# Patient Record
Sex: MALE | Race: WHITE | NOT HISPANIC OR LATINO | Employment: OTHER | URBAN - METROPOLITAN AREA
[De-identification: names, ages, dates, MRNs, and addresses within clinical notes are randomized per-mention and may not be internally consistent; named-entity substitution may affect disease eponyms.]

---

## 2017-01-06 DIAGNOSIS — J44.9 CHRONIC OBSTRUCTIVE PULMONARY DISEASE, UNSPECIFIED COPD TYPE (HCC): ICD-10-CM

## 2017-01-06 NOTE — TELEPHONE ENCOUNTER
Have we ever prescribed this med? No.  If yes, what date? To replace Anoro Ellipta, not covered    Last OV: 11/17/2016    Next OV: no pending    DX: COPD    Medications: Stiolto

## 2017-02-22 ENCOUNTER — HOSPITAL ENCOUNTER (OUTPATIENT)
Dept: LAB | Facility: MEDICAL CENTER | Age: 72
End: 2017-02-22
Attending: PHYSICIAN ASSISTANT
Payer: MEDICARE

## 2017-02-22 LAB
ALBUMIN SERPL BCP-MCNC: 4.1 G/DL (ref 3.2–4.9)
ALBUMIN/GLOB SERPL: 1.4 G/DL
ALP SERPL-CCNC: 63 U/L (ref 30–99)
ALT SERPL-CCNC: 14 U/L (ref 2–50)
ANION GAP SERPL CALC-SCNC: 5 MMOL/L (ref 0–11.9)
AST SERPL-CCNC: 16 U/L (ref 12–45)
BASOPHILS # BLD AUTO: 0.03 K/UL (ref 0–0.12)
BASOPHILS NFR BLD AUTO: 0.4 % (ref 0–1.8)
BILIRUB SERPL-MCNC: 0.6 MG/DL (ref 0.1–1.5)
BUN SERPL-MCNC: 21 MG/DL (ref 8–22)
CALCIUM SERPL-MCNC: 9.3 MG/DL (ref 8.5–10.5)
CHLORIDE SERPL-SCNC: 108 MMOL/L (ref 96–112)
CO2 SERPL-SCNC: 27 MMOL/L (ref 20–33)
CREAT SERPL-MCNC: 1 MG/DL (ref 0.5–1.4)
EOSINOPHIL # BLD: 0.06 K/UL (ref 0–0.51)
EOSINOPHIL NFR BLD AUTO: 0.8 % (ref 0–6.9)
ERYTHROCYTE [DISTWIDTH] IN BLOOD BY AUTOMATED COUNT: 47.2 FL (ref 35.9–50)
EST. AVERAGE GLUCOSE BLD GHB EST-MCNC: 128 MG/DL
GLOBULIN SER CALC-MCNC: 3 G/DL (ref 1.9–3.5)
GLUCOSE SERPL-MCNC: 102 MG/DL (ref 65–99)
HBA1C MFR BLD: 6.1 % (ref 0–5.6)
HCT VFR BLD AUTO: 52.6 % (ref 42–52)
HCV AB S/CO SERPL IA: NEGATIVE
HGB BLD-MCNC: 17.9 G/DL (ref 14–18)
IMM GRANULOCYTES # BLD AUTO: 0.02 K/UL (ref 0–0.11)
IMM GRANULOCYTES NFR BLD AUTO: 0.3 % (ref 0–0.9)
LYMPHOCYTES # BLD: 1.49 K/UL (ref 1–4.8)
LYMPHOCYTES NFR BLD AUTO: 20.2 % (ref 22–41)
MCH RBC QN AUTO: 32.8 PG (ref 27–33)
MCHC RBC AUTO-ENTMCNC: 34 G/DL (ref 33.7–35.3)
MCV RBC AUTO: 96.5 FL (ref 81.4–97.8)
MONOCYTES # BLD: 0.56 K/UL (ref 0–0.85)
MONOCYTES NFR BLD AUTO: 7.6 % (ref 0–13.4)
NEUTROPHILS # BLD: 5.21 K/UL (ref 1.82–7.42)
NEUTROPHILS NFR BLD AUTO: 70.7 % (ref 44–72)
NRBC # BLD AUTO: 0 K/UL
NRBC BLD-RTO: 0 /100 WBC
PLATELET # BLD AUTO: 139 K/UL (ref 164–446)
PMV BLD AUTO: 12.1 FL (ref 9–12.9)
POTASSIUM SERPL-SCNC: 4.7 MMOL/L (ref 3.6–5.5)
PROT SERPL-MCNC: 7.1 G/DL (ref 6–8.2)
RBC # BLD AUTO: 5.45 M/UL (ref 4.7–6.1)
SODIUM SERPL-SCNC: 140 MMOL/L (ref 135–145)
WBC # BLD AUTO: 7.4 K/UL (ref 4.8–10.8)

## 2017-02-22 PROCEDURE — 85025 COMPLETE CBC W/AUTO DIFF WBC: CPT

## 2017-02-22 PROCEDURE — 83036 HEMOGLOBIN GLYCOSYLATED A1C: CPT

## 2017-02-22 PROCEDURE — 80053 COMPREHEN METABOLIC PANEL: CPT

## 2017-02-22 PROCEDURE — 86803 HEPATITIS C AB TEST: CPT

## 2017-02-22 PROCEDURE — 36415 COLL VENOUS BLD VENIPUNCTURE: CPT

## 2017-02-23 ENCOUNTER — HOSPITAL ENCOUNTER (OUTPATIENT)
Facility: MEDICAL CENTER | Age: 72
End: 2017-02-23
Attending: FAMILY MEDICINE
Payer: MEDICARE

## 2017-02-23 ENCOUNTER — HOSPITAL ENCOUNTER (OUTPATIENT)
Facility: MEDICAL CENTER | Age: 72
End: 2017-02-23
Attending: PHYSICIAN ASSISTANT
Payer: MEDICARE

## 2017-02-23 PROCEDURE — 82274 ASSAY TEST FOR BLOOD FECAL: CPT

## 2017-02-24 LAB — HEMOCCULT STL QL IA: NEGATIVE

## 2017-03-13 ENCOUNTER — HOSPITAL ENCOUNTER (OUTPATIENT)
Dept: RADIOLOGY | Facility: MEDICAL CENTER | Age: 72
End: 2017-03-13
Attending: PHYSICIAN ASSISTANT
Payer: MEDICARE

## 2017-03-13 DIAGNOSIS — Z87.891 PERSONAL HISTORY OF TOBACCO USE, PRESENTING HAZARDS TO HEALTH: ICD-10-CM

## 2017-03-13 DIAGNOSIS — I71.40 ABDOMINAL AORTIC ANEURYSM (AAA) WITHOUT RUPTURE (HCC): ICD-10-CM

## 2017-03-13 PROCEDURE — 76775 US EXAM ABDO BACK WALL LIM: CPT

## 2017-04-27 ENCOUNTER — OFFICE VISIT (OUTPATIENT)
Dept: PULMONOLOGY | Facility: HOSPICE | Age: 72
End: 2017-04-27
Payer: MEDICARE

## 2017-04-27 VITALS
TEMPERATURE: 97.3 F | WEIGHT: 164 LBS | HEART RATE: 82 BPM | DIASTOLIC BLOOD PRESSURE: 70 MMHG | BODY MASS INDEX: 24.86 KG/M2 | RESPIRATION RATE: 16 BRPM | OXYGEN SATURATION: 91 % | SYSTOLIC BLOOD PRESSURE: 110 MMHG | HEIGHT: 68 IN

## 2017-04-27 DIAGNOSIS — J44.9 CHRONIC OBSTRUCTIVE PULMONARY DISEASE, UNSPECIFIED COPD TYPE (HCC): ICD-10-CM

## 2017-04-27 PROCEDURE — 4040F PNEUMOC VAC/ADMIN/RCVD: CPT | Performed by: NURSE PRACTITIONER

## 2017-04-27 PROCEDURE — 1101F PT FALLS ASSESS-DOCD LE1/YR: CPT | Mod: 8P | Performed by: NURSE PRACTITIONER

## 2017-04-27 PROCEDURE — 3017F COLORECTAL CA SCREEN DOC REV: CPT | Mod: 8P | Performed by: NURSE PRACTITIONER

## 2017-04-27 PROCEDURE — 99213 OFFICE O/P EST LOW 20 MIN: CPT | Performed by: NURSE PRACTITIONER

## 2017-04-27 PROCEDURE — G8432 DEP SCR NOT DOC, RNG: HCPCS | Performed by: NURSE PRACTITIONER

## 2017-04-27 PROCEDURE — 4004F PT TOBACCO SCREEN RCVD TLK: CPT | Mod: 8P | Performed by: NURSE PRACTITIONER

## 2017-04-27 PROCEDURE — G8420 CALC BMI NORM PARAMETERS: HCPCS | Performed by: NURSE PRACTITIONER

## 2017-04-27 NOTE — PATIENT INSTRUCTIONS
1. Continue Stiolto 2 inhalations daily and Ventolin 1-2 puffs every 4 hours as needed for shortness of breath.  2. Cessation encouraged.  3. Pulmonary function tests at next visit in 3 months.

## 2017-04-27 NOTE — MR AVS SNAPSHOT
"        Corona Bean   2017 11:20 AM   Office Visit   MRN: [id number]    Department:  [facility]   Dept Phone:  [phone]    Description:  Male : 1945   Provider:  BRISA Cabrales           Reason for Visit     Follow-Up 6 Mth Follow Up/ Pft not scheduled      Allergies as of 2017     No Known Allergies      You were diagnosed with     Chronic obstructive pulmonary disease, unspecified COPD type (CMS-HCC)   [[id number]]         Vital Signs     Blood Pressure Pulse Temperature Respirations Height Weight    110/70 mmHg 82 36.3 °C (97.3 °F) 16 1.727 m (5' 7.99\") 74.39 kg (164 lb)    Body Mass Index Oxygen Saturation Smoking Status             24.94 kg/m2 91% Current Every Day Smoker         Basic Information     Date Of Birth Sex Race Ethnicity Preferred Language    1945 Male White Non- English      Your appointments     Aug 01, 2017  1:00 PM   Pulmonary Function Test with PFT-RM2   [facility] (--)    [address] [place] [address] [address]  [place] [id number] [phone]            Aug 01, 2017  2:00 PM   Established Patient Pul with BRISA Cabrales   [facility] (--)    [address]  [facility] [address]  [place] [id number] [phone]              Problem List              ICD-10-CM Priority Class Noted - Resolved    COPD (chronic obstructive pulmonary disease) (CMS-HCC) J44.9   2017 - Present      Health Maintenance        Date Due Completion Dates    IMM DTaP/Tdap/Td Vaccine (1 - Tdap) 1965 ---    COLONOSCOPY 1996 ---    IMM ZOSTER VACCINE 2006 ---    IMM PNEUMOCOCCAL 65+ (ADULT) LOW/MEDIUM RISK SERIES (1 of 2 - PCV13) 2011 ---            Current Immunizations     13-VALENT PCV PREVNAR 2/10/2017    Influenza TIV (IM) 2016    Pneumococcal polysaccharide vaccine (PPSV-23) 2012      Below and/or attached are the medications your provider expects you to take. Review all of your home medications and newly " ordered medications with your provider and/or pharmacist. Follow medication instructions as directed by your provider and/or pharmacist. Please keep your medication list with you and share with your provider. Update the information when medications are discontinued, doses are changed, or new medications (including over-the-counter products) are added; and carry medication information at all times in the event of emergency situations     Allergies:  No Known Allergies          Medications  Valid as of: April 27, 2017 - 11:33 AM    Generic Name Brand Name Tablet Size Instructions for use    Albuterol Sulfate (Aero Soln) albuterol 108 (90 BASE) MCG/ACT Inhale 2 Puffs by mouth every 6 hours as needed for Shortness of Breath.        B Complex Vitamins   Take  by mouth.        Calcium Carbonate-Vit D-Min   Take  by mouth.        Doxycycline Hyclate (Cap) Doxycycline Hyclate 20 MG Take  by mouth.        MetFORMIN HCl (Tab) GLUCOPHAGE 500 MG Take 500 mg by mouth 2 times a day, with meals.        Pediatric Multivitamins-Fl   Take  by mouth.        Timolol Hemihydrate (Solution) BETIMOL 0.25 % Place 1 Drop in both eyes 2 times a day.        Tiotropium Bromide-Olodaterol (Aero Soln) Tiotropium Bromide-Olodaterol 2.5-2.5 MCG/ACT Inhale 2 Puffs by mouth every day. Indications: Rinse mouth after each use        Umeclidinium-Vilanterol (AEROSOL POWDER, BREATH ACTIVATED) Umeclidinium-Vilanterol 62.5-25 MCG/INH Take 1 Inhalation by mouth every day.        Vitamins A & D   Take  by mouth.        .                 Medicines prescribed today were sent to:     [facility]/PHARMACY #[id number] - [place], [place] - [address] [place] [id number]    Phone: [phone] Fax: [phone]    Open 24 Hours?: No      Medication refill instructions:       If your prescription bottle indicates you have medication refills left, it is not necessary to call your provider’s office. Please contact your pharmacy and  they will refill your medication.    If your prescription bottle indicates you do not have any refills left, you may request refills at any time through one of the following ways: The online [employer] system (except Urgent Care), by calling your provider’s office, or by asking your pharmacy to contact your provider’s office with a refill request. Medication refills are processed only during regular business hours and may not be available until the next business day. Your provider may request additional information or to have a follow-up visit with you prior to refilling your medication.   *Please Note: Medication refills are assigned a new Rx number when refilled electronically. Your pharmacy may indicate that no refills were authorized even though a new prescription for the same medication is available at the pharmacy. Please request the medicine by name with the pharmacy before contacting your provider for a refill.        Your To Do List     Future Labs/Procedures Complete By Expires    AMB PULMONARY FUNCTION TEST/LAB  7/27/2017 (Approximate) 4/27/2018      Instructions    1. Continue Stiolto 2 inhalations daily and Ventolin 1-2 puffs every 4 hours as needed for shortness of breath.  2. Cessation encouraged.  3. Pulmonary function tests at next visit in 3 months.               MyChart Status: Patient Declined        Quit Tobacco Information     Do you want to quit using tobacco?    Quitting tobacco decreases risks of cancer, heart and lung disease, increases life expectancy, improves sense of taste and smell, and increases spending money, among other benefits.    If you are thinking about quitting, we can help.  • [facility] Quit Tobacco Program: 164-814-4312  o Program occurs weekly for four weeks and includes pharmacist consultation on products to support quitting smoking or chewing tobacco. A provider referral is needed for pharmacist consultation.  • Tobacco Users Help Hotline: 0-443-QUIT-NOW (298-0935) or  https://[place].quitlogix.org/  o Free, confidential telephone and online coaching for [place] residents. Sessions are designed on a schedule that is convenient for you. Eligible clients receive free nicotine replacement therapy.  • Nationally: www.smokefree.gov  o Information and professional assistance to support both immediate and long-term needs as you become, and remain, a non-smoker. Smokefree.gov allows you to choose the help that best fits your needs.

## 2017-04-27 NOTE — PROGRESS NOTES
"Chief Complaint   Patient presents with   • Follow-Up     6 Mth Follow Up/ Pft not scheduled         HPI:  This is a 71 y.o. male with a history of chronic obstructive pulmonary disease. The patient is compliant with Stiolto 2 inhalations daily and Ventolin as needed. The patient currently smokes 3 packs of cigarettes per day. He recently decided he was going to quit smoking and went to days without smoking. He reports \"it wasn't as bad as I thought it would be.\" He has shortness of breath with exertion. He denies fevers, chills, sweats, and hemoptysis. He has a cough in the morning with minimal sputum production. He has occasional wheezing.    Past Medical History   Diagnosis Date   • Bronchitis    • COPD (chronic obstructive pulmonary disease) (CMS-[facility])    • Diabetes (CMS-HCC)    • Pneumonia    • Chickenpox    • Wolof measles    • Mumps    • Influenza    • Scarlet fever    • Bladder cancer (CMS-HCC)        Past Surgical History   Procedure Laterality Date   • Appendectomy     • Pr remv 2nd cataract,corn-scler sectn     • Prostatectomy robotic         Social History   Substance Use Topics   • Smoking status: Current Every Day Smoker -- 1.00 packs/day     Types: Cigarettes   • Smokeless tobacco: None   • Alcohol Use: No       ROS:   Constitutional: Denies fevers, chills, sweats, fatigue, and weight loss.  Eyes: Denies glasses.  Ears/nose/mouth/throat: Denies injury.  Cardiovascular: Denies chest pain, tightness.  Respiratory: See history of present illness.  GI: Denies heartburn, difficulty swallowing, nausea, and vomiting.  Neurological: Denies frequent headaches, dizziness, weakness.    Vitals:  Filed Vitals:    04/27/17 1103   Height: 1.727 m (5' 7.99\")   Weight: 74.39 kg (164 lb)   Weight % change since last entry.: 0 %   BP: 110/70   Pulse: 82   BMI (Calculated): 24.94   Resp: 16   Temp: 36.3 °C (97.3 °F)   O2 sat % room air: 91 %       Allergies:  Review of patient's allergies indicates no known " allergies.    Medications:  Current Outpatient Prescriptions   Medication Sig Dispense Refill   • Tiotropium Bromide-Olodaterol (STIOLTO RESPIMAT) 2.5-2.5 MCG/ACT Aero Soln Inhale 2 Puffs by mouth every day. Indications: Rinse mouth after each use 1 Inhaler 5   • albuterol (VENTOLIN HFA) 108 (90 BASE) MCG/ACT Aero Soln inhalation aerosol Inhale 2 Puffs by mouth every 6 hours as needed for Shortness of Breath.     • Umeclidinium-Vilanterol (ANORO ELLIPTA) 62.5-25 MCG/INH AEROSOL POWDER, BREATH ACTIVATED Take 1 Inhalation by mouth every day. (Patient not taking: Reported on 4/27/2017) 1 Each 5   • timolol (BETIMOL) 0.25 % ophthalmic solution Place 1 Drop in both eyes 2 times a day.     • Doxycycline Hyclate 20 MG Cap Take  by mouth.     • metformin (GLUCOPHAGE) 500 MG Tab Take 500 mg by mouth 2 times a day, with meals.     • Vitamins A & D (VITAMIN A & D PO) Take  by mouth.     • Calcium Carbonate-Vit D-Min (CALCIUM 1200 PO) Take  by mouth.     • B Complex Vitamins (B COMPLEX 50 PO) Take  by mouth.     • Pediatric Multivitamins-Fl (MULTI VIT/FL PO) Take  by mouth.       No current facility-administered medications for this visit.       PHYSICAL EXAM:  Appearance: Well-developed, well-nourished, no acute distress.  Eyes. PERRL.  Hearing: Grossly intact.  Oropharynx: Tongue normal, posterior pharynx without erythema or exudate.  Respiratory effort: No intercostal retractions or use of accessory muscles.  Lung auscultation: No crackles, wheezing.  Heart auscultation: No murmur, gallop, or rub. Regular rate and rhythm.  Extremities: No cyanosis or edema.  Gait and Station: Normal  Orientation: Oriented to time, place, and person.    Assessment:  1. Chronic obstructive pulmonary disease, unspecified COPD type (CMS-HCC)  AMB PULMONARY FUNCTION TEST/LAB         Plan:  1. Continue Stiolto 2 inhalations daily and Ventolin 1-2 puffs every 4 hours as needed for shortness of breath.  2. Cessation encouraged.  3. Pulmonary  function tests at next visit in 3 months.     Return in about 3 months (around 7/27/2017) for With results, With DEBRA Coon.

## 2017-06-15 ENCOUNTER — HOSPITAL ENCOUNTER (OUTPATIENT)
Dept: LAB | Facility: MEDICAL CENTER | Age: 72
End: 2017-06-15
Attending: PHYSICIAN ASSISTANT
Payer: MEDICARE

## 2017-06-15 ENCOUNTER — HOSPITAL ENCOUNTER (OUTPATIENT)
Dept: RADIOLOGY | Facility: MEDICAL CENTER | Age: 72
End: 2017-06-15
Attending: PHYSICIAN ASSISTANT
Payer: MEDICARE

## 2017-06-15 DIAGNOSIS — J98.4 OTHER PULMONARY INSUFFICIENCY, NOT ELSEWHERE CLASSIFIED: ICD-10-CM

## 2017-06-15 LAB
ALBUMIN SERPL BCP-MCNC: 3.8 G/DL (ref 3.2–4.9)
ALBUMIN/GLOB SERPL: 1.3 G/DL
ALP SERPL-CCNC: 61 U/L (ref 30–99)
ALT SERPL-CCNC: 13 U/L (ref 2–50)
ANION GAP SERPL CALC-SCNC: 5 MMOL/L (ref 0–11.9)
AST SERPL-CCNC: 13 U/L (ref 12–45)
BASOPHILS # BLD AUTO: 0.4 % (ref 0–1.8)
BASOPHILS # BLD: 0.03 K/UL (ref 0–0.12)
BILIRUB SERPL-MCNC: 0.5 MG/DL (ref 0.1–1.5)
BUN SERPL-MCNC: 22 MG/DL (ref 8–22)
CALCIUM SERPL-MCNC: 8.9 MG/DL (ref 8.5–10.5)
CHLORIDE SERPL-SCNC: 111 MMOL/L (ref 96–112)
CO2 SERPL-SCNC: 23 MMOL/L (ref 20–33)
CREAT SERPL-MCNC: 1.04 MG/DL (ref 0.5–1.4)
EOSINOPHIL # BLD AUTO: 0.07 K/UL (ref 0–0.51)
EOSINOPHIL NFR BLD: 0.9 % (ref 0–6.9)
ERYTHROCYTE [DISTWIDTH] IN BLOOD BY AUTOMATED COUNT: 46.8 FL (ref 35.9–50)
GFR SERPL CREATININE-BSD FRML MDRD: >60 ML/MIN/1.73 M 2
GLOBULIN SER CALC-MCNC: 2.9 G/DL (ref 1.9–3.5)
GLUCOSE SERPL-MCNC: 134 MG/DL (ref 65–99)
HCT VFR BLD AUTO: 51.1 % (ref 42–52)
HGB BLD-MCNC: 17.9 G/DL (ref 14–18)
IMM GRANULOCYTES # BLD AUTO: 0.03 K/UL (ref 0–0.11)
IMM GRANULOCYTES NFR BLD AUTO: 0.4 % (ref 0–0.9)
LYMPHOCYTES # BLD AUTO: 1.48 K/UL (ref 1–4.8)
LYMPHOCYTES NFR BLD: 19 % (ref 22–41)
MCH RBC QN AUTO: 33.1 PG (ref 27–33)
MCHC RBC AUTO-ENTMCNC: 35 G/DL (ref 33.7–35.3)
MCV RBC AUTO: 94.5 FL (ref 81.4–97.8)
MONOCYTES # BLD AUTO: 0.67 K/UL (ref 0–0.85)
MONOCYTES NFR BLD AUTO: 8.6 % (ref 0–13.4)
NEUTROPHILS # BLD AUTO: 5.51 K/UL (ref 1.82–7.42)
NEUTROPHILS NFR BLD: 70.7 % (ref 44–72)
NRBC # BLD AUTO: 0 K/UL
NRBC BLD AUTO-RTO: 0 /100 WBC
PLATELET # BLD AUTO: 132 K/UL (ref 164–446)
PMV BLD AUTO: 12.4 FL (ref 9–12.9)
POTASSIUM SERPL-SCNC: 4.3 MMOL/L (ref 3.6–5.5)
PROT SERPL-MCNC: 6.7 G/DL (ref 6–8.2)
RBC # BLD AUTO: 5.41 M/UL (ref 4.7–6.1)
SODIUM SERPL-SCNC: 139 MMOL/L (ref 135–145)
WBC # BLD AUTO: 7.8 K/UL (ref 4.8–10.8)

## 2017-06-15 PROCEDURE — 71020 DX-CHEST-2 VIEWS: CPT

## 2017-07-07 DIAGNOSIS — J44.9 CHRONIC OBSTRUCTIVE PULMONARY DISEASE, UNSPECIFIED COPD TYPE (HCC): ICD-10-CM

## 2017-07-07 RX ORDER — TIOTROPIUM BROMIDE AND OLODATEROL 3.124; 2.736 UG/1; UG/1
SPRAY, METERED RESPIRATORY (INHALATION)
Qty: 1 INHALER | Refills: 5 | Status: SHIPPED | OUTPATIENT
Start: 2017-07-07 | End: 2018-02-08 | Stop reason: SDUPTHER

## 2017-07-07 NOTE — TELEPHONE ENCOUNTER
Have we ever prescribed this med? yes.  If yes, what date? 1/6/17    Last OV: 4/27/17    Next OV: 8/1/17    DX: copd    Medications: Tiotropium Bromide-Olodaterol (STIOLTO RESPIMAT) 2.5-2.5 MCG/ACT Aero Sol

## 2017-08-01 ENCOUNTER — NON-PROVIDER VISIT (OUTPATIENT)
Dept: PULMONOLOGY | Facility: HOSPICE | Age: 72
End: 2017-08-01
Payer: MEDICARE

## 2017-08-01 ENCOUNTER — OFFICE VISIT (OUTPATIENT)
Dept: PULMONOLOGY | Facility: HOSPICE | Age: 72
End: 2017-08-01
Payer: MEDICARE

## 2017-08-01 VITALS
TEMPERATURE: 98.6 F | HEART RATE: 81 BPM | RESPIRATION RATE: 16 BRPM | OXYGEN SATURATION: 93 % | HEIGHT: 68 IN | WEIGHT: 164 LBS | DIASTOLIC BLOOD PRESSURE: 80 MMHG | BODY MASS INDEX: 24.86 KG/M2 | SYSTOLIC BLOOD PRESSURE: 110 MMHG

## 2017-08-01 DIAGNOSIS — J43.9 PULMONARY EMPHYSEMA, UNSPECIFIED EMPHYSEMA TYPE (HCC): ICD-10-CM

## 2017-08-01 DIAGNOSIS — J44.9 CHRONIC OBSTRUCTIVE PULMONARY DISEASE, UNSPECIFIED COPD TYPE (HCC): ICD-10-CM

## 2017-08-01 PROCEDURE — 94726 PLETHYSMOGRAPHY LUNG VOLUMES: CPT | Performed by: INTERNAL MEDICINE

## 2017-08-01 PROCEDURE — 94729 DIFFUSING CAPACITY: CPT | Performed by: INTERNAL MEDICINE

## 2017-08-01 PROCEDURE — 99213 OFFICE O/P EST LOW 20 MIN: CPT | Performed by: NURSE PRACTITIONER

## 2017-08-01 PROCEDURE — 94060 EVALUATION OF WHEEZING: CPT | Performed by: INTERNAL MEDICINE

## 2017-08-01 ASSESSMENT — PULMONARY FUNCTION TESTS
FEV1_PERCENT_PREDICTED: 39
FVC_PERCENT_PREDICTED: 57
FVC_PERCENT_PREDICTED: 68
FEV1: 1.11
FEV1/FVC_PERCENT_PREDICTED: 73
FEV1/FVC_PERCENT_CHANGE: 32
FEV1_PERCENT_PREDICTED: 37
FVC: 2.33
FVC_PREDICTED: 4.06
FEV1/FVC_PERCENT_PREDICTED: 65
FEV1: 1.19
FEV1_PERCENT_CHANGE: 6
FEV1/FVC: 42.81
FEV1_PERCENT_CHANGE: 19
FEV1_PREDICTED: 2.97
FEV1/FVC_PERCENT_PREDICTED: 57
FEV1/FVC: 48
FVC: 2.78

## 2017-08-01 NOTE — PROGRESS NOTES
"Chief Complaint   Patient presents with   • COPD     PFT         HPI:  This is a 71 y.o. male with a history of chronic obstructive pulmonary disease. Pulmonary function tests from 8/1/17 indicate FEV1 0.11 L, 37% predicted, FEV1 4/FVC 48%, and DLCO 84% predicted. The patient is compliant with Stiolto 2 inhalations daily. He has shortness of breath with exertion. He experiences a productive cough in the morning. He is smoking 3 packs per day. He is not really interested in quitting. He denies fevers, chills, sweats, and hemoptysis.    Past Medical History   Diagnosis Date   • Bronchitis    • COPD (chronic obstructive pulmonary disease) (CMS-HCC)    • Diabetes (CMS-HCC)    • Pneumonia    • Chickenpox    • Malay measles    • Mumps    • Influenza    • Scarlet fever    • Bladder cancer (CMS-HCC)        Past Surgical History   Procedure Laterality Date   • Appendectomy     • Pr remv 2nd cataract,corn-scler sectn     • Prostatectomy robotic         Social History   Substance Use Topics   • Smoking status: Current Every Day Smoker -- 1.00 packs/day     Types: Cigarettes   • Smokeless tobacco: None   • Alcohol Use: No       ROS:   Constitutional: Denies fevers, chills, sweats, fatigue, and weight loss.  Eyes: Denies glasses.  Ears/nose/mouth/throat: Denies injury.  Cardiovascular: Denies chest pain, tightness.  Respiratory: See history of present illness.  GI: Denies heartburn, difficulty swallowing, nausea, and vomiting.  Neurological: Denies frequent headaches, dizziness, weakness.    Vitals:  Filed Vitals:    08/01/17 1332   Height: 1.727 m (5' 7.99\")   Weight: 74.39 kg (164 lb)   Weight % change since last entry.: 0 %   BP: 110/80   Pulse: 81   BMI (Calculated): 24.94   Resp: 16   Temp: 37 °C (98.6 °F)   O2 sat % room air: 93 %       Allergies:  Review of patient's allergies indicates no known allergies.    Medications:  Current Outpatient Prescriptions   Medication Sig Dispense Refill   • STIOLTO RESPIMAT 2.5-2.5 " MCG/ACT Aero Soln INHALE 2 PUFFS BY MOUTH EVERY DAY. INDICATIONS: RINSE MOUTH AFTER EACH USE 1 Inhaler 5   • Umeclidinium-Vilanterol (ANORO ELLIPTA) 62.5-25 MCG/INH AEROSOL POWDER, BREATH ACTIVATED Take 1 Inhalation by mouth every day. (Patient not taking: Reported on 4/27/2017) 1 Each 5   • albuterol (VENTOLIN HFA) 108 (90 BASE) MCG/ACT Aero Soln inhalation aerosol Inhale 2 Puffs by mouth every 6 hours as needed for Shortness of Breath.     • timolol (BETIMOL) 0.25 % ophthalmic solution Place 1 Drop in both eyes 2 times a day.     • Doxycycline Hyclate 20 MG Cap Take  by mouth.     • metformin (GLUCOPHAGE) 500 MG Tab Take 500 mg by mouth 2 times a day, with meals.     • Vitamins A & D (VITAMIN A & D PO) Take  by mouth.     • Calcium Carbonate-Vit D-Min (CALCIUM 1200 PO) Take  by mouth.     • B Complex Vitamins (B COMPLEX 50 PO) Take  by mouth.     • Pediatric Multivitamins-Fl (MULTI VIT/FL PO) Take  by mouth.       No current facility-administered medications for this visit.       PHYSICAL EXAM:  Appearance: Well-developed, well-nourished, no acute distress.  Eyes. PERRL.  Hearing: Grossly intact.  Oropharynx: Tongue normal, posterior pharynx without erythema or exudate.  Respiratory effort: No intercostal retractions or use of accessory muscles.  Lung auscultation: No crackles, wheezing.  Heart auscultation: No murmur, gallop, or rub. Regular rate and rhythm.  Extremities: No cyanosis or edema.  Gait and Station: Normal  Orientation: Oriented to time, place, and person.    Assessment:  1. Chronic obstructive pulmonary disease, unspecified COPD type (CMS-HCC)           Plan:  1. Continue Stiolto 2 inhalations daily and Ventolin 2 inhalations every 4 hours as needed for shortness of breath or difficulty breathing.  2. Patient is encouraged to quit smoking or at least trying cut back.     Return in about 6 months (around 2/1/2018) for With DEBRA Coon.

## 2017-08-01 NOTE — MR AVS SNAPSHOT
"        Corona Bean   2017 2:00 PM   Office Visit   MRN: [id number]    Department:  [facility]   Dept Phone:  [phone]    Description:  Male : 1945   Provider:  BRISA Cabrales           Reason for Visit     COPD PFT      Allergies as of 2017     No Known Allergies      You were diagnosed with     Chronic obstructive pulmonary disease, unspecified COPD type (CMS-HCC)   [[id number]]         Vital Signs     Blood Pressure Pulse Temperature Respirations Height Weight    110/80 mmHg 81 37 °C (98.6 °F) 16 1.727 m (5' 7.99\") 74.39 kg (164 lb)    Body Mass Index Oxygen Saturation Smoking Status             24.94 kg/m2 93% Current Every Day Smoker         Basic Information     Date Of Birth Sex Race Ethnicity Preferred Language    1945 Male White Non- English      Your appointments     2018 11:40 AM   Established Patient Pul with BRISA Cabrales   [facility] (--)    [address] [place] [address] [address]  [facility] [address]   [phone]              Problem List              ICD-10-CM Priority Class Noted - Resolved    COPD (chronic obstructive pulmonary disease) (CMS-HCC) J44.9   2017 - Present      Health Maintenance        Date Due Completion Dates    IMM DTaP/Tdap/Td Vaccine (1 - Tdap) 1965 ---    COLONOSCOPY 1996 ---    IMM ZOSTER VACCINE 2006 ---    IMM INFLUENZA (1) 2017            Current Immunizations     13-VALENT PCV PREVNAR 2/10/2017    Influenza TIV (IM) 2016    Pneumococcal polysaccharide vaccine (PPSV-23) 2012      Below and/or attached are the medications your provider expects you to take. Review all of your home medications and newly ordered medications with your provider and/or pharmacist. Follow medication instructions as directed by your provider and/or pharmacist. Please keep your medication list with you and share with your provider. Update the information when medications are discontinued, " doses are changed, or new medications (including over-the-counter products) are added; and carry medication information at all times in the event of emergency situations     Allergies:  No Known Allergies          Medications  Valid as of: August 01, 2017 -  2:08 PM    Generic Name Brand Name Tablet Size Instructions for use    Albuterol Sulfate (Aero Soln) albuterol 108 (90 BASE) MCG/ACT Inhale 2 Puffs by mouth every 6 hours as needed for Shortness of Breath.        B Complex Vitamins   Take  by mouth.        Calcium Carbonate-Vit D-Min   Take  by mouth.        Doxycycline Hyclate (Cap) Doxycycline Hyclate 20 MG Take  by mouth.        MetFORMIN HCl (Tab) GLUCOPHAGE 500 MG Take 500 mg by mouth 2 times a day, with meals.        Pediatric Multivitamins-Fl   Take  by mouth.        Timolol Hemihydrate (Solution) BETIMOL 0.25 % Place 1 Drop in both eyes 2 times a day.        Tiotropium Bromide-Olodaterol (Aero Soln) STIOLTO RESPIMAT 2.5-2.5 MCG/ACT INHALE 2 PUFFS BY MOUTH EVERY DAY. INDICATIONS: RINSE MOUTH AFTER EACH USE        Umeclidinium-Vilanterol (AEROSOL POWDER, BREATH ACTIVATED) Umeclidinium-Vilanterol 62.5-25 MCG/INH Take 1 Inhalation by mouth every day.        Vitamins A & D   Take  by mouth.        .                 Medicines prescribed today were sent to:     [facility]/PHARMACY #[id number] - [place], [place] - [address] [place] [address] AT [address] [address]    Phone: [phone] Fax: [phone]    Open 24 Hours?: No      Medication refill instructions:       If your prescription bottle indicates you have medication refills left, it is not necessary to call your provider’s office. Please contact your pharmacy and they will refill your medication.    If your prescription bottle indicates you do not have any refills left, you may request refills at any time through one of the following ways: The online [employer] system (except Urgent Care), by calling your provider’s office, or by asking  your pharmacy to contact your provider’s office with a refill request. Medication refills are processed only during regular business hours and may not be available until the next business day. Your provider may request additional information or to have a follow-up visit with you prior to refilling your medication.   *Please Note: Medication refills are assigned a new Rx number when refilled electronically. Your pharmacy may indicate that no refills were authorized even though a new prescription for the same medication is available at the pharmacy. Please request the medicine by name with the pharmacy before contacting your provider for a refill.        Instructions    1. Continue Stiolto 2 inhalations daily and Ventolin 2 inhalations every 4 hours as needed for shortness of breath or difficulty breathing.  2. Patient is encouraged to quit smoking or at least trying cut back.               MyChart Status: Patient Declined        Quit Tobacco Information     Do you want to quit using tobacco?    Quitting tobacco decreases risks of cancer, heart and lung disease, increases life expectancy, improves sense of taste and smell, and increases spending money, among other benefits.    If you are thinking about quitting, we can help.  • [facility] Quit Tobacco Program: 484.312.7533  o Program occurs weekly for four weeks and includes pharmacist consultation on products to support quitting smoking or chewing tobacco. A provider referral is needed for pharmacist consultation.  • Tobacco Users Help Hotline: 8-084-QUITNOW (936-7331) or https://[place].quitlogix.org/  o Free, confidential telephone and online coaching for [place] residents. Sessions are designed on a schedule that is convenient for you. Eligible clients receive free nicotine replacement therapy.  • Nationally: www.smokefree.gov  o Information and professional assistance to support both immediate and long-term needs as you become, and remain, a non-smoker. Smokefree.gov  allows you to choose the help that best fits your needs.

## 2017-08-01 NOTE — PROCEDURES
Good patient effort & cooperation.  The results of this test meet the ATS/ERS standards for acceptability and repeatability.  A bronchodilator of Ventolin HFA- 2puffs via spacer were administered.  The DLCO was uncorrected for Hgb.    FEV1 is 1.11 L which is severely reduced at 37% of predicted. FEV1 FVC ratio is reduced at 48% of predicted. MVV is 37% of predicted.  After bronchodilator there is a 19% improvement in FVC and a 6% improvement in FEV1.  Lung volumes demonstrate hyperinflation.  Diffusing capacity is 84% predicted.  Airways resistance is mildly increased.    Severe obstructive lung disease. Partial reversibility is noted on the study.

## 2017-08-01 NOTE — PATIENT INSTRUCTIONS
1. Continue Stiolto 2 inhalations daily and Ventolin 2 inhalations every 4 hours as needed for shortness of breath or difficulty breathing.  2. Patient is encouraged to quit smoking or at least trying cut back.

## 2017-08-01 NOTE — MR AVS SNAPSHOT
Corona MARLENE Bean   2017 1:00 PM   Non-Provider Visit   MRN: [id number]    Department:  [facility]   Dept Phone:  [phone]    Description:  Male : 1945   Provider:  Dante Sosa M.D.           Reason for Visit     COPD     Shortness of Breath           Allergies as of 2017     No Known Allergies      You were diagnosed with     Pulmonary emphysema, unspecified emphysema type (CMS-HCC)   [6149581]         Vital Signs     Smoking Status                   Current Every Day Smoker           Basic Information     Date Of Birth Sex Race Ethnicity Preferred Language    1945 Male White Non- English      Your appointments     Aug 01, 2017  2:00 PM   Established Patient Pul with BRISA Cabrales   [facility] (--)    [address]  [address]  [facility] [id number] [phone]              Problem List              ICD-10-CM Priority Class Noted - Resolved    COPD (chronic obstructive pulmonary disease) (CMS-HCC) J44.9   2017 - Present      Health Maintenance        Date Due Completion Dates    IMM DTaP/Tdap/Td Vaccine (1 - Tdap) 1965 ---    COLONOSCOPY 1996 ---    IMM ZOSTER VACCINE 2006 ---    IMM INFLUENZA (1) 2017            Current Immunizations     13-VALENT PCV PREVNAR 2/10/2017    Influenza TIV (IM) 2016    Pneumococcal polysaccharide vaccine (PPSV-23) 2012      Below and/or attached are the medications your provider expects you to take. Review all of your home medications and newly ordered medications with your provider and/or pharmacist. Follow medication instructions as directed by your provider and/or pharmacist. Please keep your medication list with you and share with your provider. Update the information when medications are discontinued, doses are changed, or new medications (including over-the-counter products) are added; and carry medication information at all times in the event of emergency  situations     Allergies:  No Known Allergies          Medications  Valid as of: August 01, 2017 -  1:55 PM    Generic Name Brand Name Tablet Size Instructions for use    Albuterol Sulfate (Aero Soln) albuterol 108 (90 BASE) MCG/ACT Inhale 2 Puffs by mouth every 6 hours as needed for Shortness of Breath.        B Complex Vitamins   Take  by mouth.        Calcium Carbonate-Vit D-Min   Take  by mouth.        Doxycycline Hyclate (Cap) Doxycycline Hyclate 20 MG Take  by mouth.        MetFORMIN HCl (Tab) GLUCOPHAGE 500 MG Take 500 mg by mouth 2 times a day, with meals.        Pediatric Multivitamins-Fl   Take  by mouth.        Timolol Hemihydrate (Solution) BETIMOL 0.25 % Place 1 Drop in both eyes 2 times a day.        Tiotropium Bromide-Olodaterol (Aero Soln) STIOLTO RESPIMAT 2.5-2.5 MCG/ACT INHALE 2 PUFFS BY MOUTH EVERY DAY. INDICATIONS: RINSE MOUTH AFTER EACH USE        Umeclidinium-Vilanterol (AEROSOL POWDER, BREATH ACTIVATED) Umeclidinium-Vilanterol 62.5-25 MCG/INH Take 1 Inhalation by mouth every day.        Vitamins A & D   Take  by mouth.        .                 Medicines prescribed today were sent to:     [id number] [place], [place] - [address] [place] [id number]    Phone: [phone] Fax: [phone]    Open 24 Hours?: No      Medication refill instructions:       If your prescription bottle indicates you have medication refills left, it is not necessary to call your provider’s office. Please contact your pharmacy and they will refill your medication.    If your prescription bottle indicates you do not have any refills left, you may request refills at any time through one of the following ways: The online [employer] system (except Urgent Care), by calling your provider’s office, or by asking your pharmacy to contact your provider’s office with a refill request. Medication refills are processed only during regular business hours and may not be  available until the next business day. Your provider may request additional information or to have a follow-up visit with you prior to refilling your medication.   *Please Note: Medication refills are assigned a new Rx number when refilled electronically. Your pharmacy may indicate that no refills were authorized even though a new prescription for the same medication is available at the pharmacy. Please request the medicine by name with the pharmacy before contacting your provider for a refill.           MyChart Status: Patient Declined        Quit Tobacco Information     Do you want to quit using tobacco?    Quitting tobacco decreases risks of cancer, heart and lung disease, increases life expectancy, improves sense of taste and smell, and increases spending money, among other benefits.    If you are thinking about quitting, we can help.  • [employer] Quit Tobacco Program: [phone]  o Program occurs weekly for four weeks and includes pharmacist consultation on products to support quitting smoking or chewing tobacco. A provider referral is needed for pharmacist consultation.  • Tobacco Users Help Hotline: 2-274-QUIT-NOW (002-1500) or https://[place].quitlogix.org/  o Free, confidential telephone and online coaching for [place] residents. Sessions are designed on a schedule that is convenient for you. Eligible clients receive free nicotine replacement therapy.  • Nationally: www.smokefree.gov  o Information and professional assistance to support both immediate and long-term needs as you become, and remain, a non-smoker. Smokefree.gov allows you to choose the help that best fits your needs.

## 2018-02-05 ENCOUNTER — APPOINTMENT (OUTPATIENT)
Dept: PULMONOLOGY | Facility: HOSPICE | Age: 73
End: 2018-02-05
Payer: MEDICARE

## 2018-02-15 ENCOUNTER — HOSPITAL ENCOUNTER (OUTPATIENT)
Dept: LAB | Facility: MEDICAL CENTER | Age: 73
End: 2018-02-15
Attending: UROLOGY
Payer: MEDICARE

## 2018-02-15 LAB
ALBUMIN SERPL BCP-MCNC: 4.2 G/DL (ref 3.2–4.9)
ALBUMIN/GLOB SERPL: 1.6 G/DL
ALP SERPL-CCNC: 55 U/L (ref 30–99)
ALT SERPL-CCNC: 11 U/L (ref 2–50)
ANION GAP SERPL CALC-SCNC: 6 MMOL/L (ref 0–11.9)
AST SERPL-CCNC: 15 U/L (ref 12–45)
BILIRUB SERPL-MCNC: 0.5 MG/DL (ref 0.1–1.5)
BUN SERPL-MCNC: 21 MG/DL (ref 8–22)
CALCIUM SERPL-MCNC: 9.4 MG/DL (ref 8.5–10.5)
CHLORIDE SERPL-SCNC: 107 MMOL/L (ref 96–112)
CO2 SERPL-SCNC: 24 MMOL/L (ref 20–33)
CREAT SERPL-MCNC: 1.19 MG/DL (ref 0.5–1.4)
ERYTHROCYTE [DISTWIDTH] IN BLOOD BY AUTOMATED COUNT: 47.8 FL (ref 35.9–50)
GLOBULIN SER CALC-MCNC: 2.7 G/DL (ref 1.9–3.5)
GLUCOSE SERPL-MCNC: 131 MG/DL (ref 65–99)
HCT VFR BLD AUTO: 54 % (ref 42–52)
HGB BLD-MCNC: 18.4 G/DL (ref 14–18)
MCH RBC QN AUTO: 33 PG (ref 27–33)
MCHC RBC AUTO-ENTMCNC: 34.1 G/DL (ref 33.7–35.3)
MCV RBC AUTO: 96.9 FL (ref 81.4–97.8)
PLATELET # BLD AUTO: 150 K/UL (ref 164–446)
PMV BLD AUTO: 12 FL (ref 9–12.9)
POTASSIUM SERPL-SCNC: 4.5 MMOL/L (ref 3.6–5.5)
PROT SERPL-MCNC: 6.9 G/DL (ref 6–8.2)
RBC # BLD AUTO: 5.57 M/UL (ref 4.7–6.1)
SODIUM SERPL-SCNC: 137 MMOL/L (ref 135–145)
WBC # BLD AUTO: 8.7 K/UL (ref 4.8–10.8)

## 2018-02-15 PROCEDURE — 85027 COMPLETE CBC AUTOMATED: CPT

## 2018-02-15 PROCEDURE — 80053 COMPREHEN METABOLIC PANEL: CPT

## 2018-02-15 PROCEDURE — 36415 COLL VENOUS BLD VENIPUNCTURE: CPT

## 2018-04-06 ENCOUNTER — APPOINTMENT (OUTPATIENT)
Dept: PULMONOLOGY | Facility: HOSPICE | Age: 73
End: 2018-04-06
Payer: MEDICARE

## 2018-05-18 ENCOUNTER — OFFICE VISIT (OUTPATIENT)
Dept: PULMONOLOGY | Facility: HOSPICE | Age: 73
End: 2018-05-18
Payer: MEDICARE

## 2018-05-18 VITALS
DIASTOLIC BLOOD PRESSURE: 62 MMHG | BODY MASS INDEX: 23.04 KG/M2 | TEMPERATURE: 98.2 F | SYSTOLIC BLOOD PRESSURE: 118 MMHG | WEIGHT: 152 LBS | OXYGEN SATURATION: 93 % | RESPIRATION RATE: 16 BRPM | HEART RATE: 83 BPM | HEIGHT: 68 IN

## 2018-05-18 DIAGNOSIS — F17.200 CURRENT SMOKER: ICD-10-CM

## 2018-05-18 DIAGNOSIS — Z85.51 HISTORY OF BLADDER CANCER: ICD-10-CM

## 2018-05-18 DIAGNOSIS — J44.9 CHRONIC OBSTRUCTIVE PULMONARY DISEASE, UNSPECIFIED COPD TYPE (HCC): ICD-10-CM

## 2018-05-18 DIAGNOSIS — H40.9 GLAUCOMA OF RIGHT EYE, UNSPECIFIED GLAUCOMA TYPE: ICD-10-CM

## 2018-05-18 PROCEDURE — 99406 BEHAV CHNG SMOKING 3-10 MIN: CPT | Performed by: NURSE PRACTITIONER

## 2018-05-18 PROCEDURE — 99214 OFFICE O/P EST MOD 30 MIN: CPT | Mod: 25 | Performed by: NURSE PRACTITIONER

## 2018-05-18 RX ORDER — GLIMEPIRIDE 2 MG/1
1 TABLET ORAL 2 TIMES DAILY
Refills: 3 | COMMUNITY

## 2018-05-18 NOTE — PROGRESS NOTES
Chief Complaint   Patient presents with   • Follow-Up     6 months        HPI:  Corona Bean is a 72 y.o. year old male here today for follow-up on Stage 3 COPD. PFT 8/1/2017 with FEV1 1.11 L or 37% predicted, FEV1 CHF C ratio 48, % predicted, % predicted, and a DLCO of 84% predicted. Patient did have a positive bronchodilator response. Patient is compliant with Stiolto 2 puffs once daily and albuterol HFA inhaler up to 2 times daily. He continues to smoke 3 packs of cigarettes daily. He said he smoked when he is 15 years old and quit for 11 years at one point but resumed smoking. He would like to quit smoking but is not anxious at this time. Most recent chest x-ray indicates no acute process with hyperinflation noted. He notes shortness of breath on exertion with some days better worse. Patient has occasional wheezing mainly when he lays down. He denies chest pain or tightness. He denies pedal edema.    He also has a history of bladder cancer with bladder removed in 2014 by Dr. Armenta. 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     ROS: As per HPI and otherwise negative if not stated.    Past Medical History:   Diagnosis Date   • Bladder cancer (HCC)    • Bronchitis    • Chickenpox    • COPD (chronic obstructive pulmonary disease) (HCC)    • Diabetes (HCC)    • Pashto measles    • Influenza    • Mumps    • Pneumonia    • Scarlet fever        Past Surgical History:   Procedure Laterality Date   • APPENDECTOMY     • PB REMV 2ND CATARACT,CORN-SCLER SECTN     • PROSTATECTOMY ROBOTIC         Family History   Problem Relation Age of Onset   • Cancer Mother        Social History     Social History   • Marital status: Single     Spouse name: N/A   • Number of children: N/A   • Years of education: N/A     Occupational History   • Not on file.     Social History Main Topics   • Smoking status: Current Every Day Smoker     Packs/day: 3.00     Years: 46.00     Types: Cigarettes   • Smokeless tobacco: Never Used   • Alcohol use No  "  • Drug use: No   • Sexual activity: Not on file     Other Topics Concern   • Not on file     Social History Narrative   • No narrative on file       Allergies as of 05/18/2018   • (No Known Allergies)        @Vital signs for this encounter:  Vitals:    05/18/18 1512   Height: 1.727 m (5' 7.99\")   Weight: 68.9 kg (152 lb)   Weight % change since last entry.: 0 %   BP: 118/62   Pulse: 83   BMI (Calculated): 23.12   Resp: 16   Temp: 36.8 °C (98.2 °F)   O2 sat % room air: 93 %       Current medications as of today   Current Outpatient Prescriptions   Medication Sig Dispense Refill   • timolol (TIMOPTIC) 0.25 % Solution PLACE 1 DROP IN THE RIGHT EYE TWICE A DAY AS DIRECTED  3   • Fluticasone-Umeclidin-Vilant (TRELEGY ELLIPTA) 100-62.5-25 MCG/INH AEROSOL POWDER, BREATH ACTIVATED Inhale 1 Puff by mouth every day. 2 Each 0   • Tiotropium Bromide-Olodaterol (STIOLTO RESPIMAT) 2.5-2.5 MCG/ACT Aero Soln Inhale 2 Puffs by mouth every day. 1 Inhaler 11   • albuterol (VENTOLIN HFA) 108 (90 BASE) MCG/ACT Aero Soln inhalation aerosol Inhale 2 Puffs by mouth every 6 hours as needed for Shortness of Breath.     • timolol (BETIMOL) 0.25 % ophthalmic solution Place 1 Drop in both eyes 2 times a day.     • metformin (GLUCOPHAGE) 500 MG Tab Take 500 mg by mouth 2 times a day, with meals.     • Vitamins A & D (VITAMIN A & D PO) Take  by mouth.     • Calcium Carbonate-Vit D-Min (CALCIUM 1200 PO) Take  by mouth.     • B Complex Vitamins (B COMPLEX 50 PO) Take  by mouth.     • Pediatric Multivitamins-Fl (MULTI VIT/FL PO) Take  by mouth.     • Doxycycline Hyclate 20 MG Cap Take  by mouth.       No current facility-administered medications for this visit.          Physical Exam:   Gen:           Alert and oriented, No apparent distress. Mood and affect appropriate, normal interaction with examiner.  Eyes:          PERRL, EOM intact, sclere white, conjunctive moist.  Ears:          Not examined.   Hearing:     Grossly intact.  Nose:        "   Normal, no lesions or deformities.  Dentition:    Good dentition.  Oropharynx:   Tongue normal, posterior pharynx without erythema or exudate.  Mallampati Classification: 2/3  Neck:        Supple, trachea midline, no masses.  Respiratory Effort: No intercostal retractions or use of accessory muscles.   Lung Auscultation:      Significantly diminished t/o; no rales, rhonchi or wheezing.  CV:            Regular rate and rhythm. No murmurs, rubs or gallops.  Abd:           Not examined.   Lymphadenopathy: Not examined.  Gait and Station: Normal.  Digits and Nails: No clubbing, cyanosis, petechiae, or nodes.   Cranial Nerves: II-XII grossly intact.  Skin:        No rashes, lesions or ulcers noted.               Ext:           No cyanosis or edema.      Assessment:  1. Chronic obstructive pulmonary disease, unspecified COPD type (HCC)  REFERRAL TO LUNG CANCER SCREENING PROGRAM   2. Current smoker  PB TOBACCO CESSATION COUNSELING 3-10 MIN    REFERRAL TO LUNG CANCER SCREENING PROGRAM   3. History of bladder cancer  REFERRAL TO LUNG CANCER SCREENING PROGRAM   4. Glaucoma of right eye, unspecified glaucoma type         Immunizations:    Flu:10/2017  Pneumovax 23:2012  Prevnar 13:2017    Plan:  1. Continue Stiolto 2 puffs QD and HEENA prn. Patient would like to try Trelegy 1 puff QD. RX for samples sent. Patient will report back if more beneficial than Stiolto to then check cost.  2. Strongly encouraged smoking cessation. Declined smoking cessation progrm.  3. Referral to LDCT.  4. Discussed respiratory hygiene.  5. Follow up in 6 months with CT, sooner if needed.

## 2018-06-05 ENCOUNTER — TELEPHONE (OUTPATIENT)
Dept: HEMATOLOGY ONCOLOGY | Facility: MEDICAL CENTER | Age: 73
End: 2018-06-05

## 2018-06-05 NOTE — TELEPHONE ENCOUNTER
Received referral to lung cancer screening program.  Chart review to assess for lung cancer screening program eligibility.   1. Age 55-77 yrs of age? Yes 72 y.o.  2. 30 pack year hx of smoking, or greater? Yes 3 ppdx 46 yrs= 138 pkyr hx  3. Current smoker or if quit, has pt quit within last 15 yrs?Yes  Current Smoker  4. Any signs or symptoms of lung cancer? None noted COPD  5. Previous history of lung cancer? None noted- Hx of Bladder cancer  6. Chest CT within past 12 mos.? None noted  Patient does meet eligibility criteria. [facility] scheduling notified to schedule the shared decision making visit.

## 2018-06-13 ENCOUNTER — HOSPITAL ENCOUNTER (OUTPATIENT)
Dept: LAB | Facility: MEDICAL CENTER | Age: 73
End: 2018-06-13
Attending: FAMILY MEDICINE
Payer: MEDICARE

## 2018-06-13 LAB
ALBUMIN SERPL BCP-MCNC: 4.3 G/DL (ref 3.2–4.9)
ALBUMIN/GLOB SERPL: 1.4 G/DL
ALP SERPL-CCNC: 61 U/L (ref 30–99)
ALT SERPL-CCNC: 25 U/L (ref 2–50)
ANION GAP SERPL CALC-SCNC: 4 MMOL/L (ref 0–11.9)
AST SERPL-CCNC: 22 U/L (ref 12–45)
BASOPHILS # BLD AUTO: 0.3 % (ref 0–1.8)
BASOPHILS # BLD: 0.03 K/UL (ref 0–0.12)
BILIRUB SERPL-MCNC: 0.6 MG/DL (ref 0.1–1.5)
BUN SERPL-MCNC: 24 MG/DL (ref 8–22)
CALCIUM SERPL-MCNC: 9.3 MG/DL (ref 8.5–10.5)
CHLORIDE SERPL-SCNC: 113 MMOL/L (ref 96–112)
CHOLEST SERPL-MCNC: 186 MG/DL (ref 100–199)
CO2 SERPL-SCNC: 20 MMOL/L (ref 20–33)
CREAT SERPL-MCNC: 1.04 MG/DL (ref 0.5–1.4)
EOSINOPHIL # BLD AUTO: 0.04 K/UL (ref 0–0.51)
EOSINOPHIL NFR BLD: 0.5 % (ref 0–6.9)
ERYTHROCYTE [DISTWIDTH] IN BLOOD BY AUTOMATED COUNT: 47.7 FL (ref 35.9–50)
EST. AVERAGE GLUCOSE BLD GHB EST-MCNC: 131 MG/DL
GLOBULIN SER CALC-MCNC: 3 G/DL (ref 1.9–3.5)
GLUCOSE SERPL-MCNC: 122 MG/DL (ref 65–99)
HBA1C MFR BLD: 6.2 % (ref 0–5.6)
HCT VFR BLD AUTO: 53 % (ref 42–52)
HDLC SERPL-MCNC: 33 MG/DL
HGB BLD-MCNC: 18.1 G/DL (ref 14–18)
IMM GRANULOCYTES # BLD AUTO: 0.03 K/UL (ref 0–0.11)
IMM GRANULOCYTES NFR BLD AUTO: 0.3 % (ref 0–0.9)
LDLC SERPL CALC-MCNC: 131 MG/DL
LYMPHOCYTES # BLD AUTO: 1.67 K/UL (ref 1–4.8)
LYMPHOCYTES NFR BLD: 19.3 % (ref 22–41)
MCH RBC QN AUTO: 32.8 PG (ref 27–33)
MCHC RBC AUTO-ENTMCNC: 34.2 G/DL (ref 33.7–35.3)
MCV RBC AUTO: 96 FL (ref 81.4–97.8)
MONOCYTES # BLD AUTO: 0.77 K/UL (ref 0–0.85)
MONOCYTES NFR BLD AUTO: 8.9 % (ref 0–13.4)
NEUTROPHILS # BLD AUTO: 6.12 K/UL (ref 1.82–7.42)
NEUTROPHILS NFR BLD: 70.7 % (ref 44–72)
NRBC # BLD AUTO: 0 K/UL
NRBC BLD-RTO: 0 /100 WBC
PLATELET # BLD AUTO: 162 K/UL (ref 164–446)
PMV BLD AUTO: 12.2 FL (ref 9–12.9)
POTASSIUM SERPL-SCNC: 4.7 MMOL/L (ref 3.6–5.5)
PROT SERPL-MCNC: 7.3 G/DL (ref 6–8.2)
RBC # BLD AUTO: 5.52 M/UL (ref 4.7–6.1)
SODIUM SERPL-SCNC: 137 MMOL/L (ref 135–145)
TRIGL SERPL-MCNC: 108 MG/DL (ref 0–149)
TSH SERPL DL<=0.005 MIU/L-ACNC: 2.66 UIU/ML (ref 0.38–5.33)
WBC # BLD AUTO: 8.7 K/UL (ref 4.8–10.8)

## 2018-06-13 PROCEDURE — 36415 COLL VENOUS BLD VENIPUNCTURE: CPT

## 2018-06-13 PROCEDURE — 84443 ASSAY THYROID STIM HORMONE: CPT

## 2018-06-13 PROCEDURE — 80061 LIPID PANEL: CPT

## 2018-06-13 PROCEDURE — 85025 COMPLETE CBC W/AUTO DIFF WBC: CPT

## 2018-06-13 PROCEDURE — 80053 COMPREHEN METABOLIC PANEL: CPT

## 2018-06-13 PROCEDURE — 84681 ASSAY OF C-PEPTIDE: CPT

## 2018-06-13 PROCEDURE — 83036 HEMOGLOBIN GLYCOSYLATED A1C: CPT

## 2018-06-15 LAB — C PEPTIDE SERPL-MCNC: 2.6 NG/ML (ref 0.8–3.5)

## 2018-06-28 ENCOUNTER — HOSPITAL ENCOUNTER (OUTPATIENT)
Facility: MEDICAL CENTER | Age: 73
End: 2018-06-28
Attending: FAMILY MEDICINE
Payer: MEDICARE

## 2018-06-28 LAB
CREAT UR-MCNC: 103.7 MG/DL
MICROALBUMIN UR-MCNC: 2 MG/DL
MICROALBUMIN/CREAT UR: 19 MG/G (ref 0–30)

## 2018-06-28 PROCEDURE — 82043 UR ALBUMIN QUANTITATIVE: CPT

## 2018-06-28 PROCEDURE — 82570 ASSAY OF URINE CREATININE: CPT

## 2018-07-16 ENCOUNTER — TELEPHONE (OUTPATIENT)
Dept: HEMATOLOGY ONCOLOGY | Facility: MEDICAL CENTER | Age: 73
End: 2018-07-16

## 2018-07-17 NOTE — TELEPHONE ENCOUNTER
Spoke to pt and he's not sure if he wants to proceed with the Lung Cancer Screening- offered to call the patient back in a couple of weeks and he stated he has our contact info from the letter that was sent.  He will call back if he wants to do the appt

## 2018-08-07 ENCOUNTER — OFFICE VISIT (OUTPATIENT)
Dept: HEMATOLOGY ONCOLOGY | Facility: MEDICAL CENTER | Age: 73
End: 2018-08-07
Payer: MEDICARE

## 2018-08-07 VITALS
BODY MASS INDEX: 23.6 KG/M2 | WEIGHT: 150.35 LBS | SYSTOLIC BLOOD PRESSURE: 110 MMHG | TEMPERATURE: 99 F | OXYGEN SATURATION: 93 % | DIASTOLIC BLOOD PRESSURE: 72 MMHG | RESPIRATION RATE: 16 BRPM | HEART RATE: 87 BPM | HEIGHT: 67 IN

## 2018-08-07 DIAGNOSIS — F17.210 CIGARETTE SMOKER: ICD-10-CM

## 2018-08-07 PROCEDURE — G0296 VISIT TO DETERM LDCT ELIG: HCPCS | Performed by: NURSE PRACTITIONER

## 2018-08-07 ASSESSMENT — PAIN SCALES - GENERAL: PAINLEVEL: NO PAIN

## 2018-08-07 ASSESSMENT — ENCOUNTER SYMPTOMS
SPUTUM PRODUCTION: 1
WHEEZING: 1
SHORTNESS OF BREATH: 1
COUGH: 1
WEIGHT LOSS: 0
HEMOPTYSIS: 0

## 2018-08-07 NOTE — PROGRESS NOTES
Subjective:      Corona Bean is a 73 y.o. male who presents for Lung Cancer Screening Program Prescreen for lung cancer screening shared decision making visit.        HPI   Patient seen today for initial lung cancer screening visit. Patient referred by Pulmonology, DEBRA Troy.     The patient meets eligibility criteria including age, smoking history (30+ pack years), if former smoker, quit in the last 15 years, and absence of signs or symptoms of lung cancer.    - Age - 73  - Smoking history - Patient has smoked for 46 years at an average of 3 ppd = 138 pack year smoking history.  - Current smoking status - current smoker  - No symptoms of lung cancer and no previous history of lung cancer       No Known Allergies    Current Outpatient Prescriptions on File Prior to Visit   Medication Sig Dispense Refill   • timolol (TIMOPTIC) 0.25 % Solution PLACE 1 DROP IN THE RIGHT EYE TWICE A DAY AS DIRECTED  3   • Fluticasone-Umeclidin-Vilant (TRELEGY ELLIPTA) 100-62.5-25 MCG/INH AEROSOL POWDER, BREATH ACTIVATED Inhale 1 Puff by mouth every day. 2 Each 0   • Tiotropium Bromide-Olodaterol (STIOLTO RESPIMAT) 2.5-2.5 MCG/ACT Aero Soln Inhale 2 Puffs by mouth every day. 1 Inhaler 11   • albuterol (VENTOLIN HFA) 108 (90 BASE) MCG/ACT Aero Soln inhalation aerosol Inhale 2 Puffs by mouth every 6 hours as needed for Shortness of Breath.     • timolol (BETIMOL) 0.25 % ophthalmic solution Place 1 Drop in both eyes 2 times a day.     • Doxycycline Hyclate 20 MG Cap Take  by mouth.     • metformin (GLUCOPHAGE) 500 MG Tab Take 500 mg by mouth 2 times a day, with meals.     • Vitamins A & D (VITAMIN A & D PO) Take  by mouth.     • Calcium Carbonate-Vit D-Min (CALCIUM 1200 PO) Take  by mouth.     • B Complex Vitamins (B COMPLEX 50 PO) Take  by mouth.     • Pediatric Multivitamins-Fl (MULTI VIT/FL PO) Take  by mouth.       No current facility-administered medications on file prior to visit.        Review of Systems  "  Constitutional: Negative for malaise/fatigue (poor actiivty tolerance) and weight loss.   Respiratory: Positive for cough (morning), sputum production (white/clear), shortness of breath (with exertion, sometimes at rest especially with recent smoke) and wheezing (resolved with inhalers; albuterol BID consistently). Negative for hemoptysis.         Mid 80s with activity, rebounds to >90 fairly quick          Objective:     /72   Pulse 87   Temp 37.2 °C (99 °F)   Resp 16   Ht 1.702 m (5' 7\")   Wt 68.2 kg (150 lb 5.7 oz)   SpO2 93%   BMI 23.55 kg/m²      Physical Exam   Constitutional: He is oriented to person, place, and time. He appears well-developed and well-nourished. No distress.   Cardiovascular: Normal rate, regular rhythm and normal heart sounds.  Exam reveals no gallop and no friction rub.    No murmur heard.  Pulmonary/Chest: Effort normal and breath sounds normal. No respiratory distress. He has no wheezes.   Musculoskeletal: Normal range of motion.   Neurological: He is alert and oriented to person, place, and time.   Skin: Skin is warm and dry. He is not diaphoretic.   Vitals reviewed.       Assessment/Plan:     1. Cigarette smoker  CT-LUNG CANCER-SCREENING         We conducted a shared decision-making process using a decision aid. We reviewed benefits and harms of screening, including false positives and potential need for additional diagnostic testing, the possibility of over diagnosis, and total radiation exposure.    We discussed the importance of adhering to annual LDCT screening. We also discussed the impact of comorbities on the patient's the ability or willingness to undergo diagnostic procedure(s) and treatment.    Counseling on the importance of maintaining cigarette smoking abstinence if former smoker; or the importance of smoking cessation if current smoker and, if appropriate, furnishing of information about tobacco cessation interventions. I provided patient with smoking " cessation materials and resources within [facility] and the community. Patient appreciative of the resources.     Based on our discussion, we have decided to begin annual lung cancer screening starting now.

## 2018-08-15 ENCOUNTER — HOSPITAL ENCOUNTER (OUTPATIENT)
Dept: LAB | Facility: MEDICAL CENTER | Age: 73
End: 2018-08-15
Attending: UROLOGY
Payer: MEDICARE

## 2018-08-15 LAB
ALBUMIN SERPL BCP-MCNC: 4.2 G/DL (ref 3.2–4.9)
ALBUMIN/GLOB SERPL: 1.6 G/DL
ALP SERPL-CCNC: 54 U/L (ref 30–99)
ALT SERPL-CCNC: 22 U/L (ref 2–50)
ANION GAP SERPL CALC-SCNC: 8 MMOL/L (ref 0–11.9)
AST SERPL-CCNC: 20 U/L (ref 12–45)
BASOPHILS # BLD AUTO: 0.2 % (ref 0–1.8)
BASOPHILS # BLD: 0.02 K/UL (ref 0–0.12)
BILIRUB SERPL-MCNC: 0.5 MG/DL (ref 0.1–1.5)
BUN SERPL-MCNC: 24 MG/DL (ref 8–22)
CALCIUM SERPL-MCNC: 9.2 MG/DL (ref 8.5–10.5)
CHLORIDE SERPL-SCNC: 111 MMOL/L (ref 96–112)
CO2 SERPL-SCNC: 21 MMOL/L (ref 20–33)
CREAT SERPL-MCNC: 0.78 MG/DL (ref 0.5–1.4)
EOSINOPHIL # BLD AUTO: 0.06 K/UL (ref 0–0.51)
EOSINOPHIL NFR BLD: 0.7 % (ref 0–6.9)
ERYTHROCYTE [DISTWIDTH] IN BLOOD BY AUTOMATED COUNT: 48.9 FL (ref 35.9–50)
GLOBULIN SER CALC-MCNC: 2.6 G/DL (ref 1.9–3.5)
GLUCOSE SERPL-MCNC: 122 MG/DL (ref 65–99)
HCT VFR BLD AUTO: 50.3 % (ref 42–52)
HGB BLD-MCNC: 17.6 G/DL (ref 14–18)
IMM GRANULOCYTES # BLD AUTO: 0.04 K/UL (ref 0–0.11)
IMM GRANULOCYTES NFR BLD AUTO: 0.5 % (ref 0–0.9)
LYMPHOCYTES # BLD AUTO: 1.46 K/UL (ref 1–4.8)
LYMPHOCYTES NFR BLD: 18 % (ref 22–41)
MCH RBC QN AUTO: 33.9 PG (ref 27–33)
MCHC RBC AUTO-ENTMCNC: 35 G/DL (ref 33.7–35.3)
MCV RBC AUTO: 96.9 FL (ref 81.4–97.8)
MONOCYTES # BLD AUTO: 0.77 K/UL (ref 0–0.85)
MONOCYTES NFR BLD AUTO: 9.5 % (ref 0–13.4)
NEUTROPHILS # BLD AUTO: 5.74 K/UL (ref 1.82–7.42)
NEUTROPHILS NFR BLD: 71.1 % (ref 44–72)
NRBC # BLD AUTO: 0 K/UL
NRBC BLD-RTO: 0 /100 WBC
PLATELET # BLD AUTO: 151 K/UL (ref 164–446)
PMV BLD AUTO: 11.9 FL (ref 9–12.9)
POTASSIUM SERPL-SCNC: 4 MMOL/L (ref 3.6–5.5)
PROT SERPL-MCNC: 6.8 G/DL (ref 6–8.2)
RBC # BLD AUTO: 5.19 M/UL (ref 4.7–6.1)
SODIUM SERPL-SCNC: 140 MMOL/L (ref 135–145)
WBC # BLD AUTO: 8.1 K/UL (ref 4.8–10.8)

## 2018-08-15 PROCEDURE — 80053 COMPREHEN METABOLIC PANEL: CPT

## 2018-08-15 PROCEDURE — 36415 COLL VENOUS BLD VENIPUNCTURE: CPT

## 2018-08-15 PROCEDURE — 85025 COMPLETE CBC W/AUTO DIFF WBC: CPT

## 2018-08-29 ENCOUNTER — HOSPITAL ENCOUNTER (OUTPATIENT)
Dept: RADIOLOGY | Facility: MEDICAL CENTER | Age: 73
End: 2018-08-29
Attending: NURSE PRACTITIONER
Payer: MEDICARE

## 2018-08-29 DIAGNOSIS — F17.210 CIGARETTE SMOKER: ICD-10-CM

## 2018-08-29 PROCEDURE — G0297 LDCT FOR LUNG CA SCREEN: HCPCS

## 2018-08-30 ENCOUNTER — TELEPHONE (OUTPATIENT)
Dept: HEMATOLOGY ONCOLOGY | Facility: MEDICAL CENTER | Age: 73
End: 2018-08-30

## 2018-08-30 NOTE — TELEPHONE ENCOUNTER
Phoned patient with results of LDCT exam performed 8/29/2018.  Notified him that the results showed no pulmonary nodules and no concern for lung cancer.  Recommend follow up CT in 12 months. Informed patient of incidental findings of atherosclerosis with coronary artery calcification as well as changes of emphysema and COPD with recommendation to follow up with care providers. Patient agrees to all recommendations. Referring provider DEBRA Troy, notified of results and incidental findings.  Health maintenance updated and patient sent lung cancer screening result letter.

## 2018-08-30 NOTE — LETTER
" [place] [address]  [place] [address]  P [phone]  F [phone]         Date: August 30, 2018    Corona Bean  [address]  [place] [address]    Re:  Low-dose chest CT performed on 8/29/2018    Medical Record Number: [id number]    Dear Corona,    We are pleased to let you know that the results of your recent low-dose chest CT (LDCT) examination were negative, or showed no evidence of lung nodule or mass.  The radiologist recommends to continue annual screening with LDCT in 12 months.  In the event that any additional \"incidental\" findings were identified from this exam, we have communicated back to your primary care provider for follow-up.    Here are some other important points you should know:  · Your low-dose Chest CT report has been sent to your referring or primary health care provider and is available to participants in [facility].  As a part of our Lung Cancer Screening program we will remind you and your referring health care provider when your next LDCT screening is due.  · Although low-dose chest CT is very effective at finding lung cancer early, it cannot find all lung cancers. If you develop any new symptoms such as shortness of breath, chest pain, or coughing up blood, please call your doctor.  · Please keep in mind that good health involves quitting smoking (for help, call [facility] Quit Tobacco program at [phone]), an annual physical exam, and continued screening with low-dose chest CT.    Thank you for participating in the Lung Cancer Screening program.  If you have any questions about this letter or our program, please call our Nurse Navigator at [phone].    Sincerely,  Alexsandra Pritchett MD, [facility]  Medical Director  [facility] Lung Cancer Screening Program    "

## 2018-11-15 ENCOUNTER — APPOINTMENT (OUTPATIENT)
Dept: LAB | Facility: MEDICAL CENTER | Age: 73
End: 2018-11-15
Payer: MEDICARE

## 2018-11-20 ENCOUNTER — APPOINTMENT (OUTPATIENT)
Dept: PULMONOLOGY | Facility: HOSPICE | Age: 73
End: 2018-11-20
Payer: MEDICARE

## 2019-01-01 ENCOUNTER — HOSPITAL ENCOUNTER (OUTPATIENT)
Dept: RADIATION ONCOLOGY | Facility: MEDICAL CENTER | Age: 74
End: 2019-12-02

## 2019-01-01 ENCOUNTER — APPOINTMENT (OUTPATIENT)
Dept: RADIOLOGY | Facility: MEDICAL CENTER | Age: 74
DRG: 374 | End: 2019-01-01
Attending: HOSPITALIST
Payer: MEDICARE

## 2019-01-01 ENCOUNTER — HOSPITAL ENCOUNTER (OUTPATIENT)
Dept: RADIATION ONCOLOGY | Facility: MEDICAL CENTER | Age: 74
End: 2019-12-04
Attending: INTERNAL MEDICINE

## 2019-01-01 ENCOUNTER — HOSPITAL ENCOUNTER (OUTPATIENT)
Dept: RADIATION ONCOLOGY | Facility: MEDICAL CENTER | Age: 74
End: 2019-12-09

## 2019-01-01 ENCOUNTER — PATIENT OUTREACH (OUTPATIENT)
Dept: OTHER | Facility: MEDICAL CENTER | Age: 74
End: 2019-01-01

## 2019-01-01 ENCOUNTER — APPOINTMENT (OUTPATIENT)
Dept: CARDIOLOGY | Facility: MEDICAL CENTER | Age: 74
DRG: 374 | End: 2019-01-01
Attending: INTERNAL MEDICINE
Payer: MEDICARE

## 2019-01-01 ENCOUNTER — APPOINTMENT (OUTPATIENT)
Dept: ONCOLOGY | Facility: MEDICAL CENTER | Age: 74
DRG: 374 | End: 2019-01-01
Attending: INTERNAL MEDICINE
Payer: MEDICARE

## 2019-01-01 ENCOUNTER — HOSPITAL ENCOUNTER (OUTPATIENT)
Dept: CARDIOLOGY | Facility: MEDICAL CENTER | Age: 74
End: 2019-11-14
Attending: INTERNAL MEDICINE
Payer: MEDICARE

## 2019-01-01 ENCOUNTER — HOSPITAL ENCOUNTER (OUTPATIENT)
Dept: RADIATION ONCOLOGY | Facility: MEDICAL CENTER | Age: 74
End: 2019-11-26

## 2019-01-01 ENCOUNTER — ANESTHESIA EVENT (OUTPATIENT)
Dept: SURGERY | Facility: MEDICAL CENTER | Age: 74
End: 2019-01-01
Payer: MEDICARE

## 2019-01-01 ENCOUNTER — HOSPITAL ENCOUNTER (OUTPATIENT)
Dept: RADIATION ONCOLOGY | Facility: MEDICAL CENTER | Age: 74
End: 2019-12-31
Attending: RADIOLOGY
Payer: MEDICARE

## 2019-01-01 ENCOUNTER — HOSPITAL ENCOUNTER (OUTPATIENT)
Dept: RADIATION ONCOLOGY | Facility: MEDICAL CENTER | Age: 74
End: 2019-12-16

## 2019-01-01 ENCOUNTER — HOSPITAL ENCOUNTER (OUTPATIENT)
Dept: RADIATION ONCOLOGY | Facility: MEDICAL CENTER | Age: 74
End: 2019-12-17

## 2019-01-01 ENCOUNTER — DOCUMENTATION (OUTPATIENT)
Dept: NUTRITION | Facility: MEDICAL CENTER | Age: 74
End: 2019-01-01

## 2019-01-01 ENCOUNTER — APPOINTMENT (OUTPATIENT)
Dept: RADIOLOGY | Facility: MEDICAL CENTER | Age: 74
DRG: 374 | End: 2019-01-01
Attending: EMERGENCY MEDICINE
Payer: MEDICARE

## 2019-01-01 ENCOUNTER — HOSPITAL ENCOUNTER (OUTPATIENT)
Dept: RADIATION ONCOLOGY | Facility: MEDICAL CENTER | Age: 74
End: 2019-11-20

## 2019-01-01 ENCOUNTER — HOSPITAL ENCOUNTER (OUTPATIENT)
Dept: RADIATION ONCOLOGY | Facility: MEDICAL CENTER | Age: 74
End: 2019-11-30
Attending: SURGERY
Payer: MEDICARE

## 2019-01-01 ENCOUNTER — APPOINTMENT (OUTPATIENT)
Dept: RADIOLOGY | Facility: MEDICAL CENTER | Age: 74
DRG: 374 | End: 2019-01-01
Attending: INTERNAL MEDICINE
Payer: MEDICARE

## 2019-01-01 ENCOUNTER — HOSPITAL ENCOUNTER (OUTPATIENT)
Dept: RADIOLOGY | Facility: MEDICAL CENTER | Age: 74
End: 2019-01-01
Attending: SURGERY
Payer: MEDICARE

## 2019-01-01 ENCOUNTER — ANESTHESIA (OUTPATIENT)
Dept: SURGERY | Facility: MEDICAL CENTER | Age: 74
End: 2019-01-01
Payer: MEDICARE

## 2019-01-01 ENCOUNTER — HOSPITAL ENCOUNTER (OUTPATIENT)
Dept: RADIOLOGY | Facility: MEDICAL CENTER | Age: 74
End: 2019-10-18
Attending: SURGERY
Payer: MEDICARE

## 2019-01-01 ENCOUNTER — HOSPITAL ENCOUNTER (OUTPATIENT)
Dept: RADIATION ONCOLOGY | Facility: MEDICAL CENTER | Age: 74
End: 2019-12-06

## 2019-01-01 ENCOUNTER — HOSPITAL ENCOUNTER (OUTPATIENT)
Dept: RADIATION ONCOLOGY | Facility: MEDICAL CENTER | Age: 74
End: 2019-12-31

## 2019-01-01 ENCOUNTER — APPOINTMENT (OUTPATIENT)
Dept: ADMISSIONS | Facility: MEDICAL CENTER | Age: 74
End: 2019-01-01
Payer: MEDICARE

## 2019-01-01 ENCOUNTER — HOSPITAL ENCOUNTER (OUTPATIENT)
Dept: RADIATION ONCOLOGY | Facility: MEDICAL CENTER | Age: 74
End: 2019-12-24

## 2019-01-01 ENCOUNTER — HOSPITAL ENCOUNTER (OUTPATIENT)
Dept: RADIATION ONCOLOGY | Facility: MEDICAL CENTER | Age: 74
End: 2019-11-24

## 2019-01-01 ENCOUNTER — OUTPATIENT INFUSION SERVICES (OUTPATIENT)
Dept: ONCOLOGY | Facility: MEDICAL CENTER | Age: 74
End: 2019-01-01
Attending: INTERNAL MEDICINE
Payer: MEDICARE

## 2019-01-01 ENCOUNTER — HOSPITAL ENCOUNTER (OUTPATIENT)
Facility: MEDICAL CENTER | Age: 74
End: 2019-01-01
Attending: SURGERY | Admitting: SURGERY
Payer: MEDICARE

## 2019-01-01 ENCOUNTER — HOSPITAL ENCOUNTER (OUTPATIENT)
Dept: LAB | Facility: MEDICAL CENTER | Age: 74
End: 2019-10-07
Attending: SURGERY
Payer: MEDICARE

## 2019-01-01 ENCOUNTER — HOSPITAL ENCOUNTER (OUTPATIENT)
Dept: RADIATION ONCOLOGY | Facility: MEDICAL CENTER | Age: 74
End: 2019-12-23

## 2019-01-01 ENCOUNTER — HOSPITAL ENCOUNTER (OUTPATIENT)
Dept: RADIATION ONCOLOGY | Facility: MEDICAL CENTER | Age: 74
End: 2019-12-19

## 2019-01-01 ENCOUNTER — APPOINTMENT (OUTPATIENT)
Dept: RADIATION ONCOLOGY | Facility: MEDICAL CENTER | Age: 74
End: 2019-01-01
Attending: SURGERY
Payer: MEDICARE

## 2019-01-01 ENCOUNTER — HOSPITAL ENCOUNTER (OUTPATIENT)
Facility: MEDICAL CENTER | Age: 74
End: 2019-10-15
Attending: SURGERY | Admitting: SURGERY
Payer: MEDICARE

## 2019-01-01 ENCOUNTER — APPOINTMENT (OUTPATIENT)
Dept: RADIATION ONCOLOGY | Facility: MEDICAL CENTER | Age: 74
End: 2019-01-01
Attending: RADIOLOGY
Payer: MEDICARE

## 2019-01-01 ENCOUNTER — HOSPITAL ENCOUNTER (OUTPATIENT)
Dept: RADIATION ONCOLOGY | Facility: MEDICAL CENTER | Age: 74
End: 2019-11-21

## 2019-01-01 ENCOUNTER — DOCUMENTATION (OUTPATIENT)
Dept: OTHER | Facility: MEDICAL CENTER | Age: 74
End: 2019-01-01

## 2019-01-01 ENCOUNTER — HOSPITAL ENCOUNTER (OUTPATIENT)
Dept: RADIATION ONCOLOGY | Facility: MEDICAL CENTER | Age: 74
End: 2019-12-13

## 2019-01-01 ENCOUNTER — HOSPITAL ENCOUNTER (OUTPATIENT)
Dept: RADIOLOGY | Facility: MEDICAL CENTER | Age: 74
End: 2019-11-14
Attending: INTERNAL MEDICINE
Payer: MEDICARE

## 2019-01-01 ENCOUNTER — HOSPITAL ENCOUNTER (OUTPATIENT)
Dept: RADIATION ONCOLOGY | Facility: MEDICAL CENTER | Age: 74
End: 2019-12-10

## 2019-01-01 ENCOUNTER — APPOINTMENT (OUTPATIENT)
Dept: RADIOLOGY | Facility: MEDICAL CENTER | Age: 74
DRG: 374 | End: 2019-01-01
Attending: FAMILY MEDICINE
Payer: MEDICARE

## 2019-01-01 ENCOUNTER — TELEPHONE (OUTPATIENT)
Dept: HEALTH INFORMATION MANAGEMENT | Facility: OTHER | Age: 74
End: 2019-01-01

## 2019-01-01 ENCOUNTER — HOSPITAL ENCOUNTER (OUTPATIENT)
Dept: RADIATION ONCOLOGY | Facility: MEDICAL CENTER | Age: 74
End: 2019-12-30

## 2019-01-01 ENCOUNTER — HOSPITAL ENCOUNTER (OUTPATIENT)
Dept: RADIATION ONCOLOGY | Facility: MEDICAL CENTER | Age: 74
End: 2019-11-22

## 2019-01-01 ENCOUNTER — HOSPITAL ENCOUNTER (INPATIENT)
Facility: MEDICAL CENTER | Age: 74
LOS: 35 days | DRG: 374 | End: 2020-01-11
Attending: EMERGENCY MEDICINE | Admitting: HOSPITALIST
Payer: MEDICARE

## 2019-01-01 ENCOUNTER — HOSPITAL ENCOUNTER (OUTPATIENT)
Dept: RADIATION ONCOLOGY | Facility: MEDICAL CENTER | Age: 74
End: 2019-12-11

## 2019-01-01 ENCOUNTER — HOSPITAL ENCOUNTER (OUTPATIENT)
Dept: RADIATION ONCOLOGY | Facility: MEDICAL CENTER | Age: 74
End: 2019-12-27

## 2019-01-01 ENCOUNTER — HOSPITAL ENCOUNTER (OUTPATIENT)
Dept: LAB | Facility: MEDICAL CENTER | Age: 74
End: 2019-10-03
Attending: FAMILY MEDICINE
Payer: MEDICARE

## 2019-01-01 ENCOUNTER — HOSPITAL ENCOUNTER (OUTPATIENT)
Dept: RADIATION ONCOLOGY | Facility: MEDICAL CENTER | Age: 74
End: 2019-12-12

## 2019-01-01 ENCOUNTER — HOSPITAL ENCOUNTER (OUTPATIENT)
Dept: RADIATION ONCOLOGY | Facility: MEDICAL CENTER | Age: 74
End: 2019-11-25

## 2019-01-01 ENCOUNTER — HOSPITAL ENCOUNTER (OUTPATIENT)
Dept: RADIOLOGY | Facility: MEDICAL CENTER | Age: 74
End: 2019-11-08
Attending: RADIOLOGY
Payer: MEDICARE

## 2019-01-01 ENCOUNTER — HOSPITAL ENCOUNTER (OUTPATIENT)
Dept: RADIATION ONCOLOGY | Facility: MEDICAL CENTER | Age: 74
End: 2019-12-20

## 2019-01-01 ENCOUNTER — HOSPITAL ENCOUNTER (OUTPATIENT)
Dept: RADIATION ONCOLOGY | Facility: MEDICAL CENTER | Age: 74
End: 2019-11-27

## 2019-01-01 ENCOUNTER — HOSPITAL ENCOUNTER (OUTPATIENT)
Dept: RADIATION ONCOLOGY | Facility: MEDICAL CENTER | Age: 74
End: 2019-12-26

## 2019-01-01 ENCOUNTER — HOSPITAL ENCOUNTER (OUTPATIENT)
Dept: RADIATION ONCOLOGY | Facility: MEDICAL CENTER | Age: 74
End: 2019-12-05

## 2019-01-01 ENCOUNTER — HOSPITAL ENCOUNTER (OUTPATIENT)
Dept: RADIATION ONCOLOGY | Facility: MEDICAL CENTER | Age: 74
End: 2019-11-13

## 2019-01-01 VITALS
HEART RATE: 60 BPM | TEMPERATURE: 98.5 F | SYSTOLIC BLOOD PRESSURE: 96 MMHG | OXYGEN SATURATION: 94 % | DIASTOLIC BLOOD PRESSURE: 47 MMHG

## 2019-01-01 VITALS
HEART RATE: 90 BPM | RESPIRATION RATE: 18 BRPM | TEMPERATURE: 98 F | DIASTOLIC BLOOD PRESSURE: 95 MMHG | SYSTOLIC BLOOD PRESSURE: 147 MMHG | BODY MASS INDEX: 20.2 KG/M2 | OXYGEN SATURATION: 94 % | HEIGHT: 66 IN | WEIGHT: 125.66 LBS

## 2019-01-01 VITALS
HEART RATE: 85 BPM | OXYGEN SATURATION: 92 % | TEMPERATURE: 98.4 F | DIASTOLIC BLOOD PRESSURE: 79 MMHG | SYSTOLIC BLOOD PRESSURE: 116 MMHG

## 2019-01-01 VITALS
TEMPERATURE: 97.8 F | SYSTOLIC BLOOD PRESSURE: 90 MMHG | HEIGHT: 68 IN | WEIGHT: 130.95 LBS | HEART RATE: 74 BPM | DIASTOLIC BLOOD PRESSURE: 54 MMHG | BODY MASS INDEX: 19.85 KG/M2 | RESPIRATION RATE: 18 BRPM | OXYGEN SATURATION: 92 %

## 2019-01-01 VITALS
BODY MASS INDEX: 20.16 KG/M2 | SYSTOLIC BLOOD PRESSURE: 121 MMHG | TEMPERATURE: 97.7 F | OXYGEN SATURATION: 95 % | WEIGHT: 125.44 LBS | HEIGHT: 66 IN | DIASTOLIC BLOOD PRESSURE: 85 MMHG | HEART RATE: 81 BPM | RESPIRATION RATE: 18 BRPM

## 2019-01-01 VITALS
TEMPERATURE: 98.2 F | SYSTOLIC BLOOD PRESSURE: 102 MMHG | DIASTOLIC BLOOD PRESSURE: 63 MMHG | WEIGHT: 125 LBS | HEART RATE: 96 BPM | BODY MASS INDEX: 20.09 KG/M2 | OXYGEN SATURATION: 96 %

## 2019-01-01 VITALS
RESPIRATION RATE: 18 BRPM | HEIGHT: 66 IN | WEIGHT: 134.92 LBS | HEART RATE: 90 BPM | BODY MASS INDEX: 21.68 KG/M2 | DIASTOLIC BLOOD PRESSURE: 56 MMHG | SYSTOLIC BLOOD PRESSURE: 92 MMHG | TEMPERATURE: 98.2 F | OXYGEN SATURATION: 92 %

## 2019-01-01 VITALS
DIASTOLIC BLOOD PRESSURE: 57 MMHG | OXYGEN SATURATION: 89 % | WEIGHT: 137.3 LBS | SYSTOLIC BLOOD PRESSURE: 97 MMHG | BODY MASS INDEX: 21.68 KG/M2 | HEART RATE: 90 BPM

## 2019-01-01 VITALS
DIASTOLIC BLOOD PRESSURE: 89 MMHG | HEIGHT: 68 IN | WEIGHT: 134.64 LBS | SYSTOLIC BLOOD PRESSURE: 118 MMHG | TEMPERATURE: 98.9 F | BODY MASS INDEX: 20.4 KG/M2 | OXYGEN SATURATION: 90 % | HEART RATE: 79 BPM

## 2019-01-01 VITALS
BODY MASS INDEX: 20.2 KG/M2 | HEART RATE: 92 BPM | WEIGHT: 125.66 LBS | RESPIRATION RATE: 18 BRPM | SYSTOLIC BLOOD PRESSURE: 87 MMHG | DIASTOLIC BLOOD PRESSURE: 34 MMHG | TEMPERATURE: 98.2 F | HEIGHT: 66 IN | OXYGEN SATURATION: 95 %

## 2019-01-01 VITALS
TEMPERATURE: 98 F | HEIGHT: 67 IN | DIASTOLIC BLOOD PRESSURE: 61 MMHG | OXYGEN SATURATION: 96 % | HEART RATE: 73 BPM | SYSTOLIC BLOOD PRESSURE: 107 MMHG | RESPIRATION RATE: 18 BRPM | BODY MASS INDEX: 21.21 KG/M2 | WEIGHT: 135.14 LBS

## 2019-01-01 DIAGNOSIS — C21.1 MALIGNANT NEOPLASM OF ANAL CANAL (HCC): ICD-10-CM

## 2019-01-01 DIAGNOSIS — C20 MALIGNANT NEOPLASM OF RECTUM (HCC): ICD-10-CM

## 2019-01-01 DIAGNOSIS — C21.0 SQUAMOUS CELL CANCER, ANUS (HCC): ICD-10-CM

## 2019-01-01 DIAGNOSIS — C21.0 ANAL CARCINOMA (HCC): ICD-10-CM

## 2019-01-01 DIAGNOSIS — I95.9 HYPOTENSION, UNSPECIFIED HYPOTENSION TYPE: ICD-10-CM

## 2019-01-01 DIAGNOSIS — N28.9 RENAL INSUFFICIENCY: ICD-10-CM

## 2019-01-01 DIAGNOSIS — C21.1 CANCER OF ANAL CANAL (HCC): ICD-10-CM

## 2019-01-01 DIAGNOSIS — J44.9 CHRONIC OBSTRUCTIVE PULMONARY DISEASE, UNSPECIFIED COPD TYPE (HCC): ICD-10-CM

## 2019-01-01 DIAGNOSIS — D70.1 CHEMOTHERAPY-INDUCED NEUTROPENIA (HCC): ICD-10-CM

## 2019-01-01 DIAGNOSIS — D69.6 THROMBOCYTOPENIA (HCC): ICD-10-CM

## 2019-01-01 DIAGNOSIS — K62.89 RECTAL MASS: ICD-10-CM

## 2019-01-01 DIAGNOSIS — D61.818 PANCYTOPENIA (HCC): Chronic | ICD-10-CM

## 2019-01-01 DIAGNOSIS — C21.0 ANAL CANCER (HCC): ICD-10-CM

## 2019-01-01 DIAGNOSIS — T45.1X5A CHEMOTHERAPY-INDUCED NEUTROPENIA (HCC): ICD-10-CM

## 2019-01-01 LAB
ABO + RH BLD: NORMAL
ABO GROUP BLD: NORMAL
ALBUMIN SERPL BCP-MCNC: 1.9 G/DL (ref 3.2–4.9)
ALBUMIN SERPL BCP-MCNC: 1.9 G/DL (ref 3.2–4.9)
ALBUMIN SERPL BCP-MCNC: 2 G/DL (ref 3.2–4.9)
ALBUMIN SERPL BCP-MCNC: 2.1 G/DL (ref 3.2–4.9)
ALBUMIN SERPL BCP-MCNC: 2.1 G/DL (ref 3.2–4.9)
ALBUMIN SERPL BCP-MCNC: 2.2 G/DL (ref 3.2–4.9)
ALBUMIN SERPL BCP-MCNC: 2.2 G/DL (ref 3.2–4.9)
ALBUMIN SERPL BCP-MCNC: 2.5 G/DL (ref 3.2–4.9)
ALBUMIN SERPL BCP-MCNC: 2.6 G/DL (ref 3.2–4.9)
ALBUMIN SERPL BCP-MCNC: 3.3 G/DL (ref 3.2–4.9)
ALBUMIN SERPL BCP-MCNC: 3.5 G/DL (ref 3.2–4.9)
ALBUMIN SERPL BCP-MCNC: 4.2 G/DL (ref 3.2–4.9)
ALBUMIN SERPL BCP-MCNC: 4.4 G/DL (ref 3.2–4.9)
ALBUMIN/GLOB SERPL: 1 G/DL
ALBUMIN/GLOB SERPL: 1 G/DL
ALBUMIN/GLOB SERPL: 1.1 G/DL
ALBUMIN/GLOB SERPL: 1.2 G/DL
ALBUMIN/GLOB SERPL: 1.3 G/DL
ALBUMIN/GLOB SERPL: 1.4 G/DL
ALBUMIN/GLOB SERPL: 1.8 G/DL
ALBUMIN/GLOB SERPL: 1.8 G/DL
ALP SERPL-CCNC: 28 U/L (ref 30–99)
ALP SERPL-CCNC: 30 U/L (ref 30–99)
ALP SERPL-CCNC: 30 U/L (ref 30–99)
ALP SERPL-CCNC: 32 U/L (ref 30–99)
ALP SERPL-CCNC: 33 U/L (ref 30–99)
ALP SERPL-CCNC: 33 U/L (ref 30–99)
ALP SERPL-CCNC: 34 U/L (ref 30–99)
ALP SERPL-CCNC: 37 U/L (ref 30–99)
ALP SERPL-CCNC: 41 U/L (ref 30–99)
ALP SERPL-CCNC: 48 U/L (ref 30–99)
ALP SERPL-CCNC: 49 U/L (ref 30–99)
ALP SERPL-CCNC: 68 U/L (ref 30–99)
ALP SERPL-CCNC: 69 U/L (ref 30–99)
ALT SERPL-CCNC: 12 U/L (ref 2–50)
ALT SERPL-CCNC: 12 U/L (ref 2–50)
ALT SERPL-CCNC: 13 U/L (ref 2–50)
ALT SERPL-CCNC: 14 U/L (ref 2–50)
ALT SERPL-CCNC: 14 U/L (ref 2–50)
ALT SERPL-CCNC: 15 U/L (ref 2–50)
ALT SERPL-CCNC: 16 U/L (ref 2–50)
ALT SERPL-CCNC: 19 U/L (ref 2–50)
ALT SERPL-CCNC: 23 U/L (ref 2–50)
ANION GAP SERPL CALC-SCNC: 3 MMOL/L (ref 0–11.9)
ANION GAP SERPL CALC-SCNC: 4 MMOL/L (ref 0–11.9)
ANION GAP SERPL CALC-SCNC: 5 MMOL/L (ref 0–11.9)
ANION GAP SERPL CALC-SCNC: 6 MMOL/L (ref 0–11.9)
ANION GAP SERPL CALC-SCNC: 7 MMOL/L (ref 0–11.9)
ANION GAP SERPL CALC-SCNC: 7 MMOL/L (ref 0–11.9)
ANION GAP SERPL CALC-SCNC: 8 MMOL/L (ref 0–11.9)
ANION GAP SERPL CALC-SCNC: 8 MMOL/L (ref 0–11.9)
ANISOCYTOSIS BLD QL SMEAR: ABNORMAL
AST SERPL-CCNC: 10 U/L (ref 12–45)
AST SERPL-CCNC: 11 U/L (ref 12–45)
AST SERPL-CCNC: 12 U/L (ref 12–45)
AST SERPL-CCNC: 7 U/L (ref 12–45)
AST SERPL-CCNC: 7 U/L (ref 12–45)
AST SERPL-CCNC: 8 U/L (ref 12–45)
BACTERIA BLD CULT: NORMAL
BARCODED ABORH UBTYP: 5100
BARCODED PRD CODE UBPRD: NORMAL
BARCODED UNIT NUM UBUNT: NORMAL
BASOPHILS # BLD AUTO: 0 % (ref 0–1.8)
BASOPHILS # BLD AUTO: 0.1 % (ref 0–1.8)
BASOPHILS # BLD AUTO: 0.4 % (ref 0–1.8)
BASOPHILS # BLD AUTO: 0.5 % (ref 0–1.8)
BASOPHILS # BLD AUTO: 0.5 % (ref 0–1.8)
BASOPHILS # BLD AUTO: 0.9 % (ref 0–1.8)
BASOPHILS # BLD: 0 K/UL (ref 0–0.12)
BASOPHILS # BLD: 0.01 K/UL (ref 0–0.12)
BASOPHILS # BLD: 0.02 K/UL (ref 0–0.12)
BASOPHILS # BLD: 0.03 K/UL (ref 0–0.12)
BILIRUB SERPL-MCNC: 0.2 MG/DL (ref 0.1–1.5)
BILIRUB SERPL-MCNC: 0.3 MG/DL (ref 0.1–1.5)
BILIRUB SERPL-MCNC: 0.4 MG/DL (ref 0.1–1.5)
BILIRUB SERPL-MCNC: 0.6 MG/DL (ref 0.1–1.5)
BILIRUB SERPL-MCNC: 0.9 MG/DL (ref 0.1–1.5)
BILIRUB SERPL-MCNC: 1.1 MG/DL (ref 0.1–1.5)
BLD GP AB SCN SERPL QL: NORMAL
BUN SERPL-MCNC: 19 MG/DL (ref 8–22)
BUN SERPL-MCNC: 20 MG/DL (ref 8–22)
BUN SERPL-MCNC: 21 MG/DL (ref 8–22)
BUN SERPL-MCNC: 24 MG/DL (ref 8–22)
BUN SERPL-MCNC: 25 MG/DL (ref 8–22)
BUN SERPL-MCNC: 25 MG/DL (ref 8–22)
BUN SERPL-MCNC: 27 MG/DL (ref 8–22)
BUN SERPL-MCNC: 28 MG/DL (ref 8–22)
BUN SERPL-MCNC: 29 MG/DL (ref 8–22)
BUN SERPL-MCNC: 30 MG/DL (ref 8–22)
BUN SERPL-MCNC: 30 MG/DL (ref 8–22)
BUN SERPL-MCNC: 32 MG/DL (ref 8–22)
BUN SERPL-MCNC: 32 MG/DL (ref 8–22)
BUN SERPL-MCNC: 33 MG/DL (ref 8–22)
BUN SERPL-MCNC: 34 MG/DL (ref 8–22)
BUN SERPL-MCNC: 34 MG/DL (ref 8–22)
BUN SERPL-MCNC: 35 MG/DL (ref 8–22)
BUN SERPL-MCNC: 35 MG/DL (ref 8–22)
BUN SERPL-MCNC: 37 MG/DL (ref 8–22)
BUN SERPL-MCNC: 46 MG/DL (ref 8–22)
BUN SERPL-MCNC: 52 MG/DL (ref 8–22)
C PEPTIDE SERPL-MCNC: 2.8 NG/ML (ref 0.8–3.5)
CA-I SERPL-SCNC: 1.1 MMOL/L (ref 1.1–1.3)
CALCIUM SERPL-MCNC: 6.6 MG/DL (ref 8.5–10.5)
CALCIUM SERPL-MCNC: 6.8 MG/DL (ref 8.5–10.5)
CALCIUM SERPL-MCNC: 7 MG/DL (ref 8.5–10.5)
CALCIUM SERPL-MCNC: 7.1 MG/DL (ref 8.5–10.5)
CALCIUM SERPL-MCNC: 7.2 MG/DL (ref 8.5–10.5)
CALCIUM SERPL-MCNC: 7.3 MG/DL (ref 8.5–10.5)
CALCIUM SERPL-MCNC: 7.4 MG/DL (ref 8.5–10.5)
CALCIUM SERPL-MCNC: 7.4 MG/DL (ref 8.5–10.5)
CALCIUM SERPL-MCNC: 7.5 MG/DL (ref 8.5–10.5)
CALCIUM SERPL-MCNC: 7.5 MG/DL (ref 8.5–10.5)
CALCIUM SERPL-MCNC: 8.2 MG/DL (ref 8.5–10.5)
CALCIUM SERPL-MCNC: 8.8 MG/DL (ref 8.5–10.5)
CALCIUM SERPL-MCNC: 8.8 MG/DL (ref 8.5–10.5)
CALCIUM SERPL-MCNC: 8.9 MG/DL (ref 8.5–10.5)
CHEMOTHERAPY INFUSION START DATE: NORMAL
CHEMOTHERAPY RECORDS: 1.8
CHEMOTHERAPY RECORDS: 5400
CHEMOTHERAPY RECORDS: NORMAL
CHEMOTHERAPY RX CANCER: NORMAL
CHLORIDE SERPL-SCNC: 100 MMOL/L (ref 96–112)
CHLORIDE SERPL-SCNC: 101 MMOL/L (ref 96–112)
CHLORIDE SERPL-SCNC: 104 MMOL/L (ref 96–112)
CHLORIDE SERPL-SCNC: 107 MMOL/L (ref 96–112)
CHLORIDE SERPL-SCNC: 108 MMOL/L (ref 96–112)
CHLORIDE SERPL-SCNC: 109 MMOL/L (ref 96–112)
CHLORIDE SERPL-SCNC: 110 MMOL/L (ref 96–112)
CHLORIDE SERPL-SCNC: 110 MMOL/L (ref 96–112)
CHLORIDE SERPL-SCNC: 111 MMOL/L (ref 96–112)
CHLORIDE SERPL-SCNC: 112 MMOL/L (ref 96–112)
CHLORIDE UR-SCNC: 101 MMOL/L
CHOLEST SERPL-MCNC: 155 MG/DL (ref 100–199)
CHOLEST SERPL-MCNC: 91 MG/DL (ref 100–199)
CO2 SERPL-SCNC: 20 MMOL/L (ref 20–33)
CO2 SERPL-SCNC: 21 MMOL/L (ref 20–33)
CO2 SERPL-SCNC: 22 MMOL/L (ref 20–33)
CO2 SERPL-SCNC: 23 MMOL/L (ref 20–33)
CO2 SERPL-SCNC: 24 MMOL/L (ref 20–33)
CO2 SERPL-SCNC: 24 MMOL/L (ref 20–33)
CO2 SERPL-SCNC: 25 MMOL/L (ref 20–33)
CO2 SERPL-SCNC: 26 MMOL/L (ref 20–33)
CO2 SERPL-SCNC: 26 MMOL/L (ref 20–33)
CO2 SERPL-SCNC: 27 MMOL/L (ref 20–33)
CO2 SERPL-SCNC: 28 MMOL/L (ref 20–33)
CO2 SERPL-SCNC: 29 MMOL/L (ref 20–33)
CO2 SERPL-SCNC: 29 MMOL/L (ref 20–33)
COMPONENT P 8504P: NORMAL
CORTIS SERPL-MCNC: 11 UG/DL (ref 0–23)
CORTIS SERPL-MCNC: 22.6 UG/DL (ref 0–23)
CREAT SERPL-MCNC: 0.87 MG/DL (ref 0.5–1.4)
CREAT SERPL-MCNC: 1 MG/DL (ref 0.5–1.4)
CREAT SERPL-MCNC: 1.01 MG/DL (ref 0.5–1.4)
CREAT SERPL-MCNC: 1.03 MG/DL (ref 0.5–1.4)
CREAT SERPL-MCNC: 1.12 MG/DL (ref 0.5–1.4)
CREAT SERPL-MCNC: 1.12 MG/DL (ref 0.5–1.4)
CREAT SERPL-MCNC: 1.13 MG/DL (ref 0.5–1.4)
CREAT SERPL-MCNC: 1.14 MG/DL (ref 0.5–1.4)
CREAT SERPL-MCNC: 1.14 MG/DL (ref 0.5–1.4)
CREAT SERPL-MCNC: 1.2 MG/DL (ref 0.5–1.4)
CREAT SERPL-MCNC: 1.21 MG/DL (ref 0.5–1.4)
CREAT SERPL-MCNC: 1.25 MG/DL (ref 0.5–1.4)
CREAT SERPL-MCNC: 1.26 MG/DL (ref 0.5–1.4)
CREAT SERPL-MCNC: 1.27 MG/DL (ref 0.5–1.4)
CREAT SERPL-MCNC: 1.28 MG/DL (ref 0.5–1.4)
CREAT SERPL-MCNC: 1.31 MG/DL (ref 0.5–1.4)
CREAT SERPL-MCNC: 1.31 MG/DL (ref 0.5–1.4)
CREAT SERPL-MCNC: 1.34 MG/DL (ref 0.5–1.4)
CREAT SERPL-MCNC: 1.4 MG/DL (ref 0.5–1.4)
CREAT SERPL-MCNC: 1.41 MG/DL (ref 0.5–1.4)
CREAT SERPL-MCNC: 1.46 MG/DL (ref 0.5–1.4)
CREAT SERPL-MCNC: 1.52 MG/DL (ref 0.5–1.4)
CREAT SERPL-MCNC: 1.61 MG/DL (ref 0.5–1.4)
CREAT SERPL-MCNC: 1.65 MG/DL (ref 0.5–1.4)
CREAT SERPL-MCNC: 1.82 MG/DL (ref 0.5–1.4)
CREAT UR-MCNC: 51.1 MG/DL
CREAT UR-MCNC: 51.8 MG/DL
CRP SERPL HS-MCNC: 1.39 MG/DL (ref 0–0.75)
CRP SERPL HS-MCNC: 1.54 MG/DL (ref 0–0.75)
CRP SERPL HS-MCNC: 14.19 MG/DL (ref 0–0.75)
DACRYOCYTES BLD QL SMEAR: NORMAL
DATE 1ST CHEMO CANCER: NORMAL
DOHLE BOD BLD QL SMEAR: NORMAL
DOHLE BOD BLD QL SMEAR: NORMAL
EKG IMPRESSION: NORMAL
EOSINOPHIL # BLD AUTO: 0 K/UL (ref 0–0.51)
EOSINOPHIL # BLD AUTO: 0.01 K/UL (ref 0–0.51)
EOSINOPHIL # BLD AUTO: 0.02 K/UL (ref 0–0.51)
EOSINOPHIL # BLD AUTO: 0.04 K/UL (ref 0–0.51)
EOSINOPHIL # BLD AUTO: 0.05 K/UL (ref 0–0.51)
EOSINOPHIL NFR BLD: 0 % (ref 0–6.9)
EOSINOPHIL NFR BLD: 0.5 % (ref 0–6.9)
EOSINOPHIL NFR BLD: 0.5 % (ref 0–6.9)
EOSINOPHIL NFR BLD: 0.6 % (ref 0–6.9)
EOSINOPHIL NFR BLD: 0.6 % (ref 0–6.9)
EOSINOPHIL NFR BLD: 0.9 % (ref 0–6.9)
EOSINOPHIL NFR BLD: 1.3 % (ref 0–6.9)
EOSINOPHIL NFR BLD: 1.6 % (ref 0–6.9)
EOSINOPHIL NFR BLD: 2.6 % (ref 0–6.9)
EOSINOPHIL NFR BLD: 4.9 % (ref 0–6.9)
ERYTHROCYTE [DISTWIDTH] IN BLOOD BY AUTOMATED COUNT: 40.7 FL (ref 35.9–50)
ERYTHROCYTE [DISTWIDTH] IN BLOOD BY AUTOMATED COUNT: 41.1 FL (ref 35.9–50)
ERYTHROCYTE [DISTWIDTH] IN BLOOD BY AUTOMATED COUNT: 42 FL (ref 35.9–50)
ERYTHROCYTE [DISTWIDTH] IN BLOOD BY AUTOMATED COUNT: 42 FL (ref 35.9–50)
ERYTHROCYTE [DISTWIDTH] IN BLOOD BY AUTOMATED COUNT: 42.5 FL (ref 35.9–50)
ERYTHROCYTE [DISTWIDTH] IN BLOOD BY AUTOMATED COUNT: 42.7 FL (ref 35.9–50)
ERYTHROCYTE [DISTWIDTH] IN BLOOD BY AUTOMATED COUNT: 42.9 FL (ref 35.9–50)
ERYTHROCYTE [DISTWIDTH] IN BLOOD BY AUTOMATED COUNT: 43 FL (ref 35.9–50)
ERYTHROCYTE [DISTWIDTH] IN BLOOD BY AUTOMATED COUNT: 43.1 FL (ref 35.9–50)
ERYTHROCYTE [DISTWIDTH] IN BLOOD BY AUTOMATED COUNT: 43.1 FL (ref 35.9–50)
ERYTHROCYTE [DISTWIDTH] IN BLOOD BY AUTOMATED COUNT: 43.4 FL (ref 35.9–50)
ERYTHROCYTE [DISTWIDTH] IN BLOOD BY AUTOMATED COUNT: 43.4 FL (ref 35.9–50)
ERYTHROCYTE [DISTWIDTH] IN BLOOD BY AUTOMATED COUNT: 44.1 FL (ref 35.9–50)
ERYTHROCYTE [DISTWIDTH] IN BLOOD BY AUTOMATED COUNT: 44.6 FL (ref 35.9–50)
ERYTHROCYTE [DISTWIDTH] IN BLOOD BY AUTOMATED COUNT: 44.7 FL (ref 35.9–50)
ERYTHROCYTE [DISTWIDTH] IN BLOOD BY AUTOMATED COUNT: 44.9 FL (ref 35.9–50)
ERYTHROCYTE [DISTWIDTH] IN BLOOD BY AUTOMATED COUNT: 45 FL (ref 35.9–50)
ERYTHROCYTE [DISTWIDTH] IN BLOOD BY AUTOMATED COUNT: 47 FL (ref 35.9–50)
ERYTHROCYTE [DISTWIDTH] IN BLOOD BY AUTOMATED COUNT: 47.8 FL (ref 35.9–50)
ERYTHROCYTE [DISTWIDTH] IN BLOOD BY AUTOMATED COUNT: 48.3 FL (ref 35.9–50)
ERYTHROCYTE [DISTWIDTH] IN BLOOD BY AUTOMATED COUNT: 48.5 FL (ref 35.9–50)
ERYTHROCYTE [DISTWIDTH] IN BLOOD BY AUTOMATED COUNT: 48.6 FL (ref 35.9–50)
ERYTHROCYTE [DISTWIDTH] IN BLOOD BY AUTOMATED COUNT: 49.5 FL (ref 35.9–50)
ERYTHROCYTE [DISTWIDTH] IN BLOOD BY AUTOMATED COUNT: 49.6 FL (ref 35.9–50)
ERYTHROCYTE [DISTWIDTH] IN BLOOD BY AUTOMATED COUNT: 49.8 FL (ref 35.9–50)
ERYTHROCYTE [DISTWIDTH] IN BLOOD BY AUTOMATED COUNT: 50 FL (ref 35.9–50)
ERYTHROCYTE [DISTWIDTH] IN BLOOD BY AUTOMATED COUNT: 50.2 FL (ref 35.9–50)
ERYTHROCYTE [DISTWIDTH] IN BLOOD BY AUTOMATED COUNT: 50.3 FL (ref 35.9–50)
ERYTHROCYTE [DISTWIDTH] IN BLOOD BY AUTOMATED COUNT: 51.9 FL (ref 35.9–50)
ERYTHROCYTE [SEDIMENTATION RATE] IN BLOOD BY WESTERGREN METHOD: 31 MM/HOUR (ref 0–20)
EST. AVERAGE GLUCOSE BLD GHB EST-MCNC: 137 MG/DL
FASTING STATUS PATIENT QL REPORTED: NORMAL
GIANT PLATELETS BLD QL SMEAR: NORMAL
GLOBULIN SER CALC-MCNC: 1.6 G/DL (ref 1.9–3.5)
GLOBULIN SER CALC-MCNC: 1.7 G/DL (ref 1.9–3.5)
GLOBULIN SER CALC-MCNC: 1.8 G/DL (ref 1.9–3.5)
GLOBULIN SER CALC-MCNC: 1.9 G/DL (ref 1.9–3.5)
GLOBULIN SER CALC-MCNC: 2 G/DL (ref 1.9–3.5)
GLOBULIN SER CALC-MCNC: 2.4 G/DL (ref 1.9–3.5)
GLOBULIN SER CALC-MCNC: 2.4 G/DL (ref 1.9–3.5)
GLOBULIN SER CALC-MCNC: 2.5 G/DL (ref 1.9–3.5)
GLOBULIN SER CALC-MCNC: 2.8 G/DL (ref 1.9–3.5)
GLUCOSE BLD-MCNC: 101 MG/DL (ref 65–99)
GLUCOSE BLD-MCNC: 103 MG/DL (ref 65–99)
GLUCOSE BLD-MCNC: 111 MG/DL (ref 65–99)
GLUCOSE BLD-MCNC: 112 MG/DL (ref 65–99)
GLUCOSE BLD-MCNC: 112 MG/DL (ref 65–99)
GLUCOSE BLD-MCNC: 114 MG/DL (ref 65–99)
GLUCOSE BLD-MCNC: 117 MG/DL (ref 65–99)
GLUCOSE BLD-MCNC: 117 MG/DL (ref 65–99)
GLUCOSE BLD-MCNC: 123 MG/DL (ref 65–99)
GLUCOSE BLD-MCNC: 124 MG/DL (ref 65–99)
GLUCOSE BLD-MCNC: 127 MG/DL (ref 65–99)
GLUCOSE BLD-MCNC: 128 MG/DL (ref 65–99)
GLUCOSE BLD-MCNC: 130 MG/DL (ref 65–99)
GLUCOSE BLD-MCNC: 136 MG/DL (ref 65–99)
GLUCOSE BLD-MCNC: 139 MG/DL (ref 65–99)
GLUCOSE BLD-MCNC: 143 MG/DL (ref 65–99)
GLUCOSE BLD-MCNC: 145 MG/DL (ref 65–99)
GLUCOSE BLD-MCNC: 146 MG/DL (ref 65–99)
GLUCOSE BLD-MCNC: 146 MG/DL (ref 65–99)
GLUCOSE BLD-MCNC: 153 MG/DL (ref 65–99)
GLUCOSE BLD-MCNC: 158 MG/DL (ref 65–99)
GLUCOSE BLD-MCNC: 159 MG/DL (ref 65–99)
GLUCOSE BLD-MCNC: 166 MG/DL (ref 65–99)
GLUCOSE BLD-MCNC: 172 MG/DL (ref 65–99)
GLUCOSE BLD-MCNC: 176 MG/DL (ref 65–99)
GLUCOSE BLD-MCNC: 178 MG/DL (ref 65–99)
GLUCOSE BLD-MCNC: 191 MG/DL (ref 65–99)
GLUCOSE BLD-MCNC: 192 MG/DL (ref 65–99)
GLUCOSE BLD-MCNC: 193 MG/DL (ref 65–99)
GLUCOSE BLD-MCNC: 208 MG/DL (ref 65–99)
GLUCOSE BLD-MCNC: 232 MG/DL (ref 65–99)
GLUCOSE BLD-MCNC: 252 MG/DL (ref 65–99)
GLUCOSE BLD-MCNC: 265 MG/DL (ref 65–99)
GLUCOSE BLD-MCNC: 88 MG/DL (ref 65–99)
GLUCOSE BLD-MCNC: 89 MG/DL (ref 65–99)
GLUCOSE BLD-MCNC: 91 MG/DL (ref 65–99)
GLUCOSE BLD-MCNC: 91 MG/DL (ref 65–99)
GLUCOSE BLD-MCNC: 95 MG/DL (ref 65–99)
GLUCOSE SERPL-MCNC: 110 MG/DL (ref 65–99)
GLUCOSE SERPL-MCNC: 117 MG/DL (ref 65–99)
GLUCOSE SERPL-MCNC: 120 MG/DL (ref 65–99)
GLUCOSE SERPL-MCNC: 122 MG/DL (ref 65–99)
GLUCOSE SERPL-MCNC: 125 MG/DL (ref 65–99)
GLUCOSE SERPL-MCNC: 128 MG/DL (ref 65–99)
GLUCOSE SERPL-MCNC: 129 MG/DL (ref 65–99)
GLUCOSE SERPL-MCNC: 131 MG/DL (ref 65–99)
GLUCOSE SERPL-MCNC: 132 MG/DL (ref 65–99)
GLUCOSE SERPL-MCNC: 136 MG/DL (ref 65–99)
GLUCOSE SERPL-MCNC: 139 MG/DL (ref 65–99)
GLUCOSE SERPL-MCNC: 139 MG/DL (ref 65–99)
GLUCOSE SERPL-MCNC: 143 MG/DL (ref 65–99)
GLUCOSE SERPL-MCNC: 143 MG/DL (ref 65–99)
GLUCOSE SERPL-MCNC: 148 MG/DL (ref 65–99)
GLUCOSE SERPL-MCNC: 151 MG/DL (ref 65–99)
GLUCOSE SERPL-MCNC: 153 MG/DL (ref 65–99)
GLUCOSE SERPL-MCNC: 156 MG/DL (ref 65–99)
GLUCOSE SERPL-MCNC: 162 MG/DL (ref 65–99)
GLUCOSE SERPL-MCNC: 163 MG/DL (ref 65–99)
GLUCOSE SERPL-MCNC: 166 MG/DL (ref 65–99)
GLUCOSE SERPL-MCNC: 166 MG/DL (ref 65–99)
GLUCOSE SERPL-MCNC: 187 MG/DL (ref 65–99)
HBA1C MFR BLD: 6.4 % (ref 0–5.6)
HCT VFR BLD AUTO: 24.2 % (ref 42–52)
HCT VFR BLD AUTO: 25.4 % (ref 42–52)
HCT VFR BLD AUTO: 25.6 % (ref 42–52)
HCT VFR BLD AUTO: 26.7 % (ref 42–52)
HCT VFR BLD AUTO: 26.7 % (ref 42–52)
HCT VFR BLD AUTO: 27.3 % (ref 42–52)
HCT VFR BLD AUTO: 27.4 % (ref 42–52)
HCT VFR BLD AUTO: 27.6 % (ref 42–52)
HCT VFR BLD AUTO: 27.9 % (ref 42–52)
HCT VFR BLD AUTO: 28.5 % (ref 42–52)
HCT VFR BLD AUTO: 28.5 % (ref 42–52)
HCT VFR BLD AUTO: 29 % (ref 42–52)
HCT VFR BLD AUTO: 29.2 % (ref 42–52)
HCT VFR BLD AUTO: 29.4 % (ref 42–52)
HCT VFR BLD AUTO: 29.6 % (ref 42–52)
HCT VFR BLD AUTO: 29.6 % (ref 42–52)
HCT VFR BLD AUTO: 29.8 % (ref 42–52)
HCT VFR BLD AUTO: 29.9 % (ref 42–52)
HCT VFR BLD AUTO: 30 % (ref 42–52)
HCT VFR BLD AUTO: 30.6 % (ref 42–52)
HCT VFR BLD AUTO: 31.9 % (ref 42–52)
HCT VFR BLD AUTO: 33.2 % (ref 42–52)
HCT VFR BLD AUTO: 34 % (ref 42–52)
HCT VFR BLD AUTO: 34 % (ref 42–52)
HCT VFR BLD AUTO: 37.3 % (ref 42–52)
HCT VFR BLD AUTO: 41.8 % (ref 42–52)
HCT VFR BLD AUTO: 43.9 % (ref 42–52)
HCT VFR BLD AUTO: 46.3 % (ref 42–52)
HCT VFR BLD AUTO: 49.9 % (ref 42–52)
HDLC SERPL-MCNC: 15 MG/DL
HDLC SERPL-MCNC: 37 MG/DL
HGB BLD-MCNC: 10 G/DL (ref 14–18)
HGB BLD-MCNC: 10.1 G/DL (ref 14–18)
HGB BLD-MCNC: 10.2 G/DL (ref 14–18)
HGB BLD-MCNC: 10.3 G/DL (ref 14–18)
HGB BLD-MCNC: 10.3 G/DL (ref 14–18)
HGB BLD-MCNC: 10.7 G/DL (ref 14–18)
HGB BLD-MCNC: 10.9 G/DL (ref 14–18)
HGB BLD-MCNC: 11.3 G/DL (ref 14–18)
HGB BLD-MCNC: 11.4 G/DL (ref 14–18)
HGB BLD-MCNC: 11.5 G/DL (ref 14–18)
HGB BLD-MCNC: 13.3 G/DL (ref 14–18)
HGB BLD-MCNC: 14.9 G/DL (ref 14–18)
HGB BLD-MCNC: 15.5 G/DL (ref 14–18)
HGB BLD-MCNC: 15.8 G/DL (ref 14–18)
HGB BLD-MCNC: 16.2 G/DL (ref 14–18)
HGB BLD-MCNC: 8.1 G/DL (ref 14–18)
HGB BLD-MCNC: 8.5 G/DL (ref 14–18)
HGB BLD-MCNC: 8.6 G/DL (ref 14–18)
HGB BLD-MCNC: 9 G/DL (ref 14–18)
HGB BLD-MCNC: 9.2 G/DL (ref 14–18)
HGB BLD-MCNC: 9.3 G/DL (ref 14–18)
HGB BLD-MCNC: 9.5 G/DL (ref 14–18)
HGB BLD-MCNC: 9.5 G/DL (ref 14–18)
HGB BLD-MCNC: 9.6 G/DL (ref 14–18)
HGB BLD-MCNC: 9.6 G/DL (ref 14–18)
HGB BLD-MCNC: 9.7 G/DL (ref 14–18)
HGB BLD-MCNC: 9.8 G/DL (ref 14–18)
HGB BLD-MCNC: 9.8 G/DL (ref 14–18)
HGB BLD-MCNC: 9.9 G/DL (ref 14–18)
IMM GRANULOCYTES # BLD AUTO: 0.02 K/UL (ref 0–0.11)
IMM GRANULOCYTES # BLD AUTO: 0.03 K/UL (ref 0–0.11)
IMM GRANULOCYTES NFR BLD AUTO: 0.2 % (ref 0–0.9)
IMM GRANULOCYTES NFR BLD AUTO: 0.2 % (ref 0–0.9)
IMM GRANULOCYTES NFR BLD AUTO: 0.9 % (ref 0–0.9)
IMM GRANULOCYTES NFR BLD AUTO: 1.4 % (ref 0–0.9)
LACTATE BLD-SCNC: 1.1 MMOL/L (ref 0.5–2)
LACTATE BLD-SCNC: 1.7 MMOL/L (ref 0.5–2)
LDLC SERPL CALC-MCNC: 104 MG/DL
LDLC SERPL CALC-MCNC: 53 MG/DL
LG PLATELETS BLD QL SMEAR: NORMAL
LV EJECT FRACT  99904: 70
LV EJECT FRACT  99904: 75
LV EJECT FRACT MOD 2C 99903: 75.14
LV EJECT FRACT MOD 2C 99903: 76.9
LV EJECT FRACT MOD 4C 99902: 61.37
LV EJECT FRACT MOD 4C 99902: 71.08
LV EJECT FRACT MOD BP 99901: 62.92
LV EJECT FRACT MOD BP 99901: 74.92
LYMPHOCYTES # BLD AUTO: 0.05 K/UL (ref 1–4.8)
LYMPHOCYTES # BLD AUTO: 0.06 K/UL (ref 1–4.8)
LYMPHOCYTES # BLD AUTO: 0.06 K/UL (ref 1–4.8)
LYMPHOCYTES # BLD AUTO: 0.08 K/UL (ref 1–4.8)
LYMPHOCYTES # BLD AUTO: 0.09 K/UL (ref 1–4.8)
LYMPHOCYTES # BLD AUTO: 0.09 K/UL (ref 1–4.8)
LYMPHOCYTES # BLD AUTO: 0.1 K/UL (ref 1–4.8)
LYMPHOCYTES # BLD AUTO: 0.11 K/UL (ref 1–4.8)
LYMPHOCYTES # BLD AUTO: 0.12 K/UL (ref 1–4.8)
LYMPHOCYTES # BLD AUTO: 0.13 K/UL (ref 1–4.8)
LYMPHOCYTES # BLD AUTO: 0.14 K/UL (ref 1–4.8)
LYMPHOCYTES # BLD AUTO: 0.14 K/UL (ref 1–4.8)
LYMPHOCYTES # BLD AUTO: 0.15 K/UL (ref 1–4.8)
LYMPHOCYTES # BLD AUTO: 0.16 K/UL (ref 1–4.8)
LYMPHOCYTES # BLD AUTO: 0.16 K/UL (ref 1–4.8)
LYMPHOCYTES # BLD AUTO: 0.17 K/UL (ref 1–4.8)
LYMPHOCYTES # BLD AUTO: 0.18 K/UL (ref 1–4.8)
LYMPHOCYTES # BLD AUTO: 0.19 K/UL (ref 1–4.8)
LYMPHOCYTES # BLD AUTO: 0.27 K/UL (ref 1–4.8)
LYMPHOCYTES # BLD AUTO: 0.28 K/UL (ref 1–4.8)
LYMPHOCYTES # BLD AUTO: 1.2 K/UL (ref 1–4.8)
LYMPHOCYTES # BLD AUTO: 1.27 K/UL (ref 1–4.8)
LYMPHOCYTES NFR BLD: 10 % (ref 22–41)
LYMPHOCYTES NFR BLD: 10 % (ref 22–41)
LYMPHOCYTES NFR BLD: 11.1 % (ref 22–41)
LYMPHOCYTES NFR BLD: 12.2 % (ref 22–41)
LYMPHOCYTES NFR BLD: 13.2 % (ref 22–41)
LYMPHOCYTES NFR BLD: 13.9 % (ref 22–41)
LYMPHOCYTES NFR BLD: 14.3 % (ref 22–41)
LYMPHOCYTES NFR BLD: 15.9 % (ref 22–41)
LYMPHOCYTES NFR BLD: 17.9 % (ref 22–41)
LYMPHOCYTES NFR BLD: 2.6 % (ref 22–41)
LYMPHOCYTES NFR BLD: 2.7 % (ref 22–41)
LYMPHOCYTES NFR BLD: 33.3 % (ref 22–41)
LYMPHOCYTES NFR BLD: 36.7 % (ref 22–41)
LYMPHOCYTES NFR BLD: 4.3 % (ref 22–41)
LYMPHOCYTES NFR BLD: 4.4 % (ref 22–41)
LYMPHOCYTES NFR BLD: 49.2 % (ref 22–41)
LYMPHOCYTES NFR BLD: 5 % (ref 22–41)
LYMPHOCYTES NFR BLD: 56 % (ref 22–41)
LYMPHOCYTES NFR BLD: 6.1 % (ref 22–41)
LYMPHOCYTES NFR BLD: 6.2 % (ref 22–41)
LYMPHOCYTES NFR BLD: 7 % (ref 22–41)
LYMPHOCYTES NFR BLD: 7.5 % (ref 22–41)
LYMPHOCYTES NFR BLD: 8.9 % (ref 22–41)
LYMPHOCYTES NFR BLD: 9.6 % (ref 22–41)
LYMPHOCYTES NFR BLD: 9.7 % (ref 22–41)
MAGNESIUM SERPL-MCNC: 1.3 MG/DL (ref 1.5–2.5)
MAGNESIUM SERPL-MCNC: 1.6 MG/DL (ref 1.5–2.5)
MAGNESIUM SERPL-MCNC: 1.7 MG/DL (ref 1.5–2.5)
MAGNESIUM SERPL-MCNC: 1.8 MG/DL (ref 1.5–2.5)
MAGNESIUM SERPL-MCNC: 1.8 MG/DL (ref 1.5–2.5)
MAGNESIUM SERPL-MCNC: 2 MG/DL (ref 1.5–2.5)
MAGNESIUM SERPL-MCNC: 2.1 MG/DL (ref 1.5–2.5)
MAGNESIUM SERPL-MCNC: 2.1 MG/DL (ref 1.5–2.5)
MANUAL DIFF BLD: ABNORMAL
MANUAL DIFF BLD: NORMAL
MCH RBC QN AUTO: 32 PG (ref 27–33)
MCH RBC QN AUTO: 32.1 PG (ref 27–33)
MCH RBC QN AUTO: 32.2 PG (ref 27–33)
MCH RBC QN AUTO: 32.3 PG (ref 27–33)
MCH RBC QN AUTO: 32.4 PG (ref 27–33)
MCH RBC QN AUTO: 32.5 PG (ref 27–33)
MCH RBC QN AUTO: 32.6 PG (ref 27–33)
MCH RBC QN AUTO: 32.7 PG (ref 27–33)
MCH RBC QN AUTO: 32.8 PG (ref 27–33)
MCH RBC QN AUTO: 32.9 PG (ref 27–33)
MCH RBC QN AUTO: 33 PG (ref 27–33)
MCH RBC QN AUTO: 33.2 PG (ref 27–33)
MCH RBC QN AUTO: 33.3 PG (ref 27–33)
MCH RBC QN AUTO: 33.5 PG (ref 27–33)
MCHC RBC AUTO-ENTMCNC: 32.5 G/DL (ref 33.7–35.3)
MCHC RBC AUTO-ENTMCNC: 33 G/DL (ref 33.7–35.3)
MCHC RBC AUTO-ENTMCNC: 33.2 G/DL (ref 33.7–35.3)
MCHC RBC AUTO-ENTMCNC: 33.5 G/DL (ref 33.7–35.3)
MCHC RBC AUTO-ENTMCNC: 33.6 G/DL (ref 33.7–35.3)
MCHC RBC AUTO-ENTMCNC: 33.6 G/DL (ref 33.7–35.3)
MCHC RBC AUTO-ENTMCNC: 33.7 G/DL (ref 33.7–35.3)
MCHC RBC AUTO-ENTMCNC: 33.8 G/DL (ref 33.7–35.3)
MCHC RBC AUTO-ENTMCNC: 34 G/DL (ref 33.7–35.3)
MCHC RBC AUTO-ENTMCNC: 34.1 G/DL (ref 33.7–35.3)
MCHC RBC AUTO-ENTMCNC: 34.2 G/DL (ref 33.7–35.3)
MCHC RBC AUTO-ENTMCNC: 34.3 G/DL (ref 33.7–35.3)
MCHC RBC AUTO-ENTMCNC: 34.4 G/DL (ref 33.7–35.3)
MCHC RBC AUTO-ENTMCNC: 34.5 G/DL (ref 33.7–35.3)
MCHC RBC AUTO-ENTMCNC: 34.7 G/DL (ref 33.7–35.3)
MCHC RBC AUTO-ENTMCNC: 34.8 G/DL (ref 33.7–35.3)
MCHC RBC AUTO-ENTMCNC: 35.3 G/DL (ref 33.7–35.3)
MCHC RBC AUTO-ENTMCNC: 35.6 G/DL (ref 33.7–35.3)
MCHC RBC AUTO-ENTMCNC: 35.7 G/DL (ref 33.7–35.3)
MCHC RBC AUTO-ENTMCNC: 36.1 G/DL (ref 33.7–35.3)
MCV RBC AUTO: 101.2 FL (ref 81.4–97.8)
MCV RBC AUTO: 91.1 FL (ref 81.4–97.8)
MCV RBC AUTO: 91.9 FL (ref 81.4–97.8)
MCV RBC AUTO: 92.7 FL (ref 81.4–97.8)
MCV RBC AUTO: 93.7 FL (ref 81.4–97.8)
MCV RBC AUTO: 94 FL (ref 81.4–97.8)
MCV RBC AUTO: 94.2 FL (ref 81.4–97.8)
MCV RBC AUTO: 94.3 FL (ref 81.4–97.8)
MCV RBC AUTO: 94.7 FL (ref 81.4–97.8)
MCV RBC AUTO: 95 FL (ref 81.4–97.8)
MCV RBC AUTO: 95.1 FL (ref 81.4–97.8)
MCV RBC AUTO: 95.2 FL (ref 81.4–97.8)
MCV RBC AUTO: 95.4 FL (ref 81.4–97.8)
MCV RBC AUTO: 95.8 FL (ref 81.4–97.8)
MCV RBC AUTO: 96 FL (ref 81.4–97.8)
MCV RBC AUTO: 96.1 FL (ref 81.4–97.8)
MCV RBC AUTO: 96.2 FL (ref 81.4–97.8)
MCV RBC AUTO: 96.4 FL (ref 81.4–97.8)
MCV RBC AUTO: 96.5 FL (ref 81.4–97.8)
MCV RBC AUTO: 97.1 FL (ref 81.4–97.8)
MCV RBC AUTO: 97.3 FL (ref 81.4–97.8)
MCV RBC AUTO: 97.3 FL (ref 81.4–97.8)
MCV RBC AUTO: 97.4 FL (ref 81.4–97.8)
MCV RBC AUTO: 97.5 FL (ref 81.4–97.8)
MCV RBC AUTO: 97.8 FL (ref 81.4–97.8)
MCV RBC AUTO: 98 FL (ref 81.4–97.8)
MCV RBC AUTO: 98 FL (ref 81.4–97.8)
METAMYELOCYTES NFR BLD MANUAL: 0.8 %
METAMYELOCYTES NFR BLD MANUAL: 0.9 %
METAMYELOCYTES NFR BLD MANUAL: 1.6 %
METAMYELOCYTES NFR BLD MANUAL: 1.9 %
METAMYELOCYTES NFR BLD MANUAL: 2.6 %
METAMYELOCYTES NFR BLD MANUAL: 5.1 %
MICROALBUMIN UR-MCNC: 12.2 MG/DL
MICROALBUMIN/CREAT UR: 239 MG/G (ref 0–30)
MICROCYTES BLD QL SMEAR: ABNORMAL
MONOCYTES # BLD AUTO: 0 K/UL (ref 0–0.85)
MONOCYTES # BLD AUTO: 0.01 K/UL (ref 0–0.85)
MONOCYTES # BLD AUTO: 0.03 K/UL (ref 0–0.85)
MONOCYTES # BLD AUTO: 0.03 K/UL (ref 0–0.85)
MONOCYTES # BLD AUTO: 0.04 K/UL (ref 0–0.85)
MONOCYTES # BLD AUTO: 0.09 K/UL (ref 0–0.85)
MONOCYTES # BLD AUTO: 0.09 K/UL (ref 0–0.85)
MONOCYTES # BLD AUTO: 0.1 K/UL (ref 0–0.85)
MONOCYTES # BLD AUTO: 0.12 K/UL (ref 0–0.85)
MONOCYTES # BLD AUTO: 0.16 K/UL (ref 0–0.85)
MONOCYTES # BLD AUTO: 0.17 K/UL (ref 0–0.85)
MONOCYTES # BLD AUTO: 0.19 K/UL (ref 0–0.85)
MONOCYTES # BLD AUTO: 0.21 K/UL (ref 0–0.85)
MONOCYTES # BLD AUTO: 0.25 K/UL (ref 0–0.85)
MONOCYTES # BLD AUTO: 0.29 K/UL (ref 0–0.85)
MONOCYTES # BLD AUTO: 0.36 K/UL (ref 0–0.85)
MONOCYTES # BLD AUTO: 0.4 K/UL (ref 0–0.85)
MONOCYTES # BLD AUTO: 0.41 K/UL (ref 0–0.85)
MONOCYTES # BLD AUTO: 0.41 K/UL (ref 0–0.85)
MONOCYTES # BLD AUTO: 0.44 K/UL (ref 0–0.85)
MONOCYTES # BLD AUTO: 0.45 K/UL (ref 0–0.85)
MONOCYTES # BLD AUTO: 0.51 K/UL (ref 0–0.85)
MONOCYTES # BLD AUTO: 0.58 K/UL (ref 0–0.85)
MONOCYTES # BLD AUTO: 0.59 K/UL (ref 0–0.85)
MONOCYTES # BLD AUTO: 0.6 K/UL (ref 0–0.85)
MONOCYTES # BLD AUTO: 0.62 K/UL (ref 0–0.85)
MONOCYTES # BLD AUTO: 0.71 K/UL (ref 0–0.85)
MONOCYTES NFR BLD AUTO: 0 % (ref 0–13.4)
MONOCYTES NFR BLD AUTO: 0.9 % (ref 0–13.4)
MONOCYTES NFR BLD AUTO: 0.9 % (ref 0–13.4)
MONOCYTES NFR BLD AUTO: 1.8 % (ref 0–13.4)
MONOCYTES NFR BLD AUTO: 11.5 % (ref 0–13.4)
MONOCYTES NFR BLD AUTO: 12.3 % (ref 0–13.4)
MONOCYTES NFR BLD AUTO: 13.9 % (ref 0–13.4)
MONOCYTES NFR BLD AUTO: 14 % (ref 0–13.4)
MONOCYTES NFR BLD AUTO: 15.8 % (ref 0–13.4)
MONOCYTES NFR BLD AUTO: 16.5 % (ref 0–13.4)
MONOCYTES NFR BLD AUTO: 18.6 % (ref 0–13.4)
MONOCYTES NFR BLD AUTO: 18.7 % (ref 0–13.4)
MONOCYTES NFR BLD AUTO: 19.1 % (ref 0–13.4)
MONOCYTES NFR BLD AUTO: 20.1 % (ref 0–13.4)
MONOCYTES NFR BLD AUTO: 20.5 % (ref 0–13.4)
MONOCYTES NFR BLD AUTO: 24.1 % (ref 0–13.4)
MONOCYTES NFR BLD AUTO: 24.3 % (ref 0–13.4)
MONOCYTES NFR BLD AUTO: 26.9 % (ref 0–13.4)
MONOCYTES NFR BLD AUTO: 27.4 % (ref 0–13.4)
MONOCYTES NFR BLD AUTO: 28.6 % (ref 0–13.4)
MONOCYTES NFR BLD AUTO: 30 % (ref 0–13.4)
MONOCYTES NFR BLD AUTO: 32.9 % (ref 0–13.4)
MONOCYTES NFR BLD AUTO: 34.5 % (ref 0–13.4)
MONOCYTES NFR BLD AUTO: 5.4 % (ref 0–13.4)
MONOCYTES NFR BLD AUTO: 6 % (ref 0–13.4)
MONOCYTES NFR BLD AUTO: 6.9 % (ref 0–13.4)
MONOCYTES NFR BLD AUTO: 7.4 % (ref 0–13.4)
MORPHOLOGY BLD-IMP: NORMAL
MYELOCYTES NFR BLD MANUAL: 0.8 %
MYELOCYTES NFR BLD MANUAL: 0.9 %
MYELOCYTES NFR BLD MANUAL: 2.5 %
MYELOCYTES NFR BLD MANUAL: 2.6 %
NEUTROPHILS # BLD AUTO: 0.06 K/UL (ref 1.82–7.42)
NEUTROPHILS # BLD AUTO: 0.11 K/UL (ref 1.82–7.42)
NEUTROPHILS # BLD AUTO: 0.19 K/UL (ref 1.82–7.42)
NEUTROPHILS # BLD AUTO: 0.21 K/UL (ref 1.82–7.42)
NEUTROPHILS # BLD AUTO: 0.67 K/UL (ref 1.82–7.42)
NEUTROPHILS # BLD AUTO: 0.71 K/UL (ref 1.82–7.42)
NEUTROPHILS # BLD AUTO: 0.87 K/UL (ref 1.82–7.42)
NEUTROPHILS # BLD AUTO: 0.87 K/UL (ref 1.82–7.42)
NEUTROPHILS # BLD AUTO: 0.9 K/UL (ref 1.82–7.42)
NEUTROPHILS # BLD AUTO: 0.93 K/UL (ref 1.82–7.42)
NEUTROPHILS # BLD AUTO: 0.95 K/UL (ref 1.82–7.42)
NEUTROPHILS # BLD AUTO: 1 K/UL (ref 1.82–7.42)
NEUTROPHILS # BLD AUTO: 1.02 K/UL (ref 1.82–7.42)
NEUTROPHILS # BLD AUTO: 1.03 K/UL (ref 1.82–7.42)
NEUTROPHILS # BLD AUTO: 1.33 K/UL (ref 1.82–7.42)
NEUTROPHILS # BLD AUTO: 1.33 K/UL (ref 1.82–7.42)
NEUTROPHILS # BLD AUTO: 1.44 K/UL (ref 1.82–7.42)
NEUTROPHILS # BLD AUTO: 1.5 K/UL (ref 1.82–7.42)
NEUTROPHILS # BLD AUTO: 1.59 K/UL (ref 1.82–7.42)
NEUTROPHILS # BLD AUTO: 1.61 K/UL (ref 1.82–7.42)
NEUTROPHILS # BLD AUTO: 1.91 K/UL (ref 1.82–7.42)
NEUTROPHILS # BLD AUTO: 2 K/UL (ref 1.82–7.42)
NEUTROPHILS # BLD AUTO: 2.01 K/UL (ref 1.82–7.42)
NEUTROPHILS # BLD AUTO: 3.28 K/UL (ref 1.82–7.42)
NEUTROPHILS # BLD AUTO: 3.63 K/UL (ref 1.82–7.42)
NEUTROPHILS # BLD AUTO: 6.07 K/UL (ref 1.82–7.42)
NEUTROPHILS # BLD AUTO: 6.49 K/UL (ref 1.82–7.42)
NEUTROPHILS # BLD AUTO: ABNORMAL K/UL (ref 1.82–7.42)
NEUTROPHILS NFR BLD: 13.9 % (ref 44–72)
NEUTROPHILS NFR BLD: 14.3 % (ref 44–72)
NEUTROPHILS NFR BLD: 35 % (ref 44–72)
NEUTROPHILS NFR BLD: 38 % (ref 44–72)
NEUTROPHILS NFR BLD: 40.7 % (ref 44–72)
NEUTROPHILS NFR BLD: 55.5 % (ref 44–72)
NEUTROPHILS NFR BLD: 55.7 % (ref 44–72)
NEUTROPHILS NFR BLD: 58 % (ref 44–72)
NEUTROPHILS NFR BLD: 61.1 % (ref 44–72)
NEUTROPHILS NFR BLD: 62.6 % (ref 44–72)
NEUTROPHILS NFR BLD: 69.6 % (ref 44–72)
NEUTROPHILS NFR BLD: 69.7 % (ref 44–72)
NEUTROPHILS NFR BLD: 71.9 % (ref 44–72)
NEUTROPHILS NFR BLD: 72.2 % (ref 44–72)
NEUTROPHILS NFR BLD: 72.5 % (ref 44–72)
NEUTROPHILS NFR BLD: 72.5 % (ref 44–72)
NEUTROPHILS NFR BLD: 72.8 % (ref 44–72)
NEUTROPHILS NFR BLD: 74.6 % (ref 44–72)
NEUTROPHILS NFR BLD: 75.8 % (ref 44–72)
NEUTROPHILS NFR BLD: 77.6 % (ref 44–72)
NEUTROPHILS NFR BLD: 8.9 % (ref 44–72)
NEUTROPHILS NFR BLD: 80.7 % (ref 44–72)
NEUTROPHILS NFR BLD: 90 % (ref 44–72)
NEUTROPHILS NFR BLD: 92.2 % (ref 44–72)
NEUTROPHILS NFR BLD: 92.5 % (ref 44–72)
NEUTROPHILS NFR BLD: 93 % (ref 44–72)
NEUTROPHILS NFR BLD: 95.6 % (ref 44–72)
NEUTS BAND NFR BLD MANUAL: 0.8 % (ref 0–10)
NEUTS BAND NFR BLD MANUAL: 0.9 % (ref 0–10)
NEUTS BAND NFR BLD MANUAL: 12.6 % (ref 0–10)
NEUTS BAND NFR BLD MANUAL: 2.6 % (ref 0–10)
NEUTS BAND NFR BLD MANUAL: 22 % (ref 0–10)
NEUTS BAND NFR BLD MANUAL: 3 % (ref 0–10)
NEUTS BAND NFR BLD MANUAL: 3.5 % (ref 0–10)
NEUTS BAND NFR BLD MANUAL: 4.7 % (ref 0–10)
NEUTS BAND NFR BLD MANUAL: 49.6 % (ref 0–10)
NEUTS BAND NFR BLD MANUAL: 6.1 % (ref 0–10)
NEUTS BAND NFR BLD MANUAL: 6.9 % (ref 0–10)
NEUTS BAND NFR BLD MANUAL: 7.1 % (ref 0–10)
NEUTS BAND NFR BLD MANUAL: 7.8 % (ref 0–10)
NRBC # BLD AUTO: 0 K/UL
NRBC # BLD AUTO: 0.02 K/UL
NRBC # BLD AUTO: 0.03 K/UL
NRBC BLD-RTO: 0 /100 WBC
NRBC BLD-RTO: 0.4 /100 WBC
NRBC BLD-RTO: 1.4 /100 WBC
OSMOLALITY UR: 440 MOSM/KG H2O (ref 300–900)
PATHOLOGY CONSULT NOTE: NORMAL
PATHOLOGY CONSULT NOTE: NORMAL
PHOSPHATE SERPL-MCNC: 3.8 MG/DL (ref 2.5–4.5)
PHOSPHATE SERPL-MCNC: 5 MG/DL (ref 2.5–4.5)
PLATELET # BLD AUTO: 10 K/UL (ref 164–446)
PLATELET # BLD AUTO: 103 K/UL (ref 164–446)
PLATELET # BLD AUTO: 108 K/UL (ref 164–446)
PLATELET # BLD AUTO: 113 K/UL (ref 164–446)
PLATELET # BLD AUTO: 120 K/UL (ref 164–446)
PLATELET # BLD AUTO: 122 K/UL (ref 164–446)
PLATELET # BLD AUTO: 129 K/UL (ref 164–446)
PLATELET # BLD AUTO: 134 K/UL (ref 164–446)
PLATELET # BLD AUTO: 134 K/UL (ref 164–446)
PLATELET # BLD AUTO: 141 K/UL (ref 164–446)
PLATELET # BLD AUTO: 142 K/UL (ref 164–446)
PLATELET # BLD AUTO: 143 K/UL (ref 164–446)
PLATELET # BLD AUTO: 149 K/UL (ref 164–446)
PLATELET # BLD AUTO: 162 K/UL (ref 164–446)
PLATELET # BLD AUTO: 163 K/UL (ref 164–446)
PLATELET # BLD AUTO: 18 K/UL (ref 164–446)
PLATELET # BLD AUTO: 27 K/UL (ref 164–446)
PLATELET # BLD AUTO: 31 K/UL (ref 164–446)
PLATELET # BLD AUTO: 32 K/UL (ref 164–446)
PLATELET # BLD AUTO: 34 K/UL (ref 164–446)
PLATELET # BLD AUTO: 34 K/UL (ref 164–446)
PLATELET # BLD AUTO: 36 K/UL (ref 164–446)
PLATELET # BLD AUTO: 38 K/UL (ref 164–446)
PLATELET # BLD AUTO: 51 K/UL (ref 164–446)
PLATELET # BLD AUTO: 52 K/UL (ref 164–446)
PLATELET # BLD AUTO: 73 K/UL (ref 164–446)
PLATELET # BLD AUTO: 77 K/UL (ref 164–446)
PLATELET # BLD AUTO: 78 K/UL (ref 164–446)
PLATELET # BLD AUTO: 79 K/UL (ref 164–446)
PLATELET BLD QL SMEAR: NORMAL
PLATELETS.RETICULATED NFR BLD AUTO: 11.7 K/UL (ref 0.6–13.1)
PLATELETS.RETICULATED NFR BLD AUTO: 3.4 K/UL (ref 0.6–13.1)
PLATELETS.RETICULATED NFR BLD AUTO: 3.6 K/UL (ref 0.6–13.1)
PLATELETS.RETICULATED NFR BLD AUTO: 3.9 K/UL (ref 0.6–13.1)
PLATELETS.RETICULATED NFR BLD AUTO: 4.1 K/UL (ref 0.6–13.1)
PLATELETS.RETICULATED NFR BLD AUTO: 4.1 K/UL (ref 0.6–13.1)
PLATELETS.RETICULATED NFR BLD AUTO: 5.8 K/UL (ref 0.6–13.1)
PLATELETS.RETICULATED NFR BLD AUTO: 7.7 K/UL (ref 0.6–13.1)
PLATELETS.RETICULATED NFR BLD AUTO: 9.3 K/UL (ref 0.6–13.1)
PMV BLD AUTO: 10 FL (ref 9–12.9)
PMV BLD AUTO: 10.1 FL (ref 9–12.9)
PMV BLD AUTO: 10.2 FL (ref 9–12.9)
PMV BLD AUTO: 10.3 FL (ref 9–12.9)
PMV BLD AUTO: 10.4 FL (ref 9–12.9)
PMV BLD AUTO: 10.5 FL (ref 9–12.9)
PMV BLD AUTO: 10.5 FL (ref 9–12.9)
PMV BLD AUTO: 10.6 FL (ref 9–12.9)
PMV BLD AUTO: 10.7 FL (ref 9–12.9)
PMV BLD AUTO: 10.8 FL (ref 9–12.9)
PMV BLD AUTO: 11 FL (ref 9–12.9)
PMV BLD AUTO: 11.1 FL (ref 9–12.9)
PMV BLD AUTO: 12.3 FL (ref 9–12.9)
PMV BLD AUTO: 12.4 FL (ref 9–12.9)
PMV BLD AUTO: 12.5 FL (ref 9–12.9)
PMV BLD AUTO: 12.6 FL (ref 9–12.9)
PMV BLD AUTO: 13.5 FL (ref 9–12.9)
PMV BLD AUTO: 9.6 FL (ref 9–12.9)
PMV BLD AUTO: 9.7 FL (ref 9–12.9)
PMV BLD AUTO: 9.9 FL (ref 9–12.9)
PMV BLD AUTO: 9.9 FL (ref 9–12.9)
POIKILOCYTOSIS BLD QL SMEAR: NORMAL
POLYCHROMASIA BLD QL SMEAR: NORMAL
POTASSIUM SERPL-SCNC: 3.6 MMOL/L (ref 3.6–5.5)
POTASSIUM SERPL-SCNC: 3.7 MMOL/L (ref 3.6–5.5)
POTASSIUM SERPL-SCNC: 3.8 MMOL/L (ref 3.6–5.5)
POTASSIUM SERPL-SCNC: 3.9 MMOL/L (ref 3.6–5.5)
POTASSIUM SERPL-SCNC: 4 MMOL/L (ref 3.6–5.5)
POTASSIUM SERPL-SCNC: 4.1 MMOL/L (ref 3.6–5.5)
POTASSIUM SERPL-SCNC: 4.1 MMOL/L (ref 3.6–5.5)
POTASSIUM SERPL-SCNC: 4.2 MMOL/L (ref 3.6–5.5)
POTASSIUM SERPL-SCNC: 4.4 MMOL/L (ref 3.6–5.5)
POTASSIUM SERPL-SCNC: 4.4 MMOL/L (ref 3.6–5.5)
POTASSIUM SERPL-SCNC: 4.5 MMOL/L (ref 3.6–5.5)
POTASSIUM SERPL-SCNC: 4.8 MMOL/L (ref 3.6–5.5)
POTASSIUM SERPL-SCNC: 4.8 MMOL/L (ref 3.6–5.5)
POTASSIUM SERPL-SCNC: 5.2 MMOL/L (ref 3.6–5.5)
POTASSIUM UR-SCNC: 14.4 MMOL/L
PROCALCITONIN SERPL-MCNC: 0.05 NG/ML
PROCALCITONIN SERPL-MCNC: 0.26 NG/ML
PROCALCITONIN SERPL-MCNC: <0.05 NG/ML
PRODUCT TYPE UPROD: NORMAL
PROMYELOCYTES NFR BLD MANUAL: 3.5 %
PROT SERPL-MCNC: 3.6 G/DL (ref 6–8.2)
PROT SERPL-MCNC: 3.6 G/DL (ref 6–8.2)
PROT SERPL-MCNC: 3.8 G/DL (ref 6–8.2)
PROT SERPL-MCNC: 3.8 G/DL (ref 6–8.2)
PROT SERPL-MCNC: 3.9 G/DL (ref 6–8.2)
PROT SERPL-MCNC: 4 G/DL (ref 6–8.2)
PROT SERPL-MCNC: 4.1 G/DL (ref 6–8.2)
PROT SERPL-MCNC: 4.4 G/DL (ref 6–8.2)
PROT SERPL-MCNC: 4.5 G/DL (ref 6–8.2)
PROT SERPL-MCNC: 5.8 G/DL (ref 6–8.2)
PROT SERPL-MCNC: 6.3 G/DL (ref 6–8.2)
PROT SERPL-MCNC: 6.6 G/DL (ref 6–8.2)
PROT SERPL-MCNC: 6.8 G/DL (ref 6–8.2)
PROT UR-MCNC: 546.2 MG/DL (ref 0–15)
PTH-INTACT SERPL-MCNC: 121.6 PG/ML (ref 14–72)
RAD ONC ARIA COURSE LAST TREATMENT DATE: NORMAL
RAD ONC ARIA COURSE TREATMENT ELAPSED DAYS: NORMAL
RAD ONC ARIA REFERENCE POINT DOSAGE GIVEN TO DATE: 1.8
RAD ONC ARIA REFERENCE POINT DOSAGE GIVEN TO DATE: 1.86
RAD ONC ARIA REFERENCE POINT DOSAGE GIVEN TO DATE: 10.8
RAD ONC ARIA REFERENCE POINT DOSAGE GIVEN TO DATE: 11.19
RAD ONC ARIA REFERENCE POINT DOSAGE GIVEN TO DATE: 12.6
RAD ONC ARIA REFERENCE POINT DOSAGE GIVEN TO DATE: 13.05
RAD ONC ARIA REFERENCE POINT DOSAGE GIVEN TO DATE: 14.4
RAD ONC ARIA REFERENCE POINT DOSAGE GIVEN TO DATE: 14.92
RAD ONC ARIA REFERENCE POINT DOSAGE GIVEN TO DATE: 16.2
RAD ONC ARIA REFERENCE POINT DOSAGE GIVEN TO DATE: 16.78
RAD ONC ARIA REFERENCE POINT DOSAGE GIVEN TO DATE: 18
RAD ONC ARIA REFERENCE POINT DOSAGE GIVEN TO DATE: 18.64
RAD ONC ARIA REFERENCE POINT DOSAGE GIVEN TO DATE: 19.8
RAD ONC ARIA REFERENCE POINT DOSAGE GIVEN TO DATE: 20.51
RAD ONC ARIA REFERENCE POINT DOSAGE GIVEN TO DATE: 21.6
RAD ONC ARIA REFERENCE POINT DOSAGE GIVEN TO DATE: 22.37
RAD ONC ARIA REFERENCE POINT DOSAGE GIVEN TO DATE: 23.4
RAD ONC ARIA REFERENCE POINT DOSAGE GIVEN TO DATE: 24.24
RAD ONC ARIA REFERENCE POINT DOSAGE GIVEN TO DATE: 25.2
RAD ONC ARIA REFERENCE POINT DOSAGE GIVEN TO DATE: 26.1
RAD ONC ARIA REFERENCE POINT DOSAGE GIVEN TO DATE: 27
RAD ONC ARIA REFERENCE POINT DOSAGE GIVEN TO DATE: 27.97
RAD ONC ARIA REFERENCE POINT DOSAGE GIVEN TO DATE: 28.8
RAD ONC ARIA REFERENCE POINT DOSAGE GIVEN TO DATE: 29.83
RAD ONC ARIA REFERENCE POINT DOSAGE GIVEN TO DATE: 3.6
RAD ONC ARIA REFERENCE POINT DOSAGE GIVEN TO DATE: 3.73
RAD ONC ARIA REFERENCE POINT DOSAGE GIVEN TO DATE: 30.6
RAD ONC ARIA REFERENCE POINT DOSAGE GIVEN TO DATE: 31.7
RAD ONC ARIA REFERENCE POINT DOSAGE GIVEN TO DATE: 32.4
RAD ONC ARIA REFERENCE POINT DOSAGE GIVEN TO DATE: 33.56
RAD ONC ARIA REFERENCE POINT DOSAGE GIVEN TO DATE: 34.2
RAD ONC ARIA REFERENCE POINT DOSAGE GIVEN TO DATE: 35.42
RAD ONC ARIA REFERENCE POINT DOSAGE GIVEN TO DATE: 36
RAD ONC ARIA REFERENCE POINT DOSAGE GIVEN TO DATE: 37.29
RAD ONC ARIA REFERENCE POINT DOSAGE GIVEN TO DATE: 37.8
RAD ONC ARIA REFERENCE POINT DOSAGE GIVEN TO DATE: 39.15
RAD ONC ARIA REFERENCE POINT DOSAGE GIVEN TO DATE: 39.6
RAD ONC ARIA REFERENCE POINT DOSAGE GIVEN TO DATE: 41.02
RAD ONC ARIA REFERENCE POINT DOSAGE GIVEN TO DATE: 41.4
RAD ONC ARIA REFERENCE POINT DOSAGE GIVEN TO DATE: 42.88
RAD ONC ARIA REFERENCE POINT DOSAGE GIVEN TO DATE: 43.2
RAD ONC ARIA REFERENCE POINT DOSAGE GIVEN TO DATE: 44.75
RAD ONC ARIA REFERENCE POINT DOSAGE GIVEN TO DATE: 45
RAD ONC ARIA REFERENCE POINT DOSAGE GIVEN TO DATE: 46.61
RAD ONC ARIA REFERENCE POINT DOSAGE GIVEN TO DATE: 46.8
RAD ONC ARIA REFERENCE POINT DOSAGE GIVEN TO DATE: 48.47
RAD ONC ARIA REFERENCE POINT DOSAGE GIVEN TO DATE: 48.6
RAD ONC ARIA REFERENCE POINT DOSAGE GIVEN TO DATE: 5.4
RAD ONC ARIA REFERENCE POINT DOSAGE GIVEN TO DATE: 5.59
RAD ONC ARIA REFERENCE POINT DOSAGE GIVEN TO DATE: 50.34
RAD ONC ARIA REFERENCE POINT DOSAGE GIVEN TO DATE: 7.2
RAD ONC ARIA REFERENCE POINT DOSAGE GIVEN TO DATE: 7.46
RAD ONC ARIA REFERENCE POINT DOSAGE GIVEN TO DATE: 9
RAD ONC ARIA REFERENCE POINT DOSAGE GIVEN TO DATE: 9.32
RAD ONC ARIA REFERENCE POINT ID: NORMAL
RAD ONC ARIA REFERENCE POINT SESSION DOSAGE GIVEN: 1.8
RAD ONC ARIA REFERENCE POINT SESSION DOSAGE GIVEN: 1.86
RBC # BLD AUTO: 2.47 M/UL (ref 4.7–6.1)
RBC # BLD AUTO: 2.61 M/UL (ref 4.7–6.1)
RBC # BLD AUTO: 2.66 M/UL (ref 4.7–6.1)
RBC # BLD AUTO: 2.78 M/UL (ref 4.7–6.1)
RBC # BLD AUTO: 2.79 M/UL (ref 4.7–6.1)
RBC # BLD AUTO: 2.85 M/UL (ref 4.7–6.1)
RBC # BLD AUTO: 2.91 M/UL (ref 4.7–6.1)
RBC # BLD AUTO: 2.93 M/UL (ref 4.7–6.1)
RBC # BLD AUTO: 2.93 M/UL (ref 4.7–6.1)
RBC # BLD AUTO: 2.96 M/UL (ref 4.7–6.1)
RBC # BLD AUTO: 2.98 M/UL (ref 4.7–6.1)
RBC # BLD AUTO: 3 M/UL (ref 4.7–6.1)
RBC # BLD AUTO: 3.02 M/UL (ref 4.7–6.1)
RBC # BLD AUTO: 3.05 M/UL (ref 4.7–6.1)
RBC # BLD AUTO: 3.09 M/UL (ref 4.7–6.1)
RBC # BLD AUTO: 3.1 M/UL (ref 4.7–6.1)
RBC # BLD AUTO: 3.11 M/UL (ref 4.7–6.1)
RBC # BLD AUTO: 3.14 M/UL (ref 4.7–6.1)
RBC # BLD AUTO: 3.19 M/UL (ref 4.7–6.1)
RBC # BLD AUTO: 3.25 M/UL (ref 4.7–6.1)
RBC # BLD AUTO: 3.37 M/UL (ref 4.7–6.1)
RBC # BLD AUTO: 3.48 M/UL (ref 4.7–6.1)
RBC # BLD AUTO: 3.5 M/UL (ref 4.7–6.1)
RBC # BLD AUTO: 3.55 M/UL (ref 4.7–6.1)
RBC # BLD AUTO: 4.06 M/UL (ref 4.7–6.1)
RBC # BLD AUTO: 4.51 M/UL (ref 4.7–6.1)
RBC # BLD AUTO: 4.66 M/UL (ref 4.7–6.1)
RBC # BLD AUTO: 4.82 M/UL (ref 4.7–6.1)
RBC # BLD AUTO: 4.93 M/UL (ref 4.7–6.1)
RBC BLD AUTO: NORMAL
RBC BLD AUTO: PRESENT
RH BLD: NORMAL
SIGNIFICANT IND 70042: NORMAL
SITE SITE: NORMAL
SODIUM SERPL-SCNC: 129 MMOL/L (ref 135–145)
SODIUM SERPL-SCNC: 130 MMOL/L (ref 135–145)
SODIUM SERPL-SCNC: 133 MMOL/L (ref 135–145)
SODIUM SERPL-SCNC: 134 MMOL/L (ref 135–145)
SODIUM SERPL-SCNC: 135 MMOL/L (ref 135–145)
SODIUM SERPL-SCNC: 135 MMOL/L (ref 135–145)
SODIUM SERPL-SCNC: 136 MMOL/L (ref 135–145)
SODIUM SERPL-SCNC: 137 MMOL/L (ref 135–145)
SODIUM SERPL-SCNC: 138 MMOL/L (ref 135–145)
SODIUM SERPL-SCNC: 139 MMOL/L (ref 135–145)
SODIUM SERPL-SCNC: 140 MMOL/L (ref 135–145)
SODIUM SERPL-SCNC: 142 MMOL/L (ref 135–145)
SODIUM UR-SCNC: 114 MMOL/L
SOURCE SOURCE: NORMAL
T3FREE SERPL-MCNC: 3.11 PG/ML (ref 2.4–4.2)
T4 FREE SERPL-MCNC: 0.95 NG/DL (ref 0.53–1.43)
TOXIC GRANULES BLD QL SMEAR: NORMAL
TOXIC GRANULES BLD QL SMEAR: SLIGHT
TRIGL SERPL-MCNC: 115 MG/DL (ref 0–149)
TRIGL SERPL-MCNC: 70 MG/DL (ref 0–149)
TSH SERPL DL<=0.005 MIU/L-ACNC: 2.47 UIU/ML (ref 0.38–5.33)
TSH SERPL DL<=0.005 MIU/L-ACNC: 3.67 UIU/ML (ref 0.38–5.33)
UNIT STATUS USTAT: NORMAL
URATE SERPL-MCNC: 5.9 MG/DL (ref 2.5–8.3)
VARIANT LYMPHS BLD QL SMEAR: NORMAL
WBC # BLD AUTO: 0.3 K/UL (ref 4.8–10.8)
WBC # BLD AUTO: 0.5 K/UL (ref 4.8–10.8)
WBC # BLD AUTO: 0.9 K/UL (ref 4.8–10.8)
WBC # BLD AUTO: 1 K/UL (ref 4.8–10.8)
WBC # BLD AUTO: 1 K/UL (ref 4.8–10.8)
WBC # BLD AUTO: 1.2 K/UL (ref 4.8–10.8)
WBC # BLD AUTO: 1.4 K/UL (ref 4.8–10.8)
WBC # BLD AUTO: 1.4 K/UL (ref 4.8–10.8)
WBC # BLD AUTO: 1.5 K/UL (ref 4.8–10.8)
WBC # BLD AUTO: 1.8 K/UL (ref 4.8–10.8)
WBC # BLD AUTO: 2 K/UL (ref 4.8–10.8)
WBC # BLD AUTO: 2.1 K/UL (ref 4.8–10.8)
WBC # BLD AUTO: 2.1 K/UL (ref 4.8–10.8)
WBC # BLD AUTO: 2.2 K/UL (ref 4.8–10.8)
WBC # BLD AUTO: 2.5 K/UL (ref 4.8–10.8)
WBC # BLD AUTO: 3.4 K/UL (ref 4.8–10.8)
WBC # BLD AUTO: 4.5 K/UL (ref 4.8–10.8)
WBC # BLD AUTO: 8 K/UL (ref 4.8–10.8)
WBC # BLD AUTO: 8.4 K/UL (ref 4.8–10.8)

## 2019-01-01 PROCEDURE — 77386 HCHG IMRT DELIVERY COMPLEX: CPT | Performed by: RADIOLOGY

## 2019-01-01 PROCEDURE — 82962 GLUCOSE BLOOD TEST: CPT | Mod: 91

## 2019-01-01 PROCEDURE — 700111 HCHG RX REV CODE 636 W/ 250 OVERRIDE (IP): Performed by: INTERNAL MEDICINE

## 2019-01-01 PROCEDURE — 700102 HCHG RX REV CODE 250 W/ 637 OVERRIDE(OP): Performed by: FAMILY MEDICINE

## 2019-01-01 PROCEDURE — 700105 HCHG RX REV CODE 258: Performed by: HOSPITALIST

## 2019-01-01 PROCEDURE — 88305 TISSUE EXAM BY PATHOLOGIST: CPT

## 2019-01-01 PROCEDURE — 700102 HCHG RX REV CODE 250 W/ 637 OVERRIDE(OP): Performed by: INTERNAL MEDICINE

## 2019-01-01 PROCEDURE — A9270 NON-COVERED ITEM OR SERVICE: HCPCS | Performed by: INTERNAL MEDICINE

## 2019-01-01 PROCEDURE — 99232 SBSQ HOSP IP/OBS MODERATE 35: CPT | Performed by: INTERNAL MEDICINE

## 2019-01-01 PROCEDURE — 700105 HCHG RX REV CODE 258: Performed by: INTERNAL MEDICINE

## 2019-01-01 PROCEDURE — 85025 COMPLETE CBC W/AUTO DIFF WBC: CPT

## 2019-01-01 PROCEDURE — 83735 ASSAY OF MAGNESIUM: CPT

## 2019-01-01 PROCEDURE — A9270 NON-COVERED ITEM OR SERVICE: HCPCS | Performed by: HOSPITALIST

## 2019-01-01 PROCEDURE — 84156 ASSAY OF PROTEIN URINE: CPT

## 2019-01-01 PROCEDURE — 700111 HCHG RX REV CODE 636 W/ 250 OVERRIDE (IP): Mod: JG | Performed by: INTERNAL MEDICINE

## 2019-01-01 PROCEDURE — 700102 HCHG RX REV CODE 250 W/ 637 OVERRIDE(OP): Performed by: HOSPITALIST

## 2019-01-01 PROCEDURE — 84439 ASSAY OF FREE THYROXINE: CPT

## 2019-01-01 PROCEDURE — A9270 NON-COVERED ITEM OR SERVICE: HCPCS | Performed by: FAMILY MEDICINE

## 2019-01-01 PROCEDURE — 77300 RADIATION THERAPY DOSE PLAN: CPT | Mod: 26 | Performed by: RADIOLOGY

## 2019-01-01 PROCEDURE — 77014 PR CT GUIDANCE PLACEMENT RAD THERAPY FIELDS: CPT | Mod: 26 | Performed by: RADIOLOGY

## 2019-01-01 PROCEDURE — 770004 HCHG ROOM/CARE - ONCOLOGY PRIVATE *

## 2019-01-01 PROCEDURE — 80053 COMPREHEN METABOLIC PANEL: CPT

## 2019-01-01 PROCEDURE — 77427 RADIATION TX MANAGEMENT X5: CPT | Performed by: RADIOLOGY

## 2019-01-01 PROCEDURE — 85007 BL SMEAR W/DIFF WBC COUNT: CPT

## 2019-01-01 PROCEDURE — 86901 BLOOD TYPING SEROLOGIC RH(D): CPT

## 2019-01-01 PROCEDURE — 77336 RADIATION PHYSICS CONSULT: CPT | Performed by: RADIOLOGY

## 2019-01-01 PROCEDURE — 160204 HCHG ENDO MINUTES - 1ST 30 MINS LEVEL 5: Performed by: SURGERY

## 2019-01-01 PROCEDURE — 99214 OFFICE O/P EST MOD 30 MIN: CPT | Performed by: RADIOLOGY

## 2019-01-01 PROCEDURE — 700105 HCHG RX REV CODE 258: Performed by: SURGERY

## 2019-01-01 PROCEDURE — 84133 ASSAY OF URINE POTASSIUM: CPT

## 2019-01-01 PROCEDURE — 700105 HCHG RX REV CODE 258: Performed by: FAMILY MEDICINE

## 2019-01-01 PROCEDURE — 96375 TX/PRO/DX INJ NEW DRUG ADDON: CPT

## 2019-01-01 PROCEDURE — 84145 PROCALCITONIN (PCT): CPT

## 2019-01-01 PROCEDURE — 85055 RETICULATED PLATELET ASSAY: CPT

## 2019-01-01 PROCEDURE — P9034 PLATELETS, PHERESIS: HCPCS

## 2019-01-01 PROCEDURE — 80048 BASIC METABOLIC PNL TOTAL CA: CPT

## 2019-01-01 PROCEDURE — 77331 SPECIAL RADIATION DOSIMETRY: CPT | Mod: 26 | Performed by: RADIOLOGY

## 2019-01-01 PROCEDURE — 36415 COLL VENOUS BLD VENIPUNCTURE: CPT

## 2019-01-01 PROCEDURE — 77338 DESIGN MLC DEVICE FOR IMRT: CPT | Mod: 26 | Performed by: RADIOLOGY

## 2019-01-01 PROCEDURE — 700111 HCHG RX REV CODE 636 W/ 250 OVERRIDE (IP): Performed by: HOSPITALIST

## 2019-01-01 PROCEDURE — 96360 HYDRATION IV INFUSION INIT: CPT

## 2019-01-01 PROCEDURE — 86945 BLOOD PRODUCT/IRRADIATION: CPT

## 2019-01-01 PROCEDURE — 85027 COMPLETE CBC AUTOMATED: CPT

## 2019-01-01 PROCEDURE — 99233 SBSQ HOSP IP/OBS HIGH 50: CPT | Performed by: FAMILY MEDICINE

## 2019-01-01 PROCEDURE — 84443 ASSAY THYROID STIM HORMONE: CPT

## 2019-01-01 PROCEDURE — 71045 X-RAY EXAM CHEST 1 VIEW: CPT

## 2019-01-01 PROCEDURE — 93971 EXTREMITY STUDY: CPT | Mod: RT

## 2019-01-01 PROCEDURE — B548ZZA ULTRASONOGRAPHY OF SUPERIOR VENA CAVA, GUIDANCE: ICD-10-PCS | Performed by: INTERNAL MEDICINE

## 2019-01-01 PROCEDURE — C1751 CATH, INF, PER/CENT/MIDLINE: HCPCS

## 2019-01-01 PROCEDURE — 36592 COLLECT BLOOD FROM PICC: CPT

## 2019-01-01 PROCEDURE — 160046 HCHG PACU - 1ST 60 MINS PHASE II: Performed by: SURGERY

## 2019-01-01 PROCEDURE — 96361 HYDRATE IV INFUSION ADD-ON: CPT

## 2019-01-01 PROCEDURE — 77331 SPECIAL RADIATION DOSIMETRY: CPT | Performed by: RADIOLOGY

## 2019-01-01 PROCEDURE — A9552 F18 FDG: HCPCS

## 2019-01-01 PROCEDURE — 93306 TTE W/DOPPLER COMPLETE: CPT

## 2019-01-01 PROCEDURE — 84100 ASSAY OF PHOSPHORUS: CPT

## 2019-01-01 PROCEDURE — 77338 DESIGN MLC DEVICE FOR IMRT: CPT | Performed by: RADIOLOGY

## 2019-01-01 PROCEDURE — 87040 BLOOD CULTURE FOR BACTERIA: CPT | Mod: 91

## 2019-01-01 PROCEDURE — 80061 LIPID PANEL: CPT

## 2019-01-01 PROCEDURE — 99233 SBSQ HOSP IP/OBS HIGH 50: CPT | Performed by: INTERNAL MEDICINE

## 2019-01-01 PROCEDURE — 76775 US EXAM ABDO BACK WALL LIM: CPT

## 2019-01-01 PROCEDURE — 36556 INSERT NON-TUNNEL CV CATH: CPT | Mod: RT | Performed by: INTERNAL MEDICINE

## 2019-01-01 PROCEDURE — 77470 SPECIAL RADIATION TREATMENT: CPT | Performed by: RADIOLOGY

## 2019-01-01 PROCEDURE — 160036 HCHG PACU - EA ADDL 30 MINS PHASE I: Performed by: SURGERY

## 2019-01-01 PROCEDURE — 36556 INSERT NON-TUNNEL CV CATH: CPT

## 2019-01-01 PROCEDURE — 82962 GLUCOSE BLOOD TEST: CPT

## 2019-01-01 PROCEDURE — 99232 SBSQ HOSP IP/OBS MODERATE 35: CPT | Performed by: FAMILY MEDICINE

## 2019-01-01 PROCEDURE — 77301 RADIOTHERAPY DOSE PLAN IMRT: CPT | Mod: 26 | Performed by: RADIOLOGY

## 2019-01-01 PROCEDURE — 82533 TOTAL CORTISOL: CPT

## 2019-01-01 PROCEDURE — 306396 CUSHION, WAFFLE ADULT 17X17: Performed by: INTERNAL MEDICINE

## 2019-01-01 PROCEDURE — 700111 HCHG RX REV CODE 636 W/ 250 OVERRIDE (IP)

## 2019-01-01 PROCEDURE — 700111 HCHG RX REV CODE 636 W/ 250 OVERRIDE (IP): Performed by: ANESTHESIOLOGY

## 2019-01-01 PROCEDURE — 96372 THER/PROPH/DIAG INJ SC/IM: CPT

## 2019-01-01 PROCEDURE — 700101 HCHG RX REV CODE 250: Performed by: INTERNAL MEDICINE

## 2019-01-01 PROCEDURE — 83935 ASSAY OF URINE OSMOLALITY: CPT

## 2019-01-01 PROCEDURE — 87040 BLOOD CULTURE FOR BACTERIA: CPT

## 2019-01-01 PROCEDURE — 83605 ASSAY OF LACTIC ACID: CPT

## 2019-01-01 PROCEDURE — 160035 HCHG PACU - 1ST 60 MINS PHASE I: Performed by: SURGERY

## 2019-01-01 PROCEDURE — 84550 ASSAY OF BLOOD/URIC ACID: CPT

## 2019-01-01 PROCEDURE — 77263 THER RADIOLOGY TX PLNG CPLX: CPT | Performed by: RADIOLOGY

## 2019-01-01 PROCEDURE — 86900 BLOOD TYPING SEROLOGIC ABO: CPT

## 2019-01-01 PROCEDURE — 86140 C-REACTIVE PROTEIN: CPT

## 2019-01-01 PROCEDURE — 77290 THER RAD SIMULAJ FIELD CPLX: CPT | Performed by: RADIOLOGY

## 2019-01-01 PROCEDURE — 700105 HCHG RX REV CODE 258: Performed by: EMERGENCY MEDICINE

## 2019-01-01 PROCEDURE — 94760 N-INVAS EAR/PLS OXIMETRY 1: CPT

## 2019-01-01 PROCEDURE — 77280 THER RAD SIMULAJ FIELD SMPL: CPT | Performed by: RADIOLOGY

## 2019-01-01 PROCEDURE — 99223 1ST HOSP IP/OBS HIGH 75: CPT | Mod: AI | Performed by: HOSPITALIST

## 2019-01-01 PROCEDURE — 71260 CT THORAX DX C+: CPT

## 2019-01-01 PROCEDURE — 82570 ASSAY OF URINE CREATININE: CPT

## 2019-01-01 PROCEDURE — 86644 CMV ANTIBODY: CPT

## 2019-01-01 PROCEDURE — C1751 CATH, INF, PER/CENT/MIDLINE: HCPCS | Performed by: FAMILY MEDICINE

## 2019-01-01 PROCEDURE — 83970 ASSAY OF PARATHORMONE: CPT

## 2019-01-01 PROCEDURE — 82436 ASSAY OF URINE CHLORIDE: CPT

## 2019-01-01 PROCEDURE — 82043 UR ALBUMIN QUANTITATIVE: CPT

## 2019-01-01 PROCEDURE — 86850 RBC ANTIBODY SCREEN: CPT

## 2019-01-01 PROCEDURE — 306780 HCHG STAT FOR TRANSFUSION PER CASE

## 2019-01-01 PROCEDURE — 77334 RADIATION TREATMENT AID(S): CPT | Mod: 26 | Performed by: RADIOLOGY

## 2019-01-01 PROCEDURE — 84300 ASSAY OF URINE SODIUM: CPT

## 2019-01-01 PROCEDURE — 85652 RBC SED RATE AUTOMATED: CPT

## 2019-01-01 PROCEDURE — 93306 TTE W/DOPPLER COMPLETE: CPT | Mod: 26 | Performed by: INTERNAL MEDICINE

## 2019-01-01 PROCEDURE — 84481 FREE ASSAY (FT-3): CPT

## 2019-01-01 PROCEDURE — 93010 ELECTROCARDIOGRAM REPORT: CPT | Performed by: INTERNAL MEDICINE

## 2019-01-01 PROCEDURE — 96413 CHEMO IV INFUSION 1 HR: CPT

## 2019-01-01 PROCEDURE — 700101 HCHG RX REV CODE 250: Performed by: HOSPITALIST

## 2019-01-01 PROCEDURE — 77300 RADIATION THERAPY DOSE PLAN: CPT | Performed by: RADIOLOGY

## 2019-01-01 PROCEDURE — 84681 ASSAY OF C-PEPTIDE: CPT

## 2019-01-01 PROCEDURE — 160025 RECOVERY II MINUTES (STATS): Performed by: SURGERY

## 2019-01-01 PROCEDURE — 99285 EMERGENCY DEPT VISIT HI MDM: CPT

## 2019-01-01 PROCEDURE — 02HV33Z INSERTION OF INFUSION DEVICE INTO SUPERIOR VENA CAVA, PERCUTANEOUS APPROACH: ICD-10-PCS | Performed by: INTERNAL MEDICINE

## 2019-01-01 PROCEDURE — 160048 HCHG OR STATISTICAL LEVEL 1-5: Performed by: SURGERY

## 2019-01-01 PROCEDURE — 307059 PAD,EAR PROTECTOR: Performed by: FAMILY MEDICINE

## 2019-01-01 PROCEDURE — 99211 OFF/OP EST MAY X REQ PHY/QHP: CPT

## 2019-01-01 PROCEDURE — 82330 ASSAY OF CALCIUM: CPT

## 2019-01-01 PROCEDURE — 77334 RADIATION TREATMENT AID(S): CPT | Performed by: RADIOLOGY

## 2019-01-01 PROCEDURE — 160002 HCHG RECOVERY MINUTES (STAT): Performed by: SURGERY

## 2019-01-01 PROCEDURE — 97161 PT EVAL LOW COMPLEX 20 MIN: CPT

## 2019-01-01 PROCEDURE — 99205 OFFICE O/P NEW HI 60 MIN: CPT | Performed by: RADIOLOGY

## 2019-01-01 PROCEDURE — 77301 RADIOTHERAPY DOSE PLAN IMRT: CPT | Performed by: RADIOLOGY

## 2019-01-01 PROCEDURE — 503072 HCHG SNARE POLYPECTOMY: Performed by: SURGERY

## 2019-01-01 PROCEDURE — 97165 OT EVAL LOW COMPLEX 30 MIN: CPT

## 2019-01-01 PROCEDURE — 36430 TRANSFUSION BLD/BLD COMPNT: CPT

## 2019-01-01 PROCEDURE — 77470 SPECIAL RADIATION TREATMENT: CPT | Mod: 26 | Performed by: RADIOLOGY

## 2019-01-01 PROCEDURE — 160009 HCHG ANES TIME/MIN: Performed by: SURGERY

## 2019-01-01 PROCEDURE — 700102 HCHG RX REV CODE 250 W/ 637 OVERRIDE(OP): Performed by: EMERGENCY MEDICINE

## 2019-01-01 PROCEDURE — 83036 HEMOGLOBIN GLYCOSYLATED A1C: CPT

## 2019-01-01 PROCEDURE — G0498 CHEMO EXTEND IV INFUS W/PUMP: HCPCS

## 2019-01-01 PROCEDURE — 700117 HCHG RX CONTRAST REV CODE 255: Performed by: SURGERY

## 2019-01-01 PROCEDURE — 93005 ELECTROCARDIOGRAM TRACING: CPT | Performed by: SURGERY

## 2019-01-01 RX ORDER — 0.9 % SODIUM CHLORIDE 0.9 %
VIAL (ML) INJECTION PRN
Status: CANCELLED | OUTPATIENT
Start: 2019-01-01

## 2019-01-01 RX ORDER — POLYETHYLENE GLYCOL 3350 17 G/17G
1 POWDER, FOR SOLUTION ORAL 3 TIMES DAILY PRN
Status: DISCONTINUED | OUTPATIENT
Start: 2019-01-01 | End: 2020-01-01 | Stop reason: HOSPADM

## 2019-01-01 RX ORDER — CALCIUM CARBONATE 500 MG/1
500 TABLET, CHEWABLE ORAL 2 TIMES DAILY
Status: DISCONTINUED | OUTPATIENT
Start: 2019-01-01 | End: 2019-01-01

## 2019-01-01 RX ORDER — ONDANSETRON 2 MG/ML
4 INJECTION INTRAMUSCULAR; INTRAVENOUS PRN
Status: CANCELLED | OUTPATIENT
Start: 2019-01-01

## 2019-01-01 RX ORDER — TIOTROPIUM BROMIDE AND OLODATEROL 3.124; 2.736 UG/1; UG/1
SPRAY, METERED RESPIRATORY (INHALATION)
Qty: 1 INHALER | Refills: 1 | Status: SHIPPED | OUTPATIENT
Start: 2019-01-01

## 2019-01-01 RX ORDER — ONDANSETRON 2 MG/ML
8 INJECTION INTRAMUSCULAR; INTRAVENOUS ONCE
Status: CANCELLED | OUTPATIENT
Start: 2019-01-01

## 2019-01-01 RX ORDER — SODIUM CHLORIDE 9 MG/ML
INJECTION, SOLUTION INTRAVENOUS CONTINUOUS
Status: CANCELLED | OUTPATIENT
Start: 2019-01-01

## 2019-01-01 RX ORDER — SODIUM CHLORIDE 9 MG/ML
1000 INJECTION, SOLUTION INTRAVENOUS ONCE
Status: COMPLETED | OUTPATIENT
Start: 2019-01-01 | End: 2019-01-01

## 2019-01-01 RX ORDER — ACETAMINOPHEN 325 MG/1
650 TABLET ORAL ONCE
Status: COMPLETED | OUTPATIENT
Start: 2019-01-01 | End: 2019-01-01

## 2019-01-01 RX ORDER — AMOXICILLIN 250 MG
2 CAPSULE ORAL 2 TIMES DAILY
Status: DISCONTINUED | OUTPATIENT
Start: 2019-01-01 | End: 2019-01-01

## 2019-01-01 RX ORDER — 0.9 % SODIUM CHLORIDE 0.9 %
VIAL (ML) INJECTION PRN
Status: CANCELLED | OUTPATIENT
Start: 2020-01-01

## 2019-01-01 RX ORDER — BISACODYL 10 MG
10 SUPPOSITORY, RECTAL RECTAL
Status: DISCONTINUED | OUTPATIENT
Start: 2019-01-01 | End: 2019-01-01

## 2019-01-01 RX ORDER — TIOTROPIUM BROMIDE AND OLODATEROL 3.124; 2.736 UG/1; UG/1
2 SPRAY, METERED RESPIRATORY (INHALATION) DAILY
Qty: 1 INHALER | Refills: 1 | Status: SHIPPED | OUTPATIENT
Start: 2019-01-01 | End: 2019-01-01 | Stop reason: SDUPTHER

## 2019-01-01 RX ORDER — CALCIUM CARBONATE 500 MG/1
1000 TABLET, CHEWABLE ORAL 2 TIMES DAILY
Status: DISCONTINUED | OUTPATIENT
Start: 2019-01-01 | End: 2020-01-01

## 2019-01-01 RX ORDER — HALOPERIDOL 5 MG/ML
1 INJECTION INTRAMUSCULAR
Status: DISCONTINUED | OUTPATIENT
Start: 2019-01-01 | End: 2019-01-01 | Stop reason: HOSPADM

## 2019-01-01 RX ORDER — HYDROMORPHONE HYDROCHLORIDE 1 MG/ML
.5-1 INJECTION, SOLUTION INTRAMUSCULAR; INTRAVENOUS; SUBCUTANEOUS
Status: DISCONTINUED | OUTPATIENT
Start: 2019-01-01 | End: 2020-01-01

## 2019-01-01 RX ORDER — CALCIUM CARBONATE 500 MG/1
1000 TABLET, CHEWABLE ORAL DAILY
Status: DISCONTINUED | OUTPATIENT
Start: 2019-01-01 | End: 2019-01-01

## 2019-01-01 RX ORDER — MAGNESIUM SULFATE HEPTAHYDRATE 40 MG/ML
2 INJECTION, SOLUTION INTRAVENOUS ONCE
Status: COMPLETED | OUTPATIENT
Start: 2019-01-01 | End: 2019-01-01

## 2019-01-01 RX ORDER — LIDOCAINE HYDROCHLORIDE 10 MG/ML
INJECTION, SOLUTION EPIDURAL; INFILTRATION; INTRACAUDAL; PERINEURAL
Status: COMPLETED
Start: 2019-01-01 | End: 2019-01-01

## 2019-01-01 RX ORDER — ONDANSETRON 8 MG/1
8 TABLET, ORALLY DISINTEGRATING ORAL PRN
Status: CANCELLED | OUTPATIENT
Start: 2019-01-01

## 2019-01-01 RX ORDER — BISACODYL 10 MG
10 SUPPOSITORY, RECTAL RECTAL
Status: DISCONTINUED | OUTPATIENT
Start: 2019-01-01 | End: 2020-01-01 | Stop reason: HOSPADM

## 2019-01-01 RX ORDER — POLYETHYLENE GLYCOL 3350 17 G/17G
1 POWDER, FOR SOLUTION ORAL 3 TIMES DAILY
Status: DISCONTINUED | OUTPATIENT
Start: 2019-01-01 | End: 2019-01-01

## 2019-01-01 RX ORDER — PROCHLORPERAZINE MALEATE 10 MG
10 TABLET ORAL EVERY 6 HOURS PRN
Status: CANCELLED | OUTPATIENT
Start: 2019-01-01

## 2019-01-01 RX ORDER — CALCIUM CARBONATE 500 MG/1
500 TABLET, CHEWABLE ORAL 4 TIMES DAILY PRN
Status: DISCONTINUED | OUTPATIENT
Start: 2019-01-01 | End: 2019-01-01

## 2019-01-01 RX ORDER — MIDAZOLAM HYDROCHLORIDE 1 MG/ML
INJECTION INTRAMUSCULAR; INTRAVENOUS PRN
Status: DISCONTINUED | OUTPATIENT
Start: 2019-01-01 | End: 2019-01-01 | Stop reason: SURG

## 2019-01-01 RX ORDER — ONDANSETRON 4 MG/1
4 TABLET, ORALLY DISINTEGRATING ORAL EVERY 4 HOURS PRN
Status: DISCONTINUED | OUTPATIENT
Start: 2019-01-01 | End: 2020-01-01 | Stop reason: HOSPADM

## 2019-01-01 RX ORDER — ALBUTEROL SULFATE 90 UG/1
2 AEROSOL, METERED RESPIRATORY (INHALATION) EVERY 6 HOURS PRN
Status: DISCONTINUED | OUTPATIENT
Start: 2019-01-01 | End: 2020-01-01 | Stop reason: HOSPADM

## 2019-01-01 RX ORDER — POLYETHYLENE GLYCOL 3350 17 G/17G
1 POWDER, FOR SOLUTION ORAL
Status: DISCONTINUED | OUTPATIENT
Start: 2019-01-01 | End: 2019-01-01

## 2019-01-01 RX ORDER — CHLORAL HYDRATE 500 MG
1000 CAPSULE ORAL
COMMUNITY

## 2019-01-01 RX ORDER — PROCHLORPERAZINE EDISYLATE 5 MG/ML
10 INJECTION INTRAMUSCULAR; INTRAVENOUS EVERY 6 HOURS PRN
Status: DISCONTINUED | OUTPATIENT
Start: 2019-01-01 | End: 2020-01-01 | Stop reason: HOSPADM

## 2019-01-01 RX ORDER — SODIUM PHOSPHATE,MONO-DIBASIC 19G-7G/118
500 ENEMA (ML) RECTAL
COMMUNITY

## 2019-01-01 RX ORDER — 0.9 % SODIUM CHLORIDE 0.9 %
20 VIAL (ML) INJECTION PRN
Status: CANCELLED | OUTPATIENT
Start: 2019-01-01

## 2019-01-01 RX ORDER — SODIUM CHLORIDE 9 MG/ML
1000 INJECTION, SOLUTION INTRAVENOUS CONTINUOUS
Status: ACTIVE | OUTPATIENT
Start: 2019-01-01 | End: 2019-01-01

## 2019-01-01 RX ORDER — LOPERAMIDE HYDROCHLORIDE 2 MG/1
2-4 CAPSULE ORAL 4 TIMES DAILY PRN
Status: DISCONTINUED | OUTPATIENT
Start: 2019-01-01 | End: 2020-01-01 | Stop reason: HOSPADM

## 2019-01-01 RX ORDER — SODIUM CHLORIDE 9 MG/ML
1000 INJECTION, SOLUTION INTRAVENOUS ONCE
Status: CANCELLED | OUTPATIENT
Start: 2019-01-01

## 2019-01-01 RX ORDER — GLIMEPIRIDE 2 MG/1
1 TABLET ORAL 2 TIMES DAILY
Status: DISCONTINUED | OUTPATIENT
Start: 2019-01-01 | End: 2020-01-01 | Stop reason: HOSPADM

## 2019-01-01 RX ORDER — 0.9 % SODIUM CHLORIDE 0.9 %
5 VIAL (ML) INJECTION PRN
Status: CANCELLED | OUTPATIENT
Start: 2019-01-01

## 2019-01-01 RX ORDER — PHENYLEPHRINE HCL IN 0.9% NACL 0.5 MG/5ML
SYRINGE (ML) INTRAVENOUS PRN
Status: DISCONTINUED | OUTPATIENT
Start: 2019-01-01 | End: 2019-01-01 | Stop reason: SURG

## 2019-01-01 RX ORDER — TIOTROPIUM BROMIDE 18 UG/1
1 CAPSULE ORAL; RESPIRATORY (INHALATION)
Status: DISCONTINUED | OUTPATIENT
Start: 2019-01-01 | End: 2019-01-01

## 2019-01-01 RX ORDER — SODIUM CHLORIDE 9 MG/ML
1000 INJECTION, SOLUTION INTRAVENOUS ONCE
Status: CANCELLED
Start: 2019-01-01

## 2019-01-01 RX ORDER — SODIUM CHLORIDE, SODIUM LACTATE, POTASSIUM CHLORIDE, CALCIUM CHLORIDE 600; 310; 30; 20 MG/100ML; MG/100ML; MG/100ML; MG/100ML
INJECTION, SOLUTION INTRAVENOUS CONTINUOUS
Status: DISCONTINUED | OUTPATIENT
Start: 2019-01-01 | End: 2019-01-01 | Stop reason: HOSPADM

## 2019-01-01 RX ORDER — LABETALOL HYDROCHLORIDE 5 MG/ML
10 INJECTION, SOLUTION INTRAVENOUS EVERY 4 HOURS PRN
Status: DISCONTINUED | OUTPATIENT
Start: 2019-01-01 | End: 2020-01-01 | Stop reason: HOSPADM

## 2019-01-01 RX ORDER — SODIUM CHLORIDE 9 MG/ML
INJECTION, SOLUTION INTRAVENOUS CONTINUOUS
Status: DISCONTINUED | OUTPATIENT
Start: 2019-01-01 | End: 2019-01-01 | Stop reason: HOSPADM

## 2019-01-01 RX ORDER — AMOXICILLIN 250 MG
2 CAPSULE ORAL
Status: DISCONTINUED | OUTPATIENT
Start: 2019-01-01 | End: 2020-01-01 | Stop reason: HOSPADM

## 2019-01-01 RX ORDER — MIDODRINE HYDROCHLORIDE 5 MG/1
10 TABLET ORAL
Status: DISCONTINUED | OUTPATIENT
Start: 2019-01-01 | End: 2020-01-01

## 2019-01-01 RX ORDER — SODIUM CHLORIDE, SODIUM LACTATE, POTASSIUM CHLORIDE, CALCIUM CHLORIDE 600; 310; 30; 20 MG/100ML; MG/100ML; MG/100ML; MG/100ML
INJECTION, SOLUTION INTRAVENOUS CONTINUOUS
Status: DISCONTINUED | OUTPATIENT
Start: 2019-01-01 | End: 2019-01-01

## 2019-01-01 RX ORDER — ONDANSETRON 2 MG/ML
8 INJECTION INTRAMUSCULAR; INTRAVENOUS ONCE
Status: COMPLETED | OUTPATIENT
Start: 2019-01-01 | End: 2019-01-01

## 2019-01-01 RX ORDER — SODIUM CHLORIDE 9 MG/ML
1000 INJECTION, SOLUTION INTRAVENOUS ONCE
Status: DISCONTINUED | OUTPATIENT
Start: 2019-01-01 | End: 2019-01-01 | Stop reason: HOSPADM

## 2019-01-01 RX ORDER — OXYCODONE HYDROCHLORIDE 5 MG/1
5 TABLET ORAL EVERY 4 HOURS PRN
Status: DISCONTINUED | OUTPATIENT
Start: 2019-01-01 | End: 2020-01-01 | Stop reason: HOSPADM

## 2019-01-01 RX ORDER — PETROLATUM 42 G/100G
OINTMENT TOPICAL 2 TIMES DAILY
Status: DISCONTINUED | OUTPATIENT
Start: 2019-01-01 | End: 2020-01-01 | Stop reason: ALTCHOICE

## 2019-01-01 RX ORDER — SODIUM CHLORIDE 9 MG/ML
1000 INJECTION, SOLUTION INTRAVENOUS ONCE
Status: ACTIVE | OUTPATIENT
Start: 2019-01-01 | End: 2019-01-01

## 2019-01-01 RX ORDER — LIDOCAINE HYDROCHLORIDE 20 MG/ML
JELLY TOPICAL EVERY 6 HOURS PRN
Status: COMPLETED | OUTPATIENT
Start: 2019-01-01 | End: 2019-01-01

## 2019-01-01 RX ORDER — DRONABINOL 5 MG/1
5 CAPSULE ORAL
Status: DISCONTINUED | OUTPATIENT
Start: 2019-01-01 | End: 2020-01-01

## 2019-01-01 RX ORDER — MIDAZOLAM HYDROCHLORIDE 1 MG/ML
1 INJECTION INTRAMUSCULAR; INTRAVENOUS
Status: DISCONTINUED | OUTPATIENT
Start: 2019-01-01 | End: 2019-01-01 | Stop reason: HOSPADM

## 2019-01-01 RX ORDER — CALCIUM CARBONATE 500 MG/1
500 TABLET, CHEWABLE ORAL 2 TIMES DAILY PRN
Status: DISCONTINUED | OUTPATIENT
Start: 2019-01-01 | End: 2020-01-01 | Stop reason: HOSPADM

## 2019-01-01 RX ORDER — SODIUM CHLORIDE, SODIUM LACTATE, POTASSIUM CHLORIDE, AND CALCIUM CHLORIDE .6; .31; .03; .02 G/100ML; G/100ML; G/100ML; G/100ML
1000 INJECTION, SOLUTION INTRAVENOUS ONCE
Status: COMPLETED | OUTPATIENT
Start: 2019-01-01 | End: 2019-01-01

## 2019-01-01 RX ORDER — CALCIUM CARBONATE 500 MG/1
500 TABLET, CHEWABLE ORAL DAILY
Status: DISCONTINUED | OUTPATIENT
Start: 2019-01-01 | End: 2019-01-01

## 2019-01-01 RX ORDER — DRONABINOL 5 MG/1
5 CAPSULE ORAL
Status: DISCONTINUED | OUTPATIENT
Start: 2019-01-01 | End: 2019-01-01

## 2019-01-01 RX ORDER — ACETAMINOPHEN 325 MG/1
650 TABLET ORAL EVERY 6 HOURS PRN
Status: DISCONTINUED | OUTPATIENT
Start: 2019-01-01 | End: 2020-01-01 | Stop reason: HOSPADM

## 2019-01-01 RX ORDER — SODIUM CHLORIDE 9 MG/ML
INJECTION, SOLUTION INTRAVENOUS CONTINUOUS
Status: DISCONTINUED | OUTPATIENT
Start: 2019-01-01 | End: 2020-01-01

## 2019-01-01 RX ORDER — DIPHENHYDRAMINE HCL 25 MG
25 TABLET ORAL ONCE
Status: ACTIVE | OUTPATIENT
Start: 2019-01-01 | End: 2019-01-01

## 2019-01-01 RX ORDER — ONDANSETRON 2 MG/ML
4 INJECTION INTRAMUSCULAR; INTRAVENOUS EVERY 4 HOURS PRN
Status: DISCONTINUED | OUTPATIENT
Start: 2019-01-01 | End: 2020-01-01 | Stop reason: HOSPADM

## 2019-01-01 RX ORDER — SODIUM CHLORIDE 9 MG/ML
500 INJECTION, SOLUTION INTRAVENOUS ONCE
Status: COMPLETED | OUTPATIENT
Start: 2019-01-01 | End: 2019-01-01

## 2019-01-01 RX ORDER — ONDANSETRON 2 MG/ML
INJECTION INTRAMUSCULAR; INTRAVENOUS PRN
Status: DISCONTINUED | OUTPATIENT
Start: 2019-01-01 | End: 2019-01-01 | Stop reason: SURG

## 2019-01-01 RX ORDER — TIOTROPIUM BROMIDE 18 UG/1
1 CAPSULE ORAL; RESPIRATORY (INHALATION) DAILY
Status: DISCONTINUED | OUTPATIENT
Start: 2019-01-01 | End: 2019-01-01

## 2019-01-01 RX ADMIN — Medication 400 MG: at 18:11

## 2019-01-01 RX ADMIN — DRONABINOL 5 MG: 5 CAPSULE ORAL at 06:57

## 2019-01-01 RX ADMIN — TIMOLOL MALEATE 1 DROP: 2.5 SOLUTION OPHTHALMIC at 17:58

## 2019-01-01 RX ADMIN — ENOXAPARIN SODIUM 60 MG: 100 INJECTION SUBCUTANEOUS at 06:00

## 2019-01-01 RX ADMIN — MIDODRINE HYDROCHLORIDE 10 MG: 5 TABLET ORAL at 12:14

## 2019-01-01 RX ADMIN — MIDODRINE HYDROCHLORIDE 10 MG: 5 TABLET ORAL at 13:17

## 2019-01-01 RX ADMIN — NYSTATIN 500000 UNITS: 100000 SUSPENSION ORAL at 12:56

## 2019-01-01 RX ADMIN — Medication 400 MG: at 12:57

## 2019-01-01 RX ADMIN — MIDODRINE HYDROCHLORIDE 10 MG: 5 TABLET ORAL at 08:03

## 2019-01-01 RX ADMIN — LIDOCAINE HYDROCHLORIDE 5 ML: 20 SOLUTION OROPHARYNGEAL at 14:27

## 2019-01-01 RX ADMIN — INSULIN HUMAN 3 UNITS: 100 INJECTION, SOLUTION PARENTERAL at 12:41

## 2019-01-01 RX ADMIN — SODIUM CHLORIDE, POTASSIUM CHLORIDE, SODIUM LACTATE AND CALCIUM CHLORIDE: 600; 310; 30; 20 INJECTION, SOLUTION INTRAVENOUS at 22:11

## 2019-01-01 RX ADMIN — SILVER SULFADIAZINE 0.5 G: 10 CREAM TOPICAL at 17:03

## 2019-01-01 RX ADMIN — TIMOLOL MALEATE 1 DROP: 2.5 SOLUTION OPHTHALMIC at 18:37

## 2019-01-01 RX ADMIN — LIDOCAINE HYDROCHLORIDE 5 ML: 20 SOLUTION OROPHARYNGEAL at 08:47

## 2019-01-01 RX ADMIN — NYSTATIN 500000 UNITS: 100000 SUSPENSION ORAL at 17:42

## 2019-01-01 RX ADMIN — SODIUM CHLORIDE: 9 INJECTION, SOLUTION INTRAVENOUS at 23:24

## 2019-01-01 RX ADMIN — FLUOROURACIL 1345 MG: 50 INJECTION, SOLUTION INTRAVENOUS at 17:20

## 2019-01-01 RX ADMIN — OXYCODONE HYDROCHLORIDE 5 MG: 5 TABLET ORAL at 16:52

## 2019-01-01 RX ADMIN — DRONABINOL 5 MG: 5 CAPSULE ORAL at 08:40

## 2019-01-01 RX ADMIN — OXYCODONE HYDROCHLORIDE 5 MG: 5 TABLET ORAL at 16:12

## 2019-01-01 RX ADMIN — ACETAMINOPHEN 650 MG: 325 TABLET, FILM COATED ORAL at 22:04

## 2019-01-01 RX ADMIN — LIDOCAINE HYDROCHLORIDE 5 ML: 20 SOLUTION OROPHARYNGEAL at 03:01

## 2019-01-01 RX ADMIN — TIMOLOL MALEATE 1 DROP: 2.5 SOLUTION OPHTHALMIC at 17:42

## 2019-01-01 RX ADMIN — ACETAMINOPHEN 650 MG: 325 TABLET, FILM COATED ORAL at 09:16

## 2019-01-01 RX ADMIN — TIMOLOL MALEATE 1 DROP: 2.5 SOLUTION OPHTHALMIC at 04:59

## 2019-01-01 RX ADMIN — Medication: at 04:37

## 2019-01-01 RX ADMIN — ENOXAPARIN SODIUM 60 MG: 100 INJECTION SUBCUTANEOUS at 05:50

## 2019-01-01 RX ADMIN — ENOXAPARIN SODIUM 60 MG: 100 INJECTION SUBCUTANEOUS at 17:59

## 2019-01-01 RX ADMIN — DRONABINOL 5 MG: 5 CAPSULE ORAL at 12:14

## 2019-01-01 RX ADMIN — NYSTATIN 500000 UNITS: 100000 SUSPENSION ORAL at 16:40

## 2019-01-01 RX ADMIN — Medication: at 17:20

## 2019-01-01 RX ADMIN — HYDROCORTISONE SODIUM SUCCINATE 100 MG: 100 INJECTION, POWDER, FOR SOLUTION INTRAMUSCULAR; INTRAVENOUS at 05:28

## 2019-01-01 RX ADMIN — ANTACID TABLETS 500 MG: 500 TABLET, CHEWABLE ORAL at 01:17

## 2019-01-01 RX ADMIN — MIDODRINE HYDROCHLORIDE 10 MG: 5 TABLET ORAL at 17:39

## 2019-01-01 RX ADMIN — LIDOCAINE HYDROCHLORIDE 15 ML: 20 SOLUTION OROPHARYNGEAL at 18:13

## 2019-01-01 RX ADMIN — MIDODRINE HYDROCHLORIDE 10 MG: 5 TABLET ORAL at 12:46

## 2019-01-01 RX ADMIN — SILVER SULFADIAZINE 1 G: 10 CREAM TOPICAL at 06:04

## 2019-01-01 RX ADMIN — HYDROCORTISONE SODIUM SUCCINATE 100 MG: 100 INJECTION, POWDER, FOR SOLUTION INTRAMUSCULAR; INTRAVENOUS at 14:25

## 2019-01-01 RX ADMIN — MIDODRINE HYDROCHLORIDE 10 MG: 5 TABLET ORAL at 17:27

## 2019-01-01 RX ADMIN — OXYCODONE HYDROCHLORIDE 5 MG: 5 TABLET ORAL at 18:13

## 2019-01-01 RX ADMIN — HYDROMORPHONE HYDROCHLORIDE 1 MG: 1 INJECTION, SOLUTION INTRAMUSCULAR; INTRAVENOUS; SUBCUTANEOUS at 09:51

## 2019-01-01 RX ADMIN — DRONABINOL 5 MG: 5 CAPSULE ORAL at 12:32

## 2019-01-01 RX ADMIN — DRONABINOL 5 MG: 5 CAPSULE ORAL at 17:00

## 2019-01-01 RX ADMIN — MIDODRINE HYDROCHLORIDE 10 MG: 5 TABLET ORAL at 17:25

## 2019-01-01 RX ADMIN — OXYCODONE HYDROCHLORIDE 5 MG: 5 TABLET ORAL at 16:31

## 2019-01-01 RX ADMIN — SODIUM CHLORIDE: 9 INJECTION, SOLUTION INTRAVENOUS at 23:46

## 2019-01-01 RX ADMIN — ANTACID TABLETS 500 MG: 500 TABLET, CHEWABLE ORAL at 13:34

## 2019-01-01 RX ADMIN — ENOXAPARIN SODIUM 60 MG: 100 INJECTION SUBCUTANEOUS at 04:59

## 2019-01-01 RX ADMIN — Medication: at 20:22

## 2019-01-01 RX ADMIN — TIMOLOL MALEATE 1 DROP: 2.5 SOLUTION OPHTHALMIC at 06:02

## 2019-01-01 RX ADMIN — ENOXAPARIN SODIUM 60 MG: 100 INJECTION SUBCUTANEOUS at 06:56

## 2019-01-01 RX ADMIN — DRONABINOL 5 MG: 5 CAPSULE ORAL at 08:19

## 2019-01-01 RX ADMIN — OXYCODONE HYDROCHLORIDE 5 MG: 5 TABLET ORAL at 14:44

## 2019-01-01 RX ADMIN — ANTACID TABLETS 500 MG: 500 TABLET, CHEWABLE ORAL at 09:50

## 2019-01-01 RX ADMIN — ONDANSETRON 4 MG: 4 TABLET, ORALLY DISINTEGRATING ORAL at 23:23

## 2019-01-01 RX ADMIN — OXYCODONE HYDROCHLORIDE 5 MG: 5 TABLET ORAL at 15:33

## 2019-01-01 RX ADMIN — LOPERAMIDE HYDROCHLORIDE 4 MG: 2 CAPSULE ORAL at 20:44

## 2019-01-01 RX ADMIN — LIDOCAINE HYDROCHLORIDE 5 ML: 20 SOLUTION OROPHARYNGEAL at 04:32

## 2019-01-01 RX ADMIN — IOHEXOL 100 ML: 350 INJECTION, SOLUTION INTRAVENOUS at 13:51

## 2019-01-01 RX ADMIN — TIMOLOL MALEATE 1 DROP: 2.5 SOLUTION OPHTHALMIC at 04:57

## 2019-01-01 RX ADMIN — DRONABINOL 5 MG: 5 CAPSULE ORAL at 17:48

## 2019-01-01 RX ADMIN — TIMOLOL MALEATE 1 DROP: 2.5 SOLUTION OPHTHALMIC at 05:49

## 2019-01-01 RX ADMIN — MIDODRINE HYDROCHLORIDE 10 MG: 5 TABLET ORAL at 18:18

## 2019-01-01 RX ADMIN — TIMOLOL MALEATE 1 DROP: 2.5 SOLUTION OPHTHALMIC at 05:15

## 2019-01-01 RX ADMIN — TBO-FILGRASTIM 300 MCG: 300 INJECTION, SOLUTION SUBCUTANEOUS at 15:36

## 2019-01-01 RX ADMIN — SODIUM CHLORIDE, POTASSIUM CHLORIDE, SODIUM LACTATE AND CALCIUM CHLORIDE: 600; 310; 30; 20 INJECTION, SOLUTION INTRAVENOUS at 17:21

## 2019-01-01 RX ADMIN — ACETAMINOPHEN 650 MG: 325 TABLET, FILM COATED ORAL at 21:30

## 2019-01-01 RX ADMIN — MIDODRINE HYDROCHLORIDE 10 MG: 5 TABLET ORAL at 09:13

## 2019-01-01 RX ADMIN — MIDODRINE HYDROCHLORIDE 10 MG: 5 TABLET ORAL at 07:58

## 2019-01-01 RX ADMIN — MIDODRINE HYDROCHLORIDE 10 MG: 5 TABLET ORAL at 08:47

## 2019-01-01 RX ADMIN — ANTACID TABLETS 500 MG: 500 TABLET, CHEWABLE ORAL at 18:03

## 2019-01-01 RX ADMIN — SODIUM CHLORIDE: 9 INJECTION, SOLUTION INTRAVENOUS at 04:36

## 2019-01-01 RX ADMIN — MIDODRINE HYDROCHLORIDE 10 MG: 5 TABLET ORAL at 17:58

## 2019-01-01 RX ADMIN — ACETAMINOPHEN 650 MG: 325 TABLET, FILM COATED ORAL at 14:54

## 2019-01-01 RX ADMIN — ONDANSETRON 4 MG: 2 INJECTION INTRAMUSCULAR; INTRAVENOUS at 09:57

## 2019-01-01 RX ADMIN — OXYCODONE HYDROCHLORIDE 5 MG: 5 TABLET ORAL at 23:26

## 2019-01-01 RX ADMIN — Medication: at 18:05

## 2019-01-01 RX ADMIN — ONDANSETRON 8 MG: 2 INJECTION INTRAMUSCULAR; INTRAVENOUS at 16:15

## 2019-01-01 RX ADMIN — ANTACID TABLETS 1000 MG: 500 TABLET, CHEWABLE ORAL at 18:00

## 2019-01-01 RX ADMIN — SODIUM CHLORIDE: 9 INJECTION, SOLUTION INTRAVENOUS at 04:45

## 2019-01-01 RX ADMIN — OXYCODONE HYDROCHLORIDE 5 MG: 5 TABLET ORAL at 19:17

## 2019-01-01 RX ADMIN — ENOXAPARIN SODIUM 60 MG: 100 INJECTION SUBCUTANEOUS at 10:43

## 2019-01-01 RX ADMIN — DRONABINOL 5 MG: 5 CAPSULE ORAL at 17:59

## 2019-01-01 RX ADMIN — SODIUM CHLORIDE: 9 INJECTION, SOLUTION INTRAVENOUS at 17:48

## 2019-01-01 RX ADMIN — ACETAMINOPHEN 650 MG: 325 TABLET, FILM COATED ORAL at 19:09

## 2019-01-01 RX ADMIN — ENOXAPARIN SODIUM 60 MG: 100 INJECTION SUBCUTANEOUS at 18:18

## 2019-01-01 RX ADMIN — SODIUM CHLORIDE, POTASSIUM CHLORIDE, SODIUM LACTATE AND CALCIUM CHLORIDE: 600; 310; 30; 20 INJECTION, SOLUTION INTRAVENOUS at 21:30

## 2019-01-01 RX ADMIN — SODIUM CHLORIDE 500 ML: 9 INJECTION, SOLUTION INTRAVENOUS at 15:00

## 2019-01-01 RX ADMIN — HYDROMORPHONE HYDROCHLORIDE 1 MG: 1 INJECTION, SOLUTION INTRAMUSCULAR; INTRAVENOUS; SUBCUTANEOUS at 14:51

## 2019-01-01 RX ADMIN — FLUOROURACIL 1345 MG: 50 INJECTION, SOLUTION INTRAVENOUS at 23:25

## 2019-01-01 RX ADMIN — SODIUM CHLORIDE: 9 INJECTION, SOLUTION INTRAVENOUS at 00:00

## 2019-01-01 RX ADMIN — DRONABINOL 5 MG: 5 CAPSULE ORAL at 13:06

## 2019-01-01 RX ADMIN — Medication: at 13:04

## 2019-01-01 RX ADMIN — TIMOLOL MALEATE 1 DROP: 2.5 SOLUTION OPHTHALMIC at 17:03

## 2019-01-01 RX ADMIN — Medication: at 04:59

## 2019-01-01 RX ADMIN — SILVER SULFADIAZINE 1 G: 10 CREAM TOPICAL at 05:52

## 2019-01-01 RX ADMIN — SODIUM CHLORIDE, POTASSIUM CHLORIDE, SODIUM LACTATE AND CALCIUM CHLORIDE: 600; 310; 30; 20 INJECTION, SOLUTION INTRAVENOUS at 07:29

## 2019-01-01 RX ADMIN — ONDANSETRON 4 MG: 2 INJECTION INTRAMUSCULAR; INTRAVENOUS at 15:37

## 2019-01-01 RX ADMIN — Medication: at 05:53

## 2019-01-01 RX ADMIN — SILVER SULFADIAZINE 1 G: 10 CREAM TOPICAL at 15:25

## 2019-01-01 RX ADMIN — DRONABINOL 5 MG: 5 CAPSULE ORAL at 12:26

## 2019-01-01 RX ADMIN — ENOXAPARIN SODIUM 60 MG: 100 INJECTION SUBCUTANEOUS at 17:02

## 2019-01-01 RX ADMIN — LIDOCAINE HYDROCHLORIDE 5 ML: 20 SOLUTION OROPHARYNGEAL at 21:46

## 2019-01-01 RX ADMIN — ANTACID TABLETS 1000 MG: 500 TABLET, CHEWABLE ORAL at 18:03

## 2019-01-01 RX ADMIN — Medication: at 17:42

## 2019-01-01 RX ADMIN — ANTACID TABLETS 500 MG: 500 TABLET, CHEWABLE ORAL at 06:02

## 2019-01-01 RX ADMIN — SILVER SULFADIAZINE 1 G: 10 CREAM TOPICAL at 16:55

## 2019-01-01 RX ADMIN — SODIUM CHLORIDE: 9 INJECTION, SOLUTION INTRAVENOUS at 17:40

## 2019-01-01 RX ADMIN — Medication: at 17:02

## 2019-01-01 RX ADMIN — OXYCODONE HYDROCHLORIDE 5 MG: 5 TABLET ORAL at 00:26

## 2019-01-01 RX ADMIN — TIMOLOL MALEATE 1 DROP: 2.5 SOLUTION OPHTHALMIC at 04:33

## 2019-01-01 RX ADMIN — SILVER SULFADIAZINE 1 G: 10 CREAM TOPICAL at 13:04

## 2019-01-01 RX ADMIN — IOHEXOL 25 ML: 240 INJECTION, SOLUTION INTRATHECAL; INTRAVASCULAR; INTRAVENOUS; ORAL at 13:51

## 2019-01-01 RX ADMIN — MIDODRINE HYDROCHLORIDE 10 MG: 5 TABLET ORAL at 08:48

## 2019-01-01 RX ADMIN — ENOXAPARIN SODIUM 60 MG: 100 INJECTION SUBCUTANEOUS at 17:23

## 2019-01-01 RX ADMIN — LIDOCAINE HYDROCHLORIDE 5 ML: 20 SOLUTION OROPHARYNGEAL at 01:17

## 2019-01-01 RX ADMIN — MIDODRINE HYDROCHLORIDE 10 MG: 5 TABLET ORAL at 12:56

## 2019-01-01 RX ADMIN — LIDOCAINE HYDROCHLORIDE 5 ML: 20 SOLUTION OROPHARYNGEAL at 00:27

## 2019-01-01 RX ADMIN — SODIUM CHLORIDE: 9 INJECTION, SOLUTION INTRAVENOUS at 15:12

## 2019-01-01 RX ADMIN — TIMOLOL MALEATE 1 DROP: 2.5 SOLUTION OPHTHALMIC at 06:00

## 2019-01-01 RX ADMIN — SENNOSIDES AND DOCUSATE SODIUM 2 TABLET: 8.6; 5 TABLET ORAL at 17:39

## 2019-01-01 RX ADMIN — SODIUM CHLORIDE 1000 ML: 9 INJECTION, SOLUTION INTRAVENOUS at 15:04

## 2019-01-01 RX ADMIN — TIMOLOL MALEATE 1 DROP: 2.5 SOLUTION OPHTHALMIC at 18:03

## 2019-01-01 RX ADMIN — MIDODRINE HYDROCHLORIDE 10 MG: 5 TABLET ORAL at 09:09

## 2019-01-01 RX ADMIN — SODIUM CHLORIDE: 9 INJECTION, SOLUTION INTRAVENOUS at 13:06

## 2019-01-01 RX ADMIN — LIDOCAINE HYDROCHLORIDE 5 ML: 20 SOLUTION OROPHARYNGEAL at 16:31

## 2019-01-01 RX ADMIN — Medication: at 06:00

## 2019-01-01 RX ADMIN — ENOXAPARIN SODIUM 60 MG: 100 INJECTION SUBCUTANEOUS at 19:57

## 2019-01-01 RX ADMIN — MIDODRINE HYDROCHLORIDE 10 MG: 5 TABLET ORAL at 17:02

## 2019-01-01 RX ADMIN — Medication: at 09:48

## 2019-01-01 RX ADMIN — DRONABINOL 5 MG: 5 CAPSULE ORAL at 12:44

## 2019-01-01 RX ADMIN — INSULIN HUMAN 1 UNITS: 100 INJECTION, SOLUTION PARENTERAL at 17:23

## 2019-01-01 RX ADMIN — SODIUM CHLORIDE: 9 INJECTION, SOLUTION INTRAVENOUS at 15:37

## 2019-01-01 RX ADMIN — NYSTATIN 500000 UNITS: 100000 SUSPENSION ORAL at 14:25

## 2019-01-01 RX ADMIN — ENOXAPARIN SODIUM 60 MG: 100 INJECTION SUBCUTANEOUS at 17:30

## 2019-01-01 RX ADMIN — HYDROMORPHONE HYDROCHLORIDE 0.5 MG: 1 INJECTION, SOLUTION INTRAMUSCULAR; INTRAVENOUS; SUBCUTANEOUS at 22:12

## 2019-01-01 RX ADMIN — ANTACID TABLETS 500 MG: 500 TABLET, CHEWABLE ORAL at 17:24

## 2019-01-01 RX ADMIN — LIDOCAINE HYDROCHLORIDE 0.5 ML: 10 INJECTION, SOLUTION EPIDURAL; INFILTRATION; INTRACAUDAL at 08:34

## 2019-01-01 RX ADMIN — FLUOROURACIL 1345 MG: 50 INJECTION, SOLUTION INTRAVENOUS at 19:16

## 2019-01-01 RX ADMIN — MIDODRINE HYDROCHLORIDE 10 MG: 5 TABLET ORAL at 17:23

## 2019-01-01 RX ADMIN — SENNOSIDES AND DOCUSATE SODIUM 2 TABLET: 8.6; 5 TABLET ORAL at 17:20

## 2019-01-01 RX ADMIN — ACETAMINOPHEN 650 MG: 325 TABLET, FILM COATED ORAL at 23:23

## 2019-01-01 RX ADMIN — ENOXAPARIN SODIUM 60 MG: 100 INJECTION SUBCUTANEOUS at 18:37

## 2019-01-01 RX ADMIN — SILVER SULFADIAZINE 1 G: 10 CREAM TOPICAL at 08:46

## 2019-01-01 RX ADMIN — OXYCODONE HYDROCHLORIDE 5 MG: 5 TABLET ORAL at 22:43

## 2019-01-01 RX ADMIN — SILVER SULFADIAZINE 1 G: 10 CREAM TOPICAL at 19:58

## 2019-01-01 RX ADMIN — Medication: at 05:52

## 2019-01-01 RX ADMIN — ANTACID TABLETS 500 MG: 500 TABLET, CHEWABLE ORAL at 16:38

## 2019-01-01 RX ADMIN — DRONABINOL 5 MG: 5 CAPSULE ORAL at 12:38

## 2019-01-01 RX ADMIN — DRONABINOL 5 MG: 5 CAPSULE ORAL at 08:08

## 2019-01-01 RX ADMIN — MIDODRINE HYDROCHLORIDE 10 MG: 5 TABLET ORAL at 13:00

## 2019-01-01 RX ADMIN — ENOXAPARIN SODIUM 60 MG: 100 INJECTION SUBCUTANEOUS at 05:07

## 2019-01-01 RX ADMIN — ENOXAPARIN SODIUM 60 MG: 100 INJECTION SUBCUTANEOUS at 18:00

## 2019-01-01 RX ADMIN — DRONABINOL 5 MG: 5 CAPSULE ORAL at 12:52

## 2019-01-01 RX ADMIN — DRONABINOL 5 MG: 5 CAPSULE ORAL at 18:04

## 2019-01-01 RX ADMIN — MIDODRINE HYDROCHLORIDE 10 MG: 5 TABLET ORAL at 08:08

## 2019-01-01 RX ADMIN — TIMOLOL MALEATE 1 DROP: 2.5 SOLUTION OPHTHALMIC at 07:32

## 2019-01-01 RX ADMIN — MIDODRINE HYDROCHLORIDE 10 MG: 5 TABLET ORAL at 12:52

## 2019-01-01 RX ADMIN — INSULIN HUMAN 1 UNITS: 100 INJECTION, SOLUTION PARENTERAL at 21:27

## 2019-01-01 RX ADMIN — Medication: at 05:07

## 2019-01-01 RX ADMIN — OXYCODONE HYDROCHLORIDE 5 MG: 5 TABLET ORAL at 20:57

## 2019-01-01 RX ADMIN — DRONABINOL 5 MG: 5 CAPSULE ORAL at 17:29

## 2019-01-01 RX ADMIN — HYDROCORTISONE SODIUM SUCCINATE 100 MG: 100 INJECTION, POWDER, FOR SOLUTION INTRAMUSCULAR; INTRAVENOUS at 20:57

## 2019-01-01 RX ADMIN — TIMOLOL MALEATE 1 DROP: 2.5 SOLUTION OPHTHALMIC at 17:25

## 2019-01-01 RX ADMIN — MIDODRINE HYDROCHLORIDE 10 MG: 5 TABLET ORAL at 08:49

## 2019-01-01 RX ADMIN — SENNOSIDES AND DOCUSATE SODIUM 2 TABLET: 8.6; 5 TABLET ORAL at 04:31

## 2019-01-01 RX ADMIN — Medication 0.5 ML: at 08:34

## 2019-01-01 RX ADMIN — FILGRASTIM 300 MCG: 300 INJECTION, SOLUTION INTRAVENOUS; SUBCUTANEOUS at 16:16

## 2019-01-01 RX ADMIN — MIDODRINE HYDROCHLORIDE 10 MG: 5 TABLET ORAL at 14:58

## 2019-01-01 RX ADMIN — OXYCODONE HYDROCHLORIDE 5 MG: 5 TABLET ORAL at 17:02

## 2019-01-01 RX ADMIN — MIDODRINE HYDROCHLORIDE 10 MG: 5 TABLET ORAL at 12:02

## 2019-01-01 RX ADMIN — SILVER SULFADIAZINE 1 G: 10 CREAM TOPICAL at 05:16

## 2019-01-01 RX ADMIN — Medication: at 17:25

## 2019-01-01 RX ADMIN — MIDODRINE HYDROCHLORIDE 10 MG: 5 TABLET ORAL at 16:40

## 2019-01-01 RX ADMIN — SILVER SULFADIAZINE 25 G: 10 CREAM TOPICAL at 14:52

## 2019-01-01 RX ADMIN — ENOXAPARIN SODIUM 60 MG: 100 INJECTION SUBCUTANEOUS at 06:07

## 2019-01-01 RX ADMIN — DRONABINOL 5 MG: 5 CAPSULE ORAL at 05:59

## 2019-01-01 RX ADMIN — TIMOLOL MALEATE 1 DROP: 2.5 SOLUTION OPHTHALMIC at 17:35

## 2019-01-01 RX ADMIN — INSULIN HUMAN 1 UNITS: 100 INJECTION, SOLUTION PARENTERAL at 12:29

## 2019-01-01 RX ADMIN — MIDODRINE HYDROCHLORIDE 10 MG: 5 TABLET ORAL at 08:32

## 2019-01-01 RX ADMIN — Medication: at 20:56

## 2019-01-01 RX ADMIN — ACETAMINOPHEN 650 MG: 325 TABLET, FILM COATED ORAL at 20:58

## 2019-01-01 RX ADMIN — MIDODRINE HYDROCHLORIDE 10 MG: 5 TABLET ORAL at 17:40

## 2019-01-01 RX ADMIN — PROPOFOL 25 MG: 10 INJECTION, EMULSION INTRAVENOUS at 09:21

## 2019-01-01 RX ADMIN — TIMOLOL MALEATE 1 DROP: 2.5 SOLUTION OPHTHALMIC at 18:12

## 2019-01-01 RX ADMIN — LIDOCAINE HYDROCHLORIDE 5 ML: 20 SOLUTION OROPHARYNGEAL at 13:21

## 2019-01-01 RX ADMIN — INSULIN HUMAN 2 UNITS: 100 INJECTION, SOLUTION PARENTERAL at 21:40

## 2019-01-01 RX ADMIN — DRONABINOL 5 MG: 5 CAPSULE ORAL at 12:46

## 2019-01-01 RX ADMIN — PROCHLORPERAZINE EDISYLATE 10 MG: 5 INJECTION INTRAMUSCULAR; INTRAVENOUS at 23:45

## 2019-01-01 RX ADMIN — OXYCODONE HYDROCHLORIDE 5 MG: 5 TABLET ORAL at 10:35

## 2019-01-01 RX ADMIN — DRONABINOL 5 MG: 5 CAPSULE ORAL at 05:51

## 2019-01-01 RX ADMIN — ACETAMINOPHEN 650 MG: 325 TABLET, FILM COATED ORAL at 12:57

## 2019-01-01 RX ADMIN — ONDANSETRON 8 MG: 2 INJECTION INTRAMUSCULAR; INTRAVENOUS at 11:08

## 2019-01-01 RX ADMIN — INSULIN HUMAN 1 UNITS: 100 INJECTION, SOLUTION PARENTERAL at 13:02

## 2019-01-01 RX ADMIN — NYSTATIN 500000 UNITS: 100000 SUSPENSION ORAL at 17:20

## 2019-01-01 RX ADMIN — DRONABINOL 5 MG: 5 CAPSULE ORAL at 17:17

## 2019-01-01 RX ADMIN — ANTACID TABLETS 500 MG: 500 TABLET, CHEWABLE ORAL at 22:43

## 2019-01-01 RX ADMIN — SILVER SULFADIAZINE 1 G: 10 CREAM TOPICAL at 20:13

## 2019-01-01 RX ADMIN — ENOXAPARIN SODIUM 60 MG: 100 INJECTION SUBCUTANEOUS at 10:15

## 2019-01-01 RX ADMIN — ANTACID TABLETS 500 MG: 500 TABLET, CHEWABLE ORAL at 17:25

## 2019-01-01 RX ADMIN — TIMOLOL MALEATE 1 DROP: 2.5 SOLUTION OPHTHALMIC at 17:40

## 2019-01-01 RX ADMIN — Medication: at 04:32

## 2019-01-01 RX ADMIN — ANTACID TABLETS 500 MG: 500 TABLET, CHEWABLE ORAL at 04:31

## 2019-01-01 RX ADMIN — SODIUM CHLORIDE 1000 ML: 9 INJECTION, SOLUTION INTRAVENOUS at 16:07

## 2019-01-01 RX ADMIN — INSULIN HUMAN 1 UNITS: 100 INJECTION, SOLUTION PARENTERAL at 09:18

## 2019-01-01 RX ADMIN — SODIUM CHLORIDE: 9 INJECTION, SOLUTION INTRAVENOUS at 18:03

## 2019-01-01 RX ADMIN — TIMOLOL MALEATE 1 DROP: 2.5 SOLUTION OPHTHALMIC at 17:48

## 2019-01-01 RX ADMIN — SODIUM CHLORIDE: 9 INJECTION, SOLUTION INTRAVENOUS at 20:07

## 2019-01-01 RX ADMIN — HYDROCORTISONE SODIUM SUCCINATE 100 MG: 100 INJECTION, POWDER, FOR SOLUTION INTRAMUSCULAR; INTRAVENOUS at 13:34

## 2019-01-01 RX ADMIN — SODIUM CHLORIDE 1000 ML: 9 INJECTION, SOLUTION INTRAVENOUS at 08:48

## 2019-01-01 RX ADMIN — ONDANSETRON 4 MG: 2 INJECTION INTRAMUSCULAR; INTRAVENOUS at 08:02

## 2019-01-01 RX ADMIN — TIMOLOL MALEATE 1 DROP: 2.5 SOLUTION OPHTHALMIC at 18:31

## 2019-01-01 RX ADMIN — DRONABINOL 5 MG: 5 CAPSULE ORAL at 13:16

## 2019-01-01 RX ADMIN — INSULIN HUMAN 1 UNITS: 100 INJECTION, SOLUTION PARENTERAL at 17:40

## 2019-01-01 RX ADMIN — FENTANYL CITRATE 100 MCG: 50 INJECTION, SOLUTION INTRAMUSCULAR; INTRAVENOUS at 09:09

## 2019-01-01 RX ADMIN — TIMOLOL MALEATE 1 DROP: 2.5 SOLUTION OPHTHALMIC at 17:52

## 2019-01-01 RX ADMIN — DRONABINOL 5 MG: 5 CAPSULE ORAL at 05:49

## 2019-01-01 RX ADMIN — OXYCODONE HYDROCHLORIDE 5 MG: 5 TABLET ORAL at 22:34

## 2019-01-01 RX ADMIN — LIDOCAINE HYDROCHLORIDE 5 ML: 20 SOLUTION OROPHARYNGEAL at 16:40

## 2019-01-01 RX ADMIN — TIMOLOL MALEATE 1 DROP: 2.5 SOLUTION OPHTHALMIC at 17:59

## 2019-01-01 RX ADMIN — HYDROCORTISONE SODIUM SUCCINATE 100 MG: 100 INJECTION, POWDER, FOR SOLUTION INTRAMUSCULAR; INTRAVENOUS at 05:52

## 2019-01-01 RX ADMIN — NYSTATIN 500000 UNITS: 100000 SUSPENSION ORAL at 08:10

## 2019-01-01 RX ADMIN — SODIUM CHLORIDE: 9 INJECTION, SOLUTION INTRAVENOUS at 05:08

## 2019-01-01 RX ADMIN — SILVER SULFADIAZINE 4 G: 10 CREAM TOPICAL at 01:10

## 2019-01-01 RX ADMIN — SODIUM CHLORIDE, POTASSIUM CHLORIDE, SODIUM LACTATE AND CALCIUM CHLORIDE: 600; 310; 30; 20 INJECTION, SOLUTION INTRAVENOUS at 01:03

## 2019-01-01 RX ADMIN — SILVER SULFADIAZINE 0.5 G: 10 CREAM TOPICAL at 18:14

## 2019-01-01 RX ADMIN — MITOMYCIN 17 MG: 20 INJECTION, POWDER, LYOPHILIZED, FOR SOLUTION INTRAVENOUS at 11:53

## 2019-01-01 RX ADMIN — Medication 400 MG: at 18:00

## 2019-01-01 RX ADMIN — ENOXAPARIN SODIUM 60 MG: 100 INJECTION SUBCUTANEOUS at 18:02

## 2019-01-01 RX ADMIN — Medication: at 17:27

## 2019-01-01 RX ADMIN — SODIUM CHLORIDE, POTASSIUM CHLORIDE, SODIUM LACTATE AND CALCIUM CHLORIDE: 600; 310; 30; 20 INJECTION, SOLUTION INTRAVENOUS at 01:14

## 2019-01-01 RX ADMIN — DRONABINOL 5 MG: 5 CAPSULE ORAL at 12:57

## 2019-01-01 RX ADMIN — DRONABINOL 5 MG: 5 CAPSULE ORAL at 11:46

## 2019-01-01 RX ADMIN — NYSTATIN 500000 UNITS: 100000 SUSPENSION ORAL at 17:27

## 2019-01-01 RX ADMIN — MIDODRINE HYDROCHLORIDE 10 MG: 5 TABLET ORAL at 09:32

## 2019-01-01 RX ADMIN — HYDROCORTISONE SODIUM SUCCINATE 100 MG: 100 INJECTION, POWDER, FOR SOLUTION INTRAMUSCULAR; INTRAVENOUS at 21:14

## 2019-01-01 RX ADMIN — TIMOLOL MALEATE 1 DROP: 2.5 SOLUTION OPHTHALMIC at 06:15

## 2019-01-01 RX ADMIN — NYSTATIN 500000 UNITS: 100000 SUSPENSION ORAL at 21:13

## 2019-01-01 RX ADMIN — Medication: at 18:13

## 2019-01-01 RX ADMIN — FENTANYL CITRATE 25 MCG: 50 INJECTION INTRAMUSCULAR; INTRAVENOUS at 10:07

## 2019-01-01 RX ADMIN — NYSTATIN 500000 UNITS: 100000 SUSPENSION ORAL at 21:23

## 2019-01-01 RX ADMIN — SODIUM CHLORIDE 1000 ML: 9 INJECTION, SOLUTION INTRAVENOUS at 17:27

## 2019-01-01 RX ADMIN — SODIUM CHLORIDE, POTASSIUM CHLORIDE, SODIUM LACTATE AND CALCIUM CHLORIDE: 600; 310; 30; 20 INJECTION, SOLUTION INTRAVENOUS at 21:21

## 2019-01-01 RX ADMIN — SILVER SULFADIAZINE 1 G: 10 CREAM TOPICAL at 12:06

## 2019-01-01 RX ADMIN — TIMOLOL MALEATE 1 DROP: 2.5 SOLUTION OPHTHALMIC at 05:06

## 2019-01-01 RX ADMIN — MIDODRINE HYDROCHLORIDE 10 MG: 5 TABLET ORAL at 18:37

## 2019-01-01 RX ADMIN — TIMOLOL MALEATE 1 DROP: 2.5 SOLUTION OPHTHALMIC at 18:39

## 2019-01-01 RX ADMIN — MIDODRINE HYDROCHLORIDE 10 MG: 5 TABLET ORAL at 17:59

## 2019-01-01 RX ADMIN — TIMOLOL MALEATE 1 DROP: 2.5 SOLUTION OPHTHALMIC at 18:05

## 2019-01-01 RX ADMIN — SODIUM CHLORIDE: 9 INJECTION, SOLUTION INTRAVENOUS at 01:02

## 2019-01-01 RX ADMIN — LOPERAMIDE HYDROCHLORIDE 4 MG: 2 CAPSULE ORAL at 10:33

## 2019-01-01 RX ADMIN — MIDAZOLAM 2 MG: 1 INJECTION INTRAMUSCULAR; INTRAVENOUS at 09:09

## 2019-01-01 RX ADMIN — MIDODRINE HYDROCHLORIDE 10 MG: 5 TABLET ORAL at 12:40

## 2019-01-01 RX ADMIN — DRONABINOL 5 MG: 5 CAPSULE ORAL at 06:07

## 2019-01-01 RX ADMIN — DRONABINOL 5 MG: 5 CAPSULE ORAL at 16:15

## 2019-01-01 RX ADMIN — POLYETHYLENE GLYCOL 3350 1 PACKET: 17 POWDER, FOR SOLUTION ORAL at 17:39

## 2019-01-01 RX ADMIN — LIDOCAINE HYDROCHLORIDE 5 ML: 20 SOLUTION OROPHARYNGEAL at 08:08

## 2019-01-01 RX ADMIN — MIDODRINE HYDROCHLORIDE 10 MG: 5 TABLET ORAL at 17:17

## 2019-01-01 RX ADMIN — OXYCODONE HYDROCHLORIDE 5 MG: 5 TABLET ORAL at 13:03

## 2019-01-01 RX ADMIN — DRONABINOL 5 MG: 5 CAPSULE ORAL at 18:37

## 2019-01-01 RX ADMIN — TIMOLOL MALEATE 1 DROP: 2.5 SOLUTION OPHTHALMIC at 04:37

## 2019-01-01 RX ADMIN — DRONABINOL 5 MG: 5 CAPSULE ORAL at 18:01

## 2019-01-01 RX ADMIN — ANTACID TABLETS 1000 MG: 500 TABLET, CHEWABLE ORAL at 05:04

## 2019-01-01 RX ADMIN — Medication 400 MG: at 05:14

## 2019-01-01 RX ADMIN — DRONABINOL 5 MG: 5 CAPSULE ORAL at 13:00

## 2019-01-01 RX ADMIN — MIDODRINE HYDROCHLORIDE 10 MG: 5 TABLET ORAL at 12:01

## 2019-01-01 RX ADMIN — ONDANSETRON 4 MG: 2 INJECTION INTRAMUSCULAR; INTRAVENOUS at 14:51

## 2019-01-01 RX ADMIN — ONDANSETRON 4 MG: 2 INJECTION INTRAMUSCULAR; INTRAVENOUS at 09:09

## 2019-01-01 RX ADMIN — MIDODRINE HYDROCHLORIDE 10 MG: 5 TABLET ORAL at 17:29

## 2019-01-01 RX ADMIN — FLUOROURACIL 6800 MG: 50 INJECTION, SOLUTION INTRAVENOUS at 12:39

## 2019-01-01 RX ADMIN — SODIUM CHLORIDE: 9 INJECTION, SOLUTION INTRAVENOUS at 18:23

## 2019-01-01 RX ADMIN — LOPERAMIDE HYDROCHLORIDE 4 MG: 2 CAPSULE ORAL at 13:10

## 2019-01-01 RX ADMIN — OXYCODONE HYDROCHLORIDE 5 MG: 5 TABLET ORAL at 19:15

## 2019-01-01 RX ADMIN — ENOXAPARIN SODIUM 60 MG: 100 INJECTION SUBCUTANEOUS at 17:50

## 2019-01-01 RX ADMIN — Medication: at 17:40

## 2019-01-01 RX ADMIN — INSULIN HUMAN 1 UNITS: 100 INJECTION, SOLUTION PARENTERAL at 05:49

## 2019-01-01 RX ADMIN — NYSTATIN 500000 UNITS: 100000 SUSPENSION ORAL at 17:40

## 2019-01-01 RX ADMIN — SODIUM CHLORIDE: 9 INJECTION, SOLUTION INTRAVENOUS at 11:50

## 2019-01-01 RX ADMIN — MIDODRINE HYDROCHLORIDE 10 MG: 5 TABLET ORAL at 12:26

## 2019-01-01 RX ADMIN — Medication: at 23:58

## 2019-01-01 RX ADMIN — MIDODRINE HYDROCHLORIDE 10 MG: 5 TABLET ORAL at 05:59

## 2019-01-01 RX ADMIN — Medication 400 MG: at 18:37

## 2019-01-01 RX ADMIN — NYSTATIN 500000 UNITS: 100000 SUSPENSION ORAL at 21:14

## 2019-01-01 RX ADMIN — MIDODRINE HYDROCHLORIDE 10 MG: 5 TABLET ORAL at 12:57

## 2019-01-01 RX ADMIN — OXYCODONE HYDROCHLORIDE 5 MG: 5 TABLET ORAL at 23:23

## 2019-01-01 RX ADMIN — SODIUM CHLORIDE, POTASSIUM CHLORIDE, SODIUM LACTATE AND CALCIUM CHLORIDE: 600; 310; 30; 20 INJECTION, SOLUTION INTRAVENOUS at 12:07

## 2019-01-01 RX ADMIN — FILGRASTIM 300 MCG: 300 INJECTION, SOLUTION INTRAVENOUS; SUBCUTANEOUS at 16:59

## 2019-01-01 RX ADMIN — NYSTATIN 500000 UNITS: 100000 SUSPENSION ORAL at 20:57

## 2019-01-01 RX ADMIN — LIDOCAINE HYDROCHLORIDE 5 ML: 20 SOLUTION OROPHARYNGEAL at 09:17

## 2019-01-01 RX ADMIN — ENOXAPARIN SODIUM 60 MG: 100 INJECTION SUBCUTANEOUS at 06:15

## 2019-01-01 RX ADMIN — ACETAMINOPHEN 650 MG: 325 TABLET, FILM COATED ORAL at 23:26

## 2019-01-01 RX ADMIN — DRONABINOL 5 MG: 5 CAPSULE ORAL at 18:12

## 2019-01-01 RX ADMIN — Medication: at 17:50

## 2019-01-01 RX ADMIN — TIMOLOL MALEATE 1 DROP: 2.5 SOLUTION OPHTHALMIC at 05:53

## 2019-01-01 RX ADMIN — MIDODRINE HYDROCHLORIDE 10 MG: 5 TABLET ORAL at 05:42

## 2019-01-01 RX ADMIN — MIDODRINE HYDROCHLORIDE 10 MG: 5 TABLET ORAL at 08:46

## 2019-01-01 RX ADMIN — TIMOLOL MALEATE 1 DROP: 2.5 SOLUTION OPHTHALMIC at 17:20

## 2019-01-01 RX ADMIN — OXYCODONE HYDROCHLORIDE 5 MG: 5 TABLET ORAL at 21:22

## 2019-01-01 RX ADMIN — MIDODRINE HYDROCHLORIDE 10 MG: 5 TABLET ORAL at 18:39

## 2019-01-01 RX ADMIN — OXYCODONE HYDROCHLORIDE 5 MG: 5 TABLET ORAL at 00:40

## 2019-01-01 RX ADMIN — SODIUM CHLORIDE: 9 INJECTION, SOLUTION INTRAVENOUS at 08:56

## 2019-01-01 RX ADMIN — LIDOCAINE HYDROCHLORIDE 5 ML: 20 SOLUTION OROPHARYNGEAL at 10:47

## 2019-01-01 RX ADMIN — NYSTATIN 500000 UNITS: 100000 SUSPENSION ORAL at 08:47

## 2019-01-01 RX ADMIN — ENOXAPARIN SODIUM 60 MG: 100 INJECTION SUBCUTANEOUS at 05:44

## 2019-01-01 RX ADMIN — HYDROCORTISONE SODIUM SUCCINATE 100 MG: 100 INJECTION, POWDER, FOR SOLUTION INTRAMUSCULAR; INTRAVENOUS at 21:23

## 2019-01-01 RX ADMIN — MIDODRINE HYDROCHLORIDE 10 MG: 5 TABLET ORAL at 12:38

## 2019-01-01 RX ADMIN — SILVER SULFADIAZINE 0.5 G: 10 CREAM TOPICAL at 18:05

## 2019-01-01 RX ADMIN — HYDROCORTISONE SODIUM SUCCINATE 100 MG: 100 INJECTION, POWDER, FOR SOLUTION INTRAMUSCULAR; INTRAVENOUS at 06:00

## 2019-01-01 RX ADMIN — MIDODRINE HYDROCHLORIDE 10 MG: 5 TABLET ORAL at 12:31

## 2019-01-01 RX ADMIN — SENNOSIDES AND DOCUSATE SODIUM 2 TABLET: 8.6; 5 TABLET ORAL at 06:00

## 2019-01-01 RX ADMIN — MIDODRINE HYDROCHLORIDE 10 MG: 5 TABLET ORAL at 08:40

## 2019-01-01 RX ADMIN — ENOXAPARIN SODIUM 60 MG: 100 INJECTION SUBCUTANEOUS at 21:10

## 2019-01-01 RX ADMIN — DRONABINOL 5 MG: 5 CAPSULE ORAL at 05:14

## 2019-01-01 RX ADMIN — NYSTATIN 500000 UNITS: 100000 SUSPENSION ORAL at 09:58

## 2019-01-01 RX ADMIN — TIMOLOL MALEATE 1 DROP: 2.5 SOLUTION OPHTHALMIC at 06:08

## 2019-01-01 RX ADMIN — Medication 400 MG: at 06:02

## 2019-01-01 RX ADMIN — ENOXAPARIN SODIUM 60 MG: 100 INJECTION SUBCUTANEOUS at 05:06

## 2019-01-01 RX ADMIN — HYDROCORTISONE SODIUM SUCCINATE 100 MG: 100 INJECTION, POWDER, FOR SOLUTION INTRAMUSCULAR; INTRAVENOUS at 04:32

## 2019-01-01 RX ADMIN — HYDROCORTISONE SODIUM SUCCINATE 100 MG: 100 INJECTION, POWDER, FOR SOLUTION INTRAMUSCULAR; INTRAVENOUS at 04:38

## 2019-01-01 RX ADMIN — INSULIN HUMAN 1 UNITS: 100 INJECTION, SOLUTION PARENTERAL at 20:50

## 2019-01-01 RX ADMIN — TIMOLOL MALEATE 1 DROP: 2.5 SOLUTION OPHTHALMIC at 17:05

## 2019-01-01 RX ADMIN — MIDODRINE HYDROCHLORIDE 10 MG: 5 TABLET ORAL at 12:53

## 2019-01-01 RX ADMIN — ENOXAPARIN SODIUM 60 MG: 100 INJECTION SUBCUTANEOUS at 17:18

## 2019-01-01 RX ADMIN — SODIUM CHLORIDE: 9 INJECTION, SOLUTION INTRAVENOUS at 02:23

## 2019-01-01 RX ADMIN — ENOXAPARIN SODIUM 60 MG: 100 INJECTION SUBCUTANEOUS at 18:03

## 2019-01-01 RX ADMIN — DRONABINOL 5 MG: 5 CAPSULE ORAL at 17:05

## 2019-01-01 RX ADMIN — SODIUM CHLORIDE: 9 INJECTION, SOLUTION INTRAVENOUS at 00:51

## 2019-01-01 RX ADMIN — ENOXAPARIN SODIUM 60 MG: 100 INJECTION SUBCUTANEOUS at 05:52

## 2019-01-01 RX ADMIN — SODIUM CHLORIDE: 9 INJECTION, SOLUTION INTRAVENOUS at 11:08

## 2019-01-01 RX ADMIN — ENOXAPARIN SODIUM 60 MG: 100 INJECTION SUBCUTANEOUS at 09:36

## 2019-01-01 RX ADMIN — Medication 400 MG: at 05:42

## 2019-01-01 RX ADMIN — SODIUM CHLORIDE: 9 INJECTION, SOLUTION INTRAVENOUS at 09:56

## 2019-01-01 RX ADMIN — POLYETHYLENE GLYCOL 3350 1 PACKET: 17 POWDER, FOR SOLUTION ORAL at 12:03

## 2019-01-01 RX ADMIN — MIDODRINE HYDROCHLORIDE 10 MG: 5 TABLET ORAL at 17:50

## 2019-01-01 RX ADMIN — SODIUM CHLORIDE: 9 INJECTION, SOLUTION INTRAVENOUS at 21:44

## 2019-01-01 RX ADMIN — SENNOSIDES AND DOCUSATE SODIUM 2 TABLET: 8.6; 5 TABLET ORAL at 22:03

## 2019-01-01 RX ADMIN — INSULIN HUMAN 2 UNITS: 100 INJECTION, SOLUTION PARENTERAL at 21:21

## 2019-01-01 RX ADMIN — LIDOCAINE HYDROCHLORIDE 5 ML: 20 SOLUTION OROPHARYNGEAL at 21:28

## 2019-01-01 RX ADMIN — NYSTATIN 500000 UNITS: 100000 SUSPENSION ORAL at 14:56

## 2019-01-01 RX ADMIN — Medication 400 MG: at 06:00

## 2019-01-01 RX ADMIN — MIDODRINE HYDROCHLORIDE 10 MG: 5 TABLET ORAL at 13:04

## 2019-01-01 RX ADMIN — SODIUM CHLORIDE: 9 INJECTION, SOLUTION INTRAVENOUS at 06:02

## 2019-01-01 RX ADMIN — TIMOLOL MALEATE 1 DROP: 2.5 SOLUTION OPHTHALMIC at 18:32

## 2019-01-01 RX ADMIN — NYSTATIN 500000 UNITS: 100000 SUSPENSION ORAL at 13:21

## 2019-01-01 RX ADMIN — LIDOCAINE HYDROCHLORIDE 5 ML: 20 SOLUTION OROPHARYNGEAL at 21:23

## 2019-01-01 RX ADMIN — TIMOLOL MALEATE 1 DROP: 2.5 SOLUTION OPHTHALMIC at 17:27

## 2019-01-01 RX ADMIN — Medication 400 MG: at 05:49

## 2019-01-01 RX ADMIN — MIDODRINE HYDROCHLORIDE 10 MG: 5 TABLET ORAL at 12:41

## 2019-01-01 RX ADMIN — Medication 400 MG: at 18:12

## 2019-01-01 RX ADMIN — DRONABINOL 5 MG: 5 CAPSULE ORAL at 17:57

## 2019-01-01 RX ADMIN — SODIUM CHLORIDE: 9 INJECTION, SOLUTION INTRAVENOUS at 20:58

## 2019-01-01 RX ADMIN — Medication: at 17:06

## 2019-01-01 RX ADMIN — SILVER SULFADIAZINE 1 G: 10 CREAM TOPICAL at 18:00

## 2019-01-01 RX ADMIN — FENTANYL CITRATE 50 MCG: 50 INJECTION INTRAMUSCULAR; INTRAVENOUS at 10:15

## 2019-01-01 RX ADMIN — MIDODRINE HYDROCHLORIDE 10 MG: 5 TABLET ORAL at 18:30

## 2019-01-01 RX ADMIN — TIOTROPIUM BROMIDE 1 CAPSULE: 18 CAPSULE ORAL; RESPIRATORY (INHALATION) at 06:19

## 2019-01-01 RX ADMIN — PROPOFOL 50 MG: 10 INJECTION, EMULSION INTRAVENOUS at 09:09

## 2019-01-01 RX ADMIN — Medication: at 16:41

## 2019-01-01 RX ADMIN — NYSTATIN 500000 UNITS: 100000 SUSPENSION ORAL at 18:31

## 2019-01-01 RX ADMIN — SODIUM CHLORIDE: 9 INJECTION, SOLUTION INTRAVENOUS at 11:37

## 2019-01-01 RX ADMIN — ANTACID TABLETS 500 MG: 500 TABLET, CHEWABLE ORAL at 02:23

## 2019-01-01 RX ADMIN — TIMOLOL MALEATE 1 DROP: 2.5 SOLUTION OPHTHALMIC at 20:58

## 2019-01-01 RX ADMIN — MIDODRINE HYDROCHLORIDE 10 MG: 5 TABLET ORAL at 13:27

## 2019-01-01 RX ADMIN — ANTACID TABLETS 500 MG: 500 TABLET, CHEWABLE ORAL at 05:43

## 2019-01-01 RX ADMIN — INSULIN HUMAN 1 UNITS: 100 INJECTION, SOLUTION PARENTERAL at 12:01

## 2019-01-01 RX ADMIN — ANTACID TABLETS 500 MG: 500 TABLET, CHEWABLE ORAL at 12:57

## 2019-01-01 RX ADMIN — MIDODRINE HYDROCHLORIDE 10 MG: 5 TABLET ORAL at 17:05

## 2019-01-01 RX ADMIN — ANTACID TABLETS 500 MG: 500 TABLET, CHEWABLE ORAL at 05:10

## 2019-01-01 RX ADMIN — Medication: at 08:48

## 2019-01-01 RX ADMIN — OXYCODONE HYDROCHLORIDE 5 MG: 5 TABLET ORAL at 01:01

## 2019-01-01 RX ADMIN — Medication 400 MG: at 18:04

## 2019-01-01 RX ADMIN — ENOXAPARIN SODIUM 60 MG: 100 INJECTION SUBCUTANEOUS at 17:05

## 2019-01-01 RX ADMIN — LIDOCAINE HYDROCHLORIDE 5 ML: 20 SOLUTION OROPHARYNGEAL at 16:12

## 2019-01-01 RX ADMIN — LIDOCAINE HYDROCHLORIDE 5 ML: 20 SOLUTION OROPHARYNGEAL at 17:41

## 2019-01-01 RX ADMIN — HYDROCORTISONE SODIUM SUCCINATE 100 MG: 100 INJECTION, POWDER, FOR SOLUTION INTRAMUSCULAR; INTRAVENOUS at 21:33

## 2019-01-01 RX ADMIN — MIDODRINE HYDROCHLORIDE 10 MG: 5 TABLET ORAL at 07:59

## 2019-01-01 RX ADMIN — HYDROCORTISONE SODIUM SUCCINATE 100 MG: 100 INJECTION, POWDER, FOR SOLUTION INTRAMUSCULAR; INTRAVENOUS at 14:19

## 2019-01-01 RX ADMIN — NYSTATIN 500000 UNITS: 100000 SUSPENSION ORAL at 21:32

## 2019-01-01 RX ADMIN — MIDODRINE HYDROCHLORIDE 10 MG: 5 TABLET ORAL at 18:01

## 2019-01-01 RX ADMIN — MIDODRINE HYDROCHLORIDE 10 MG: 5 TABLET ORAL at 09:36

## 2019-01-01 RX ADMIN — Medication: at 18:31

## 2019-01-01 RX ADMIN — LIDOCAINE HYDROCHLORIDE 5 ML: 20 SOLUTION OROPHARYNGEAL at 23:51

## 2019-01-01 RX ADMIN — ANTACID TABLETS 500 MG: 500 TABLET, CHEWABLE ORAL at 00:11

## 2019-01-01 RX ADMIN — ACETAMINOPHEN 650 MG: 325 TABLET, FILM COATED ORAL at 09:17

## 2019-01-01 RX ADMIN — Medication 400 MG: at 17:02

## 2019-01-01 RX ADMIN — ENOXAPARIN SODIUM 60 MG: 100 INJECTION SUBCUTANEOUS at 05:15

## 2019-01-01 RX ADMIN — FLUOROURACIL 1345 MG: 50 INJECTION, SOLUTION INTRAVENOUS at 18:24

## 2019-01-01 RX ADMIN — MAGNESIUM HYDROXIDE 30 ML: 400 SUSPENSION ORAL at 09:14

## 2019-01-01 RX ADMIN — LIDOCAINE HYDROCHLORIDE 1 ML: 20 JELLY TOPICAL at 16:15

## 2019-01-01 RX ADMIN — TIMOLOL MALEATE 1 DROP: 2.5 SOLUTION OPHTHALMIC at 06:13

## 2019-01-01 RX ADMIN — SODIUM CHLORIDE: 9 INJECTION, SOLUTION INTRAVENOUS at 00:11

## 2019-01-01 RX ADMIN — NYSTATIN 500000 UNITS: 100000 SUSPENSION ORAL at 21:22

## 2019-01-01 RX ADMIN — TIMOLOL MALEATE 1 DROP: 2.5 SOLUTION OPHTHALMIC at 22:04

## 2019-01-01 RX ADMIN — ENOXAPARIN SODIUM 60 MG: 100 INJECTION SUBCUTANEOUS at 08:35

## 2019-01-01 RX ADMIN — INSULIN HUMAN 3 UNITS: 100 INJECTION, SOLUTION PARENTERAL at 12:14

## 2019-01-01 RX ADMIN — MIDODRINE HYDROCHLORIDE 10 MG: 5 TABLET ORAL at 09:17

## 2019-01-01 RX ADMIN — SILVER SULFADIAZINE 1 G: 10 CREAM TOPICAL at 18:38

## 2019-01-01 RX ADMIN — SILVER SULFADIAZINE 0.5 G: 10 CREAM TOPICAL at 17:05

## 2019-01-01 RX ADMIN — TIMOLOL MALEATE 1 DROP: 2.5 SOLUTION OPHTHALMIC at 18:01

## 2019-01-01 RX ADMIN — MIDODRINE HYDROCHLORIDE 10 MG: 5 TABLET ORAL at 11:46

## 2019-01-01 RX ADMIN — MIDODRINE HYDROCHLORIDE 10 MG: 5 TABLET ORAL at 17:48

## 2019-01-01 RX ADMIN — LIDOCAINE HYDROCHLORIDE 5 ML: 20 SOLUTION OROPHARYNGEAL at 14:56

## 2019-01-01 RX ADMIN — TIMOLOL MALEATE 1 DROP: 2.5 SOLUTION OPHTHALMIC at 05:59

## 2019-01-01 RX ADMIN — MIDODRINE HYDROCHLORIDE 10 MG: 5 TABLET ORAL at 09:52

## 2019-01-01 RX ADMIN — SILVER SULFADIAZINE 1 G: 10 CREAM TOPICAL at 17:25

## 2019-01-01 RX ADMIN — TIMOLOL MALEATE 1 DROP: 2.5 SOLUTION OPHTHALMIC at 06:56

## 2019-01-01 RX ADMIN — DRONABINOL 5 MG: 5 CAPSULE ORAL at 05:42

## 2019-01-01 RX ADMIN — PROPOFOL 25 MG: 10 INJECTION, EMULSION INTRAVENOUS at 09:25

## 2019-01-01 RX ADMIN — DRONABINOL 5 MG: 5 CAPSULE ORAL at 17:23

## 2019-01-01 RX ADMIN — MIDODRINE HYDROCHLORIDE 10 MG: 5 TABLET ORAL at 18:12

## 2019-01-01 RX ADMIN — TIMOLOL MALEATE 1 DROP: 2.5 SOLUTION OPHTHALMIC at 17:18

## 2019-01-01 RX ADMIN — ENOXAPARIN SODIUM 60 MG: 100 INJECTION SUBCUTANEOUS at 18:01

## 2019-01-01 RX ADMIN — SODIUM CHLORIDE, POTASSIUM CHLORIDE, SODIUM LACTATE AND CALCIUM CHLORIDE: 600; 310; 30; 20 INJECTION, SOLUTION INTRAVENOUS at 16:13

## 2019-01-01 RX ADMIN — SODIUM CHLORIDE: 9 INJECTION, SOLUTION INTRAVENOUS at 15:43

## 2019-01-01 RX ADMIN — OXYCODONE HYDROCHLORIDE 5 MG: 5 TABLET ORAL at 04:31

## 2019-01-01 RX ADMIN — MIDODRINE HYDROCHLORIDE 10 MG: 5 TABLET ORAL at 08:19

## 2019-01-01 RX ADMIN — MITOMYCIN 13.44 MG: 5 INJECTION, POWDER, LYOPHILIZED, FOR SOLUTION INTRAVENOUS at 16:41

## 2019-01-01 RX ADMIN — FILGRASTIM 300 MCG: 300 INJECTION, SOLUTION INTRAVENOUS; SUBCUTANEOUS at 16:32

## 2019-01-01 RX ADMIN — MIDODRINE HYDROCHLORIDE 10 MG: 5 TABLET ORAL at 08:53

## 2019-01-01 RX ADMIN — NYSTATIN 500000 UNITS: 100000 SUSPENSION ORAL at 13:34

## 2019-01-01 RX ADMIN — ANTACID TABLETS 500 MG: 500 TABLET, CHEWABLE ORAL at 20:34

## 2019-01-01 RX ADMIN — NYSTATIN 500000 UNITS: 100000 SUSPENSION ORAL at 12:02

## 2019-01-01 RX ADMIN — ENOXAPARIN SODIUM 60 MG: 100 INJECTION SUBCUTANEOUS at 17:24

## 2019-01-01 RX ADMIN — LIDOCAINE HYDROCHLORIDE 5 ML: 20 SOLUTION OROPHARYNGEAL at 22:45

## 2019-01-01 RX ADMIN — TIMOLOL MALEATE 1 DROP: 2.5 SOLUTION OPHTHALMIC at 05:51

## 2019-01-01 RX ADMIN — OXYCODONE HYDROCHLORIDE 5 MG: 5 TABLET ORAL at 18:39

## 2019-01-01 RX ADMIN — SODIUM CHLORIDE, POTASSIUM CHLORIDE, SODIUM LACTATE AND CALCIUM CHLORIDE 1000 ML: 600; 310; 30; 20 INJECTION, SOLUTION INTRAVENOUS at 00:35

## 2019-01-01 RX ADMIN — OXYCODONE HYDROCHLORIDE 5 MG: 5 TABLET ORAL at 06:18

## 2019-01-01 RX ADMIN — Medication 400 MG: at 06:07

## 2019-01-01 RX ADMIN — Medication 100 MCG: at 09:13

## 2019-01-01 RX ADMIN — TIMOLOL MALEATE 1 DROP: 2.5 SOLUTION OPHTHALMIC at 05:52

## 2019-01-01 RX ADMIN — MIDODRINE HYDROCHLORIDE 10 MG: 5 TABLET ORAL at 18:04

## 2019-01-01 RX ADMIN — ANTACID TABLETS 500 MG: 500 TABLET, CHEWABLE ORAL at 05:07

## 2019-01-01 RX ADMIN — SODIUM CHLORIDE, POTASSIUM CHLORIDE, SODIUM LACTATE AND CALCIUM CHLORIDE: 600; 310; 30; 20 INJECTION, SOLUTION INTRAVENOUS at 08:04

## 2019-01-01 RX ADMIN — ANTACID TABLETS 500 MG: 500 TABLET, CHEWABLE ORAL at 21:45

## 2019-01-01 RX ADMIN — NYSTATIN 500000 UNITS: 100000 SUSPENSION ORAL at 09:17

## 2019-01-01 RX ADMIN — DRONABINOL 5 MG: 5 CAPSULE ORAL at 12:40

## 2019-01-01 RX ADMIN — MAGNESIUM SULFATE 2 G: 2 INJECTION INTRAVENOUS at 09:35

## 2019-01-01 RX ADMIN — ANTACID TABLETS 1000 MG: 500 TABLET, CHEWABLE ORAL at 05:14

## 2019-01-01 RX ADMIN — SODIUM CHLORIDE: 9 INJECTION, SOLUTION INTRAVENOUS at 12:58

## 2019-01-01 RX ADMIN — NYSTATIN 500000 UNITS: 100000 SUSPENSION ORAL at 09:14

## 2019-01-01 RX ADMIN — MIDODRINE HYDROCHLORIDE 10 MG: 5 TABLET ORAL at 13:25

## 2019-01-01 RX ADMIN — MIDODRINE HYDROCHLORIDE 10 MG: 5 TABLET ORAL at 08:57

## 2019-01-01 RX ADMIN — SODIUM CHLORIDE, POTASSIUM CHLORIDE, SODIUM LACTATE AND CALCIUM CHLORIDE: 600; 310; 30; 20 INJECTION, SOLUTION INTRAVENOUS at 08:34

## 2019-01-01 RX ADMIN — INSULIN HUMAN 1 UNITS: 100 INJECTION, SOLUTION PARENTERAL at 21:31

## 2019-01-01 RX ADMIN — SODIUM CHLORIDE 1000 ML: 9 INJECTION, SOLUTION INTRAVENOUS at 11:28

## 2019-01-01 RX ADMIN — TIMOLOL MALEATE 1 DROP: 2.5 SOLUTION OPHTHALMIC at 16:41

## 2019-01-01 SDOH — HEALTH STABILITY: MENTAL HEALTH: HOW OFTEN DO YOU HAVE 6 OR MORE DRINKS ON ONE OCCASION?: NEVER

## 2019-01-01 ASSESSMENT — ENCOUNTER SYMPTOMS
COUGH: 0
SHORTNESS OF BREATH: 0
INSOMNIA: 0
PALPITATIONS: 0
SORE THROAT: 0
DIARRHEA: 0
FOCAL WEAKNESS: 0
SPUTUM PRODUCTION: 1
SPEECH CHANGE: 0
PHOTOPHOBIA: 0
CHILLS: 0
NERVOUS/ANXIOUS: 0
COUGH: 0
FLANK PAIN: 0
FOCAL WEAKNESS: 0
VOMITING: 0
NAUSEA: 0
WHEEZING: 0
NAUSEA: 0
MYALGIAS: 0
SPEECH CHANGE: 0
HEARTBURN: 0
FOCAL WEAKNESS: 0
SORE THROAT: 0
DIZZINESS: 0
BACK PAIN: 0
BACK PAIN: 1
FEVER: 0
SHORTNESS OF BREATH: 0
SPEECH CHANGE: 0
FLANK PAIN: 0
HEADACHES: 0
FEVER: 0
NAUSEA: 0
NERVOUS/ANXIOUS: 0
COUGH: 0
VOMITING: 0
MYALGIAS: 0
BLURRED VISION: 0
NAUSEA: 0
SENSORY CHANGE: 0
HEADACHES: 0
DIZZINESS: 0
HEADACHES: 0
MYALGIAS: 0
DEPRESSION: 0
CONSTIPATION: 0
NERVOUS/ANXIOUS: 0
DEPRESSION: 0
CLAUDICATION: 0
FEVER: 0
FEVER: 0
DEPRESSION: 0
SORE THROAT: 0
WEAKNESS: 0
NAUSEA: 0
BLURRED VISION: 0
COUGH: 0
SENSORY CHANGE: 0
FEVER: 0
NERVOUS/ANXIOUS: 0
HEMOPTYSIS: 0
FEVER: 0
WEAKNESS: 0
PHOTOPHOBIA: 0
COUGH: 0
SENSORY CHANGE: 0
DIZZINESS: 0
WEAKNESS: 0
CHILLS: 0
COUGH: 0
NERVOUS/ANXIOUS: 1
DEPRESSION: 0
COUGH: 0
BACK PAIN: 0
PHOTOPHOBIA: 0
DIARRHEA: 0
HEARTBURN: 0
DIARRHEA: 0
WEAKNESS: 0
CHILLS: 0
ABDOMINAL PAIN: 0
HEADACHES: 0
PALPITATIONS: 0
DEPRESSION: 0
NERVOUS/ANXIOUS: 0
MYALGIAS: 0
BACK PAIN: 1
SORE THROAT: 0
INSOMNIA: 0
CLAUDICATION: 0
NAUSEA: 0
DEPRESSION: 1
WEAKNESS: 0
CHILLS: 0
SENSORY CHANGE: 0
MYALGIAS: 0
DIARRHEA: 0
ABDOMINAL PAIN: 0
WHEEZING: 0
ABDOMINAL PAIN: 0
DIZZINESS: 0
CLAUDICATION: 0
LOSS OF CONSCIOUSNESS: 0
NECK PAIN: 0
MYALGIAS: 0
NERVOUS/ANXIOUS: 0
NERVOUS/ANXIOUS: 0
HEADACHES: 0
VOMITING: 0
FEVER: 0
SENSORY CHANGE: 0
WEAKNESS: 0
CONSTIPATION: 0
DIZZINESS: 0
COUGH: 0
HEARTBURN: 0
FLANK PAIN: 0
SORE THROAT: 0
SHORTNESS OF BREATH: 0
BACK PAIN: 1
HEADACHES: 0
MYALGIAS: 0
CONSTIPATION: 0
SHORTNESS OF BREATH: 0
DEPRESSION: 1
BLURRED VISION: 0
INSOMNIA: 0
CONSTIPATION: 0
DEPRESSION: 0
VOMITING: 0
HEARTBURN: 0
COUGH: 0
PSYCHIATRIC NEGATIVE: 1
HEADACHES: 0
DIARRHEA: 0
HEADACHES: 0
DEPRESSION: 0
WEIGHT LOSS: 1
DIARRHEA: 1
NECK PAIN: 0
ABDOMINAL PAIN: 0
DIARRHEA: 0
SHORTNESS OF BREATH: 0
HEADACHES: 0
PALPITATIONS: 0
MYALGIAS: 0
SPUTUM PRODUCTION: 0
COUGH: 0
DIAPHORESIS: 0
SPEECH CHANGE: 0
SORE THROAT: 1
PHOTOPHOBIA: 0
HEADACHES: 0
CONSTIPATION: 0
CHILLS: 0
VOMITING: 0
NAUSEA: 1
NERVOUS/ANXIOUS: 0
NAUSEA: 1
CONSTIPATION: 0
DEPRESSION: 0
NAUSEA: 0
NAUSEA: 0
CHILLS: 0
BACK PAIN: 1
DIARRHEA: 0
ABDOMINAL PAIN: 0
PALPITATIONS: 0
EYES NEGATIVE: 1
SORE THROAT: 0
CHILLS: 0
DIAPHORESIS: 0
DOUBLE VISION: 0
DEPRESSION: 0
NECK PAIN: 0
HEARTBURN: 0
CHILLS: 0
CHILLS: 0
FLANK PAIN: 0
NERVOUS/ANXIOUS: 0
DIZZINESS: 0
SPEECH CHANGE: 0
CHILLS: 0
CONSTIPATION: 0
VOMITING: 0
HEARTBURN: 0
CHILLS: 0
MYALGIAS: 0
CARDIOVASCULAR NEGATIVE: 1
ABDOMINAL PAIN: 0
DIZZINESS: 0
DIARRHEA: 0
CHILLS: 0
SPEECH CHANGE: 0
CLAUDICATION: 0
FLANK PAIN: 0
HEARTBURN: 0
DEPRESSION: 1
HEARTBURN: 0
FEVER: 0
DIZZINESS: 0
FLANK PAIN: 0
PHOTOPHOBIA: 0
HEADACHES: 0
HEARTBURN: 0
INSOMNIA: 0
SORE THROAT: 0
PALPITATIONS: 0
COUGH: 0
COUGH: 0
INSOMNIA: 0
FLANK PAIN: 0
DIZZINESS: 0
INSOMNIA: 0
COUGH: 0
CHILLS: 0
CHILLS: 0
BACK PAIN: 1
HEARTBURN: 0
VOMITING: 0
HEARTBURN: 0
NERVOUS/ANXIOUS: 0
HEARTBURN: 0
BLURRED VISION: 0
CONSTIPATION: 0
ABDOMINAL PAIN: 0
WHEEZING: 0
BLURRED VISION: 0
BLURRED VISION: 0
FEVER: 0
MYALGIAS: 0
HEADACHES: 0
CHILLS: 0
SPEECH CHANGE: 0
CONSTIPATION: 0
SHORTNESS OF BREATH: 0
SENSORY CHANGE: 0
NERVOUS/ANXIOUS: 0
PHOTOPHOBIA: 0
PALPITATIONS: 0
CHILLS: 0
BLURRED VISION: 0
DEPRESSION: 0
DIZZINESS: 0
CHILLS: 0
SORE THROAT: 1
SHORTNESS OF BREATH: 0
BACK PAIN: 0
BLURRED VISION: 0
DIARRHEA: 1
SPUTUM PRODUCTION: 0
FEVER: 0
SENSORY CHANGE: 0
VOMITING: 0
SENSORY CHANGE: 0
SPUTUM PRODUCTION: 1
PHOTOPHOBIA: 0
SORE THROAT: 0
FLANK PAIN: 0
WEAKNESS: 1
MYALGIAS: 0
CONSTIPATION: 0
SORE THROAT: 0
BLURRED VISION: 0
WHEEZING: 0
DIAPHORESIS: 0
VOMITING: 0
SHORTNESS OF BREATH: 0
DEPRESSION: 0
COUGH: 0
BACK PAIN: 0
NERVOUS/ANXIOUS: 0
PALPITATIONS: 0
DIAPHORESIS: 0
PHOTOPHOBIA: 0
CLAUDICATION: 0
SPEECH CHANGE: 0
NECK PAIN: 0
VOMITING: 0
DIARRHEA: 0
COUGH: 0
SENSORY CHANGE: 0
NERVOUS/ANXIOUS: 0
FLANK PAIN: 0
INSOMNIA: 0
COUGH: 0
CLAUDICATION: 0
ABDOMINAL PAIN: 0
BACK PAIN: 1
NERVOUS/ANXIOUS: 0
BLURRED VISION: 0
CONSTIPATION: 0
BLURRED VISION: 0
DIZZINESS: 0
CLAUDICATION: 0
DEPRESSION: 0
HEARTBURN: 0
WEAKNESS: 0
MYALGIAS: 0
FLANK PAIN: 0
DIARRHEA: 0
COUGH: 0
MYALGIAS: 0
WEAKNESS: 0
CHILLS: 0
NERVOUS/ANXIOUS: 0
INSOMNIA: 0
VOMITING: 0
SENSORY CHANGE: 0
CLAUDICATION: 0
DIZZINESS: 0
CLAUDICATION: 0
SPEECH CHANGE: 0
SORE THROAT: 1
HEARTBURN: 0
HEARTBURN: 0
SORE THROAT: 0
CHILLS: 0
INSOMNIA: 0
VOMITING: 0
CONSTIPATION: 0
INSOMNIA: 0
WEAKNESS: 1
VOMITING: 0
PHOTOPHOBIA: 0
BACK PAIN: 0
FLANK PAIN: 0
FLANK PAIN: 0
HEARTBURN: 0
ROS GI COMMENTS: ANAL PAIN
HEADACHES: 0
MYALGIAS: 0
NAUSEA: 0
COUGH: 0
ABDOMINAL PAIN: 0
CONSTIPATION: 0
ROS GI COMMENTS: PERIRECTAL PAIN
WEAKNESS: 0
DIZZINESS: 0
SORE THROAT: 0
CONSTIPATION: 0
ABDOMINAL PAIN: 0
FLANK PAIN: 0
DEPRESSION: 0
NAUSEA: 0
DIARRHEA: 0
SPEECH CHANGE: 0
SPEECH CHANGE: 0
COUGH: 0
ABDOMINAL PAIN: 0
HEADACHES: 0
WEAKNESS: 0
INSOMNIA: 0
CLAUDICATION: 0
FEVER: 0
FLANK PAIN: 0
WEAKNESS: 1
FEVER: 0
SPEECH CHANGE: 0
INSOMNIA: 0
CLAUDICATION: 0
MYALGIAS: 0
PHOTOPHOBIA: 0
PALPITATIONS: 0
CONSTIPATION: 0
SHORTNESS OF BREATH: 0
ABDOMINAL PAIN: 0
VOMITING: 0
FEVER: 0
WEAKNESS: 1
WEAKNESS: 0
DIZZINESS: 0
ABDOMINAL PAIN: 0
NERVOUS/ANXIOUS: 0
SPUTUM PRODUCTION: 1
NECK PAIN: 0
COUGH: 0
VOMITING: 0
BACK PAIN: 0
NAUSEA: 0
BRUISES/BLEEDS EASILY: 0
DIAPHORESIS: 0
VOMITING: 0
DIARRHEA: 0
SORE THROAT: 0
DEPRESSION: 0
DIZZINESS: 0
SHORTNESS OF BREATH: 0
HEARTBURN: 0
ABDOMINAL PAIN: 0
CONSTIPATION: 0
PHOTOPHOBIA: 0
FEVER: 0
HEARTBURN: 0
FEVER: 0
BLURRED VISION: 0
NAUSEA: 0
SHORTNESS OF BREATH: 0
DIZZINESS: 0
FOCAL WEAKNESS: 0
MYALGIAS: 0
SPEECH CHANGE: 0
SHORTNESS OF BREATH: 0
SPEECH CHANGE: 0
SENSORY CHANGE: 0
VOMITING: 0
CHILLS: 0
CHILLS: 0
PHOTOPHOBIA: 0
ABDOMINAL PAIN: 0
NAUSEA: 0
NAUSEA: 0
COUGH: 0
NECK PAIN: 0
CLAUDICATION: 0
NERVOUS/ANXIOUS: 0
NAUSEA: 0
ABDOMINAL PAIN: 0
FLANK PAIN: 0
VOMITING: 0
SORE THROAT: 0
CLAUDICATION: 0
HEADACHES: 0
BLOOD IN STOOL: 0
WHEEZING: 0
DIARRHEA: 0
WEAKNESS: 1
BLURRED VISION: 0
SHORTNESS OF BREATH: 0
WHEEZING: 0
PHOTOPHOBIA: 0
DIZZINESS: 0
FLANK PAIN: 0
PHOTOPHOBIA: 0
SPEECH CHANGE: 0
SHORTNESS OF BREATH: 0
ABDOMINAL PAIN: 0
WHEEZING: 0
CLAUDICATION: 0
ROS GI COMMENTS: PERIRECTAL PAIN
FEVER: 0
WEAKNESS: 0
SHORTNESS OF BREATH: 0
SORE THROAT: 0
COUGH: 0
WEAKNESS: 1
BLURRED VISION: 0
CLAUDICATION: 0
DIARRHEA: 0
FOCAL WEAKNESS: 0
DIARRHEA: 0
FOCAL WEAKNESS: 0
DIZZINESS: 0
ABDOMINAL PAIN: 0
BLURRED VISION: 0
SPEECH CHANGE: 0
SENSORY CHANGE: 0
BLURRED VISION: 0
SENSORY CHANGE: 0
VOMITING: 0
SPEECH CHANGE: 0
NERVOUS/ANXIOUS: 0
SPEECH CHANGE: 0
SENSORY CHANGE: 0
SPEECH CHANGE: 0
SORE THROAT: 0
SORE THROAT: 0
CLAUDICATION: 0
DIAPHORESIS: 0
FEVER: 0
DIZZINESS: 0
HEADACHES: 0
ABDOMINAL PAIN: 0
HEADACHES: 0
CHILLS: 0
ROS GI COMMENTS: PERIRECTAL PAIN
BLURRED VISION: 0
SENSORY CHANGE: 0
ABDOMINAL PAIN: 0
VOMITING: 0
NECK PAIN: 0
FOCAL WEAKNESS: 0
INSOMNIA: 0
CHILLS: 0
SPEECH CHANGE: 0
CLAUDICATION: 0
PHOTOPHOBIA: 0
DIARRHEA: 0
CHILLS: 0
PHOTOPHOBIA: 0
NERVOUS/ANXIOUS: 0
SENSORY CHANGE: 0
ABDOMINAL PAIN: 0
MYALGIAS: 0
BLURRED VISION: 0
FLANK PAIN: 0
WEAKNESS: 0
SEIZURES: 0
CLAUDICATION: 0
SENSORY CHANGE: 0
NAUSEA: 1
SORE THROAT: 0
NERVOUS/ANXIOUS: 0
DIARRHEA: 0
SORE THROAT: 0
FOCAL WEAKNESS: 0
SORE THROAT: 0
FLANK PAIN: 0
FEVER: 0
HEADACHES: 0
BLURRED VISION: 0
SENSORY CHANGE: 0
SHORTNESS OF BREATH: 0
WEAKNESS: 0
HEARTBURN: 0
CONSTIPATION: 0
BACK PAIN: 1
ABDOMINAL PAIN: 0
NERVOUS/ANXIOUS: 0
SENSORY CHANGE: 0
NAUSEA: 0
CLAUDICATION: 0
SENSORY CHANGE: 0
VOMITING: 0
BACK PAIN: 0
SPEECH CHANGE: 0
NAUSEA: 0
FLANK PAIN: 0
MYALGIAS: 0
FEVER: 0
DIARRHEA: 1
CHILLS: 0
CLAUDICATION: 0
FEVER: 0
WEAKNESS: 1
FEVER: 0
HEADACHES: 0
WHEEZING: 0
SHORTNESS OF BREATH: 0
SORE THROAT: 0
DIARRHEA: 0
BACK PAIN: 1
DIZZINESS: 0
NAUSEA: 0
INSOMNIA: 0
VOMITING: 0
COUGH: 0
VOMITING: 0
SPUTUM PRODUCTION: 0
NAUSEA: 0
BLURRED VISION: 0
FEVER: 0
DIARRHEA: 0
VOMITING: 0
ABDOMINAL PAIN: 0
HEARTBURN: 0
SORE THROAT: 0
MYALGIAS: 0
COUGH: 0
SPUTUM PRODUCTION: 0
FEVER: 0
NERVOUS/ANXIOUS: 1
SHORTNESS OF BREATH: 0
NAUSEA: 0
NERVOUS/ANXIOUS: 0
MYALGIAS: 0
NAUSEA: 0
MYALGIAS: 0
NERVOUS/ANXIOUS: 0
CONSTIPATION: 0
INSOMNIA: 0
DIARRHEA: 0
CLAUDICATION: 0
NERVOUS/ANXIOUS: 0
DEPRESSION: 1
VOMITING: 0
SENSORY CHANGE: 0
PHOTOPHOBIA: 0
SENSORY CHANGE: 0
WEAKNESS: 0
SHORTNESS OF BREATH: 0
SPEECH CHANGE: 0
DIARRHEA: 1
DEPRESSION: 0
BLURRED VISION: 0
MYALGIAS: 0
SPEECH CHANGE: 0
SPUTUM PRODUCTION: 1
NERVOUS/ANXIOUS: 0
HEARTBURN: 0
DEPRESSION: 0
MYALGIAS: 0
CONSTIPATION: 0
DIZZINESS: 0
DIARRHEA: 1
FEVER: 0
SORE THROAT: 0
CONSTIPATION: 0
DIZZINESS: 0
SORE THROAT: 1
WEAKNESS: 0
ABDOMINAL PAIN: 0
CLAUDICATION: 0
BLURRED VISION: 0
FLANK PAIN: 0
SORE THROAT: 0
HEADACHES: 0
INSOMNIA: 0
FEVER: 0
FLANK PAIN: 0
SHORTNESS OF BREATH: 0
WEAKNESS: 0
FLANK PAIN: 0
SHORTNESS OF BREATH: 0
BLURRED VISION: 0
DEPRESSION: 0
ABDOMINAL PAIN: 0
PALPITATIONS: 0
SHORTNESS OF BREATH: 0
DIAPHORESIS: 0
DEPRESSION: 0
SHORTNESS OF BREATH: 1
MYALGIAS: 0
CLAUDICATION: 0
FLANK PAIN: 0
SORE THROAT: 0
WEAKNESS: 1
SPEECH CHANGE: 0
COUGH: 0
SENSORY CHANGE: 0
DIARRHEA: 0
SHORTNESS OF BREATH: 0
CONSTIPATION: 0
DIZZINESS: 0
MUSCULOSKELETAL NEGATIVE: 1
HEARTBURN: 0
CONSTIPATION: 0
SENSORY CHANGE: 0
FLANK PAIN: 0
DIZZINESS: 0
HEADACHES: 0
NAUSEA: 0
DEPRESSION: 0
HEADACHES: 0
DIARRHEA: 0
CONSTIPATION: 0
DIAPHORESIS: 0
SHORTNESS OF BREATH: 0
DIZZINESS: 0
INSOMNIA: 0
HEADACHES: 0
HEADACHES: 0
HEARTBURN: 0
HEARTBURN: 0
DEPRESSION: 0
BLURRED VISION: 0
CLAUDICATION: 0
DEPRESSION: 0
NAUSEA: 1
HEADACHES: 0
HEARTBURN: 0

## 2019-01-01 ASSESSMENT — GAIT ASSESSMENTS
GAIT LEVEL OF ASSIST: SUPERVISED
DISTANCE (FEET): 25

## 2019-01-01 ASSESSMENT — LIFESTYLE VARIABLES
EVER HAD A DRINK FIRST THING IN THE MORNING TO STEADY YOUR NERVES TO GET RID OF A HANGOVER: NO
TOTAL SCORE: 0
HAVE PEOPLE ANNOYED YOU BY CRITICIZING YOUR DRINKING: NO
ALCOHOL_USE: NO
AVERAGE NUMBER OF DAYS PER WEEK YOU HAVE A DRINK CONTAINING ALCOHOL: 0
EVER FELT BAD OR GUILTY ABOUT YOUR DRINKING: NO
TOTAL SCORE: 0
ON A TYPICAL DAY WHEN YOU DRINK ALCOHOL HOW MANY DRINKS DO YOU HAVE: 0
EVER_SMOKED: YES
EVER_SMOKED: YES
CONSUMPTION TOTAL: NEGATIVE
TOTAL SCORE: 0
DO YOU DRINK ALCOHOL: NO
HAVE YOU EVER FELT YOU SHOULD CUT DOWN ON YOUR DRINKING: NO
HOW MANY TIMES IN THE PAST YEAR HAVE YOU HAD 5 OR MORE DRINKS IN A DAY: 0
DOES PATIENT WANT TO STOP DRINKING: CANNOT ASSESS

## 2019-01-01 ASSESSMENT — COGNITIVE AND FUNCTIONAL STATUS - GENERAL
DAILY ACTIVITIY SCORE: 24
SUGGESTED CMS G CODE MODIFIER DAILY ACTIVITY: CH
SUGGESTED CMS G CODE MODIFIER DAILY ACTIVITY: CH
MOBILITY SCORE: 24
MOBILITY SCORE: 24
SUGGESTED CMS G CODE MODIFIER MOBILITY: CH
DAILY ACTIVITIY SCORE: 24
SUGGESTED CMS G CODE MODIFIER MOBILITY: CH

## 2019-01-01 ASSESSMENT — PATIENT HEALTH QUESTIONNAIRE - PHQ9
1. LITTLE INTEREST OR PLEASURE IN DOING THINGS: NOT AT ALL
SUM OF ALL RESPONSES TO PHQ9 QUESTIONS 1 AND 2: 0
2. FEELING DOWN, DEPRESSED, IRRITABLE, OR HOPELESS: NOT AT ALL
2. FEELING DOWN, DEPRESSED, IRRITABLE, OR HOPELESS: NOT AT ALL
1. LITTLE INTEREST OR PLEASURE IN DOING THINGS: NOT AT ALL
SUM OF ALL RESPONSES TO PHQ9 QUESTIONS 1 AND 2: 0
1. LITTLE INTEREST OR PLEASURE IN DOING THINGS: NOT AT ALL
2. FEELING DOWN, DEPRESSED, IRRITABLE, OR HOPELESS: NOT AT ALL
SUM OF ALL RESPONSES TO PHQ9 QUESTIONS 1 AND 2: 0
2. FEELING DOWN, DEPRESSED, IRRITABLE, OR HOPELESS: NOT AT ALL
SUM OF ALL RESPONSES TO PHQ9 QUESTIONS 1 AND 2: 0
2. FEELING DOWN, DEPRESSED, IRRITABLE, OR HOPELESS: NOT AT ALL
1. LITTLE INTEREST OR PLEASURE IN DOING THINGS: NOT AT ALL
SUM OF ALL RESPONSES TO PHQ9 QUESTIONS 1 AND 2: 0
1. LITTLE INTEREST OR PLEASURE IN DOING THINGS: NOT AT ALL
SUM OF ALL RESPONSES TO PHQ9 QUESTIONS 1 AND 2: 0
2. FEELING DOWN, DEPRESSED, IRRITABLE, OR HOPELESS: NOT AT ALL

## 2019-01-01 ASSESSMENT — COPD QUESTIONNAIRES
DO YOU EVER COUGH UP ANY MUCUS OR PHLEGM?: YES, A FEW DAYS A WEEK OR MONTH
IN THE PAST 12 MONTHS DO YOU DO LESS THAN YOU USED TO BECAUSE OF YOUR BREATHING PROBLEMS: AGREE
DO YOU EVER COUGH UP ANY MUCUS OR PHLEGM?: YES, A FEW DAYS A WEEK OR MONTH
COPD SCREENING SCORE: 7
COPD SCREENING SCORE: 7
HAVE YOU SMOKED AT LEAST 100 CIGARETTES IN YOUR ENTIRE LIFE: YES
DURING THE PAST 4 WEEKS HOW MUCH DID YOU FEEL SHORT OF BREATH: SOME OF THE TIME
HAVE YOU SMOKED AT LEAST 100 CIGARETTES IN YOUR ENTIRE LIFE: YES
DURING THE PAST 4 WEEKS HOW MUCH DID YOU FEEL SHORT OF BREATH: SOME OF THE TIME

## 2019-01-01 ASSESSMENT — PAIN SCALES - GENERAL
PAINLEVEL: NO PAIN
PAINLEVEL: 7=MODERATE-SEVERE PAIN
PAINLEVEL: 7=MODERATE-SEVERE PAIN
PAIN_LEVEL: 5
PAINLEVEL: 6=MODERATE PAIN
PAINLEVEL: 5=MODERATE PAIN

## 2019-01-01 ASSESSMENT — PAIN SCALES - WONG BAKER: WONGBAKER_NUMERICALRESPONSE: HURTS EVEN MORE

## 2019-01-01 ASSESSMENT — ACTIVITIES OF DAILY LIVING (ADL): TOILETING: INDEPENDENT

## 2019-02-13 NOTE — TELEPHONE ENCOUNTER
Have we ever prescribed this med? Yes.  If yes, what date? 02/08/2018    Last OV: 05/18/2018-Brown     Next OV: No appointment on file-  Plan:  1. Continue Stiolto 2 puffs QD and HEENA prn. Patient would like to try Trelegy 1 puff QD. RX for samples sent. Patient will report back if more beneficial than Stiolto to then check cost.  2. Strongly encouraged smoking cessation. Declined smoking cessation progrm.  3. Referral to LDCT.  4. Discussed respiratory hygiene.  5. Follow up in 6 months with CT, sooner if needed.    DX: Chronic obstructive pulmonary disease, unspecified COPD type (HCC) (J44.9)    Medications:   Requested Prescriptions     Pending Prescriptions Disp Refills   • STIOLTO RESPIMAT 2.5-2.5 MCG/ACT Aero Soln [Pharmacy Med Name: STIOLTO RESPIMAT INHAL SPRAY]  9     Sig: INHALE 2 PUFFS BY MOUTH EVERY DAY.     Patient is taking Trelegy Ellipta

## 2019-04-24 NOTE — TELEPHONE ENCOUNTER
Patient is taking Trelegy Ellipta       Have we ever prescribed this med? Yes.  If yes, what date? 02/13/2019    Last OV: 05/18/2018-ALBINA Feliciano     Next OV: No appointment on file-  Plan:  1. Continue Stiolto 2 puffs QD and HEENA prn. Patient would like to try Trelegy 1 puff QD. RX for samples sent. Patient will report back if more beneficial than Stiolto to then check cost.  2. Strongly encouraged smoking cessation. Declined smoking cessation progrm.  3. Referral to LDCT.  4. Discussed respiratory hygiene.  5. Follow up in 6 months with CT, sooner if needed.    DX: COPD     Medications:   Requested Prescriptions     Pending Prescriptions Disp Refills   • STIOLTO RESPIMAT 2.5-2.5 MCG/ACT Aero Soln [Pharmacy Med Name: STIOLTO RESPIMAT INHAL SPRAY]  1     Sig: INHALE 2 PUFFS BY MOUTH EVERY DAY. NEED APPT

## 2019-10-15 NOTE — PROGRESS NOTES
Patient in pre-op, assessment completed, patient  updated on plan of care, all questions answered, no further needs at this time, call light within reach.

## 2019-10-15 NOTE — OR SURGEON
Immediate Post OP Note    PreOp Diagnosis: Anal Margin Cancer    PostOp Diagnosis: same    Procedure(s):  COLONOSCOPY - Wound Class: Dirty or Infected  With Biopsy and Tattoo placement    Surgeon(s):  Josh Huang M.D.    Anesthesiologist/Type of Anesthesia:  Anesthesiologist: Jake Anne M.D./MAC    Surgical Staff:  Circulator: Sera Hong R.N.  Endoscopy Technician: Marti Garza; Kaitlynn Gan    Specimens removed if any:  ID Type Source Tests Collected by Time Destination   A : Sigmoid colon mass Tissue Colon - Sigmoid PATHOLOGY SPECIMEN Josh Huang M.D. 10/15/2019  9:23 AM        Estimated Blood Loss: none    Findings: Inadequet prep precluding advancement of scope into the cecum.  Unresectable mass identified in the proximal sigmoid colon 35cm from the anal verge, biopsies taken and tattoo marking placed.  Large known anal margin tumor noted as well.  Pt will need a complete colonoscopy to finish staging in the next 1-2 weeks    Complications: none        10/15/2019 9:56 AM Josh Huang M.D.

## 2019-10-15 NOTE — OR NURSING
"Tolerating fluids well; no complaints of nausea  States has pain, same as pre op, in rectal area; not \"gas\" pain  Unable to wean totally off O2; discharged to Phase II on 1 l/m  "

## 2019-10-15 NOTE — PROGRESS NOTES
Patient verbalizes readiness for discharge. Pt off oxygen using Incentive spirometer.  Instructions reviewed with patient and friend Marla, all questions answered, no further needs.

## 2019-10-15 NOTE — ANESTHESIA POSTPROCEDURE EVALUATION
Patient: Corona Bean    Procedure Summary     Date:  10/15/19 Room / Location:  [place] [address] / SURGERY [facility]    Anesthesia Start:  0908 Anesthesia Stop:  0938    Procedure:  COLONOSCOPY (Buttocks) Diagnosis:  (RECTAL MASS)    Surgeon:  Josh Huang M.D. Responsible Provider:  Jake Anne M.D.    Anesthesia Type:  general, MAC ASA Status:  2          Final Anesthesia Type: general, MAC  Last vitals  BP   Blood Pressure : 123/66    Temp   36.6 °C (97.9 °F)    Pulse   Pulse: 86   Resp   18    SpO2   94 %      Anesthesia Post Evaluation    Patient location during evaluation: PACU  Patient participation: complete - patient participated  Level of consciousness: awake and alert  Pain score: 5    Airway patency: patent  Anesthetic complications: no  Cardiovascular status: hemodynamically stable  Respiratory status: acceptable  Hydration status: euvolemic    PONV: none           Nurse Pain Score: 6 (NPRS)

## 2019-10-15 NOTE — ANESTHESIA PREPROCEDURE EVALUATION
Relevant Problems   PULMONARY   (+) COPD (chronic obstructive pulmonary disease) (HCC)      NEURO   (+) History of bladder cancer      Other   (+) Current smoker       Physical Exam    Airway   Mallampati: II  TM distance: >3 FB  Neck ROM: full       Cardiovascular - normal exam  Rhythm: regular  Rate: normal  (-) murmur     Dental - normal exam         Pulmonary - normal exam  Breath sounds clear to auscultation     Abdominal    Neurological - normal exam                 Anesthesia Plan    ASA 2       Plan - general and MAC       Airway plan will be natural airway        Induction: intravenous    Postoperative Plan: Postoperative administration of opioids is intended.    Pertinent diagnostic labs and testing reviewed    Informed Consent:    Anesthetic plan and risks discussed with patient.

## 2019-10-15 NOTE — ANESTHESIA QCDR
2019 [facility] Clinical Data Registry (for Quality Improvement)     Postoperative nausea/vomiting risk protocol (Adult = 18 yrs and Pediatric 3-17 yrs)- (430 and 463)  General inhalation anesthetic (NOT TIVA) with PONV risk factors: No  Provision of anti-emetic therapy with at least 2 different classes of agents: N/A  Patient DID NOT receive anti-emetic therapy and reason is documented in Medical Record: N/A    Multimodal Pain Management- (AQI59)  Patient undergoing Elective Surgery (i.e. Outpatient, or ASC, or Prescheduled Surgery prior to Hospital Admission): No  Use of Multimodal Pain Management, two or more drugs and/or interventions, NOT including systemic opioids: N/A  Exception: Documented allergy to multiple classes of analgesics: N/A    PACU assessment of acute postoperative pain prior to Anesthesia Care End- Applies to Patients Age = 18- (ABG7)  Initial PACU pain score is which of the following: < 7/10  Patient unable to report pain score: N/A    Post-anesthetic transfer of care checklist/protocol to PACU/ICU- (426 and 427)  Upon conclusion of case, patient transferred to which of the following locations: PACU/Non-ICU  Use of transfer checklist/protocol: Yes  Exclusion: Service Performed in Patient Hospital Room (and thus did not require transfer): N/A    PACU Reintubation- (AQI31)  General anesthesia requiring endotracheal intubation (ETT) along with subsequent extubation in OR or PACU: No  Required reintubation in the PACU: N/A  Extubation was a planned trial documented in the medical record prior to removal of the original airway device: N/A    Unplanned admission to ICU related to anesthesia service up through end of PACU care- (MD51)  Unplanned admission to ICU (not initially anticipated at anesthesia start time): No

## 2019-10-15 NOTE — ANESTHESIA TIME REPORT
Anesthesia Start and Stop Event Times     Date Time Event    10/15/2019 0831 Ready for Procedure     0908 Anesthesia Start     0938 Anesthesia Stop        Responsible Staff  10/15/19    Name Role Begin End    Jake Anne M.D. Anesth 0908 0938        Preop Diagnosis (Free Text):  Pre-op Diagnosis     RECTAL MASS        Preop Diagnosis (Codes):    Post op Diagnosis  Rectal mass      Premium Reason  Non-Premium    Comments:

## 2019-10-15 NOTE — DISCHARGE INSTRUCTIONS
ACTIVITY: Rest and take it easy for the first 24 hours.  A responsible adult is recommended to remain with you during that time.  It is normal to feel sleepy.  We encourage you to not do anything that requires balance, judgment or coordination.    MILD FLU-LIKE SYMPTOMS ARE NORMAL. YOU MAY EXPERIENCE GENERALIZED MUSCLE ACHES, THROAT IRRITATION, HEADACHE AND/OR SOME NAUSEA.    FOR 24 HOURS DO NOT:  Drive, operate machinery or run household appliances.  Drink beer or alcoholic beverages.   Make important decisions or sign legal documents.    SPECIAL INSTRUCTIONS:    Diet and activity as tolerated    Colonoscopy, Adult    A colonoscopy is an exam to look at the entire large intestine. During the exam, a lubricated, bendable tube is inserted into the anus and then passed into the rectum, colon, and other parts of the large intestine.  A colonoscopy is often done as a part of normal colorectal screening or in response to certain symptoms, such as anemia, persistent diarrhea, abdominal pain, and blood in the stool. The exam can help screen for and diagnose medical problems, including:  · Tumors.  · Polyps.  · Inflammation.  · Areas of bleeding.  Tell a health care provider about:  · Any allergies you have.  · All medicines you are taking, including vitamins, herbs, eye drops, creams, and over-the-counter medicines.  · Any problems you or family members have had with anesthetic medicines.  · Any blood disorders you have.  · Any surgeries you have had.  · Any medical conditions you have.  · Any problems you have had passing stool.  What are the risks?  Generally, this is a safe procedure. However, problems may occur, including:  · Bleeding.  · A tear in the intestine.  · A reaction to medicines given during the exam.  · Infection (rare).  What happens before the procedure?  Eating and drinking restrictions   Follow instructions from your health care provider about eating and drinking, which may include:  · A few days  before the procedure - follow a low-fiber diet. Avoid nuts, seeds, dried fruit, raw fruits, and vegetables.  · 1-3 days before the procedure - follow a clear liquid diet. Drink only clear liquids, such as clear broth or bouillon, black coffee or tea, clear juice, clear soft drinks or sports drinks, gelatin desert, and popsicles. Avoid any liquids that contain red or purple dye.  · On the day of the procedure - do not eat or drink anything during the 2 hours before the procedure, or within the time period that your health care provider recommends.  Bowel prep   If you were prescribed an oral bowel prep to clean out your colon:  · Take it as told by your health care provider. Starting the day before your procedure, you will need to drink a large amount of medicated liquid. The liquid will cause you to have multiple loose stools until your stool is almost clear or light green.  · If your skin or anus gets irritated from diarrhea, you may use these to relieve the irritation:  ¨ Medicated wipes, such as adult wet wipes with aloe and vitamin E.  ¨ A skin soothing-product like petroleum jelly.  · If you vomit while drinking the bowel prep, take a break for up to 60 minutes and then begin the bowel prep again. If vomiting continues and you cannot take the bowel prep without vomiting, call your health care provider.  General instructions  · Ask your health care provider about changing or stopping your regular medicines. This is especially important if you are taking diabetes medicines or blood thinners.  · Plan to have someone take you home from the hospital or clinic.  What happens during the procedure?  · An IV tube may be inserted into one of your veins.  · You will be given medicine to help you relax (sedative).  · To reduce your risk of infection:  ¨ Your health care team will wash or sanitize their hands.  ¨ Your anal area will be washed with soap.  · You will be asked to lie on your side with your knees bent.  · Your  health care provider will lubricate a long, thin, flexible tube. The tube will have a camera and a light on the end.  · The tube will be inserted into your anus.  · The tube will be gently eased through your rectum and colon.  · Air will be delivered into your colon to keep it open. You may feel some pressure or cramping.  · The camera will be used to take images during the procedure.  · A small tissue sample may be removed from your body to be examined under a microscope (biopsy). If any potential problems are found, the tissue will be sent to a lab for testing.  · If small polyps are found, your health care provider may remove them and have them checked for cancer cells.  · The tube that was inserted into your anus will be slowly removed.  The procedure may vary among health care providers and hospitals.  What happens after the procedure?  · Your blood pressure, heart rate, breathing rate, and blood oxygen level will be monitored until the medicines you were given have worn off.  · Do not drive for 24 hours after the exam.  · You may have a small amount of blood in your stool.  · You may pass gas and have mild abdominal cramping or bloating due to the air that was used to inflate your colon during the exam.  · It is up to you to get the results of your procedure. Ask your health care provider, or the department performing the procedure, when your results will be ready.  This information is not intended to replace advice given to you by your health care provider. Make sure you discuss any questions you have with your health care provider.  Document Released: 12/15/2001 Document Revised: 07/07/2017 Document Reviewed: 02/28/2017  [employer] Patient Education © 2017 [employer]    DIET: To avoid nausea, slowly advance diet as tolerated, avoiding spicy or greasy foods for the first day.  Add more substantial food to your diet according to your physician's instructions. INCREASE FLUIDS AND FIBER TO AVOID  CONSTIPATION.    SURGICAL DRESSING/BATHING: Ok to shower in 24 hours.     FOLLOW-UP APPOINTMENT:  A follow-up appointment should be arranged with your doctor in 1-2 weeks; call to schedule.    You should CALL YOUR PHYSICIAN if you develop:  Fever greater than 101 degrees F.  Pain not relieved by medication, or persistent nausea or vomiting.  Excessive bleeding (blood soaking through dressing) or unexpected drainage from the wound.  Extreme redness or swelling around the incision site, drainage of pus or foul smelling drainage.  Inability to urinate or empty your bladder within 8 hours.  Problems with breathing or chest pain.    You should call 911 if you develop problems with breathing or chest pain.  If you are unable to contact your doctor or surgical center, you should go to the nearest emergency room or urgent care center.  Physician's telephone #: Dr. Huang ([phone])    If any questions arise, call your doctor.  If your doctor is not available, please feel free to call the Surgical Center at [phone].  The Center is open Monday through Friday from 7AM to 7PM.  You can also call the [employer] HOTLINE open 24 hours/day, 7 days/week and speak to a nurse at [phone], or toll free at [phone].    A registered nurse may call you a few days after your surgery to see how you are doing after your procedure.    MEDICATIONS: Resume taking daily medication.  Take prescribed pain medication with food.  If no medication is prescribed, you may take non-aspirin pain medication if needed.  PAIN MEDICATION CAN BE VERY CONSTIPATING.  Take a stool softener or laxative such as senokot, pericolace, or milk of magnesia if needed.    No oral pain medications given, you may take a dose of tylenol at anytime.     If your physician has prescribed pain medication that includes Acetaminophen (Tylenol), do not take additional Acetaminophen (Tylenol) while taking the prescribed medication.    Depression / Suicide  Risk    As you are discharged from this [facility], it is important to learn how to keep safe from harming yourself.    Recognize the warning signs:  · Abrupt changes in personality, positive or negative- including increase in energy   · Giving away possessions  · Change in eating patterns- significant weight changes-  positive or negative  · Change in sleeping patterns- unable to sleep or sleeping all the time   · Unwillingness or inability to communicate  · Depression  · Unusual sadness, discouragement and loneliness  · Talk of wanting to die  · Neglect of personal appearance   · Rebelliousness- reckless behavior  · Withdrawal from people/activities they love  · Confusion- inability to concentrate     If you or a loved one observes any of these behaviors or has concerns about self-harm, here's what you can do:  · Talk about it- your feelings and reasons for harming yourself  · Remove any means that you might use to hurt yourself (examples: pills, rope, extension cords, firearm)  · Get professional help from the community (Mental Health, Substance Abuse, psychological counseling)  · Do not be alone:Call your Safe Contact- someone whom you trust who will be there for you.  · Call your local CRISIS HOTLINE 494-3555 or 479-228-6138  · Call your local Children's Mobile Crisis Response Team [place] (631) 446-7935 or www.[employer]  · Call the toll free National Suicide Prevention Hotlines   · National Suicide Prevention Lifeline 442-970-RDCY (9974)  · National Hope Line Network 800-SUICIDE (291-5002)

## 2019-10-16 NOTE — OP REPORT
DATE OF SERVICE:  10/15/2019    PREOPERATIVE DIAGNOSIS:  Anal margin cancer.    POSTOPERATIVE DIAGNOSES:  Anal margin cancer and colon mass.    PROCEDURE:  Colonoscopy to the cecum with biopsy and tattoo placement.    SURGEON:  Josh Huang MD    ANESTHESIA:  General monitored anesthesia.    ANESTHESIOLOGIST:  Jake Anne MD    ESTIMATED BLOOD LOSS:  None.    SPECIMENS:  Biopsy of sigmoid colon mass.    COMPLICATIONS:  None.    CONDITION:  Stable.    INDICATIONS FOR PROCEDURE:  This is a 74-year-old male who presents with a   newly diagnosed anal margin cancer.  He needs a colonoscopy for complete   workup.  Risks, benefits, alternatives of the procedure were explained to him   and he completed a bowel prep before presenting.    PROCEDURE FINDINGS:  Inadequate bowel prep precluding complete evaluation of   the colon.  Unresectable mass noted in the sigmoid colon about 35-40 cm from   the anal verge.  Biopsies taken and tattoo placed.    PROCEDURE TECHNIQUE:  After informed consent was obtained, the patient was   taken to the OR, placed in the left lateral position and anesthesia was   achieved with propofol.  We inserted a lubricated colonoscope into the   patient's rectum.  This was carefully advanced under direct visualization   until we noted a mass in the sigmoid colon about 35-40 cm from the anal verge.    This was about 1/4 circumference of the lumen pedunculated firmly fixed the   abdominal wall and not resectable endoscopically.  The mass was biopsied and   then the area was marked with 3 spot injections of 5 mL total of tattoo ink.    We then continued advancing the scope all the way to what appeared to be the   right colon.  However, there was massive amount of liquid stool at this   location and we could not complete the colonoscopy.  We were able to visualize   most of the distal colon mucosal surface and did not note any other masses or   lesions.  The scope was withdrawn.  We were able to  thoroughly evaluate the   rectum and noted no masses or lesions distal to the mass.  Procedure was   concluded.  The patient returned to the PACU in stable condition.  He will   need a repeat colonoscopy in the next week or two after a complete bowel prep.       ____________________________________     MD CORINA Iyer / SANGITA    DD:  10/16/2019 07:47:30  DT:  10/16/2019 08:08:45    D#:  [id number]  Job#:  [id number]

## 2019-10-29 NOTE — PROGRESS NOTES
On 10/29/2019 at 1044 this ONN called  Annie in Radiation Therapy to verify rferral to rad onc had been received. Annie verified receipt and stated that she had called patient and left a voicemail for scheduling.

## 2019-11-05 PROBLEM — C21.0 ANAL CARCINOMA (HCC): Status: ACTIVE | Noted: 2019-01-01

## 2019-11-05 NOTE — NON-PROVIDER
"Patient was seen today in clinic with Dr. Chu for consult.  Vitals signs and weight were obtained and pain assessment was completed.  Allergies and medications were reviewed with the patient.  Review of systems completed.     Vitals/Pain:  Vitals:    11/05/19 1423   BP: 118/89   BP Location: Left arm   Patient Position: Sitting   BP Cuff Size: Adult   Pulse: 79   Temp: 37.2 °C (98.9 °F)   TempSrc: Temporal   SpO2: 90%   Weight: 61.1 kg (134 lb 10.2 oz)   Height: 1.727 m (5' 8\")   Pain Score: 5=Moderate Pain        Allergies:   Patient has no known allergies.    Current Medications:  Current Outpatient Medications   Medication Sig Dispense Refill   • glucosamine Sulfate 500 MG Cap Take 500 mg by mouth 3 times a day, with meals.     • Plant Sterols and Stanols (CHOLEST OFF PO) Take  by mouth.     • Ferrous Gluconate (IRON 27 PO) Take  by mouth.     • Omega-3 Fatty Acids (FISH OIL) 1000 MG Cap capsule Take 1,000 mg by mouth 3 times a day, with meals.     • Multiple Vitamins-Minerals (MULTIVITAMIN ADULT PO) Take 1 Tab by mouth every evening.     • Cholecalciferol (VITAMIN D PO) Take 1 Tab by mouth every evening.     • STIOLTO RESPIMAT 2.5-2.5 MCG/ACT Aero Soln INHALE 2 PUFFS BY MOUTH EVERY DAY. NEED APPT 1 Inhaler 1   • timolol (TIMOPTIC) 0.25 % Solution PLACE 1 DROP IN THE RIGHT EYE TWICE A DAY AS DIRECTED  3   • albuterol (VENTOLIN HFA) 108 (90 BASE) MCG/ACT Aero Soln inhalation aerosol Inhale 2 Puffs by mouth every 6 hours as needed for Shortness of Breath.     • metformin (GLUCOPHAGE) 500 MG Tab Take 500 mg by mouth 2 times a day, with meals.     • Calcium Carbonate-Vit D-Min (CALCIUM 1200 PO) Take 1 Tab by mouth every evening.     • B Complex Vitamins (B COMPLEX 50 PO) Take 1 Tab by mouth every evening.       No current facility-administered medications for this encounter.          PCP:  Krys Gaming, Isaiah Ass't  "

## 2019-11-06 NOTE — CONSULTS
RADIATION ONCOLOGY CONSULT    DATE OF SERVICE: 11/5/2019    IDENTIFICATION:   74 year old with Stage IIB [id number] anal squamous cell carcinoma     HISTORY OF PRESENT ILLNESS: I had the pleasure of seeing Mr. Bean today in consultation at the request of Dr. Huang for anal cancer.  Patient originally presented to his PCP Dr. Marcano who referred him to Dr. Huangfor consultation regarding bleeding painful rectal mass which underwent punch biopsy on October 7, 2019 which showed well-differentiated keratinizing squamous cell carcinoma.  Patient underwent colonoscopy on October 15, 2019 which showed sigmoid colon mass tubulovillous adenoma no evidence of dysplasia or malignancy.  He had staging CT chest and pelvis on October 18, 2019 which showed no evidence of distant metastatic disease.  Today he is here and complains of the rectal bleeding and pain as well as urinary incontinence although no rectal incontinence.    PAST MEDICAL HISTORY:   Past Medical History:   Diagnosis Date   • Bladder cancer (HCC)    • Breath shortness     copd, inhalers a few times a day   • Bronchitis    • Cancer (HCC)     H/O Bladder CA   • Cataract     right eye only, removed   • Chickenpox    • COPD (chronic obstructive pulmonary disease) (HCC)    • Dental disorder 10/11/2019    Upper Denture   • Diabetes (HCC)    • Slovenian measles    • Glaucoma 10/11/2019    right eye only   • Heart burn 10/11/2019   • Influenza    • Mumps    • Pneumonia    • Scarlet fever    • Urinary incontinence 10/11/2019    H/O Bladder CA       PAST SURGICAL HISTORY:  Past Surgical History:   Procedure Laterality Date   • COLONOSCOPY  10/15/2019    Procedure: COLONOSCOPY;  Surgeon: Josh Huang M.D.;  Location: SURGERY [facility];  Service: General   • OTHER  2014    Bladder Removed, neobladder created   • APPENDECTOMY     • EXCISION OF BLADDER MESH W/ REPAIR     • HERNIA REPAIR      2 right inguinal, 1 left inguinal   • OTHER      Bladder Scrapping 2012  x 2 & 2013   • PB REMV 2ND CATARACT,CORN-SCLER SECTN     • PROSTATECTOMY ROBOTIC     • PROSTATECTOMY, RADICAL RETRO         CURRENT MEDICATIONS:  Current Outpatient Medications   Medication Sig Dispense Refill   • glucosamine Sulfate 500 MG Cap Take 500 mg by mouth 3 times a day, with meals.     • Plant Sterols and Stanols (CHOLEST OFF PO) Take  by mouth.     • Ferrous Gluconate (IRON 27 PO) Take  by mouth.     • Omega-3 Fatty Acids (FISH OIL) 1000 MG Cap capsule Take 1,000 mg by mouth 3 times a day, with meals.     • Multiple Vitamins-Minerals (MULTIVITAMIN ADULT PO) Take 1 Tab by mouth every evening.     • Cholecalciferol (VITAMIN D PO) Take 1 Tab by mouth every evening.     • STIOLTO RESPIMAT 2.5-2.5 MCG/ACT Aero Soln INHALE 2 PUFFS BY MOUTH EVERY DAY. NEED APPT 1 Inhaler 1   • timolol (TIMOPTIC) 0.25 % Solution PLACE 1 DROP IN THE RIGHT EYE TWICE A DAY AS DIRECTED  3   • albuterol (VENTOLIN HFA) 108 (90 BASE) MCG/ACT Aero Soln inhalation aerosol Inhale 2 Puffs by mouth every 6 hours as needed for Shortness of Breath.     • metformin (GLUCOPHAGE) 500 MG Tab Take 500 mg by mouth 2 times a day, with meals.     • Calcium Carbonate-Vit D-Min (CALCIUM 1200 PO) Take 1 Tab by mouth every evening.     • B Complex Vitamins (B COMPLEX 50 PO) Take 1 Tab by mouth every evening.       No current facility-administered medications for this encounter.        ALLERGIES:    Patient has no known allergies.    FAMILY HISTORY:    Family History   Problem Relation Age of Onset   • Cancer Mother    • Cancer Paternal Grandmother    • Cancer Maternal Uncle        SOCIAL HISTORY:    Social History     Tobacco Use   • Smoking status: Current Every Day Smoker     Packs/day: 2.50     Years: 46.00     Pack years: 115.00     Types: Cigarettes   • Smokeless tobacco: Never Used   • Tobacco comment: since age 15 (quit for 11y)   Substance Use Topics   • Alcohol use: Not Currently     Binge frequency: Never     Comment: none for 8 years   • Drug  "use: No     Patient lives in [place] is single and retired and currently smokes 2 packs/day    REVIEW OF SYSTEMS:  A greater than 10 point review of systems was completed in patient's chart on 11/5/2019 and scanned in to [facility].    The rest of the review of systems is negative and has been reviewed by me.  All are negative with relationship to this diagnosis with the exception of: Positive for abdominal pain, constipation, hemorrhoids, incontinence, cough, shortness of breath, wheezing          PHYSICAL EXAM:    Vitals:    11/05/19 1423   BP: 118/89   BP Location: Left arm   Patient Position: Sitting   BP Cuff Size: Adult   Pulse: 79   Temp: 37.2 °C (98.9 °F)   TempSrc: Temporal   SpO2: 90%   Weight: 61.1 kg (134 lb 10.2 oz)   Height: 1.727 m (5' 8\")   Pain Score: 5=Moderate Pain    ECOG  1= Restricted in physically strenuous activity, but ambulatory and able to carry out work of a light sedentary nature, e.g., light housework, office work.    PAIN:  5  What makes the pain better: lidocaine  What makes the pain worse: bm  Pain controlled with current regimen: no  Pain related to condition being seen here for: yes    GENERAL: No apparent distress.  HEENT:  Pupils are equal, round, and reactive to light.  Extraocular muscles   are intact. Sclerae nonicteric.  Conjunctivae pink.  Oral cavity, tongue   protrudes midline.   NECK:  Supple without evidence of thyromegaly.  NODES:  No peripheral adenopathy of the neck, supraclavicular fossa bilaterally.  LUNGS:  Good effort   HEART:  Regular rate  ABDOMEN:  Soft. No evidence of hepatosplenomegaly.  EXTREMITIES:  Without Edema.  NEUROLOGIC:  Cranial nerves II through XII were intact. Normal stance and gait motor and sensory grossly within normal limits  Rectal: large exophytic mass >5cm    IMAGING:  Results for orders placed during the hospital encounter of 10/18/19   CT-CHEST,ABDOMEN,PELVIS WITH    Impression 1.  No rectal mass demonstrated.  2.  Large calcification within the " neobladder.  3.  Increased colonic stool.  4.  Cholelithiasis.  5.  No adenopathy in the abdomen or pelvis.  6.  No evidence for metastatic disease in the chest.  7.  Moderate emphysema.     LABS:  Lab Results   Component Value Date/Time    WBC 8.4 10/03/2019 01:34 PM    RBC 4.93 10/03/2019 01:34 PM    HEMOGLOBIN 16.2 10/03/2019 01:34 PM    HEMATOCRIT 49.9 10/03/2019 01:34 PM    .2 (H) 10/03/2019 01:34 PM    MCH 32.9 10/03/2019 01:34 PM    MCHC 32.5 (L) 10/03/2019 01:34 PM    MPV 12.4 10/03/2019 01:34 PM    NEUTSPOLYS 77.60 (H) 10/03/2019 01:34 PM    LYMPHOCYTES 14.30 (L) 10/03/2019 01:34 PM    MONOCYTES 6.90 10/03/2019 01:34 PM    EOSINOPHILS 0.60 10/03/2019 01:34 PM    BASOPHILS 0.40 10/03/2019 01:34 PM      Lab Results   Component Value Date/Time    SODIUM 142 10/03/2019 01:34 PM    POTASSIUM 4.8 10/03/2019 01:34 PM    CHLORIDE 107 10/03/2019 01:34 PM    CO2 29 10/03/2019 01:34 PM    GLUCOSE 117 (H) 10/03/2019 01:34 PM    BUN 19 10/03/2019 01:34 PM    CREATININE 1.01 10/03/2019 01:34 PM       PATHOLOGY:  10/7/19    FINAL DIAGNOSIS:    A. Rectum mass:         Well-differentiated keratinizing squamous cell carcinoma.         There for a few small irregular nests of atypical squamous cells          within the fibrous stroma, most consistent with stromal          invasion.         The squamous cell carcinoma extends to and involves the          peripheral margins of the biopsy.         See comment.    Comment: The patient's H&P stated there is a large fungating,  ulcerating mass in the posterior anal verge, 6 x 5 cm, fixed and  eroding into the underlying tissues with a malignant appearance. The  rectal mass biopsy demonstrates a well-differentiated keratinizing  squamous cell carcinoma with focal stromal invasion. The slide is  reviewed with Dr. Sandhu and Dr. Chris with agreement on the malignant  diagnosis. Deeper levels through the tissue block are obtained. Dr. Huang's assistant Valerie was notified of  the histologic findings on  10/9/2018.    IMPRESSION:   74 year old with Stage IIB [id number] anal squamous cell carcinoma     RECOMMENDATIONS:   I discussed the role of chemoradiation therapy in the treatment of anal cancer.  Patient needs a PET CT scan for treatment planning and also a referral to medical oncology which I placed today.  The goal of treatment would be prevent recurrence, surgical resection with loss of function and ultimately delay or prevent death from anal cancer.  Radiation therapy was discussed including high dose conformal external beam using IMRT techniques.  We will use IMRT in order to try and minimize radiotherapy doses to normal tissue and allow her to have less toxicity and lower chance of treatment interruptions due to toxicity.  We will use the IMRT techniques outlined in recent RTOG studies.      Risks and side effects related to the radiation therapy include those which are:  Likely   • Tanning, redness, or darkening of skin in treatment area; including substantial mansi-anal desquamation causing pain which can become severe enough to require a break in treatment.  • Rash, itching or peeling of skin   • Temporary hair loss in the treatment area   • Temporary fatigue   • Abdominal cramps   • Frequent bowel movements, sometime with urgency, or diarrhea  • Rectal cramps and irritation with pain on defecation   • Mild rectal bleeding that does not require treatment  • Bladder irritation with a stinging sensation   • Frequency or urgency of urination   • Small amounts of blood in the urine   • In females, vaginal stenosis causing inability to have sexual activity or long term stenosis.    Less Likely    • Urinary obstruction requiring the placement of a temporary urinary catheter and/or dilatation because of stricture (narrowing)  • Less ability to control urine (increased incontinence)  • Inability to achieve an erection (inability of the penis to become hard) in males  • Rectal bleeding  that requires medication or laser treatment to stop    Rare but serious   • Injury to the bladder, urethra, bowel, or other tissues in the pelvis or abdomen requiring additional procedure or surgery, such as a colostomy (bag for stool).  • Intestinal obstruction requiring surgery    We will set the patient up with CT simulation with contrast early next week and plan to start 7 to 10 days later.  We will plan on 6 weeks of treatment with concurrent chemotherapy.      CC: Dr. Huang, Dr. Marcano

## 2019-11-07 NOTE — PROGRESS NOTES
On 11/7/2019 at 1134 this ONN called patient. Introduced self and explained role of nurse navigator. Patient states he understands all his upcoming appointments and treatment plan. Patient states he is meeting with a doctor at [facility] today at 2:30pm. Patient denies concerns with transportation. Patient states he lives by himself by is very capable of caring for self and driving. This ONN discussion with patient side effects of treatment including but not limited to fatigue. Patient states Dr. Torrez discussed side effects of radiation with patient. Patient states his only concern is cost related. Patient states he thought he was enrolled in a billing plan but patient states he was informed he needed to pay $130 for his PET-CT. This ONN explained that if patient cannot afford the $130 to pay what patient could at the time and then request to be billed for the remainder. Patient made verbal understanding. Patient states he believes he has spoken with the financial resource advocate in the past. This ONN agreed to meet with patient at CT-SIM appointment. Patient denies other questions or concerns at this time.

## 2019-11-13 NOTE — PROGRESS NOTES
On 11/13/2019 this ONN went to Radiation Oncology to meet with patient. Patient had left already.

## 2019-11-15 NOTE — PROGRESS NOTES
"  PICC Line Instructions    How to Care for your PICC  • Do not take a bath, swim, or use hot tubs when you have a PICC. Cover PICC line with clear plastic wrap and tape to keep it dry while showering.  • Check the PICC insertion site daily for leakage, redness, swelling, or pain.  • Flush the PICC as directed by your health care provider. Let your health care provider know right away if the PICC is difficult to flush or does not flush. Do not use force to flush the PICC.  • Do not use a syringe that is less than 10 mL to flush the PICC.  • Avoid blood pressure checks on the arm with the PICC.  • Do not take the PICC out yourself. Only a trained clinical professional should remove the PICC.  • Make sure you or anyone who access your PICC Line washes their hands before using the line.  • Make sure the hub of the line is \"scrubbed\" prior to using the line.    Dressing Changes  • Change the PICC dressing as instructed by your health care provider.  • Change your PICC dressing if it becomes loose or wet.    When to seek medical attention  • PICC is accidentally pulled all the way out.  • There is any type of drainage, redness, or swelling where the PICC enters the skin.  • Noticeable increase in arm circumference due to swelling of arm.  • You cannot flush the PICC, it is difficult to flush, or the PICC leaks around the insertion site when it is flushed.  • You hear a \"flushing\" sound when the PICC is flushed.  • You notice a hole or tear in the PICC.  • You develop chills or a fever.    PICC LINE CUT AT 40CM    PICC LINE SECURED AT 1CM  "

## 2019-11-15 NOTE — PROGRESS NOTES
Vascular Access Team     Date of Insertion: 11/14/19  Arm Circumference: 26  Internal length: 40  External Length: 1  Vein Occupancy %: 10  Reason for PICC: CHEMOTHERAPY  Labs within procedure parameters.    Consents confirmed, vessel patency confirmed with ultrasound. Risks and benefits of procedure explained to patient and education regarding central line associated bloodstream infections provided. Questions answered.     PICC placed in RUE per licensed provider order with ultrasound guidance.  5 Fr, 2 lumen PICC placed in BASILIC vein after 1 attempt(s). 3 mL of 1% lidocaine injected intradermally, 21 gauge microintroducer needle and modified Seldinger technique used. 40 cm catheter inserted with good blood return. Secured at 1 cm marker. Each lumen flushed without resistance with 10 mL 0.9% normal saline. PICC line secured with Biopatch and Tegaderm.     PICC tip placement location is confirmed by nurse to be in the Superior Vena Cava (SVC) utilizing 3CG technology. PICC line is appropriate for use at this time. Patient tolerated procedure well, without complications.  Patient condition relayed to unit RN or ordering physician via this post procedure note in the EMR.        [employer] PICC ref # [id number], Lot # [id number]

## 2019-11-21 NOTE — PROGRESS NOTES
Late entry from encounter 11/20/2019    Nutrition Services:  New XRT  74 year old male with diagnosis of Stage IIB [id number] anal squamous cell carcinoma.    Past Medical History:   Diagnosis Date   • Bladder cancer (HCC)    • Bowel habit changes     constipation/diarrhea   • Breath shortness     copd, inhalers a few times a day   • Bronchitis    • Cancer (HCC)     H/O Bladder CA   • Cataract     right eye only, removed   • Chickenpox    • COPD (chronic obstructive pulmonary disease) (HCC)    • Dental disorder 10/11/2019    Upper Denture   • Diabetes (HCC)     oral   • Hebrew measles    • Glaucoma 10/11/2019    right eye only   • Heart burn 10/11/2019   • Influenza    • Mumps    • Pneumonia    • Scarlet fever    • Urinary incontinence 10/11/2019    H/O Bladder CA     I met with patient following XRT.  Today is his first day of radiation treatment.  Patient with history of bladder cancer now with neobladder.  Patient reports that for the last year his weight has been fairly stable 135-145 pounds.  Previously in 2013 when he was diagnosed with bladder cancer he reports that he weighed 199 pounds.  Patient is diabetic but reports that he does not follow a strictly diabetic diet because his A1C has been acceptable.  He does not regularly check his blood sugars.  He reports no change to appetite or bowel movements recently     We discussed nutrition related goals during treatment as well and RD role. We discussed the potential side effects of treatment and their impacts on nutrition. We discussed the benefit of small, frequent meals and snacks focusing on high calorie/protein items as well as the benefits of maintaining weight and lean muscle mass throughout treatment. I provided handouts and information regarding a general healthy diet with emphasis on a plant based diet limiting red and processed meats.  Patient receptive and agreeable to information provided.     Assessment:  • Pertinent Labs: A1c (10/3):  Patient has been scheduled for today.   6.4  • Pertinent Meds: Glucosamine, Cholest off, ferrous gluconate, omega-3 1,000 mg, daily multi-vitamin, cholecalciferol, albuterol, metformin, B complex, calcium carbonate vit D  • Weight: 134 pounds  • Height: 5'8''  • BMI: 20    Weight History from Chart:  4/27/207: 164 pounds  8/7/2018: 150 pounds  10/15/2019: 130 pounds     PLAN/RECOMMEND -   • Continue with current daily nutrition routine and contact RD should questions or concerns arise.     RD monitoring

## 2019-11-21 NOTE — ON TREATMENT VISIT
ON TREATMENT  NOTE  RADIATION ONCOLOGY DEPARTMENT    Patient name:  Corona Bean    Primary Physician:  Francis Marcano M.D. MRN: [id number]  [facility]: [id number]   Referring physician:  Josh Huang M.D. : 1945, 74 y.o.     ENCOUNTER DATE:  19    DIAGNOSIS:  Anal carcinoma (HCC)  Staging form: Anus, AJCC 8th Edition  - Clinical stage from 2019: Stage IIB (cT3, cN0, cM0) - Signed by Beau Torrez M.D. on 2019  Tumor location in anus: Anal      TREATMENT SUMMARY:  [facility]ria Treatment Information        Some values may be hidden. Unless noted otherwise, only the newest values recorded on each date are displayed.         [facility]ria Treatment Summary 19   Course First Treatment Date 2019   Course Last Treatment Date 2019   Anal Plan from Course Anal   Fraction 1 of 30   Elapsed Course Days 0 @    Prescribed Fraction Dose 180 cGy   Prescribed Total Dose 5,400 cGy   Anal Reference Point from Course Anal   Elapsed Course Days 0 @ [id number]   Session Dose 180 cGy   Total Dose 180 cGy   Anal CP Reference Point from Course Anal   Elapsed Course Days 0 @ [id number]   Session Dose 186 cGy   Total Dose 186 cGy             SUBJECTIVE:  Started RT today, issues with chemo with hopefully start tomorrow.      VITAL SIGNS:  KPS: 100, Fully active, able to carry on all pre-disease performed without restriction (ECOG equivalent 0)  Encounter Vitals  Temperature: 36.9 °C (98.4 °F)  Temp src: Temporal  Blood Pressure : 116/79  Pulse: 85  Pulse Oximetry: 92 %  Pain Score: No pain  Pain Assessment 2019   Pain Assessment - Chronic Pain   Pain Score 0 5   Pain Loc - Rectum   What increases pain? - nothing    What decreases pain? - nothing    Describe pain - Constant   Some recent data might be hidden          PHYSICAL EXAM:    Physical Exam  Constitutional:       Appearance: Normal appearance.   Neurological:      Mental Status: He is alert.          Toxicity  Assessment 11/21/2019   Toxicity Assessment Male Pelvis   Fatigue (lethargy, malaise, asthenia) None   Radiation Dermatitis None   Anorexia None   Colitis None   Constipation None   Dehydration None   Diarrhea w/o Colostomy None   Flatulence None   Nausea None   Proctitis None   Vomiting None   RT - Pain due to RT None   Tumor Pain (onset or exacerbation of tumor pain due to treatment) None   Dysuria (painful urination) None   Urinary Frequency Normal   Urinary Urgency None   Bladder Spasms Absent   Incontinence None   Urinary Retention Normal       CURRENT MEDICATIONS:    Current Outpatient Medications:   •  glucosamine Sulfate 500 MG Cap, Take 500 mg by mouth 3 times a day, with meals., Disp: , Rfl:   •  Plant Sterols and Stanols (CHOLEST OFF PO), Take  by mouth., Disp: , Rfl:   •  Ferrous Gluconate (IRON 27 PO), Take  by mouth., Disp: , Rfl:   •  Omega-3 Fatty Acids (FISH OIL) 1000 MG Cap capsule, Take 1,000 mg by mouth 3 times a day, with meals., Disp: , Rfl:   •  Multiple Vitamins-Minerals (MULTIVITAMIN ADULT PO), Take 1 Tab by mouth every evening., Disp: , Rfl:   •  Cholecalciferol (VITAMIN D PO), Take 1 Tab by mouth every evening., Disp: , Rfl:   •  STIOLTO RESPIMAT 2.5-2.5 MCG/ACT Aero Soln, INHALE 2 PUFFS BY MOUTH EVERY DAY. NEED APPT, Disp: 1 Inhaler, Rfl: 1  •  timolol (TIMOPTIC) 0.25 % Solution, PLACE 1 DROP IN THE RIGHT EYE TWICE A DAY AS DIRECTED, Disp: , Rfl: 3  •  albuterol (VENTOLIN HFA) 108 (90 BASE) MCG/ACT Aero Soln inhalation aerosol, Inhale 2 Puffs by mouth every 6 hours as needed for Shortness of Breath., Disp: , Rfl:   •  metformin (GLUCOPHAGE) 500 MG Tab, Take 500 mg by mouth 2 times a day, with meals., Disp: , Rfl:   •  Calcium Carbonate-Vit D-Min (CALCIUM 1200 PO), Take 1 Tab by mouth every evening., Disp: , Rfl:   •  B Complex Vitamins (B COMPLEX 50 PO), Take 1 Tab by mouth every evening., Disp: , Rfl:     LABORATORY DATA:   Lab Results   Component Value Date/Time    SODIUM 142 10/03/2019  01:34 PM    POTASSIUM 4.8 10/03/2019 01:34 PM    CHLORIDE 107 10/03/2019 01:34 PM    CO2 29 10/03/2019 01:34 PM    GLUCOSE 117 (H) 10/03/2019 01:34 PM    BUN 19 10/03/2019 01:34 PM    CREATININE 1.01 10/03/2019 01:34 PM         Lab Results   Component Value Date/Time    WBC 8.4 10/03/2019 01:34 PM    HEMOGLOBIN 16.2 10/03/2019 01:34 PM    HEMATOCRIT 49.9 10/03/2019 01:34 PM    .2 (H) 10/03/2019 01:34 PM    PLATELETCT 143 (L) 10/03/2019 01:34 PM        RADIOLOGY DATA:  [id number] Base To Mid-thigh    Result Date: 11/8/2019 11/8/2019 10:20 AM HISTORY/REASON FOR EXAM:  Squamous cell carcinoma of the anus TECHNIQUE/EXAM DESCRIPTION AND NUMBER OF VIEWS: PET body imaging. Initially, 16.04 mCi F-18 FDG was administered intravenously under standardized conditions. Approximately 45 minutes after FDG administration, the patient was placed in the supine position on the PET CT table. Blood glucose level was 116 mg/dL. Low dose spiral CT imaging was performed from the skull base to the mid thighs. PET imaging was then performed from the skull base to the mid thighs. CT images, PET images, and PET/CT fused images were reviewed on a PACS 3D workstation. The limited non-contrast CT data are used primarily for attenuation correction and anatomic correlation.  Evaluation of solid organs and bowel are especially limited utilizing this technique. COMPARISON: CT chest, abdomen, and pelvis FINDINGS: Head and neck: No abnormal focal FDG activity. Chest: No abnormal focal FDG activity. Abdomen and pelvis: There is focal activity in the distal anal sphincter with a maximum SUV of 11.5. Patchy bowel activity is likely physiologic. Musculoskeletal: No abnormal focal FDG activity. Mild symmetric activity in the scalene muscles is likely physiologic. Incidental findings on CT: Scattered arterial calcifications. Bilateral emphysematous change. Calculi layering the gallbladder. There are postoperative changes from prior bowel  resection. A large calcification within the bladder. The prostate gland has been removed. There are postoperative changes from prior lymph node dissection. Degenerative changes are in the spine.     1.  FDG avid anal soft tissue mass is consistent with known malignancy. 2.  No metastatic disease is identified.    Ec-echocardiogram Complete W/o Cont    Result Date: 2019  Transthoracic Echo Report Echocardiography Laboratory CONCLUSIONS No prior study is available for comparison. Left ventricular ejection fraction is visually estimated to be 70%. Unable to estimate pulmonary artery pressure due to an inadequate tricuspid regurgitant jet. TASHIA CARMONA Exam Date:         2019                    13:54 Exam Location:     Out Patient Priority:          Routine Ordering Physician:        MICHELA FAY Referring Physician: Sonographer:               Scar Tolentino RDCS, RVT Age:    74     Gender:    M MRN:    [id number] :    1945 BSA:    1.7    Ht (in):    67     Wt (lb):    133 Exam Type:     Complete Indications:     Pre-Chemo Therapy ICD Codes:       V49.89 CPT Codes:       21926 BP:          /          HR: Technical Quality:       Technically difficult study -                          adequate information is obtained MEASUREMENTS  (Male / Female) Normal Values 2D ECHO LV Diastolic Diameter PLAX        3.9 cm                4.2 - 5.9 / 3.9 - 5.3 cm LV Systolic Diameter PLAX         2.5 cm                2.1 - 4.0 cm IVS Diastolic Thickness           0.89 cm               LVPW Diastolic Thickness          1.1 cm                LVOT Diameter                     2.2 cm                Estimated LV Ejection Fraction    70 %                  LV Ejection Fraction MOD BP       74.9 %                >= 55  % LV Ejection Fraction MOD 4C       71.1 %                LV Ejection Fraction MOD 2C       75.1 %                IVC Diameter                      1.7 cm                 DOPPLER AV Peak Velocity                  1.4 m/s               AV Peak Gradient                  8 mmHg                AV Mean Gradient                  3.9 mmHg              LVOT Peak Velocity                1.2 m/s               AV Area Cont Eq vti               3.6 cm???               MV Velocity Time Integral         29.9 cm               Mitral E Point Velocity           0.78 m/s              Mitral E to A Ratio               0.74                  MV Pressure Half Time             74.4 ms               MV Area PHT                       3 cm???                 MV Deceleration Time              257 ms                PV Peak Velocity                  0.86 m/s              PV Peak Gradient                  3 mmHg                RVOT Peak Velocity                0.65 m/s              * Indicates values subject to auto-interpretation LV EF:  70    % FINDINGS Left Ventricle Normal left ventricular chamber size. Normal left ventricular wall thickness. Normal left ventricular systolic function. Left ventricular ejection fraction is visually estimated to be 70%. Normal regional wall motion. Indeterminate diastolic function. Right Ventricle Normal right ventricular size. Normal right ventricular systolic function. Right Atrium Enlarged right atrium. Normal inferior vena cava size and inspiratory collapse. Left Atrium Normal left atrial size. Left atrial volume index is 17  mL/sq m. Mitral Valve Mitral annular calcification. No mitral stenosis. No mitral regurgitation. Aortic Valve The aortic valve is not well visualized. No aortic stenosis. No aortic insufficiency. Tricuspid Valve Structurally normal tricuspid valve. No tricuspid stenosis. Trace tricuspid regurgitation. Unable to estimate pulmonary artery pressure due to an inadequate tricuspid regurgitant jet. Pulmonic Valve The pulmonic valve is not well visualized. No pulmonic stenosis. No pulmonic insufficiency. Pericardium Normal pericardium without effusion. Aorta  Ascending aorta diameter is 3.2  cm. Severo Rowland MD (Electronically Signed) Final Date:     14 November 2019                 16:13    Ir-picc Line Placement W/ Guidance > Age 5    Result Date: 11/14/2019  HISTORY/REASON FOR EXAM:   PICC placement. TECHNIQUE/EXAM DESCRIPTION AND NUMBER OF VIEWS:   PICC line insertion with ultrasound guidance.  The procedure was performed using maximal sterile barrier technique including sterile gown, mask, cap, and donning of sterile gloves following appropriate hand hygiene and/or sterile scrub. Patient skin site was prepped with 2% Chlorhexidine solution. FINDINGS:  PICC line insertion with Ultrasound Guidance was performed by qualified nursing staff without the assistance of a Radiologist. PICC positioning appropriateness confirmed by 3CG technology; chest xray only needed in the instance 3CG unable to confirm placement.              Ultrasound-guided PICC placement performed by qualified nursing staff as above.       IMPRESSION:  Anal carcinoma (HCC)  Staging form: Anus, AJCC 8th Edition  - Clinical stage from 11/5/2019: Stage IIB (cT3, cN0, cM0) - Signed by Beau Torrez M.D. on 11/5/2019  Tumor location in anus: Anal      PLAN:  No change in treatment plan    Disposition:  Treatment plan reviewed. Questions answered. Continue therapy outlined.     Beau Torrez M.D.    No orders of the defined types were placed in this encounter.

## 2019-11-22 NOTE — PROGRESS NOTES
Triage RN meet with pt to discuss POC including importance of Chemo Education appt tomorrow at [facility] and chemotherapy start on 11/25/19. Schedule given to pt for appts with infusion and radiation and pt verbalized understanding of importance of [facility] appt tomorrow. Pt had PICC placed on 11/14/19 and is due for PICC drsg today. PICC drsg changed per protocol and both lumens flushed with brisk blood return noted. Pre chemo labs drawn for tx on 11/25/19. Gauze with arm wrap applied. No VS or risk assessments evaluated during this visit. Pt is aware that he needs to  antiemetics from pharmacy. Pt left ambulatory in no distress at 1530

## 2019-11-25 NOTE — PROGRESS NOTES
"Pt arrived ambulatory to [facility] for Chemo treatment, Mitomycin and 5FU CADD pump. Pt with double lumen PICC line WENDY, brisk blood return noted both lumens, flushed easily with NS. POC discussed with pt and he agrees with plan. Pt's questions answered. Education given on meds and CADD pump. Pt verbalized understanding. Pt expressed some anxiety r/t treatment \"I'm scared\"  This RN provided pt with active listening and supportive care. Pt tolerated treatment without s/s adverse reaction. Pt discharged to self care, NAD. Pt also provided with [facility] phone number for any questions/concerns.   "

## 2019-11-25 NOTE — PROGRESS NOTES
Chemotherapy Verification - PRIMARY RN    D1C1    Height = 169cm  Weight = 61.3kg  BSA = 1.7m2       Medication: Mitomycin (Mutamycin)  Dose: 10mg/m2  Calculated Dose: 17mg                                Medication: Fluorouracil (Adrucil)  Dose: 4000mg/m2  Calculated Dose: 6800mg CADD Pump 96 hours                                 I confirm this process was performed independently with the BSA and all final chemotherapy dosing calculations congruent.  Any discrepancies of 10% or greater have been addressed with the chemotherapy pharmacist. The resolution of the discrepancy has been documented in the EPIC progress notes.

## 2019-11-25 NOTE — PROGRESS NOTES
"Pharmacy Chemotherapy Calculation    Patient Name: Corona Bean   Protocol: Fluorouracil/MitoMYcin with concurrent radiation     Fluorouracil 1000 mg/m2 IV continuous infusion over 24 hours daily on Days 1-4 and 29-32  Mitomycin 10 mg/m2 (maximum 20 mg) IV push on Days 1 and 29    NCCN Guidelines for Anal Carcinoma V.1.2019  Travis ARDON, et al. JULITA. 2008;299(16):1914-21  Will LEGER, et. Al. Lancet Oncol. 2013;14(6):516-24    Dx: Anal Carcinoma     Allergies:  Patient has no known allergies.       /61   Pulse 73   Temp 36.7 °C (98 °F) (Temporal)   Resp 18   Ht 1.695 m (5' 6.73\")   Wt 61.3 kg (135 lb 2.3 oz)   SpO2 96%   BMI 21.34 kg/m²  Body surface area is 1.7 meters squared.    Labs 11/21/19  ANC~ 6070 Plt = 129k   Hgb = 15.8     SCr = 0.87 mg/dL CrCl ~ 65.3mL/min   LFT's = WNL TBili = 0.6     Drug Order   (Drug name, dose, route, IV Fluid & volume, frequency, number of doses) Cycle: 1      Previous treatment: n/a     Medication = Fluorouracil   Base Dose= 4000 mg/m2  Calc Dose: Base Dose x 1.7 m2 = 6800 mg  Final Dose = 6800 mg  Route = via CADD pump  Concentration = 50 mg/mL  Fluid & Volume =  136 mL (+ 3 mL of overfill = 139 mL)  Admin Duration = Over 96 hours = 1.4 mL/min          < 10% difference okay to treat with final dose   Medication = Mitomycin   Base Dose= 10 mg/m2  Calc Dose: Base Dose x 1.7 m2 = 17 mg  Final Dose = 17 mg  Route = IV   Fluid & Volume =  mL  Admin Duration = Over 15-30 minutes           < 10% difference okay to treat with final dose      By my signature below, I confirm this process was performed independently with the BSA and all final chemotherapy dosing calculations congruent. I have reviewed the above chemotherapy order and that my calculation of the final dose and BSA (when applicable) corroborate those calculations of the  pharmacist. Discrepancies of 10% or greater in the written dose have been addressed and documented within the EPIC Progress " notes.    Collins Wolff, PharmD

## 2019-11-25 NOTE — PROGRESS NOTES
"Pharmacy Chemotherapy calculation:    DX: Anal cancer    Cycle 1    Day 1  Previous treatment = none    Regimen: Fluorouracil + mitomycin + XRT  Fluorouracil 1000mg/m2/day x 4 days  on days 1-4 and 29-32  Mitomycin 10mg/m2([id number]) IVP on days 1 and 29  NCCN guidelines for Anal Carcinoma V.1.2019  Travis WALKER, et al - GAYATHRI. 2008 Apr 23;299(16):1914-21. doi: [id number].  Will LEGER et al - Lancet Oncol. 2013 May;14(6):516-24. doi: 10.1016/-[id number]. Epub 2013 Apr 9.       /61   Pulse 73   Temp 36.7 °C (98 °F) (Temporal)   Resp 18   Ht 1.695 m (5' 6.73\")   Wt 61.3 kg (135 lb 2.3 oz)   SpO2 96%   BMI 21.34 kg/m²   Body surface area is 1.7 meters squared.     All lab results 11/21/19 within treatment parameters.       Fluorouracil 4000mg/m2(1000mg/m2/day) x 1.7m2 = 6800mg   <10% difference, ok to treat with final dose = 6800mg    CIVI over 96 hours at 1.4mL/hr via cadd pump(days 1-4)    Mitomycin 10mg/m2 x 1.7m2 = 17mg   <10% difference, ok to treat with final dose = 17mg IV      MARCE Gan Pharm.D.      "

## 2019-11-25 NOTE — PROGRESS NOTES
Chemotherapy Verification - SECONDARY RN       Height = 66.73 inches  Weight = 61.3 kg  BSA = 1.7 m2       Medication: Mitomycin (Mutamycin)  Dose: 10 mg/m2  Calculated Dose: 17 mg                             (In mg/m2, AUC, mg/kg)     Medication: Fluorouracil (Adrucil)  Dose: 4000 mg/m2  Calculated Dose: 6800 mg via CADD pump for CIVI over 96 hrs                             (In mg/m2, AUC, mg/kg)        I confirm that this process was performed independently.

## 2019-11-26 NOTE — ON TREATMENT VISIT
ON TREATMENT  NOTE  RADIATION ONCOLOGY DEPARTMENT    Patient name:  Corona Bean    Primary Physician:  Francis Marcano M.D. MRN: [id number]  [facility]: [id number]   Referring physician:  Josh Huang M.D. : 1945, 74 y.o.     ENCOUNTER DATE:  19    DIAGNOSIS:  Visit Diagnoses     ICD-10-CM   1. Malignant neoplasm of rectum (HCC) C20     Anal carcinoma (HCC)  Staging form: Anus, AJCC 8th Edition  - Clinical stage from 2019: Stage IIB (cT3, cN0, cM0) - Signed by Beau Torrez M.D. on 2019  Tumor location in anus: Anal      TREATMENT SUMMARY:  Aria Treatment Information        Some values may be hidden. Unless noted otherwise, only the newest values recorded on each date are displayed.         Aria Treatment Summary 19   Course First Treatment Date 2019   Course Last Treatment Date 2019   Anal Plan from Course Anal   Fraction 1 of 30 2 of 30 3 of 30 4 of 30 5 of 30 6 of 30   Elapsed Course Days 0 @ [id number] 1 @ [id number] 2 @ [id number] 4 @ [id number] 5 @ [id number] 6 @ [id number]   Prescribed Fraction Dose 180 cGy 180 cGy 180 cGy 180 cGy 180 cGy 180 cGy   Prescribed Total Dose 5,400 cGy 5,400 cGy 5,400 cGy 5,400 cGy 5,400 cGy 5,400 cGy   Anal Reference Point from Course Anal   Elapsed Course Days 0 @ [id number] 1 @ [id number] 2 @ [id number] 4 @ [id number] 5 @ [id number] 6 @ [id number]   Session Dose 180 cGy 180 cGy 180 cGy 180 cGy 180 cGy 180 cGy   Total Dose 180 cGy 360 cGy 540 cGy 720 cGy 900 cGy 1,080 cGy   Anal CP Reference Point from Course Anal   Elapsed Course Days 0 @ [id number] 1 @ [id number] 2 @ [id number] 4 @ [id number] 5 @ [id number] 6 @ [id number]   Session Dose 186 cGy 186 cGy 186 cGy 186 cGy 186 cGy 186 cGy   Total Dose 186 cGy 373 cGy 559 cGy 746 cGy 932  cGy 1,119 cGy             SUBJECTIVE:  Well appearing. Mild pain.      VITAL SIGNS:  KPS: 90, Able to carry on normal activity; minor signs or symptoms of disease (ECOG equivalent 0)  Encounter Vitals  Blood Pressure : (!) 97/57  Pulse: 90  Pulse Oximetry: 89 %  Weight: 62.3 kg (137 lb 4.8 oz)  Pain Score: 6=Moderate Pain  Pain Assessment 11/26/2019 11/20/2019   Pain Assessment Chronic Pain -   Pain Score 6 0   Pain Loc Rectum -   What increases pain? bowel movement  -   What decreases pain? ointment  -   Some recent data might be hidden          PHYSICAL EXAM:    Physical Exam  Vitals signs reviewed.   Constitutional:       Appearance: Normal appearance.   Abdominal:      General: There is no distension.      Palpations: Abdomen is soft.   Skin:     Findings: No erythema.   Neurological:      Mental Status: He is alert.      5fu pump in place    Toxicity Assessment 11/26/2019 11/21/2019   Toxicity Assessment Male Pelvis Male Pelvis   Fatigue (lethargy, malaise, asthenia) Increased fatigue over baseline, but not altering normal activities None   Radiation Dermatitis Faint erythema or dry desquamation None   Anorexia None None   Colitis None None   Constipation Requiring stool softener or dietary modification None   Dehydration None None   Diarrhea w/o Colostomy None None   Flatulence None None   Nausea None None   Proctitis None None   Vomiting None None   RT - Pain due to RT Mild pain not interfering with function None   Tumor Pain (onset or exacerbation of tumor pain due to treatment) Mild pain not interfering with function None   Dysuria (painful urination) None None   Urinary Frequency Normal Normal   Urinary Urgency None None   Bladder Spasms Absent Absent   Incontinence Spontaneous, some control None   Urinary Retention Normal Normal       CURRENT MEDICATIONS:    Current Outpatient Medications:   •  glucosamine Sulfate 500 MG Cap, Take 500 mg by mouth 3 times a day, with meals., Disp: , Rfl:   •  Plant Sterols  and Stanols (CHOLEST OFF PO), Take  by mouth., Disp: , Rfl:   •  Ferrous Gluconate (IRON 27 PO), Take  by mouth., Disp: , Rfl:   •  Omega-3 Fatty Acids (FISH OIL) 1000 MG Cap capsule, Take 1,000 mg by mouth 3 times a day, with meals., Disp: , Rfl:   •  Multiple Vitamins-Minerals (MULTIVITAMIN ADULT PO), Take 1 Tab by mouth every evening., Disp: , Rfl:   •  Cholecalciferol (VITAMIN D PO), Take 1 Tab by mouth every evening., Disp: , Rfl:   •  STIOLTO RESPIMAT 2.5-2.5 MCG/ACT Aero Soln, INHALE 2 PUFFS BY MOUTH EVERY DAY. NEED APPT, Disp: 1 Inhaler, Rfl: 1  •  timolol (TIMOPTIC) 0.25 % Solution, PLACE 1 DROP IN THE RIGHT EYE TWICE A DAY AS DIRECTED, Disp: , Rfl: 3  •  albuterol (VENTOLIN HFA) 108 (90 BASE) MCG/ACT Aero Soln inhalation aerosol, Inhale 2 Puffs by mouth every 6 hours as needed for Shortness of Breath., Disp: , Rfl:   •  metformin (GLUCOPHAGE) 500 MG Tab, Take 500 mg by mouth 2 times a day, with meals., Disp: , Rfl:   •  Calcium Carbonate-Vit D-Min (CALCIUM 1200 PO), Take 1 Tab by mouth every evening., Disp: , Rfl:   •  B Complex Vitamins (B COMPLEX 50 PO), Take 1 Tab by mouth every evening., Disp: , Rfl:     LABORATORY DATA:   Lab Results   Component Value Date/Time    SODIUM 138 11/21/2019 03:10 PM    POTASSIUM 4.4 11/21/2019 03:10 PM    CHLORIDE 108 11/21/2019 03:10 PM    CO2 24 11/21/2019 03:10 PM    GLUCOSE 132 (H) 11/21/2019 03:10 PM    BUN 21 11/21/2019 03:10 PM    CREATININE 0.87 11/21/2019 03:10 PM         Lab Results   Component Value Date/Time    WBC 8.0 11/21/2019 03:10 PM    HEMOGLOBIN 15.8 11/21/2019 03:10 PM    HEMATOCRIT 46.3 11/21/2019 03:10 PM    MCV 96.1 11/21/2019 03:10 PM    PLATELETCT 129 (L) 11/21/2019 03:10 PM        RADIOLOGY DATA:  Pe-ppkld-awehp Base To Mid-thigh    Result Date: 11/8/2019 11/8/2019 10:20 AM HISTORY/REASON FOR EXAM:  Squamous cell carcinoma of the anus TECHNIQUE/EXAM DESCRIPTION AND NUMBER OF VIEWS: PET body imaging. Initially, 16.04 mCi F-18 FDG was administered  intravenously under standardized conditions. Approximately 45 minutes after FDG administration, the patient was placed in the supine position on the PET CT table. Blood glucose level was 116 mg/dL. Low dose spiral CT imaging was performed from the skull base to the mid thighs. PET imaging was then performed from the skull base to the mid thighs. CT images, PET images, and PET/CT fused images were reviewed on a PACS 3D workstation. The limited non-contrast CT data are used primarily for attenuation correction and anatomic correlation.  Evaluation of solid organs and bowel are especially limited utilizing this technique. COMPARISON: CT chest, abdomen, and pelvis FINDINGS: Head and neck: No abnormal focal FDG activity. Chest: No abnormal focal FDG activity. Abdomen and pelvis: There is focal activity in the distal anal sphincter with a maximum SUV of 11.5. Patchy bowel activity is likely physiologic. Musculoskeletal: No abnormal focal FDG activity. Mild symmetric activity in the scalene muscles is likely physiologic. Incidental findings on CT: Scattered arterial calcifications. Bilateral emphysematous change. Calculi layering the gallbladder. There are postoperative changes from prior bowel resection. A large calcification within the bladder. The prostate gland has been removed. There are postoperative changes from prior lymph node dissection. Degenerative changes are in the spine.     1.  FDG avid anal soft tissue mass is consistent with known malignancy. 2.  No metastatic disease is identified.    Ec-echocardiogram Complete W/o Cont    Result Date: 11/14/2019  Transthoracic Echo Report Echocardiography Laboratory CONCLUSIONS No prior study is available for comparison. Left ventricular ejection fraction is visually estimated to be 70%. Unable to estimate pulmonary artery pressure due to an inadequate tricuspid regurgitant jet. TASHIA CARMONA Exam Date:         11/14/2019                    13:54 Exam Location:      Out Patient Priority:          Routine Ordering Physician:        MICHELA FAY Referring Physician: Sonographer:               Scar Tolentino RDCS, RVT Age:    74     Gender:    M MRN:    [id number] :    1945 BSA:    1.7    Ht (in):    67     Wt (lb):    133 Exam Type:     Complete Indications:     Pre-Chemo Therapy ICD Codes:       V49.89 CPT Codes:       99989 BP:          /          HR: Technical Quality:       Technically difficult study -                          adequate information is obtained MEASUREMENTS  (Male / Female) Normal Values 2D ECHO LV Diastolic Diameter PLAX        3.9 cm                4.2 - 5.9 / 3.9 - 5.3 cm LV Systolic Diameter PLAX         2.5 cm                2.1 - 4.0 cm IVS Diastolic Thickness           0.89 cm               LVPW Diastolic Thickness          1.1 cm                LVOT Diameter                     2.2 cm                Estimated LV Ejection Fraction    70 %                  LV Ejection Fraction MOD BP       74.9 %                >= 55  % LV Ejection Fraction MOD 4C       71.1 %                LV Ejection Fraction MOD 2C       75.1 %                IVC Diameter                      1.7 cm                DOPPLER AV Peak Velocity                  1.4 m/s               AV Peak Gradient                  8 mmHg                AV Mean Gradient                  3.9 mmHg              LVOT Peak Velocity                1.2 m/s               AV Area Cont Eq vti               3.6 cm???               MV Velocity Time Integral         29.9 cm               Mitral E Point Velocity           0.78 m/s              Mitral E to A Ratio               0.74                  MV Pressure Half Time             74.4 ms               MV Area PHT                       3 cm???                 MV Deceleration Time              257 ms                PV Peak Velocity                  0.86 m/s              PV Peak Gradient                  3 mmHg                 RVOT Peak Velocity                0.65 m/s              * Indicates values subject to auto-interpretation LV EF:  70    % FINDINGS Left Ventricle Normal left ventricular chamber size. Normal left ventricular wall thickness. Normal left ventricular systolic function. Left ventricular ejection fraction is visually estimated to be 70%. Normal regional wall motion. Indeterminate diastolic function. Right Ventricle Normal right ventricular size. Normal right ventricular systolic function. Right Atrium Enlarged right atrium. Normal inferior vena cava size and inspiratory collapse. Left Atrium Normal left atrial size. Left atrial volume index is 17  mL/sq m. Mitral Valve Mitral annular calcification. No mitral stenosis. No mitral regurgitation. Aortic Valve The aortic valve is not well visualized. No aortic stenosis. No aortic insufficiency. Tricuspid Valve Structurally normal tricuspid valve. No tricuspid stenosis. Trace tricuspid regurgitation. Unable to estimate pulmonary artery pressure due to an inadequate tricuspid regurgitant jet. Pulmonic Valve The pulmonic valve is not well visualized. No pulmonic stenosis. No pulmonic insufficiency. Pericardium Normal pericardium without effusion. Aorta Ascending aorta diameter is 3.2  cm. Severo Rowland MD (Electronically Signed) Final Date:     14 November 2019                 16:13    Ir-picc Line Placement W/ Guidance > Age 5    Result Date: 11/14/2019  HISTORY/REASON FOR EXAM:   PICC placement. TECHNIQUE/EXAM DESCRIPTION AND NUMBER OF VIEWS:   PICC line insertion with ultrasound guidance.  The procedure was performed using maximal sterile barrier technique including sterile gown, mask, cap, and donning of sterile gloves following appropriate hand hygiene and/or sterile scrub. Patient skin site was prepped with 2% Chlorhexidine solution. FINDINGS:  PICC line insertion with Ultrasound Guidance was performed by qualified nursing staff without the assistance of a  Radiologist. PICC positioning appropriateness confirmed by 3CG technology; chest xray only needed in the instance 3CG unable to confirm placement.              Ultrasound-guided PICC placement performed by qualified nursing staff as above.       IMPRESSION:  Anal carcinoma (HCC)  Staging form: Anus, AJCC 8th Edition  - Clinical stage from 11/5/2019: Stage IIB (cT3, cN0, cM0) - Signed by Beau Torrez M.D. on 11/5/2019  Tumor location in anus: Anal      PLAN:  No change in treatment plan    Disposition:  Treatment plan reviewed. Questions answered. Continue therapy outlined.     Beau Torrez M.D.    No orders of the defined types were placed in this encounter.

## 2019-11-27 NOTE — PROGRESS NOTES
"Nutrition Services: Brief Update  Weight: 137 lbs, weighed on 11/26  Weight History:  134 lbs on 11/20  130 lbs on 10/15  150 lbs on 8/7/18  164 lbs on 4/27/17    Weight Change: pt with potential 3 lb increase in weight.    RD able to visit pt briefly following Xrt. Pt states is not doing so well, mentions the radiation has \"taken it out of [him]\". States he has low energy and is a bit worried since this is only day 7 of treatment. Mentions appetite is still fine and has not had taste changes. States had one bout of nausea last night, had a peanut butter sandwich with whole wheat bread beforehand. Mentions does frequent late-night snacks. Is interested in trying Ensure or Boost. Aware that weight is stable at this time, though mentions used to be 15 lbs heavier before all this started. Denies additional questions and concerns.     Plan/Recommend:  • RD recommended discontinuing eating 2 hours before bedtime for improved digestion and glycemic control. Discussed if going to have a snack at night, recommended easy-to-digest foods, discussed examples.  • RD reviewed side effects of treatment  • Provided samples of Boost/ Ensure supplemental drinks. Discussed to drink slowly to prevent gastric discomfort. Provided coupons.   • Discussed weight maintenance during treatment is preferred. Discussed while pt would like to regain weight, this can be more difficult during treatment when undergoing a lot of stress. Emphasized retaining lean body mass, discussed can strategize ways to regain weight/ LBM once treatment nears completion.     Pt verbalizes understanding and is receptive. RD to continue to monitor throughout treatment.  Please contact [phone]    "

## 2019-11-30 NOTE — PROGRESS NOTES
Pt arrived to [facility], ambulatory, for PICC care/pump disconnect. Pt c/o fatigue. Pump shut off upon arrival, 0 mL left to be delivered. Pump returned to pharmacy. PICC line flushed per policy, positive blood return noted x2 lumens. PICC line dressing changed with sterile manner. Pt left IS with no s/sx of distress. Follow up appointment confirmed.

## 2019-12-02 NOTE — PROGRESS NOTES
"Nutrition Services: Brief Update  Weight: 134 lbs, weighed today in Infusion  Weight History:  137 lbs on 11/26  134 lbs on 11/20  130 lbs on 10/15  150 lbs on 8/7/18  164 lbs on 4/27/17     Weight Change: Pt with potential 3 lb (2.1%) loss within 1 week, which is significant, though appears pt weight has fluctuating within past 2 weeks.     RD able to visit pt following Xrt, mentions friend is in other waiting room to help drive him to appointments. States that went to see MD today. Relays that has been having issues with mouth soreness and bleeding from butt. States used [employer] mouthwash and it burned terribly. Mentions MD did not seem concerned about this. Pt relays that was told had low blood pressure and should get IV hydration, though pt states he refused this since it would be an expensive out-of-pocket cost. States has 6-8 glasses of sugar-free Leonidas Aid, water, or diet soda per day. Mentions some of them do contain caffeine, though he will stop drinking caffeinated beverages in the afternoon. Mentions had diarrhea this morning, though stools have been normal prior to this. Has not yet tried the Boost supplements, though plans to drink one tonight. Does mention eating has become \"less pleasurable\" d/t mouth sores and low energy, though states still eating.     Plan/Recommend:  • RD recommended trying Boost supplemental drink tonight. Discussed if continues to lose weight will want pt to have these on a daily basis.  • Encouraged to utilize mouth wash of water, salt, and baking soda. Pt believes he has this recipe on a packet at home. RD to follow-up.  • Encouraged pt to drink an additional cup of water for every caffeinated drink he consumes. Discussed importance of adequate hydration.     RD to continue to monitor throughout treatment.  Please contact [phone]      "

## 2019-12-05 NOTE — PROGRESS NOTES
"Pt ambulatory to [facility] for hydration and CBC check w/ poss Neupogen injection.  Pt has no s/s of infection.  Pt c/o not being able to eat due to his mouth hurting.  Upon further evaluation pt states, \"It's like my teeth hurt when I chew.  I also am having trouble swallowing.  I have to sip liquids instead of gulping them.\"  Pt reports he has tried Boost supplements provided by dietary and he does tolerate them.  Dietician called and request for more samples to pt.  Pt given copy of oral rinse recipe and educated to rinse 3-4 times a day.  Pt mouth assessed, no sores or ulceration noted, mucosa is pink and moist, no white coating.  Pt does wear dentures on top has his own teeth on the bottom, although dose not have the greatest dentition.  Dietician at chairside w/ Boost samples.      Miriam from Lab called with critical result of WBC of 0.5, platelets of 36K, and preliminary ANC of 250 at 1530. Critical lab result read back to Miriam. 1630  Pt's final ANC is 190  Mary RICHARDSON for Dr Hartmann notified of critical lab result at 1640.  Critical lab result read back by Mary RICHARDSON.    Per Mary if pt's platelets fall below 20K on his repeat CBCs tomorrow and Saturday, pt will need to be transfused. No new orders at this time.  Pt educated on Neutropenia and thrombocytopenia precautions, handouts given, pt states his understanding.  Facial mask provided to pt as well.  Neupogen injection administered to left back arm, band-aid placed.  Pt left on foot in NAD.  Confirmed pt's appt for tomorrow.  "

## 2019-12-05 NOTE — PROGRESS NOTES
Nutrition Services: Brief Update  Weight: 125 pounds/57 kg - in infusion  Weight Change: Weight down 9 pounds in the last three days however I questions accuracy of weights.     RN from infusion called today requesting boost supplements samples. I met with patient and provided boost glucose control samples.  He reports he is not really able to eat solid foods at this time.  He notes his mouth, throat, teeth and gums hurt.  RN reports she could not see any sores in his mouth. Advised patient to discuss this with MD and to utilize salt/baking soda rinse multiple times per day.  He reports he has started using this already.     I discussed with patient the importance of weight maintenance and eating frequently. Advised Boost BID and eating soft solids every 2-3 hours.  Discussed high calorie/protein and fat foods. Referenced handouts previously provided which patient recalled.     RD to continue to monitor throughout treatment.  Please contact [phone]

## 2019-12-06 NOTE — PROGRESS NOTES
Patient here for cbc/possible neupogen today. Blood pressure low. Feels fatigued and tired. Some dizziness. Denies other complaints. Message left for MD to call back to see if we can give patient fluids. Awaiting call back. PICC in place with brisk blood return. Labs collected and sent. Awaiting results.    No cyanosis, no pallor, no jaundice, no rash

## 2019-12-06 NOTE — ON TREATMENT VISIT
ON TREATMENT  NOTE  RADIATION ONCOLOGY DEPARTMENT    Patient name:  Corona Bean    Primary Physician:  Francis Marcano M.D. MRN: [id number]  [facility]: [id number]   Referring physician:  Beau Torrez M.D. : 1945, 74 y.o.     ENCOUNTER DATE:  19    DIAGNOSIS:  Anal carcinoma (HCC)  Staging form: Anus, AJCC 8th Edition  - Clinical stage from 2019: Stage IIB (cT3, cN0, cM0) - Signed by Beau Torrez M.D. on 2019  Tumor location in anus: Anal      TREATMENT SUMMARY:  Aria Treatment Information        Some values may be hidden. Unless noted otherwise, only the newest values recorded on each date are displayed.         Aria Treatment Summary 19   Course First Treatment Date 2019   Course Last Treatment Date 2019   Anal Plan from Course Anal   Fraction  30   Elapsed Course Days 16 @ [id number]   Prescribed Fraction Dose 180 cGy   Prescribed Total Dose 5,400 cGy   Anal Reference Point from Course Anal   Elapsed Course Days 16 @ [id number]   Session Dose 180 cGy   Total Dose 1,980 cGy   Anal CP Reference Point from Course Anal   Elapsed Course Days 16 @ [id number]   Session Dose 186 cGy   Total Dose 2,051 cGy             SUBJECTIVE:  Mucositis. Pain in anus.       VITAL SIGNS:  KPS: 90, Able to carry on normal activity; minor signs or symptoms of disease (ECOG equivalent 0)  Encounter Vitals  Temperature: 36.8 °C (98.2 °F)  Blood Pressure : 102/63  Pulse: 96  Pulse Oximetry: 96 %  Weight: 56.7 kg (125 lb)  Pain Score: 7=Moderate-Severe Pain  Pain Assessment 2019   Pain Assessment Acute Pain -   Pain Score 7 7   Pain Loc Rectum -   What increases pain? sitting/movement -   What decreases pain? tylenol (pt also given rx for lidocaine which he will  today) -   Some recent data might be hidden          PHYSICAL EXAM:    Physical Exam  HENT:      Head:            Toxicity Assessment 2019   Toxicity Assessment Male  Pelvis Male Pelvis Male Pelvis   Fatigue (lethargy, malaise, asthenia) Increased fatigue over baseline, but not altering normal activities Increased fatigue over baseline, but not altering normal activities None   Radiation Dermatitis Faint erythema or dry desquamation Faint erythema or dry desquamation None   Anorexia Loss of appetite None None   Colitis None None None   Constipation None Requiring stool softener or dietary modification None   Dehydration None None None   Diarrhea w/o Colostomy None None None   Flatulence None None None   Nausea None None None   Proctitis None None None   Vomiting None None None   RT - Pain due to RT Moderate pain, pain or analgesics interfering with function, but not interfering with activities of daily living Mild pain not interfering with function None   Tumor Pain (onset or exacerbation of tumor pain due to treatment) Moderate pain, pain or analgesics interfering with function, but not interfering with activities of daily living Mild pain not interfering with function None   Dysuria (painful urination) None None None   Urinary Frequency Normal Normal Normal   Urinary Urgency None None None   Bladder Spasms Absent Absent Absent   Incontinence None Spontaneous, some control None   Urinary Retention Normal Normal Normal       CURRENT MEDICATIONS:    Current Outpatient Medications:   •  glucosamine Sulfate 500 MG Cap, Take 500 mg by mouth 3 times a day, with meals., Disp: , Rfl:   •  Plant Sterols and Stanols (CHOLEST OFF PO), Take  by mouth., Disp: , Rfl:   •  Ferrous Gluconate (IRON 27 PO), Take  by mouth., Disp: , Rfl:   •  Omega-3 Fatty Acids (FISH OIL) 1000 MG Cap capsule, Take 1,000 mg by mouth 3 times a day, with meals., Disp: , Rfl:   •  Multiple Vitamins-Minerals (MULTIVITAMIN ADULT PO), Take 1 Tab by mouth every evening., Disp: , Rfl:   •  Cholecalciferol (VITAMIN D PO), Take 1 Tab by mouth every evening., Disp: , Rfl:   •  STIOLTO RESPIMAT 2.5-2.5 MCG/ACT Aero Soln,  INHALE 2 PUFFS BY MOUTH EVERY DAY. NEED APPT, Disp: 1 Inhaler, Rfl: 1  •  timolol (TIMOPTIC) 0.25 % Solution, PLACE 1 DROP IN THE RIGHT EYE TWICE A DAY AS DIRECTED, Disp: , Rfl: 3  •  albuterol (VENTOLIN HFA) 108 (90 BASE) MCG/ACT Aero Soln inhalation aerosol, Inhale 2 Puffs by mouth every 6 hours as needed for Shortness of Breath., Disp: , Rfl:   •  metformin (GLUCOPHAGE) 500 MG Tab, Take 500 mg by mouth 2 times a day, with meals., Disp: , Rfl:   •  Calcium Carbonate-Vit D-Min (CALCIUM 1200 PO), Take 1 Tab by mouth every evening., Disp: , Rfl:   •  B Complex Vitamins (B COMPLEX 50 PO), Take 1 Tab by mouth every evening., Disp: , Rfl:     LABORATORY DATA:   Lab Results   Component Value Date/Time    SODIUM 138 11/21/2019 03:10 PM    POTASSIUM 4.4 11/21/2019 03:10 PM    CHLORIDE 108 11/21/2019 03:10 PM    CO2 24 11/21/2019 03:10 PM    GLUCOSE 132 (H) 11/21/2019 03:10 PM    BUN 21 11/21/2019 03:10 PM    CREATININE 0.87 11/21/2019 03:10 PM         Lab Results   Component Value Date/Time    WBC 0.3 (LL) 12/06/2019 02:54 PM    HEMOGLOBIN 14.9 12/06/2019 02:54 PM    HEMATOCRIT 41.8 (L) 12/06/2019 02:54 PM    MCV 92.7 12/06/2019 02:54 PM    PLATELETCT 27 (LL) 12/06/2019 02:54 PM        RADIOLOGY DATA:  Fb-kwqrg-zmtne Base To Mid-thigh    Result Date: 11/8/2019 11/8/2019 10:20 AM HISTORY/REASON FOR EXAM:  Squamous cell carcinoma of the anus TECHNIQUE/EXAM DESCRIPTION AND NUMBER OF VIEWS: PET body imaging. Initially, 16.04 mCi F-18 FDG was administered intravenously under standardized conditions. Approximately 45 minutes after FDG administration, the patient was placed in the supine position on the PET CT table. Blood glucose level was 116 mg/dL. Low dose spiral CT imaging was performed from the skull base to the mid thighs. PET imaging was then performed from the skull base to the mid thighs. CT images, PET images, and PET/CT fused images were reviewed on a PACS 3D workstation. The limited non-contrast CT data are used  primarily for attenuation correction and anatomic correlation.  Evaluation of solid organs and bowel are especially limited utilizing this technique. COMPARISON: CT chest, abdomen, and pelvis FINDINGS: Head and neck: No abnormal focal FDG activity. Chest: No abnormal focal FDG activity. Abdomen and pelvis: There is focal activity in the distal anal sphincter with a maximum SUV of 11.5. Patchy bowel activity is likely physiologic. Musculoskeletal: No abnormal focal FDG activity. Mild symmetric activity in the scalene muscles is likely physiologic. Incidental findings on CT: Scattered arterial calcifications. Bilateral emphysematous change. Calculi layering the gallbladder. There are postoperative changes from prior bowel resection. A large calcification within the bladder. The prostate gland has been removed. There are postoperative changes from prior lymph node dissection. Degenerative changes are in the spine.     1.  FDG avid anal soft tissue mass is consistent with known malignancy. 2.  No metastatic disease is identified.    Ec-echocardiogram Complete W/o Cont    Result Date: 2019  Transthoracic Echo Report Echocardiography Laboratory CONCLUSIONS No prior study is available for comparison. Left ventricular ejection fraction is visually estimated to be 70%. Unable to estimate pulmonary artery pressure due to an inadequate tricuspid regurgitant jet. TASHIA CARMONA Exam Date:         2019                    13:54 Exam Location:     Out Patient Priority:          Routine Ordering Physician:        MICHELA FAY Referring Physician: Sonographer:               Scar Tolentino RDCS, RVT Age:    74     Gender:    M MRN:    [id number] :    1945 BSA:    1.7    Ht (in):    67     Wt (lb):    133 Exam Type:     Complete Indications:     Pre-Chemo Therapy ICD Codes:       V49.89 CPT Codes:       96028 BP:          /          HR: Technical Quality:       Technically  difficult study -                          adequate information is obtained MEASUREMENTS  (Male / Female) Normal Values 2D ECHO LV Diastolic Diameter PLAX        3.9 cm                4.2 - 5.9 / 3.9 - 5.3 cm LV Systolic Diameter PLAX         2.5 cm                2.1 - 4.0 cm IVS Diastolic Thickness           0.89 cm               LVPW Diastolic Thickness          1.1 cm                LVOT Diameter                     2.2 cm                Estimated LV Ejection Fraction    70 %                  LV Ejection Fraction MOD BP       74.9 %                >= 55  % LV Ejection Fraction MOD 4C       71.1 %                LV Ejection Fraction MOD 2C       75.1 %                IVC Diameter                      1.7 cm                DOPPLER AV Peak Velocity                  1.4 m/s               AV Peak Gradient                  8 mmHg                AV Mean Gradient                  3.9 mmHg              LVOT Peak Velocity                1.2 m/s               AV Area Cont Eq vti               3.6 cm???               MV Velocity Time Integral         29.9 cm               Mitral E Point Velocity           0.78 m/s              Mitral E to A Ratio               0.74                  MV Pressure Half Time             74.4 ms               MV Area PHT                       3 cm???                 MV Deceleration Time              257 ms                PV Peak Velocity                  0.86 m/s              PV Peak Gradient                  3 mmHg                RVOT Peak Velocity                0.65 m/s              * Indicates values subject to auto-interpretation LV EF:  70    % FINDINGS Left Ventricle Normal left ventricular chamber size. Normal left ventricular wall thickness. Normal left ventricular systolic function. Left ventricular ejection fraction is visually estimated to be 70%. Normal regional wall motion. Indeterminate diastolic function. Right Ventricle Normal right ventricular size. Normal right ventricular  systolic function. Right Atrium Enlarged right atrium. Normal inferior vena cava size and inspiratory collapse. Left Atrium Normal left atrial size. Left atrial volume index is 17  mL/sq m. Mitral Valve Mitral annular calcification. No mitral stenosis. No mitral regurgitation. Aortic Valve The aortic valve is not well visualized. No aortic stenosis. No aortic insufficiency. Tricuspid Valve Structurally normal tricuspid valve. No tricuspid stenosis. Trace tricuspid regurgitation. Unable to estimate pulmonary artery pressure due to an inadequate tricuspid regurgitant jet. Pulmonic Valve The pulmonic valve is not well visualized. No pulmonic stenosis. No pulmonic insufficiency. Pericardium Normal pericardium without effusion. Aorta Ascending aorta diameter is 3.2  cm. Severo Rowland MD (Electronically Signed) Final Date:     14 November 2019                 16:13    Ir-picc Line Placement W/ Guidance > Age 5    Result Date: 11/14/2019  HISTORY/REASON FOR EXAM:   PICC placement. TECHNIQUE/EXAM DESCRIPTION AND NUMBER OF VIEWS:   PICC line insertion with ultrasound guidance.  The procedure was performed using maximal sterile barrier technique including sterile gown, mask, cap, and donning of sterile gloves following appropriate hand hygiene and/or sterile scrub. Patient skin site was prepped with 2% Chlorhexidine solution. FINDINGS:  PICC line insertion with Ultrasound Guidance was performed by qualified nursing staff without the assistance of a Radiologist. PICC positioning appropriateness confirmed by 3CG technology; chest xray only needed in the instance 3CG unable to confirm placement.              Ultrasound-guided PICC placement performed by qualified nursing staff as above.       IMPRESSION:  Anal carcinoma (HCC)  Staging form: Anus, AJCC 8th Edition  - Clinical stage from 11/5/2019: Stage IIB (cT3, cN0, cM0) - Signed by Beau Torrez M.D. on 11/5/2019  Tumor location in anus: Anal      PLAN:  No change in  treatment plan    Disposition:  Treatment plan reviewed. Questions answered. Continue therapy outlined. Given rx for MMW for oral mucositis. Recommend silvadene and lidocaine for anus.     Beau Torrez M.D.    No orders of the defined types were placed in this encounter.

## 2019-12-07 PROBLEM — N17.9 ACUTE RENAL FAILURE (ARF) (HCC): Status: ACTIVE | Noted: 2019-01-01

## 2019-12-07 PROBLEM — E11.9 DIABETES MELLITUS TYPE 2 IN NONOBESE (HCC): Status: ACTIVE | Noted: 2019-01-01

## 2019-12-07 PROBLEM — E86.0 DEHYDRATION: Status: ACTIVE | Noted: 2019-01-01

## 2019-12-07 PROBLEM — K12.1 STOMATITIS: Status: ACTIVE | Noted: 2019-01-01

## 2019-12-07 NOTE — PROGRESS NOTES
Tolerated fluid bolus with no reactions. PICC flushed and secured. BP normal now. Encouraged patient to drink fluids tonight as his creatinine has jumped up. Agrees to do this. Re-check CMP tomorrow per orders since kidney function is elevated. Discharged home to self care in no distress at this time. Returns tomorrow.

## 2019-12-07 NOTE — PROGRESS NOTES
This RN called again. Spoke with Dr. Maribel RIVERA whom wants us to check CMP/electrolytes, and give patient a 1000ml bolus. Would like us to hydrate again tomorrow. If creatinine greater than 2 patient to go to ER to be admitted. Bolus infusing at this time. Labs running. Continue to monitor.

## 2019-12-08 PROBLEM — I95.9 HYPOTENSION: Status: ACTIVE | Noted: 2019-01-01

## 2019-12-08 PROBLEM — R31.9 HEMATURIA: Status: ACTIVE | Noted: 2019-01-01

## 2019-12-08 NOTE — ED NOTES
"Pt states 10 lb weight loss in last 3 weeks. Pt states unable to eat or drink. Open wound noted to rectum, foul smelling, no drainage noted to depends. Pt unsure if able to give urine sample. \"will try\". Pt had reconstructive bladder surgery and wears depends.  "

## 2019-12-08 NOTE — ASSESSMENT & PLAN NOTE
Chemotherapy given - 5-FU and Mitomycin C - completed  Remove central line  Maceration of perineum/rectum - secondary to radiation - painful requiring IV dilaudid for mansi-care.  Transition to comfort care measures only since 1/7   Plan to go on hospice to a group [facility] hospice

## 2019-12-08 NOTE — PROGRESS NOTES
· 2 RN skin check complete with Chris, RN.  · Devices in place N/A  · Skin assessed under devices N/A  · Confirmed pressure ulcers found on area around the anus  · New potential pressure ulcers noted on area around the anus. Wound consult placed and wound reported.  · The following interventions in place Q 2 hour turns in place, waffle overlay, pt educated about nutrition.

## 2019-12-08 NOTE — PROGRESS NOTES
[facility] Medicine Daily Progress Note    Date of Service  12/8/2019    Chief Complaint  74 y.o. male admitted 12/7/2019 with oral pain and weight loss.    Hospital Course    Patient with known anal cancer undergoing radiation and chemotherapy  He has had significant oral pain and unable to swallow well the past few weeks.  He has a sore on his bottom that he has been applying hemorrhoidal cream with lidocaine for pain.  He had hematuria develop since admission and platelet count was significantly low.      Interval Problem Update  Patient feeling tired and blood pressure has been markedly low, he has received 2 Liters of NS bolus and LR has been running at 100/hour since admit.  He still appears dehydrated but is starting to require oxygen to maintain saturation, lungs still sound clear on examination.  He received 1 unit of platelets and urine is clearing in collection tubing from neobladder.  Cr increased overnight with hydration from 1.52 to 1.82 and this could be due to early hydration only and expect it to trend back down.  If pressure does not respond to midodrine and IVF then patient will need to transfer to ICU for pressor support.  Midodrine and solucortef started because of hypotension.      Consultants/Specialty  none    Code Status  full    Disposition  tbd    Review of Systems  Review of Systems   Constitutional: Negative for chills and fever.   HENT: Negative for congestion and sore throat.    Eyes: Negative for blurred vision and photophobia.   Respiratory: Negative for cough and shortness of breath.    Cardiovascular: Negative for chest pain, claudication and leg swelling.   Gastrointestinal: Negative for abdominal pain, constipation, diarrhea, heartburn, nausea and vomiting.   Genitourinary: Positive for hematuria. Negative for dysuria and flank pain.        Rectal pain     Musculoskeletal: Negative for joint pain and myalgias.   Skin: Negative for itching and rash.   Neurological: Negative for  dizziness, sensory change, speech change, weakness and headaches.   Psychiatric/Behavioral: Negative for depression. The patient is not nervous/anxious and does not have insomnia.         Physical Exam  Temp:  [36.4 °C (97.6 °F)-37.3 °C (99.2 °F)] 36.7 °C (98 °F)  Pulse:  [74-96] 74  Resp:  [16-22] 16  BP: ()/(42-85) 76/44  SpO2:  [87 %-97 %] 96 %    Physical Exam  Vitals signs and nursing note reviewed.   Constitutional:       General: He is not in acute distress.     Appearance: Normal appearance.   HENT:      Head: Normocephalic and atraumatic.   Eyes:      General: No scleral icterus.     Extraocular Movements: Extraocular movements intact.   Neck:      Musculoskeletal: Normal range of motion and neck supple.   Cardiovascular:      Rate and Rhythm: Normal rate and regular rhythm.      Pulses: Normal pulses.      Heart sounds: Normal heart sounds. No murmur.   Pulmonary:      Effort: Pulmonary effort is normal. No respiratory distress.      Breath sounds: Normal breath sounds. No wheezing, rhonchi or rales.   Abdominal:      General: Abdomen is flat. Bowel sounds are normal. There is no distension.      Palpations: Abdomen is soft.      Tenderness: There is no rebound.   Musculoskeletal:         General: No swelling or tenderness.   Lymphadenopathy:      Cervical: No cervical adenopathy.   Skin:     Coloration: Skin is not jaundiced.      Findings: No erythema.      Comments: Rectal sore without evidence of infection, consistent with radiation changes.     Neurological:      General: No focal deficit present.      Mental Status: He is alert and oriented to person, place, and time. Mental status is at baseline.      Cranial Nerves: No cranial nerve deficit.   Psychiatric:         Mood and Affect: Mood normal.         Behavior: Behavior normal.         Fluids    Intake/Output Summary (Last 24 hours) at 12/8/2019 1316  Last data filed at 12/8/2019 0848  Gross per 24 hour   Intake 1000 ml   Output 500 ml   Net  500 ml       Laboratory  Recent Labs     12/06/19  1454 12/07/19  1521 12/08/19  0315   WBC 0.3* 0.5* 0.5*   RBC 4.51* 4.06* 3.25*   HEMOGLOBIN 14.9 13.3* 10.7*   HEMATOCRIT 41.8* 37.3* 29.6*   MCV 92.7 91.9 91.1   MCH 33.0 32.8 32.9   MCHC 35.6* 35.7* 36.1*   RDW 42.7 41.1 40.7   PLATELETCT 27* 18* 10*   MPV 12.6 12.5 13.5*     Recent Labs     12/06/19  1603 12/07/19  1521   SODIUM 130* 129*   POTASSIUM 5.2 4.8   CHLORIDE 100 101   CO2 22 20   GLUCOSE 166* 129*   BUN 46* 52*   CREATININE 1.52* 1.82*   CALCIUM 8.8 8.2*             Recent Labs     12/08/19  0315   TRIGLYCERIDE 115   HDL 15*   LDL 53       Imaging  US-RENAL   Final Result      1.  There is moderate bilateral hydronephrosis, more than is typically expected for history of cystectomy with neobladder formation.      DX-CHEST-PORTABLE (1 VIEW)   Final Result      No radiographic evidence of acute cardiopulmonary process.           Assessment/Plan  * Acute renal failure (ARF) ([facility])  Assessment & Plan  Cr worse after hydration, continue with fluid resuscitation  The patient this point will need electrolyte monitoring and correction.  Patient will be given fluid resuscitation we will monitor intake and output  Renal ultrasound shows moderate bilateral hydronephrosis, more than expected following neobladder     Dehydration  Assessment & Plan  Hypotensive despite 2 Liter bolus  Fluid resuscitation LR at 100/hour      Diabetes mellitus type 2 in nonobese (HCC)  Assessment & Plan  -accus with sliding scale coverage to begin.  -diabetic diet  -diabetic education  -follow glycohemoglobin levels long term, most recent hemoglobin A1c 6.4  -Hold metformin with elevated renal functions.  I discussed this with the patient  -monitor for hypoglycemic episodes and adjust control if he should get low    Anal carcinoma (HCC)- (present on admission)  Assessment & Plan  Patient continues at this point on treatment as an outpatient.    COPD (chronic obstructive pulmonary  disease) (HCC)- (present on admission)  Assessment & Plan  RT protocol, nebulizer treatments, oxygen as needed currently not in acute exacerbation.    Hematuria  Assessment & Plan  Secondary to significant thrombocytopenia at 10  Transfuse platelets  Bleeding in collection tubing from neobladder is less bloody since transfusion  Recheck CBC        Hypotension  Assessment & Plan  Patient does not appear septic, lactic acid 1.7  2L normal saline bolus with no significant change in his blood pressure  Patient is not symptomatic   Initiate midodrine and if this is not sufficient, will transfer to ICU for pressor support      Stomatitis  Assessment & Plan  Patient appears to have thrush.  The patient does have white mucousy strands throughout his mouth.   nystatin swish and swallow   MBX so that he can tolerate eating.    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    Current smoker- (present on admission)  Assessment & Plan  -nicotine replacement protocol and cessation education provided         VTE prophylaxis: SCDs, AC c/i due to bleeding and thrombocytopenia.

## 2019-12-08 NOTE — PROGRESS NOTES
Patient arrived ambulatory to the [facility] for labs/possible Neupogen. Reviewed vital signs, labs, and physician order. Patient reports mouth sores on tongue, and back of throat, with difficulty swallowing. Pt reports excoriation to rectal area/radiation site, reports eating a half cup of applesauce today. Pt arrives with DL PICC to [facility], visualized brisk blood return, labs collected per MD order. WBC 0.5, , Platelets 18,000, Creatinine 1.82, BUN 52. Results called to Dr López (MD on call for Dr Hartmann), advised pt to seek further medical attention in the ED. Neupogen administered per MD order to back of LUE, band aid dressing placed. Call placed to ED charge RN by Del GRACIA, telephone report given. Pt transported to the ED via wheelchair with medical staff, and friend without incident.

## 2019-12-08 NOTE — ED TRIAGE NOTES
Pt here at infusion center to receive Neupogen injection, sent to ER for low WBC count. Pt with hx of bladder ca, dx'd with rectal ca 4 weeks ago and has had 2 weeks of radiation and 1 dose of chemo. Pt states taking Tylenol for pain, not effective. Pt c/o 7/10 rectal pain and pt states oral pain - having difficulty chewing and swallowing.

## 2019-12-08 NOTE — ASSESSMENT & PLAN NOTE
Improving however after discontinuing IV fluids the patient developed hypotension  Bolused and continuous IV fluids  Poor PO intake, start marinol and protein supplements

## 2019-12-08 NOTE — ED NOTES
Rounded on Pt. Pt resting in bed awaiting transfer to floor. Discussed POC with Pt. Pt verbalizes understanding. Pt denies any needs at this time. Pt's call light is within reach and bed is in low locked position.

## 2019-12-08 NOTE — CARE PLAN
Problem: Safety  Goal: Will remain free from falls  Outcome: PROGRESSING AS EXPECTED  Intervention: Implement fall precautions  Flowsheets  Taken 12/7/2019 2040  Environmental Precautions: Treaded Slipper Socks on Patient;Personal Belongings, Wastebasket, Call Bell etc. in Easy Reach;Transferred to Stronger Side;Report Given to Other Health Care Providers Regarding Fall Risk;Bed in Low Position;Communication Sign for Patients & Families;Mobility Assessed & Appropriate Sign Placed  Taken 12/8/2019 0203  Chair/Bed Strip Alarm: Yes - Alarm On     Problem: Pain Management  Goal: Pain level will decrease to patient's comfort goal  Outcome: PROGRESSING AS EXPECTED

## 2019-12-08 NOTE — ED PROVIDER NOTES
ED Provider Note    Scribed for Katlyn Jauregui M.D. by Nhung Asher. 12/7/2019  5:06 PM    Primary care provider: Francis Marcano M.D.  Means of arrival: EMS  History obtained from: Patient  History limited by: None    CHIEF COMPLAINT  Chief Complaint   Patient presents with   • Abnormal Labs       HPI  Corona Bean is a 74 y.o. male, with a history of COPD, who presents to the Emergency Department via EMS from [facility] for evaluation of low WBC, hypotension, and abnormal labs. Patient describes that his systolic blood pressure is usually at 120, however it has been low today. Patient was given a Neupogen injection at the infusion center today, and reports he has been having sores in his mouth and difficulty chewing and swallowing because of this. Patient has been taking Ibuprofen for chronic pain with no improvement of symptoms. He reports that he has been having a cough, but this is most likely from his history of COPD. He denies any associated fever, nausea, diarrhea, or abdominal pain. Patient is followed by Dr. Jerry, oncology.     REVIEW OF SYSTEMS  HEENT:  Sores in mouth, difficulty swallowing and chewing secondary to pain.  PULMONARY: cough (chronic)  GI: no nausea, vomiting, diarrhea, or abdominal pain.  Endocrine: no fevers    See history of present illness. All other systems are negative. C.    PAST MEDICAL HISTORY   has a past medical history of Bladder cancer ([facility]), Bowel habit changes, Breath shortness, Bronchitis, Cancer ([facility]), Cataract, Chickenpox, COPD (chronic obstructive pulmonary disease) ([facility]), Dental disorder (10/11/2019), Diabetes ([facility]), [place] measles, Glaucoma (10/11/2019), Heart burn (10/11/2019), Influenza, Mumps, Pneumonia, Scarlet fever, and Urinary incontinence (10/11/2019).    SURGICAL HISTORY   has a past surgical history that includes remv 2nd cataract,corn-scler sectn; prostatectomy robotic; appendectomy; hernia repair; colonoscopy (10/15/2019);  "prostatectomy, radical retro; excision of bladder mesh w/ repair; other; and other (2014).    SOCIAL HISTORY  Social History     Tobacco Use   • Smoking status: Current Every Day Smoker     Packs/day: 3.00     Years: 46.00     Pack years: 138.00     Types: Cigarettes   • Smokeless tobacco: Never Used   • Tobacco comment: since age 15 (quit for 11y)   Substance Use Topics   • Alcohol use: Not Currently     Binge frequency: Never     Comment: none for 8 years   • Drug use: No      Social History     Substance and Sexual Activity   Drug Use No       FAMILY HISTORY  Family History   Problem Relation Age of Onset   • Cancer Mother    • Cancer Paternal Grandmother    • Cancer Maternal Uncle        CURRENT MEDICATIONS  None noted    ALLERGIES  No Known Allergies    PHYSICAL EXAM  VITAL SIGNS: BP (!) 88/52   Pulse 89   Temp 36.8 °C (98.2 °F) (Temporal)   Resp (!) 22   Ht 1.727 m (5' 8\")   Wt 56.7 kg (125 lb)   BMI 19.01 kg/m²     Constitutional: Well developed, Well nourished, No acute distress, Non-toxic appearance.   HEENT: Sores in mouth from chemo therapy. Normocephalic, Atraumatic,  external ears normal, pharynx pink,  Mucous  Membranes moist, No rhinorrhea or mucosal edema  Eyes: PERRL, EOMI, Conjunctiva normal, No discharge.   Neck: Normal range of motion, No tenderness, Supple, No stridor.   Lymphatic: No lymphadenopathy    Cardiovascular: Regular Rate and Rhythm, No murmurs,  rubs, or gallops.   Thorax & Lungs: Lungs clear to auscultation bilaterally, No respiratory distress, No wheezes, rhales or rhonchi, No chest wall tenderness.   Abdomen: Bowel sounds normal, Soft, non tender, non distended,  No pulsatile masses., no rebound guarding or peritoneal signs.   Skin: Warm, Dry, No erythema, No rash,   Back:  No CVA tenderness,  No spinal tenderness, bony crepitance, step offs, or instability.   Neurologic: Alert & oriented x 3, Normal motor function, Normal sensory function, No focal deficits noted. Normal " reflexes. Normal Cranial Nerves.  Extremities: Equal, intact distal pulses, No cyanosis, clubbing or edema,  No tenderness.   Musculoskeletal: Good range of motion in all major joints. No tenderness to palpation or major deformities noted.     DIAGNOSTIC STUDIES / PROCEDURES    LABS  Results for orders placed or performed during the hospital encounter of 12/07/19   Lactic acid (lactate)   Result Value Ref Range    Lactic Acid 1.7 0.5 - 2.0 mmol/L   Westergren Sed Rate   Result Value Ref Range    Sed Rate Westergren 31 (H) 0 - 20 mm/hour   CRP Quantitive (Non-Cardiac)   Result Value Ref Range    Stat C-Reactive Protein 14.19 (H) 0.00 - 0.75 mg/dL   Ionized calcium - free   Result Value Ref Range    Ionized Calcium 1.1 1.1 - 1.3 mmol/L   Phosphorus   Result Value Ref Range    Phosphorus 5.0 (H) 2.5 - 4.5 mg/dL   Uric Acid   Result Value Ref Range    Uric Acid 5.9 2.5 - 8.3 mg/dL   Magnesium   Result Value Ref Range    Magnesium 2.0 1.5 - 2.5 mg/dL       All labs reviewed by me.    RADIOLOGY  US-RENAL   Final Result      1.  There is moderate bilateral hydronephrosis, more than is typically expected for history of cystectomy with neobladder formation.      DX-CHEST-PORTABLE (1 VIEW)   Final Result      No radiographic evidence of acute cardiopulmonary process.        The radiologist's interpretation of all radiological studies have been reviewed by me.    COURSE & MEDICAL DECISION MAKING  Nursing notes, VS, PMSFHx reviewed in chart.    Obtained and reviewed past medical record of patient which showed he was treated at [facility] for anal cancer today and received radiation. His WBC count is low, and his labs were abnormal. Patient is also complaining of sores in mouth.    5:06 PM Patient seen and examined at bedside. I informed the patient that I will consult with Dr. Jerry and hospitalist to see if patient will be treated overnight with antibiotics. He will be treated with magic mouthwash. He understands and  agrees with admission. Patient will be treated with NS infusion 1000 ml, MBX Oral Solution. Intravenous fluids administered for dehydration. Ordered Blood culture x2, Urine culture, UA to evaluate his symptoms. The differential diagnoses include but are not limited to:dehydration, infection, malnutrition    5:11 PM Paged Oncology.    5:17 PM I discussed the patient's case and the above findings with Dr. López (oncology) who recommended admission and hydration.     5:19 PM I discussed the patient's case and the above findings with Dr. Santillan (hospitalist) who will evaluate the patient for hospitalization.     HYDRATION: Based on the patient's presentation of Dehydration, Hypotension and Other decreased PO intake the patient was given IV fluids. IV Hydration was used because oral hydration was not adequate alone. Upon recheck following hydration, the patient was midly improved.     Critical Care  Due to the real possibility of a deterioration of this patient's condition required the highest level of my preparedness for sudden emergent intervention. I provided critical care services which included medication orders, frequent reevaluations of the patient's condition and response to treatment, ordering and reviewing test results and discussing the case with various consultants. The critical care time associated with the care of the patient was 35 minutes. Review chart for interventions. This time is exclusive of any other billable procedures.    DISPOSITION:  Patient will be hospitalized by Dr. Pérez in guarded condition.    FINAL IMPRESSION  1. Chemotherapy-induced neutropenia (HCC)    2. Anal cancer (HCC)    3. Hypotension, unspecified hypotension type    4. Renal insufficiency    5. Thrombocytopenia (HCC)          Nhung VAZQUEZ (David), am scribing for, and in the presence of, Katlyn Jauregui M.D..    Electronically signed by: Nhung Mendoza), 12/7/2019    Katlyn VAZQUEZ M.D. personally performed the  services described in this documentation, as scribed by Nhung Asher in my presence, and it is both accurate and complete.    C    The note accurately reflects work and decisions made by me.  Katlyn Jauregui  12/7/2019  9:06 PM

## 2019-12-08 NOTE — PROGRESS NOTES
0606 - 3835    Pt resting, eyes closed at start of shift. Plt transfusion outstanding as blood bank did not have cmv neg/irradiated plts.     BP 82/51 on am check, pulse in 70s consistently, pt denies lightheadedness, chest palpitations, or increased weakness. Dr. Mckenzie notified of low BP and hematuria (red, clear urine) 1L IVF bolus ordered and infusing.      Platelets transfusing, bolus complete, BP now 73/45 -Dr. Mckenzie again notified. Additional 1L IVF ordered and administered.     Post platelet transfusion -hematuria improved significantly, urine is now only pink tinged. BP remains low, 70s/40s pulse still in 70s, pt asymptomatic. Dr. Mckenzie notified. Possible transfer to ICU for pressor support discussed w/ pt and caregiver at bedside, pt declines transfer. *New orders for midodrine and hydrocortisone. Goal BP is systolic >90.     1800 - 89/51, pt remains asymptomatic. WCTM BP and pt symptoms.     Alicja Crowell

## 2019-12-08 NOTE — H&P
Hospital Medicine History & Physical Note    Date of Service  12/7/2019    Primary Care Physician  Francis Marcano M.D.    Consultants  Oncology    Code Status  Full code    Chief Complaint  Overall pain with loss of weight    History of Presenting Illness  74 y.o. male who presented 12/7/2019 with oral pain, rectal pain and abnormal labs.  Patient presented for his port to be flushed today at the infusion clinic.  The patient has been receiving chemotherapy for anal cancer.  Today he was not scheduled for any treatment except for the flushing of the port.  They also check labs.  His BUN and creatinine are elevated.  At that point the oncologist recommended he come to the emergency room for evaluation.  At this point the patient has severe oral thrush she is not able to take in anything orally.  He will at this point need nystatin swish and swallow as well as a adjusted diet so that he can eat and tolerate oral intake.  In the meantime patient will need to be hydrated to ensure that his electrolytes are corrected and his fluid status is corrected.    Review of Systems  Review of Systems   Constitutional: Positive for malaise/fatigue and weight loss. Negative for chills, diaphoresis and fever.   HENT: Positive for sore throat.         Oral pain   Eyes: Negative.  Negative for double vision.   Respiratory: Positive for shortness of breath. Negative for cough, hemoptysis and wheezing.    Cardiovascular: Negative.  Negative for chest pain, palpitations and leg swelling.   Gastrointestinal: Negative for abdominal pain, blood in stool, constipation, diarrhea, heartburn, nausea and vomiting.        Anal pain   Genitourinary: Negative.  Negative for frequency, hematuria and urgency.   Musculoskeletal: Negative.  Negative for joint pain.   Skin: Negative.  Negative for itching and rash.   Neurological: Positive for weakness. Negative for dizziness, focal weakness, seizures, loss of consciousness and headaches.    Endo/Heme/Allergies: Negative.  Does not bruise/bleed easily.   Psychiatric/Behavioral: Negative.  Negative for suicidal ideas. The patient is not nervous/anxious.    All other systems reviewed and are negative.      Past Medical History   has a past medical history of Bladder cancer ([facility]), Bowel habit changes, Breath shortness, Bronchitis, Cancer ([facility]), Cataract, Chickenpox, COPD (chronic obstructive pulmonary disease) ([facility]), Dental disorder (10/11/2019), Diabetes ([facility]), [place] measles, Glaucoma (10/11/2019), Heart burn (10/11/2019), Influenza, Mumps, Pneumonia, Scarlet fever, and Urinary incontinence (10/11/2019).    Surgical History   has a past surgical history that includes pr remv 2nd cataract,corn-scler sectn; prostatectomy robotic; appendectomy; hernia repair; colonoscopy (10/15/2019); prostatectomy, radical retro; excision of bladder mesh w/ repair; other; and other (2014).     Family History  family history includes Cancer in his maternal uncle, mother, and paternal grandmother.     Social History   reports that he has been smoking cigarettes. He has a 138.00 pack-year smoking history. He has never used smokeless tobacco. He reports previous alcohol use. He reports that he does not use drugs.    Allergies  No Known Allergies    Medications  Prior to Admission Medications   Prescriptions Last Dose Informant Patient Reported? Taking?   B Complex Vitamins (B COMPLEX 50 PO)  Patient Yes No   Sig: Take 1 Tab by mouth every evening.   Calcium Carbonate-Vit D-Min (CALCIUM 1200 PO)  Patient Yes No   Sig: Take 1 Tab by mouth every evening.   Cholecalciferol (VITAMIN D PO)  Patient Yes No   Sig: Take 1 Tab by mouth every evening.   Ferrous Gluconate (IRON 27 PO)   Yes No   Sig: Take  by mouth.   Multiple Vitamins-Minerals (MULTIVITAMIN ADULT PO)  Patient Yes No   Sig: Take 1 Tab by mouth every evening.   Omega-3 Fatty Acids (FISH OIL) 1000 MG Cap capsule   Yes No   Sig: Take 1,000 mg by mouth 3 times a day, with  meals.   Plant Sterols and Stanols (CHOLEST OFF PO)   Yes No   Sig: Take  by mouth.   STIOLTO RESPIMAT 2.5-2.5 MCG/ACT Aero Soln  Patient No No   Sig: INHALE 2 PUFFS BY MOUTH EVERY DAY. NEED APPT   albuterol (VENTOLIN HFA) 108 (90 BASE) MCG/ACT Aero Soln inhalation aerosol  Patient Yes No   Sig: Inhale 2 Puffs by mouth every 6 hours as needed for Shortness of Breath.   glucosamine Sulfate 500 MG Cap   Yes No   Sig: Take 500 mg by mouth 3 times a day, with meals.   metformin (GLUCOPHAGE) 500 MG Tab  Patient Yes No   Sig: Take 500 mg by mouth 2 times a day, with meals.   timolol (TIMOPTIC) 0.25 % Solution  Patient Yes No   Sig: PLACE 1 DROP IN THE RIGHT EYE TWICE A DAY AS DIRECTED      Facility-Administered Medications: None       Physical Exam  Temp:  [36.8 °C (98.2 °F)] 36.8 °C (98.2 °F)  Pulse:  [83-89] 83  Resp:  [22] 22  BP: (84-88)/(52-57) 84/57  SpO2:  [97 %] 97 %    Physical Exam  Vitals signs and nursing note reviewed. Exam conducted with a chaperone present.   Constitutional:       General: He is awake.      Appearance: He is well-developed. He is cachectic. He is ill-appearing.   HENT:      Head: Normocephalic and atraumatic.      Right Ear: External ear normal.      Left Ear: External ear normal.      Nose: Nose normal.      Mouth/Throat:      Mouth: Mucous membranes are dry.      Pharynx: Oropharyngeal exudate present. No posterior oropharyngeal erythema.   Eyes:      Extraocular Movements: Extraocular movements intact.      Conjunctiva/sclera: Conjunctivae normal.      Pupils: Pupils are equal, round, and reactive to light.   Neck:      Musculoskeletal: Normal range of motion and neck supple. No neck rigidity or muscular tenderness.      Thyroid: No thyromegaly.      Vascular: No carotid bruit or JVD.   Cardiovascular:      Rate and Rhythm: Normal rate and regular rhythm.      Heart sounds: Normal heart sounds. No murmur.   Pulmonary:      Effort: Prolonged expiration present.      Breath sounds:  Decreased air movement present. Examination of the right-lower field reveals decreased breath sounds. Examination of the left-lower field reveals decreased breath sounds. Decreased breath sounds present. No wheezing or rales.   Chest:      Chest wall: No tenderness.   Abdominal:      General: Abdomen is flat. Bowel sounds are normal. There is no distension.      Palpations: Abdomen is soft. There is no mass.      Tenderness: There is no tenderness. There is no guarding or rebound.   Genitourinary:     Comments: Mcgregor catheter  Musculoskeletal: Normal range of motion.         General: No tenderness or signs of injury.      Left lower leg: No edema.   Lymphadenopathy:      Cervical: No cervical adenopathy.   Skin:     General: Skin is warm and dry.      Capillary Refill: Capillary refill takes 2 to 3 seconds.      Coloration: Skin is not jaundiced or pale.      Findings: No bruising, erythema, lesion or rash.   Neurological:      General: No focal deficit present.      Mental Status: He is alert and oriented to person, place, and time. Mental status is at baseline.      Cranial Nerves: No cranial nerve deficit.      Deep Tendon Reflexes: Reflexes are normal and symmetric.   Psychiatric:         Mood and Affect: Mood normal.         Behavior: Behavior normal. Behavior is cooperative.         Thought Content: Thought content normal.         Judgment: Judgment normal.         Laboratory:  Recent Labs     12/05/19  1450 12/06/19  1454 12/07/19  1521   WBC 0.5* 0.3* 0.5*   RBC 4.66* 4.51* 4.06*   HEMOGLOBIN 15.5 14.9 13.3*   HEMATOCRIT 43.9 41.8* 37.3*   MCV 94.2 92.7 91.9   MCH 33.3* 33.0 32.8   MCHC 35.3 35.6* 35.7*   RDW 43.4 42.7 41.1   PLATELETCT 36* 27* 18*   MPV 12.3 12.6 12.5     Recent Labs     12/06/19  1603 12/07/19  1521   SODIUM 130* 129*   POTASSIUM 5.2 4.8   CHLORIDE 100 101   CO2 22 20   GLUCOSE 166* 129*   BUN 46* 52*   CREATININE 1.52* 1.82*   CALCIUM 8.8 8.2*     Recent Labs     12/06/19  1603  12/07/19  1521   ALTSGPT 16 14   ASTSGOT 8* 7*   ALKPHOSPHAT 49 48   TBILIRUBIN 1.1 0.9   GLUCOSE 166* 129*         No results for input(s): NTPROBNP in the last 72 hours.      No results for input(s): TROPONINT in the last 72 hours.    Urinalysis:    No results found     Imaging:  DX-CHEST-PORTABLE (1 VIEW)   Final Result      No radiographic evidence of acute cardiopulmonary process.      US-RENAL    (Results Pending)         Assessment/Plan:  I anticipate this patient will require at least two midnights for appropriate medical management, necessitating inpatient admission.    * Acute renal failure (ARF) ([facility])  Assessment & Plan  Because of oral pain the patient has developed at this point lack of oral intake and thus has become dehydrated with acute renal failure.  The patient this point will need electrolyte monitoring and correction.  Patient will be given fluid resuscitation we will monitor intake and output  Get a renal ultrasound.    Dehydration  Assessment & Plan  Fluid resuscitation monitor oral intake adjust diet sedated soft enough for him to be able to tolerate    Diabetes mellitus type 2 in nonobese ([facility])  Assessment & Plan  -accus with sliding scale coverage to begin.  -diabetic diet  -diabetic education  -follow glycohemoglobin levels long term, most recent hemoglobin A1c 6.4  -Hold metformin with elevated renal functions.  I discussed this with the patient  -monitor for hypoglycemic episodes and adjust control if he should get low    Anal carcinoma ([facility])- (present on admission)  Assessment & Plan  Patient continues at this point on treatment as an outpatient.    COPD (chronic obstructive pulmonary disease) ([facility])- (present on admission)  Assessment & Plan  RT protocol, nebulizer treatments, oxygen as needed currently not in acute exacerbation.    Stomatitis  Assessment & Plan  Patient appears to have thrush.  The patient does have white mucousy strands throughout his mouth.  This point on going to  start him on nystatin swish and swallow as well as MBS so that he can tolerate eating.    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    Current smoker- (present on admission)  Assessment & Plan  -nicotine replacement protocol and cessation education provided for 12 minutes, discussed options of nicotine patch, acupuncture, medical treatment with wellbutrin and chantix. Discussed other options. Code 61269      VTE prophylaxis: SCDs

## 2019-12-09 NOTE — RESPIRATORY CARE
COPD EDUCATION by COPD CLINICAL EDUCATOR  12/8/2019 at 4:04 PM by Jerardo Butler     Patient interviewed by COPD education team. Patient refused COPD program at this time.

## 2019-12-09 NOTE — CARE PLAN
Problem: Safety  Goal: Will remain free from falls  Outcome: PROGRESSING AS EXPECTED  Intervention: Implement fall precautions  Flowsheets  Taken 12/8/2019 2040  Environmental Precautions: Treaded Slipper Socks on Patient;Personal Belongings, Wastebasket, Call Bell etc. in Easy Reach;Transferred to Stronger Side;Report Given to Other Health Care Providers Regarding Fall Risk;Bed in Low Position;Communication Sign for Patients & Families;Mobility Assessed & Appropriate Sign Placed  Taken 12/9/2019 0115  Chair/Bed Strip Alarm: Yes - Alarm On     Problem: Pain Management  Goal: Pain level will decrease to patient's comfort goal  Outcome: PROGRESSING AS EXPECTED

## 2019-12-09 NOTE — PROGRESS NOTES
"BP (!) 89/52   Pulse 68   Temp 36.5 °C (97.7 °F) (Oral)   Resp 18   Ht 1.727 m (5' 8\")   Wt 56.7 kg (125 lb)   SpO2 91%   BMI 19.01 kg/m²   Received report and assumed care of pt at 1900. Pt is A&O x4. Pt denies nausea or SOB.  PICC is patent with blood return. POC discussed and updated. Fall precautions in place. Skin wound assessed. Bed is in lowest position and call light is within reach. Hourly rounding is in place.  "

## 2019-12-09 NOTE — PROGRESS NOTES
Patient received treatment in Radiation Therapy. Patient received treatment number 12 of 30 planned.

## 2019-12-09 NOTE — PROGRESS NOTES
Lab called with critical result of WBC 0.5, plts 34, ANC 0.21 at 1345. Critical lab result read back to Lab.   Dr. Mckenzie notified of critical lab result at 1345.  Critical lab result read back by Dr. Mckenzie.    Platelets improved from pre-transfusion plt of 10.   WBC & ANC expected. No new orders. Protective precautions remain in place.     Alicja Crowell

## 2019-12-09 NOTE — PROGRESS NOTES
[facility] Medicine Daily Progress Note    Date of Service  12/9/2019    Chief Complaint  74 y.o. male admitted 12/7/2019 with oral pain and weight loss.    Hospital Course    Patient with known anal cancer undergoing radiation and chemotherapy  He has had significant oral pain and unable to swallow well the past few weeks.  He has a sore on his bottom that he has been applying hemorrhoidal cream with lidocaine for pain.  He had hematuria develop since admission and platelet count was significantly low.      Interval Problem Update  12/8 Patient feeling tired and blood pressure has been markedly low, he has received 2 Liters of NS bolus and LR has been running at 100/hour since admit.  He still appears dehydrated but is starting to require oxygen to maintain saturation, lungs still sound clear on examination.  He received 1 unit of platelets and urine is clearing in collection tubing from neobladder.  Cr increased overnight with hydration from 1.52 to 1.82 and this could be due to early hydration only and expect it to trend back down.  If pressure does not respond to midodrine and IVF then patient will need to transfer to ICU for pressor support.  Midodrine and solucortef started because of hypotension.    12/9 Patient less fatigued and feeling slightly better when compared to yesterday.  He has been able to eat with pain control received by MBX.  Cr trending back down following IV resuscitation and blood pressure has improved with midodrine and cortef - patient refused transfer to ICU yesterday.  Radiation continued today on schedule.      Consultants/Specialty  none    Code Status  full    Disposition  tbd    Review of Systems  Review of Systems   Constitutional: Negative for chills and fever.   HENT: Negative for congestion and sore throat.    Eyes: Negative for blurred vision and photophobia.   Respiratory: Negative for cough and shortness of breath.    Cardiovascular: Negative for chest pain, claudication and leg  swelling.   Gastrointestinal: Negative for abdominal pain, constipation, diarrhea, heartburn, nausea and vomiting.   Genitourinary: Positive for hematuria (better). Negative for dysuria and flank pain.        Rectal pain     Musculoskeletal: Negative for joint pain and myalgias.   Skin: Negative for itching and rash.   Neurological: Negative for dizziness, sensory change, speech change, weakness and headaches.   Psychiatric/Behavioral: Negative for depression. The patient is not nervous/anxious and does not have insomnia.         Physical Exam  Temp:  [36.4 °C (97.6 °F)-37.1 °C (98.8 °F)] 36.4 °C (97.6 °F)  Pulse:  [63-85] 64  Resp:  [16-20] 18  BP: ()/(44-55) 91/51  SpO2:  [90 %-95 %] 95 %    Physical Exam  Vitals signs and nursing note reviewed.   Constitutional:       General: He is not in acute distress.     Appearance: Normal appearance.   HENT:      Head: Normocephalic and atraumatic.   Eyes:      General: No scleral icterus.     Extraocular Movements: Extraocular movements intact.   Neck:      Musculoskeletal: Normal range of motion and neck supple.   Cardiovascular:      Rate and Rhythm: Normal rate and regular rhythm.      Pulses: Normal pulses.      Heart sounds: Normal heart sounds. No murmur.   Pulmonary:      Effort: Pulmonary effort is normal. No respiratory distress.      Breath sounds: Normal breath sounds. No wheezing, rhonchi or rales.   Abdominal:      General: Abdomen is flat. Bowel sounds are normal. There is no distension.      Palpations: Abdomen is soft.      Tenderness: There is no rebound.   Musculoskeletal:         General: No swelling or tenderness.   Lymphadenopathy:      Cervical: No cervical adenopathy.   Skin:     Coloration: Skin is not jaundiced.      Findings: No erythema.      Comments: Rectal sore without evidence of infection, consistent with radiation changes.     Neurological:      General: No focal deficit present.      Mental Status: He is alert and oriented to person,  place, and time. Mental status is at baseline.      Cranial Nerves: No cranial nerve deficit.   Psychiatric:         Mood and Affect: Mood normal.         Behavior: Behavior normal.         Fluids    Intake/Output Summary (Last 24 hours) at 12/9/2019 1411  Last data filed at 12/9/2019 0004  Gross per 24 hour   Intake 246 ml   Output 1225 ml   Net -979 ml       Laboratory  Recent Labs     12/08/19  0315 12/08/19  1315 12/09/19  0030   WBC 0.5* 0.5* 0.5*   RBC 3.25* 2.85* 2.91*   HEMOGLOBIN 10.7* 9.2* 9.5*   HEMATOCRIT 29.6* 26.7* 27.4*   MCV 91.1 93.7 94.2   MCH 32.9 32.3 32.6   MCHC 36.1* 34.5 34.7   RDW 40.7 42.0 42.0   PLATELETCT 10* 34* 32*   MPV 13.5* 11.1 10.8     Recent Labs     12/06/19  1603 12/07/19  1521 12/09/19  0030   SODIUM 130* 129* 133*   POTASSIUM 5.2 4.8 4.5   CHLORIDE 100 101 107   CO2 22 20 22   GLUCOSE 166* 129* 139*   BUN 46* 52* 34*   CREATININE 1.52* 1.82* 1.12   CALCIUM 8.8 8.2* 7.2*             Recent Labs     12/08/19  0315   TRIGLYCERIDE 115   HDL 15*   LDL 53       Imaging  US-RENAL   Final Result      1.  There is moderate bilateral hydronephrosis, more than is typically expected for history of cystectomy with neobladder formation.      DX-CHEST-PORTABLE (1 VIEW)   Final Result      No radiographic evidence of acute cardiopulmonary process.           Assessment/Plan  * Acute renal failure (ARF) ([facility])  Assessment & Plan  Cr worse after hydration, continue with fluid resuscitation  The patient this point will need electrolyte monitoring and correction.  Patient will be given fluid resuscitation we will monitor intake and output  Renal ultrasound shows moderate bilateral hydronephrosis, more than expected following neobladder     Dehydration  Assessment & Plan  Received 2 Liter bolus for hypotension  Continue with fluid resuscitation LR at 100/hour      Diabetes mellitus type 2 in nonobese (HCC)  Assessment & Plan  -accus with sliding scale coverage to begin.  -diabetic diet  -diabetic  education  -follow glycohemoglobin levels long term, most recent hemoglobin A1c 6.4  -Hold metformin with elevated renal functions.  I discussed this with the patient  -monitor for hypoglycemic episodes and adjust control if he should get low    Anal carcinoma (HCC)- (present on admission)  Assessment & Plan  Patient continues at this point on treatment as an outpatient.    COPD (chronic obstructive pulmonary disease) (HCC)- (present on admission)  Assessment & Plan  RT protocol, nebulizer treatments, oxygen as needed currently not in acute exacerbation.    Hematuria  Assessment & Plan  Secondary to significant thrombocytopenia at 10, responded to transfusion of platelets  Trend CBC        Hypotension  Assessment & Plan  Improved with interventions  Patient does not appear septic, lactic acid 1.7  2L normal saline bolus with no significant change in his blood pressure  Patient is not symptomatic   Initiated midodrine and cortef, patient declined transfer to ICU      Stomatitis  Assessment & Plan  Patient appears to have thrush vs mucocytis from chemotherapy.   nystatin swish and swallow   MBX is allowing him to tolerate eating.    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    Current smoker- (present on admission)  Assessment & Plan  -nicotine replacement protocol and cessation education provided       VTE prophylaxis: SCDs, AC c/i due to bleeding and thrombocytopenia.

## 2019-12-10 NOTE — THERAPY
"Occupational Therapy Evaluation completed.   Functional Status:  SPV sit>stand, SPV functional mobility no AD, SPV standing grooming, SPV toilet txf, SPV sit>supine  Plan of Care: DC needs only  Discharge Recommendations:  Equipment: No Equipment Needed. Post-acute therapy Anticipate that the patient will have no further occupational therapy needs after discharge from the hospital.     See \"Rehab Therapy-Acute\" Patient Summary Report for complete documentation.    Pt is 73yo male admitted with oral pain and weight loss, known hx of anal cancer undergoing radiation and chemotherapy. Pt presents to OT eval at SPV level for ADLs and ADL transfers without AD or DME. Pt lives in a senior apartment building with all DME installed and good social support who assist him with IADLs and transportation to appointments. Pt reports his energy level is improving but always feels weak on radiation days. Recommend pt OOB at least 3x/day for meals to increase activity tolerance. Anticipate pt will have no further OT needs upon d/c, pt will not be actively followed for occupational therapy services at this time, however may be seen if requested by physician for 1 more visit within 30 days to address any discharge or equipment needs.   "

## 2019-12-10 NOTE — DIETARY
"Nutrition services: Day 2 of admit.  Corona Bean is a 74 y.o. male with oral pain, rectal pain and abnormal labs, acute renal failure, dehydration, stomatitis, thrush  History includes: Anal cancer, bladder cancer, COPD, Diabetes, Heartburn, Urinary incontinence, current smoker    · Patient seen for poor PO intake and unknown if weight loss present.  · Patient stated his appetite is never a problem.  He has had difficulty eating recently due to thrush.  He stated he lost approximately 75# over the last four years due to cancer, but about 10-15# in the past 6-12 months.  · He declined pre-arranged snacks between meals but is getting snacks as needed from the floor  · He declined Boost supplements    Assessment:  Height: 172.7 cm (5' 8\")  Weight: 56.7 kg (125 lb)  Body mass index is 19.01 kg/m².   Diet/Intake: GI soft; but eating mostly liquid or very soft foods.  No PO records yet in ADL's    Evaluation:   1. In reviewing past admits; he weight ~131# on 10/15/19. This represents 5# or 4% loss in the past eight weeks   2. Pertinent Labs today:  Sodium 133  3. He is receiving LR at 100 ml/hr per MAR    Malnutrition Risk: Patient is at risk for malnutrition.    Recommendations/Inteventions/Plan:   1. MD to manage mouth pain/thrush  2. Encourage intake of meals and snacks  3. Document intake of all PO as % taken in ADL's to provide interdisciplinary communication across all shifts.   4. Monitor weight.  5. Nutrition rep will continue to see patient for ongoing meal and snack preferences.     RD following.      "

## 2019-12-10 NOTE — PROGRESS NOTES
Samia from Lab called with critical result of WBC of 0.9 and plt of 34 at 0215. Critical lab result read back to Samia.   This critical lab result is within parameters established by Dr. Mckenzie for this patient

## 2019-12-10 NOTE — DISCHARGE PLANNING
Care Transition Team Assessment      Spoke with the patient at the bedside. The patient stated that he lives alone but has several friends who are willing to assist with any needs while he is undergoing treatment.    Information Source  Information Given By: Patient         Elopement Risk  Legal Hold: No  Ambulatory or Self Mobile in Wheelchair: Yes  Disoriented: No  Psychiatric Symptoms: None  History of Wandering: No  Elopement this Admit: No  Vocalizing Wanting to Leave: No  Displays Behaviors, Body Language Wanting to Leave: No-Not at Risk for Elopement  Elopement Risk: Not at Risk for Elopement    Interdisciplinary Discharge Planning  Does Admitting Nurse Feel This Could be a Complex Discharge?: No  Lives with - Patient's Self Care Capacity: Alone and Able to Care For Self  Patient or legal guardian wants to designate a caregiver (see row info): No  Support Systems: Friends / Neighbors  Housing / Facility: 1 Story Apartment / Condo  Do You Take your Prescribed Medications Regularly: Yes  Able to Return to Previous ADL's: Yes  Mobility Issues: No  Prior Services: None, Home-Independent  Patient Expects to be Discharged to:: home  Assistance Needed: Unknown at this Time  Durable Medical Equipment: Not Applicable    Discharge Preparedness  What is your plan after discharge?: Home with help  What are your discharge supports?: Other (comment)(Friends)  Prior Functional Level: Ambulatory, Independent with Activities of Daily Living, Independent with Medication Management    Functional Assesment  Prior Functional Level: Ambulatory, Independent with Activities of Daily Living, Independent with Medication Management    Finances  Financial Barriers to Discharge: No  Prescription Coverage: Yes    Vision / Hearing Impairment  Vision Impairment : Yes  Right Eye Vision: Impaired, Wears Glasses  Left Eye Vision: Impaired, Wears Glasses  Hearing Impairment : No         Advance Directive  Advance Directive?: None  Advance  Directive offered?: AD Booklet refused    Domestic Abuse  Have you ever been the victim of abuse or violence?: No  Physical Abuse or Sexual Abuse: No  Verbal Abuse or Emotional Abuse: No  Possible Abuse Reported to:: Not Applicable         Discharge Risks or Barriers  Discharge risks or barriers?: No    Anticipated Discharge Information  Anticipated discharge disposition: Home  Discharge Address: [address] [place] [id number]  Discharge Contact Phone Number: [phone]

## 2019-12-10 NOTE — PROGRESS NOTES
Shift Summary [id number]    -Neuro: A&Ox4  -VS: 1 L NC O2, baseline hypotensive. Notify MD if < 80 SBP  -Pain to coccyx and throat; lidocaine and nystatin effective for thrush. Aquaphor in use to excoriated buttocks  -Condom cath in place for incontinence   -No complaints of nausea  -Constipation; LBM a week ago now  -Access: R DL PICC infusing MIVF  -Activity: AX1, BA on, calls appropriately, bed locked and in lowest position, call light in reach, rounding in place

## 2019-12-10 NOTE — PROGRESS NOTES
Hospital Medicine Daily Progress Note    Date of Service  12/10/2019    Chief Complaint  74 y.o. male admitted 12/7/2019 with oral pain and weight loss.    Hospital Course    Patient with known anal cancer undergoing radiation and chemotherapy  He has had significant oral pain and unable to swallow well the past few weeks.  He has a sore on his bottom that he has been applying hemorrhoidal cream with lidocaine for pain.  He had hematuria develop since admission and platelet count was significantly low.      Interval Problem Update  12/8 Patient feeling tired and blood pressure has been markedly low, he has received 2 Liters of NS bolus and LR has been running at 100/hour since admit.  He still appears dehydrated but is starting to require oxygen to maintain saturation, lungs still sound clear on examination.  He received 1 unit of platelets and urine is clearing in collection tubing from neobladder.  Cr increased overnight with hydration from 1.52 to 1.82 and this could be due to early hydration only and expect it to trend back down.  If pressure does not respond to midodrine and IVF then patient will need to transfer to ICU for pressor support.  Midodrine and solucortef started because of hypotension.    12/9 Patient less fatigued and feeling slightly better when compared to yesterday.  He has been able to eat with pain control received by MBX.  Cr trending back down following IV resuscitation and blood pressure has improved with midodrine and cortef - patient refused transfer to ICU yesterday.  Radiation continued today on schedule.    12/10: The patient continue improving, tolerating diet with improving on thrush, creatinine back to his baseline, blood pressure has been stable around 90/50      Consultants/Specialty  none    Code Status  full    Disposition  tbd    Review of Systems  Review of Systems   Constitutional: Negative for chills and fever.   HENT: Negative for congestion and sore throat.    Eyes:  Negative for blurred vision and photophobia.   Respiratory: Negative for cough and shortness of breath.    Cardiovascular: Negative for chest pain, claudication and leg swelling.   Gastrointestinal: Negative for abdominal pain, constipation, diarrhea, heartburn, nausea and vomiting.   Genitourinary: Positive for hematuria (better). Negative for dysuria and flank pain.        Rectal pain     Musculoskeletal: Negative for joint pain and myalgias.   Skin: Negative for itching and rash.   Neurological: Negative for dizziness, sensory change, speech change, weakness and headaches.   Psychiatric/Behavioral: Negative for depression. The patient is not nervous/anxious and does not have insomnia.         Physical Exam  Temp:  [36.5 °C (97.7 °F)-36.9 °C (98.5 °F)] 36.6 °C (97.9 °F)  Pulse:  [55-61] 57  Resp:  [18-20] 18  BP: ()/(46-57) 97/57  SpO2:  [91 %-95 %] 93 %    Physical Exam  Vitals signs and nursing note reviewed.   Constitutional:       General: He is not in acute distress.     Appearance: Normal appearance.   HENT:      Head: Normocephalic and atraumatic.   Eyes:      General: No scleral icterus.     Extraocular Movements: Extraocular movements intact.   Neck:      Musculoskeletal: Normal range of motion and neck supple.   Cardiovascular:      Rate and Rhythm: Normal rate and regular rhythm.      Pulses: Normal pulses.      Heart sounds: Normal heart sounds. No murmur.   Pulmonary:      Effort: Pulmonary effort is normal. No respiratory distress.      Breath sounds: Normal breath sounds. No wheezing, rhonchi or rales.   Abdominal:      General: Abdomen is flat. Bowel sounds are normal. There is no distension.      Palpations: Abdomen is soft.      Tenderness: There is no tenderness. There is no guarding or rebound.   Musculoskeletal:         General: No swelling or tenderness.   Lymphadenopathy:      Cervical: No cervical adenopathy.   Skin:     Coloration: Skin is not jaundiced.      Findings: No erythema.       Comments: Rectal sore without evidence of infection, consistent with radiation changes.     Neurological:      General: No focal deficit present.      Mental Status: He is alert and oriented to person, place, and time. Mental status is at baseline.      Cranial Nerves: No cranial nerve deficit.   Psychiatric:         Mood and Affect: Mood normal.         Behavior: Behavior normal.         Fluids    Intake/Output Summary (Last 24 hours) at 12/10/2019 1808  Last data filed at 12/10/2019 1755  Gross per 24 hour   Intake --   Output 3000 ml   Net -3000 ml       Laboratory  Recent Labs     12/08/19  1315 12/09/19  0030 12/10/19  0105   WBC 0.5* 0.5* 0.9*   RBC 2.85* 2.91* 2.96*   HEMOGLOBIN 9.2* 9.5* 9.6*   HEMATOCRIT 26.7* 27.4* 27.9*   MCV 93.7 94.2 94.3   MCH 32.3 32.6 32.4   MCHC 34.5 34.7 34.4   RDW 42.0 42.0 42.5   PLATELETCT 34* 32* 34*   MPV 11.1 10.8 11.0     Recent Labs     12/09/19  0030 12/10/19  0105   SODIUM 133* 138   POTASSIUM 4.5 4.4   CHLORIDE 107 108   CO2 22 24   GLUCOSE 139* 143*   BUN 34* 32*   CREATININE 1.12 1.14   CALCIUM 7.2* 7.4*             Recent Labs     12/08/19  0315   TRIGLYCERIDE 115   HDL 15*   LDL 53       Imaging  US-RENAL   Final Result      1.  There is moderate bilateral hydronephrosis, more than is typically expected for history of cystectomy with neobladder formation.      DX-CHEST-PORTABLE (1 VIEW)   Final Result      No radiographic evidence of acute cardiopulmonary process.           Assessment/Plan  * Acute renal failure (ARF) ([facility])  Assessment & Plan  Likely due to dehydration  Renal ultrasound shows moderate bilateral hydronephrosis, more than expected following neobladder   Good urine output  Improving on his creatinine   keep monitoring    Dehydration  Assessment & Plan  Improving   received 2 Liter bolus for hypotension  Continue with fluid resuscitation LR at 100/hour      Diabetes mellitus type 2 in nonobese ([facility])  Assessment & Plan  -accus with sliding scale  coverage to begin.  -diabetic diet  -diabetic education  -follow glycohemoglobin levels long term, most recent hemoglobin A1c 6.4  -Hold metformin with elevated renal functions.  I discussed this with the patient  -monitor for hypoglycemic episodes and adjust control if he should get low    Anal carcinoma (HCC)- (present on admission)  Assessment & Plan  Patient continues at this point on treatment as an outpatient.    COPD (chronic obstructive pulmonary disease) (HCC)- (present on admission)  Assessment & Plan  No exacerbation   RT protocol, nebulizer treatments, oxygen as needed     Hematuria  Assessment & Plan  History of bladder cancer   secondary to significant thrombocytopenia at 10, responded to transfusion of platelets  Trend CBC        Hypotension  Assessment & Plan  Improved with interventions  Patient does not appear septic, lactic acid 1.7  2L normal saline bolus with no significant change in his blood pressure  Patient is not symptomatic   Initiated midodrine and cortef, patient declined transfer to ICU      Stomatitis  Assessment & Plan  Patient appears to have thrush vs mucocytis from chemotherapy.   nystatin swish and swallow   MBX is allowing him to tolerate eating.  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    Current smoker- (present on admission)  Assessment & Plan  -nicotine replacement protocol and cessation education provided       VTE prophylaxis: SCDs, AC c/i due to bleeding and thrombocytopenia.

## 2019-12-10 NOTE — CARE PLAN
Problem: Safety  Goal: Will remain free from injury  Outcome: PROGRESSING AS EXPECTED  Goal: Will remain free from falls  Outcome: PROGRESSING AS EXPECTED     Problem: Respiratory:  Goal: Respiratory status will improve  Outcome: PROGRESSING SLOWER THAN EXPECTED     Problem: Respiratory:  Goal: Respiratory status will improve  Outcome: PROGRESSING SLOWER THAN EXPECTED   Heavily encouraging incentive spirometer. Attempting to titrate off oxygen. Continuous pulse ox in replaced.

## 2019-12-10 NOTE — CARE PLAN
Problem: Nutritional:  Goal: Achieve adequate nutritional intake  Description  Patient will consume 50% of meals   Outcome: NOT MET     See RD progress note

## 2019-12-11 PROBLEM — D61.818 PANCYTOPENIA (HCC): Chronic | Status: ACTIVE | Noted: 2019-01-01

## 2019-12-11 PROBLEM — K59.00 CONSTIPATION: Chronic | Status: ACTIVE | Noted: 2019-01-01

## 2019-12-11 NOTE — CARE PLAN
Problem: Infection  Goal: Will remain free from infection  Outcome: PROGRESSING AS EXPECTED  Note:   Neutropenic precautions in place; pt understands to wash hands when eating and to wear mask in hallway.      Problem: Bowel/Gastric:  Goal: Normal bowel function is maintained or improved  Outcome: PROGRESSING SLOWER THAN EXPECTED  Note:   LBM was 12/3/19, a week ago. Pt currently not complaining of feeling constipated, bowel sounds are hypoactive.

## 2019-12-11 NOTE — PROGRESS NOTES
Pt is A&O.  Eating breakfast presently. Appetite is good. Anticipate XRT at 1330 today.  Awaiting pt's OK to ambulate and get 02 sats for DC.  Pt took MOM this am and already had miralax earlier.  Declines suppository d/t very sore anus.

## 2019-12-11 NOTE — PROGRESS NOTES
Received bedside report and assumed care of patient at 0700.  Patient is A&Ox4. VSS on 1.5L.  Pain controlled with MMW and oxycodone.  Bed is in the lowest position. Bed alarm is on. Call light is in reach.  All questions and concerns answered.

## 2019-12-11 NOTE — PROGRESS NOTES
Shift Summary [id number]     -Neuro: A&Ox4  -VS: 1 L NC O2, baseline hypotensive; bradycardic  -Pain to coccyx and throat; lidocaine, nystatin and oxycodone effective for thrush. Aquaphor in use to excoriated buttocks  -Condom cath in place for incontinence r/t bladder cancer  -No complaints of nausea  -Constipation; LBM a week ago now  -Access: R DL PICC infusing MIVF  -Activity: AX1, BA on, calls appropriately, bed locked and in lowest position, call light in reach, rounding in place  -Plan: continue XRT today

## 2019-12-11 NOTE — WOUND TEAM
Wound consult received for radiation burn. MD has already ordered Aquaphor and ostomy powder per MAR to be applied by nursing. Discussed with nursing, no wound team needs at this time.

## 2019-12-11 NOTE — PROGRESS NOTES
Jaida from Lab called with critical result of WBC of 1.2, plt of 31 and hgb 10 at 0155. Critical lab result read back to Jaida.   This critical lab result is within parameters established by  for this patient

## 2019-12-11 NOTE — THERAPY
"Physical Therapy Evaluation completed.   Bed Mobility:  Supine to Sit: Supervised  Transfers: Sit to Stand: Supervised  Gait: Level Of Assist: Supervised with No Equipment Needed       Plan of Care: Patient with no further skilled PT needs in the acute care setting at this time  Discharge Recommendations: Equipment: No Equipment Needed. Post-acute therapy Currently anticipate no further skilled therapy needs once patient is discharged from the inpatient setting.    Pt admitted for dehydration and acute renal failure presenting without gross mobility deficit. He did not require assist for bed mob or gait within room. Distance was limited by pt's condom catheter leaking. At this time, PT will not actively follow but pt can be seen again for DC needs only. Anticipate pt to be functionally capable of return home.     See \"Rehab Therapy-Acute\" Patient Summary Report for complete documentation.     "

## 2019-12-11 NOTE — PROGRESS NOTES
Hospital Medicine Daily Progress Note    Date of Service  12/11/2019    Chief Complaint  74 y.o. male admitted 12/7/2019 with oral pain and weight loss.    Hospital Course    Patient with known anal cancer undergoing radiation and chemotherapy  He has had significant oral pain and unable to swallow well the past few weeks.  He has a sore on his bottom that he has been applying hemorrhoidal cream with lidocaine for pain.  He had hematuria develop since admission and platelet count was significantly low.      Interval Problem Update  12/8 Patient feeling tired and blood pressure has been markedly low, he has received 2 Liters of NS bolus and LR has been running at 100/hour since admit.  He still appears dehydrated but is starting to require oxygen to maintain saturation, lungs still sound clear on examination.  He received 1 unit of platelets and urine is clearing in collection tubing from neobladder.  Cr increased overnight with hydration from 1.52 to 1.82 and this could be due to early hydration only and expect it to trend back down.  If pressure does not respond to midodrine and IVF then patient will need to transfer to ICU for pressor support.  Midodrine and solucortef started because of hypotension.    12/9 Patient less fatigued and feeling slightly better when compared to yesterday.  He has been able to eat with pain control received by MBX.  Cr trending back down following IV resuscitation and blood pressure has improved with midodrine and cortef - patient refused transfer to ICU yesterday.  Radiation continued today on schedule.    12/10: The patient continue improving, tolerating diet with improving on thrush, creatinine back to his baseline, blood pressure has been stable around 90/50  12/11: No need for home oxygen, with bowel regime he had bowel movement, he does not feel okay for discharge today, labs showed improving on his creatinine.     Consultants/Specialty  none    Code  Status  full    Disposition  Tomorrow    Review of Systems  Review of Systems   Constitutional: Negative for chills and fever.   HENT: Negative for congestion and sore throat.    Eyes: Negative for blurred vision and photophobia.   Respiratory: Negative for cough and shortness of breath.    Cardiovascular: Negative for chest pain, claudication and leg swelling.   Gastrointestinal: Negative for abdominal pain, constipation, diarrhea, heartburn, nausea and vomiting.   Genitourinary: Negative for dysuria, flank pain and hematuria (better).        Rectal pain     Musculoskeletal: Negative for joint pain and myalgias.   Skin: Negative for itching and rash.   Neurological: Negative for dizziness, sensory change, speech change, weakness and headaches.   Psychiatric/Behavioral: Negative for depression. The patient is not nervous/anxious and does not have insomnia.         Physical Exam  Temp:  [36.4 °C (97.5 °F)-37.1 °C (98.7 °F)] 37.1 °C (98.7 °F)  Pulse:  [55-66] 66  Resp:  [18-20] 18  BP: ()/(55-58) 107/58  SpO2:  [89 %-92 %] 90 %    Physical Exam  Vitals signs and nursing note reviewed.   Constitutional:       General: He is not in acute distress.     Appearance: Normal appearance.   HENT:      Head: Normocephalic and atraumatic.   Eyes:      General: No scleral icterus.     Extraocular Movements: Extraocular movements intact.   Neck:      Musculoskeletal: Normal range of motion and neck supple.   Cardiovascular:      Rate and Rhythm: Normal rate and regular rhythm.      Pulses: Normal pulses.      Heart sounds: Normal heart sounds. No murmur.   Pulmonary:      Effort: Pulmonary effort is normal. No respiratory distress.      Breath sounds: Normal breath sounds. No wheezing, rhonchi or rales.   Abdominal:      General: Abdomen is flat. Bowel sounds are normal. There is no distension.      Palpations: Abdomen is soft.      Tenderness: There is no tenderness. There is no guarding or rebound.   Musculoskeletal:          General: No swelling or tenderness.   Lymphadenopathy:      Cervical: No cervical adenopathy.   Skin:     Coloration: Skin is not jaundiced.      Findings: No erythema.      Comments: Rectal sore without evidence of infection, consistent with radiation changes.     Neurological:      General: No focal deficit present.      Mental Status: He is alert and oriented to person, place, and time. Mental status is at baseline.      Cranial Nerves: No cranial nerve deficit.   Psychiatric:         Mood and Affect: Mood normal.         Behavior: Behavior normal.         Fluids    Intake/Output Summary (Last 24 hours) at 12/11/2019 1823  Last data filed at 12/11/2019 0400  Gross per 24 hour   Intake --   Output 1000 ml   Net -1000 ml       Laboratory  Recent Labs     12/09/19  0030 12/10/19  0105 12/11/19  0110   WBC 0.5* 0.9* 1.2*   RBC 2.91* 2.96* 3.05*   HEMOGLOBIN 9.5* 9.6* 10.0*   HEMATOCRIT 27.4* 27.9* 29.0*   MCV 94.2 94.3 95.1   MCH 32.6 32.4 32.8   MCHC 34.7 34.4 34.5   RDW 42.0 42.5 43.1   PLATELETCT 32* 34* 31*   MPV 10.8 11.0 11.0     Recent Labs     12/09/19  0030 12/10/19  0105 12/11/19  0110   SODIUM 133* 138 136   POTASSIUM 4.5 4.4 4.2   CHLORIDE 107 108 107   CO2 22 24 26   GLUCOSE 139* 143* 187*   BUN 34* 32* 35*   CREATININE 1.12 1.14 1.20   CALCIUM 7.2* 7.4* 7.5*                   Imaging  US-RENAL   Final Result      1.  There is moderate bilateral hydronephrosis, more than is typically expected for history of cystectomy with neobladder formation.      DX-CHEST-PORTABLE (1 VIEW)   Final Result      No radiographic evidence of acute cardiopulmonary process.           Assessment/Plan  * Acute renal failure (ARF) (HCC)  Assessment & Plan  Likely due to dehydration  Renal ultrasound shows moderate bilateral hydronephrosis, more than expected following neobladder   Good urine output  Improving on his creatinine   keep monitoring    Dehydration  Assessment & Plan  Improving   received 2 Liter bolus for  hypotension  Continue with fluid resuscitation LR at 100/hour      Diabetes mellitus type 2 in nonobese (HCC)  Assessment & Plan  -accus with sliding scale coverage to begin.  -diabetic diet  -diabetic education  -follow glycohemoglobin levels long term, most recent hemoglobin A1c 6.4  -Hold metformin with elevated renal functions.  I discussed this with the patient  -monitor for hypoglycemic episodes and adjust control if he should get low    Anal carcinoma (HCC)- (present on admission)  Assessment & Plan  Patient continues at this point on treatment as an outpatient.    COPD (chronic obstructive pulmonary disease) (HCC)- (present on admission)  Assessment & Plan  No exacerbation   RT protocol, nebulizer treatments, oxygen as needed     Pancytopenia (HCC)- (present on admission)  Assessment & Plan  Due to chemotherapy  Improving  No signs of infection and no signs of any bleeding  Hold Lovenox or heparin for DVT prophylaxis    Constipation- (present on admission)  Assessment & Plan  He did not have bowel movement for more than 1 week  No abdominal pain and nausea or vomiting  With MiraLAX and milk of magnesia has improved  Keep monitoring      Hematuria  Assessment & Plan  History of bladder cancer   secondary to significant thrombocytopenia at 10, responded to transfusion of platelets  Trend CBC        Hypotension  Assessment & Plan  Improved with interventions  Patient does not appear septic, lactic acid 1.7  2L normal saline bolus with no significant change in his blood pressure  Patient is not symptomatic   Initiated midodrine and cortef, patient declined transfer to ICU      Stomatitis  Assessment & Plan  Patient appears to have thrush vs mucocytis from chemotherapy.   nystatin swish and swallow   MBX is allowing him to tolerate eating.  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    Current smoker- (present on admission)  Assessment & Plan  -nicotine replacement protocol and cessation education provided       VTE prophylaxis: SCDs, AC c/i due to bleeding and thrombocytopenia.

## 2019-12-12 NOTE — ASSESSMENT & PLAN NOTE
Resolved  He has many bowel movements  Hold MiraLAX and bowel regimen  IV fluids  Monitoring closely

## 2019-12-12 NOTE — PROGRESS NOTES
Patient received treatment in Radiation Therapy. Patient received treatment number 14 of 30 planned.

## 2019-12-12 NOTE — DISCHARGE PLANNING
Received Choice form at [phone]  Agency/Facility Name: [facility]  Referral sent per Choice form @ [phone]

## 2019-12-12 NOTE — PROGRESS NOTES
Shift Summary [id number]     -Plan: Discharge 10x10 today   -Neuro: A&Ox4  -VS: 1 L NC O2, baseline hypotensive; bradycardic. Unable to wean off O2; will need O2 for discharging   -Pain to coccyx and throat; lidocaine, nystatin and oxycodone effective for thrush. Aquaphor in use to excoriated buttocks  -Condom cath in place for incontinence r/t bladder cancer  -No complaints of nausea  -Multiple loose BMs overnight  -Access: R DL PICC infusing MIVF  -Activity: AX1, BA on, calls appropriately, bed locked and in lowest position, call light in reach, rounding in place

## 2019-12-12 NOTE — CARE PLAN
Problem: Safety  Goal: Will remain free from injury  Note:   Hourly rounding in effect, pt instructed to call for assistance, bed locked and in lowest position. Bed alarm on. Non-skid socks in use and room close to nurses' station.        Problem: Knowledge Deficit  Goal: Knowledge of disease process/condition, treatment plan, diagnostic tests, and medications will improve  Note:   Pt updated and educated on nursing interventions, medications and POC

## 2019-12-12 NOTE — PROGRESS NOTES
Patient brought down to radiation oncology and received treatment 15 of 30. Patient will return tomorrow for further treatment.

## 2019-12-12 NOTE — DISCHARGE PLANNING
Agency/Facility Name: [facility]   Spoke To: Ya  Outcome: pt accepted. Referral is being processes and will be delivered to pt today. Could not give [facility] a time.

## 2019-12-12 NOTE — PROGRESS NOTES
Assumed pt care at 0900. Introduced self to pt. Pt needs met at this time, call light within reach, and will continue to monitor hourly.

## 2019-12-12 NOTE — CARE PLAN
Problem: Nutritional:  Goal: Achieve adequate nutritional intake  Description  Patient will consume 50% of meals   Outcome: PROGRESSING AS EXPECTED  3 meals recorded in ADLs, all % consumed.   Will continue to monitor d/t recent weight loss and low BMI.    Weight has trended up slightly since admit, however this likely r/t IVF (has been receiving LR @ 100 ml/hr since admit).     RD following

## 2019-12-12 NOTE — PROGRESS NOTES
Received report from [facility], assumed care of pt 0700.  Pt is A&Ox4. Assessment complete. PICC dual lumen, patent with + blood return, dressing is CDI with biopatch present.   Pt denies pain, denies nausea. Had 3 bms yesterday, anal area red/excoriated, sacrum is blanching; pt on waffle and ambulates up with SBA.  We will do home oxygen eval today. IS is at bedside, pt pulls 1000, encouraged pt to continue to use.   Bed alarm on, treaded socks on, all needs met at this time.   PRN oral MBX solution administered per MAR, pt states he gets much relief from this med.

## 2019-12-13 NOTE — PROGRESS NOTES
Hospital Medicine Daily Progress Note    Date of Service  12/13/2019    Chief Complaint  74 y.o. male admitted 12/7/2019 with oral pain and weight loss.    Hospital Course    Patient with known anal cancer undergoing radiation and chemotherapy  He has had significant oral pain and unable to swallow well the past few weeks.  He has a sore on his bottom that he has been applying hemorrhoidal cream with lidocaine for pain.  He had hematuria develop since admission and platelet count was significantly low.      Interval Problem Update  12/8 Patient feeling tired and blood pressure has been markedly low, he has received 2 Liters of NS bolus and LR has been running at 100/hour since admit.  He still appears dehydrated but is starting to require oxygen to maintain saturation, lungs still sound clear on examination.  He received 1 unit of platelets and urine is clearing in collection tubing from neobladder.  Cr increased overnight with hydration from 1.52 to 1.82 and this could be due to early hydration only and expect it to trend back down.  If pressure does not respond to midodrine and IVF then patient will need to transfer to ICU for pressor support.  Midodrine and solucortef started because of hypotension.    12/9 Patient less fatigued and feeling slightly better when compared to yesterday.  He has been able to eat with pain control received by MBX.  Cr trending back down following IV resuscitation and blood pressure has improved with midodrine and cortef - patient refused transfer to ICU yesterday.  Radiation continued today on schedule.    12/10: The patient continue improving, tolerating diet with improving on thrush, creatinine back to his baseline, blood pressure has been stable around 90/50  12/11: No need for home oxygen, with bowel regime he had bowel movement, he does not feel okay for discharge today, labs showed improving on his creatinine.   12/12: His saturation dropped during the night on her exertion, no  change on exam, home oxygen eval showed he needs 1 L on exertion, waiting for oxygen before discharge  12/13: Hypotension without symptoms, labs was ordered to rule out infection, IV bolus with continuous fluid was a started.     Consultants/Specialty  none    Code Status  full    Disposition  To be determined later    Review of Systems  Review of Systems   Constitutional: Negative for chills and fever.   HENT: Negative for congestion and sore throat.    Eyes: Negative for blurred vision and photophobia.   Respiratory: Negative for cough and shortness of breath.    Cardiovascular: Negative for chest pain, claudication and leg swelling.   Gastrointestinal: Negative for abdominal pain, constipation, diarrhea, heartburn, nausea and vomiting.   Genitourinary: Negative for dysuria, flank pain and hematuria (better).        Rectal pain     Musculoskeletal: Negative for joint pain and myalgias.   Skin: Negative for itching and rash.   Neurological: Negative for dizziness, sensory change, speech change, weakness and headaches.   Psychiatric/Behavioral: Negative for depression. The patient is not nervous/anxious and does not have insomnia.         Physical Exam  Temp:  [36.8 °C (98.3 °F)-36.9 °C (98.4 °F)] 36.9 °C (98.4 °F)  Pulse:  [70-79] 79  Resp:  [17] 17  BP: ()/(42-49) 80/42  SpO2:  [93 %-97 %] 93 %    Physical Exam  Vitals signs and nursing note reviewed.   Constitutional:       General: He is not in acute distress.     Appearance: Normal appearance.   HENT:      Head: Normocephalic and atraumatic.   Eyes:      General: No scleral icterus.     Extraocular Movements: Extraocular movements intact.   Neck:      Musculoskeletal: Normal range of motion and neck supple.   Cardiovascular:      Rate and Rhythm: Normal rate and regular rhythm.      Pulses: Normal pulses.      Heart sounds: Normal heart sounds. No murmur.   Pulmonary:      Effort: Pulmonary effort is normal. No respiratory distress.      Breath sounds:  Normal breath sounds. No wheezing, rhonchi or rales.      Comments: Some wheezing  Abdominal:      General: Abdomen is flat. Bowel sounds are normal. There is no distension.      Palpations: Abdomen is soft.      Tenderness: There is no tenderness. There is no guarding or rebound.   Musculoskeletal:         General: No swelling or tenderness.   Lymphadenopathy:      Cervical: No cervical adenopathy.   Skin:     Coloration: Skin is not jaundiced.      Findings: No erythema.      Comments: Rectal sore without evidence of infection, consistent with radiation changes.     Neurological:      General: No focal deficit present.      Mental Status: He is alert and oriented to person, place, and time. Mental status is at baseline.      Cranial Nerves: No cranial nerve deficit.   Psychiatric:         Mood and Affect: Mood normal.         Behavior: Behavior normal.         Fluids    Intake/Output Summary (Last 24 hours) at 12/13/2019 1607  Last data filed at 12/13/2019 1442  Gross per 24 hour   Intake 236 ml   Output 500 ml   Net -264 ml       Laboratory  Recent Labs     12/11/19  0110 12/12/19  0105   WBC 1.2* 1.5*   RBC 3.05* 3.37*   HEMOGLOBIN 10.0* 10.9*   HEMATOCRIT 29.0* 31.9*   MCV 95.1 94.7   MCH 32.8 32.3   MCHC 34.5 34.2   RDW 43.1 43.1   PLATELETCT 31* 38*   MPV 11.0 10.8     Recent Labs     12/11/19  0110 12/12/19  0105   SODIUM 136 140   POTASSIUM 4.2 4.1   CHLORIDE 107 107   CO2 26 29   GLUCOSE 187* 131*   BUN 35* 34*   CREATININE 1.20 1.31   CALCIUM 7.5* 7.3*                   Imaging  US-RENAL   Final Result      1.  There is moderate bilateral hydronephrosis, more than is typically expected for history of cystectomy with neobladder formation.      DX-CHEST-PORTABLE (1 VIEW)   Final Result      No radiographic evidence of acute cardiopulmonary process.           Assessment/Plan  * Acute renal failure (ARF) (HCC)  Assessment & Plan  Likely due to dehydration  Renal ultrasound shows moderate bilateral  hydronephrosis, more than expected following neobladder   Improving on his creatinine   keep monitoring    Hypotension  Assessment & Plan  Improved with IV fluid and worsening after holding IV fluid  Patient does not appear septic, lactic acid 1.7  2L normal saline bolus with no significant change in his blood pressure  Patient is not symptomatic   Initiated midodrine and cortef, patient declined transfer to ICU  We will continue IV fluid and repeat labs to rule out infection      Dehydration  Assessment & Plan  Improving however after discontinuing IV fluids the patient developed hypotension  Bolused and continuous IV fluids          Diabetes mellitus type 2 in nonobese (HCC)  Assessment & Plan  -accus with sliding scale coverage to begin.  -diabetic diet  -diabetic education  -follow glycohemoglobin levels long term, most recent hemoglobin A1c 6.4  -Hold metformin with elevated renal functions.  I discussed this with the patient  -monitor for hypoglycemic episodes and adjust control if he should get low    Anal carcinoma ([facility])- (present on admission)  Assessment & Plan  Patient continues at this point on treatment as an outpatient.    COPD (chronic obstructive pulmonary disease) ([facility])- (present on admission)  Assessment & Plan  No exacerbation   RT protocol, nebulizer treatments, oxygen as needed     Pancytopenia (HCC)- (present on admission)  Assessment & Plan  Due to chemotherapy  Improving  No signs of infection and no signs of any bleeding  Hold Lovenox or heparin for DVT prophylaxis    Constipation- (present on admission)  Assessment & Plan  Resolved  He has many bowel movements  Hold MiraLAX and bowel regimen  IV fluids  Monitoring closely        Hematuria  Assessment & Plan  History of bladder cancer   secondary to significant thrombocytopenia at 10, responded to transfusion of platelets  Trend CBC        Stomatitis  Assessment & Plan  Patient appears to have thrush vs mucocytis from chemotherapy.    nystatin swish and swallow   MBX is allowing him to tolerate eating.  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    Current smoker- (present on admission)  Assessment & Plan  -nicotine replacement protocol and cessation education provided       VTE prophylaxis: SCDs, AC c/i due to bleeding and thrombocytopenia.

## 2019-12-13 NOTE — PROGRESS NOTES
Pt completed 500ml fluid bolus. BP 80/40. MD notified. New order received for continuous fluids. Pt asymptomatic of his hypotension. Frequent assessment in place.

## 2019-12-13 NOTE — PROGRESS NOTES
Pt alert and oriented x 4. Denies nausea but complains of anal pain and mouth pain. MBX administered. Oxycodone held as pt has been hypotensive through the shift. Pt verbalizes understanding. BP down to 70's/40's this afternoon. Order received for 500ml bolus. Bolus infusing at this time. Pt continues with loose stool. PICC to RUE flushing well with positive blood return. Pt resting comfortably at this time. Call light within reach. Hourly rounding in place.

## 2019-12-13 NOTE — PROGRESS NOTES
Hospital Medicine Daily Progress Note    Date of Service  12/12/2019    Chief Complaint  74 y.o. male admitted 12/7/2019 with oral pain and weight loss.    Hospital Course    Patient with known anal cancer undergoing radiation and chemotherapy  He has had significant oral pain and unable to swallow well the past few weeks.  He has a sore on his bottom that he has been applying hemorrhoidal cream with lidocaine for pain.  He had hematuria develop since admission and platelet count was significantly low.      Interval Problem Update  12/8 Patient feeling tired and blood pressure has been markedly low, he has received 2 Liters of NS bolus and LR has been running at 100/hour since admit.  He still appears dehydrated but is starting to require oxygen to maintain saturation, lungs still sound clear on examination.  He received 1 unit of platelets and urine is clearing in collection tubing from neobladder.  Cr increased overnight with hydration from 1.52 to 1.82 and this could be due to early hydration only and expect it to trend back down.  If pressure does not respond to midodrine and IVF then patient will need to transfer to ICU for pressor support.  Midodrine and solucortef started because of hypotension.    12/9 Patient less fatigued and feeling slightly better when compared to yesterday.  He has been able to eat with pain control received by MBX.  Cr trending back down following IV resuscitation and blood pressure has improved with midodrine and cortef - patient refused transfer to ICU yesterday.  Radiation continued today on schedule.    12/10: The patient continue improving, tolerating diet with improving on thrush, creatinine back to his baseline, blood pressure has been stable around 90/50  12/11: No need for home oxygen, with bowel regime he had bowel movement, he does not feel okay for discharge today, labs showed improving on his creatinine.   12/12: His saturation dropped during the night on her exertion, no  change on exam, home oxygen eval showed he needs 1 L on exertion, waiting for oxygen before discharge      Consultants/Specialty  none    Code Status  full    Disposition  Tomorrow    Review of Systems  Review of Systems   Constitutional: Negative for chills and fever.   HENT: Negative for congestion and sore throat.    Eyes: Negative for blurred vision and photophobia.   Respiratory: Negative for cough and shortness of breath.    Cardiovascular: Negative for chest pain, claudication and leg swelling.   Gastrointestinal: Negative for abdominal pain, constipation, diarrhea, heartburn, nausea and vomiting.   Genitourinary: Negative for dysuria, flank pain and hematuria (better).        Rectal pain     Musculoskeletal: Negative for joint pain and myalgias.   Skin: Negative for itching and rash.   Neurological: Negative for dizziness, sensory change, speech change, weakness and headaches.   Psychiatric/Behavioral: Negative for depression. The patient is not nervous/anxious and does not have insomnia.         Physical Exam  Temp:  [36.6 °C (97.8 °F)-37.3 °C (99.1 °F)] 36.6 °C (97.8 °F)  Pulse:  [56-70] 62  Resp:  [17-18] 18  BP: ()/(46-59) 98/46  SpO2:  [90 %-96 %] 92 %    Physical Exam  Vitals signs and nursing note reviewed.   Constitutional:       General: He is not in acute distress.     Appearance: Normal appearance.   HENT:      Head: Normocephalic and atraumatic.   Eyes:      General: No scleral icterus.     Extraocular Movements: Extraocular movements intact.   Neck:      Musculoskeletal: Normal range of motion and neck supple.   Cardiovascular:      Rate and Rhythm: Normal rate and regular rhythm.      Pulses: Normal pulses.      Heart sounds: Normal heart sounds. No murmur.   Pulmonary:      Effort: Pulmonary effort is normal. No respiratory distress.      Breath sounds: Normal breath sounds. No wheezing, rhonchi or rales.      Comments: Some wheezing  Abdominal:      General: Abdomen is flat. Bowel  sounds are normal. There is no distension.      Palpations: Abdomen is soft.      Tenderness: There is no tenderness. There is no guarding or rebound.   Musculoskeletal:         General: No swelling or tenderness.   Lymphadenopathy:      Cervical: No cervical adenopathy.   Skin:     Coloration: Skin is not jaundiced.      Findings: No erythema.      Comments: Rectal sore without evidence of infection, consistent with radiation changes.     Neurological:      General: No focal deficit present.      Mental Status: He is alert and oriented to person, place, and time. Mental status is at baseline.      Cranial Nerves: No cranial nerve deficit.   Psychiatric:         Mood and Affect: Mood normal.         Behavior: Behavior normal.         Fluids    Intake/Output Summary (Last 24 hours) at 12/12/2019 1627  Last data filed at 12/12/2019 0450  Gross per 24 hour   Intake --   Output 600 ml   Net -600 ml       Laboratory  Recent Labs     12/10/19  0105 12/11/19  0110 12/12/19 0105   WBC 0.9* 1.2* 1.5*   RBC 2.96* 3.05* 3.37*   HEMOGLOBIN 9.6* 10.0* 10.9*   HEMATOCRIT 27.9* 29.0* 31.9*   MCV 94.3 95.1 94.7   MCH 32.4 32.8 32.3   MCHC 34.4 34.5 34.2   RDW 42.5 43.1 43.1   PLATELETCT 34* 31* 38*   MPV 11.0 11.0 10.8     Recent Labs     12/10/19  0105 12/11/19  0110 12/12/19 0105   SODIUM 138 136 140   POTASSIUM 4.4 4.2 4.1   CHLORIDE 108 107 107   CO2 24 26 29   GLUCOSE 143* 187* 131*   BUN 32* 35* 34*   CREATININE 1.14 1.20 1.31   CALCIUM 7.4* 7.5* 7.3*                   Imaging  US-RENAL   Final Result      1.  There is moderate bilateral hydronephrosis, more than is typically expected for history of cystectomy with neobladder formation.      DX-CHEST-PORTABLE (1 VIEW)   Final Result      No radiographic evidence of acute cardiopulmonary process.           Assessment/Plan  * Acute renal failure (ARF) (HCC)  Assessment & Plan  Likely due to dehydration  Renal ultrasound shows moderate bilateral hydronephrosis, more than  expected following neobladder   Good urine output  Improving on his creatinine   keep monitoring    Dehydration  Assessment & Plan  Improving   received 2 Liter bolus for hypotension        Diabetes mellitus type 2 in nonobese (HCC)  Assessment & Plan  -accus with sliding scale coverage to begin.  -diabetic diet  -diabetic education  -follow glycohemoglobin levels long term, most recent hemoglobin A1c 6.4  -Hold metformin with elevated renal functions.  I discussed this with the patient  -monitor for hypoglycemic episodes and adjust control if he should get low    Anal carcinoma (HCC)- (present on admission)  Assessment & Plan  Patient continues at this point on treatment as an outpatient.    COPD (chronic obstructive pulmonary disease) (HCC)- (present on admission)  Assessment & Plan  No exacerbation   RT protocol, nebulizer treatments, oxygen as needed     Pancytopenia (HCC)- (present on admission)  Assessment & Plan  Due to chemotherapy  Improving  No signs of infection and no signs of any bleeding  Hold Lovenox or heparin for DVT prophylaxis    Constipation- (present on admission)  Assessment & Plan  He did not have bowel movement for more than 1 week  No abdominal pain and nausea or vomiting  With MiraLAX and milk of magnesia has improved  Keep monitoring      Hematuria  Assessment & Plan  History of bladder cancer   secondary to significant thrombocytopenia at 11, responded to transfusion of platelets  Trend CBC        Hypotension  Assessment & Plan  Improved with interventions  Patient does not appear septic, lactic acid 1.7  2L normal saline bolus with no significant change in his blood pressure  Patient is not symptomatic   Initiated midodrine and cortef, patient declined transfer to ICU      Stomatitis  Assessment & Plan  Patient appears to have thrush vs mucocytis from chemotherapy.   nystatin swish and swallow   MBX is allowing him to tolerate eating.  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    Current smoker- (present on admission)  Assessment & Plan  -nicotine replacement protocol and cessation education provided       VTE prophylaxis: SCDs, AC c/i due to bleeding and thrombocytopenia.

## 2019-12-14 NOTE — PROGRESS NOTES
[id number] - [id number]    Pt asleep at start of shift, IVF infusing at 100/hr per MAR,  intact, bed low, call bell w/in reach.     BP stable today, 90s/40s and pt asymptomatic. C/o pain in mouth and radiation site today, improved by swish & swallow for mouth and aquaphor for bottom.      New swelling in right upper extremity, pitting edema in forearm. Extremity is cool to touch and patient denies any pain, numbness, or tingling. Double lumen PICC line is patent w/ blood return in both lumens. Dr. Lorenzo notified of findings, new order for ultrasound of RUE and IVF modified from 100ml/hr to 83ml/hr.     Alicja Crowell

## 2019-12-14 NOTE — PROGRESS NOTES
Pt blood pressure is 82/40, asymptomatic. Updated Dr. Marques's. Received orders for LR bolus and cortisol level. LR bolus infusing per MAR and cortisol level drawn and sent to lab.

## 2019-12-14 NOTE — PROGRESS NOTES
Updated Dr. Marques's that after LR bolus pt's blood pressure is still 86/44. Reported to MD that since shift change pt has put out 1500 ml of urine. Received orders for urine osmolality/ electrolytes and IV Solu-Cortef.

## 2019-12-14 NOTE — PROGRESS NOTES
Assumed care of patient at 1900. Pt is A&Ox4, standby assist. On 2 L O2 via NC,  in place. Reports 6/10 pain, medicated per MAR. Denies nausea. R PICC with positive blood return, infusing. Condom cath draining to gravity. Incontinent of stool. Radiation burns to rectum. Bed alarm on for safety. Call light within reach, hourly rounding in place.

## 2019-12-14 NOTE — CARE PLAN
Problem: Bowel/Gastric:  Goal: Will not experience complications related to bowel motility  Outcome: PROGRESSING SLOWER THAN EXPECTED  Note:   Patient experiencing loose stool for multiple days at this point, pt states this happens when he takes stool softeners. Bowel medications are currently being held.      Problem: Skin Integrity  Goal: Risk for impaired skin integrity will decrease  Outcome: PROGRESSING SLOWER THAN EXPECTED  Note:   Extensive radiation wound to rectal area, pt has completed 16/30 treatments.      Alicja Crowell

## 2019-12-14 NOTE — PROGRESS NOTES
Pt's BP improved to 100/58. Oxycodone given for pain. Pt resting comfortably at this time. Call light within reach. Hourly rounding in place.

## 2019-12-14 NOTE — PROGRESS NOTES
[facility] Medicine Daily Progress Note    Date of Service  12/14/2019    Chief Complaint  74 y.o. male admitted 12/7/2019 with oral pain and weight loss.    Hospital Course    Patient with known anal cancer undergoing radiation and chemotherapy  He has had significant oral pain and unable to swallow well the past few weeks.  He has a sore on his bottom that he has been applying hemorrhoidal cream with lidocaine for pain.  He had hematuria develop since admission and platelet count was significantly low.      Interval Problem Update  12/8 Patient feeling tired and blood pressure has been markedly low, he has received 2 Liters of NS bolus and LR has been running at 100/hour since admit.  He still appears dehydrated but is starting to require oxygen to maintain saturation, lungs still sound clear on examination.  He received 1 unit of platelets and urine is clearing in collection tubing from neobladder.  Cr increased overnight with hydration from 1.52 to 1.82 and this could be due to early hydration only and expect it to trend back down.  If pressure does not respond to midodrine and IVF then patient will need to transfer to ICU for pressor support.  Midodrine and solucortef started because of hypotension.    12/9 Patient less fatigued and feeling slightly better when compared to yesterday.  He has been able to eat with pain control received by MBX.  Cr trending back down following IV resuscitation and blood pressure has improved with midodrine and cortef - patient refused transfer to ICU yesterday.  Radiation continued today on schedule.    12/10: The patient continue improving, tolerating diet with improving on thrush, creatinine back to his baseline, blood pressure has been stable around 90/50  12/11: No need for home oxygen, with bowel regime he had bowel movement, he does not feel okay for discharge today, labs showed improving on his creatinine.   12/12: His saturation dropped during the night on her exertion, no  change on exam, home oxygen eval showed he needs 1 L on exertion, waiting for oxygen before discharge  12/13: Hypotension without symptoms, labs was ordered to rule out infection, IV bolus with continuous fluid was a started.   12/14: Improving on his blood pressure and creatinine after fluid, will continue IV fluid, labs was reviewed, having 3-5 bowel movements per day.        Consultants/Specialty  none    Code Status  full    Disposition  To be determined later    Review of Systems  Review of Systems   Constitutional: Negative for chills and fever.   HENT: Negative for congestion and sore throat.    Eyes: Negative for blurred vision and photophobia.   Respiratory: Negative for cough and shortness of breath.    Cardiovascular: Negative for chest pain, claudication and leg swelling.   Gastrointestinal: Negative for abdominal pain, constipation, diarrhea, heartburn, nausea and vomiting.   Genitourinary: Negative for dysuria, flank pain and hematuria (better).        Rectal pain     Musculoskeletal: Negative for joint pain and myalgias.   Skin: Negative for itching and rash.   Neurological: Negative for dizziness, sensory change, speech change, weakness and headaches.   Psychiatric/Behavioral: Negative for depression. The patient is not nervous/anxious and does not have insomnia.         Physical Exam  Temp:  [36.6 °C (97.8 °F)-37.2 °C (99 °F)] 36.6 °C (97.8 °F)  Pulse:  [58-79] 58  Resp:  [16-18] 18  BP: ()/(40-58) 100/49  SpO2:  [93 %-97 %] 97 %    Physical Exam  Vitals signs and nursing note reviewed.   Constitutional:       General: He is not in acute distress.     Appearance: Normal appearance.   HENT:      Head: Normocephalic and atraumatic.   Eyes:      General: No scleral icterus.     Extraocular Movements: Extraocular movements intact.   Neck:      Musculoskeletal: Normal range of motion and neck supple.   Cardiovascular:      Rate and Rhythm: Normal rate and regular rhythm.      Pulses: Normal pulses.       Heart sounds: Normal heart sounds. No murmur.   Pulmonary:      Effort: Pulmonary effort is normal. No respiratory distress.      Breath sounds: Normal breath sounds. No wheezing, rhonchi or rales.      Comments: Some wheezing  Abdominal:      General: Abdomen is flat. Bowel sounds are normal. There is no distension.      Palpations: Abdomen is soft.      Tenderness: There is no tenderness. There is no guarding or rebound.   Musculoskeletal:         General: No swelling or tenderness.   Lymphadenopathy:      Cervical: No cervical adenopathy.   Skin:     Coloration: Skin is not jaundiced.      Findings: No erythema.      Comments: Rectal sore without evidence of infection, consistent with radiation changes.     Neurological:      General: No focal deficit present.      Mental Status: He is alert and oriented to person, place, and time. Mental status is at baseline.      Cranial Nerves: No cranial nerve deficit.   Psychiatric:         Mood and Affect: Mood normal.         Behavior: Behavior normal.         Fluids    Intake/Output Summary (Last 24 hours) at 12/14/2019 1238  Last data filed at 12/14/2019 0436  Gross per 24 hour   Intake 236 ml   Output 1875 ml   Net -1639 ml       Laboratory  Recent Labs     12/12/19  0105 12/13/19  1545 12/14/19  0000   WBC 1.5* 1.4* 1.5*   RBC 3.37* 3.55* 3.00*   HEMOGLOBIN 10.9* 11.5* 9.8*   HEMATOCRIT 31.9* 34.0* 28.5*   MCV 94.7 95.8 95.0   MCH 32.3 32.4 32.7   MCHC 34.2 33.8 34.4   RDW 43.1 44.1 43.4   PLATELETCT 38* 52* 51*   MPV 10.8 10.6 10.8     Recent Labs     12/12/19  0105 12/13/19  1545 12/14/19  0000   SODIUM 140 138 137   POTASSIUM 4.1 3.7 3.8   CHLORIDE 107 104 107   CO2 29 28 27   GLUCOSE 131* 151* 139*   BUN 34* 29* 30*   CREATININE 1.31 1.61* 1.46*   CALCIUM 7.3* 7.5* 7.0*                   Imaging  US-RENAL   Final Result      1.  There is moderate bilateral hydronephrosis, more than is typically expected for history of cystectomy with neobladder formation.       DX-CHEST-PORTABLE (1 VIEW)   Final Result      No radiographic evidence of acute cardiopulmonary process.      US-EXTREMITY VENOUS UPPER UNILAT RIGHT    (Results Pending)        Assessment/Plan  * Acute renal failure (ARF) ([facility])  Assessment & Plan  Likely due to dehydration  Renal ultrasound shows moderate bilateral hydronephrosis, more than expected following neobladder   Improving on his creatinine   keep monitoring    Hypotension  Assessment & Plan  Improved with IV fluid and worsening after holding IV fluid  Patient does not appear septic, lactic acid 1.7  2L normal saline bolus with no significant change in his blood pressure  Patient is not symptomatic   Initiated midodrine and cortef, patient declined transfer to ICU  We will continue IV fluid and repeat labs to rule out infection      Dehydration  Assessment & Plan  Improving however after discontinuing IV fluids the patient developed hypotension  Bolused and continuous IV fluids          Diabetes mellitus type 2 in nonobese ([facility])  Assessment & Plan  -accus with sliding scale coverage to begin.  -diabetic diet  -diabetic education  -follow glycohemoglobin levels long term, most recent hemoglobin A1c 6.4  -Hold metformin with elevated renal functions.  I discussed this with the patient  -monitor for hypoglycemic episodes and adjust control if he should get low    Anal carcinoma (HCC)- (present on admission)  Assessment & Plan  Patient continues at this point on treatment as an outpatient.    COPD (chronic obstructive pulmonary disease) ([facility])- (present on admission)  Assessment & Plan  No exacerbation   RT protocol, nebulizer treatments, oxygen as needed     Pancytopenia (HCC)- (present on admission)  Assessment & Plan  Due to chemotherapy  Improving  No signs of infection and no signs of any bleeding  Hold Lovenox or heparin for DVT prophylaxis    Constipation- (present on admission)  Assessment & Plan  Resolved  He has many bowel movements  Hold MiraLAX  and bowel regimen  IV fluids  Monitoring closely        Hematuria  Assessment & Plan  History of bladder cancer   secondary to significant thrombocytopenia at 11, responded to transfusion of platelets  Trend CBC        Stomatitis  Assessment & Plan  Patient appears to have thrush vs mucocytis from chemotherapy.   nystatin swish and swallow   MBX is allowing him to tolerate eating.  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    Current smoker- (present on admission)  Assessment & Plan  -nicotine replacement protocol and cessation education provided       VTE prophylaxis: SCDs, AC c/i due to bleeding and thrombocytopenia.

## 2019-12-14 NOTE — CARE PLAN
Problem: Discharge Barriers/Planning  Goal: Patient's continuum of care needs will be met  Outcome: PROGRESSING AS EXPECTED  Intervention: Collaborate with Transitional Care Team and Interdisciplinary Team to meet discharge needs  Note:   Pt was planning for discharge today. Low blood pressure, restarted on IV fluids and continue scheduled Midodrine. Pt aware that he will be staying for continued monitoring. Questions answered.      Problem: Pain Management  Goal: Pain level will decrease to patient's comfort goal  Outcome: PROGRESSING AS EXPECTED  Intervention: Follow pain managment plan developed in collaboration with patient and Interdisciplinary Team  Note:   Pt reports pain using 0-10 scale, medicated per MAR.

## 2019-12-15 PROBLEM — M79.89 LIMB SWELLING: Status: ACTIVE | Noted: 2019-01-01

## 2019-12-15 PROBLEM — Z71.89 ACP (ADVANCE CARE PLANNING): Status: ACTIVE | Noted: 2019-01-01

## 2019-12-15 NOTE — ASSESSMENT & PLAN NOTE
I had a long discussion with the patient about his diseases, and the poor prognosis of cancer, I answered all his questions, the patient understands that he has noncurable disease, he did not desired yet about the CODE STATUS however he wants to stay full code at this time.

## 2019-12-15 NOTE — PROGRESS NOTES
Received in bed, aaox4, c/o throat pain, tylenol given since BP in low side, MD aware, US done and results relayed to MD, started on lovenox, will dc PICC 24 hours after start of lovenox, POC discussed, needs attended.

## 2019-12-15 NOTE — PROGRESS NOTES
Hospital Medicine Daily Progress Note    Date of Service  12/15/2019    Chief Complaint  74 y.o. male admitted 12/7/2019 with oral pain and weight loss.    Hospital Course    Patient with known anal cancer undergoing radiation and chemotherapy  He has had significant oral pain and unable to swallow well the past few weeks.  He has a sore on his bottom that he has been applying hemorrhoidal cream with lidocaine for pain.  He had hematuria develop since admission and platelet count was significantly low.      Interval Problem Update  12/8 Patient feeling tired and blood pressure has been markedly low, he has received 2 Liters of NS bolus and LR has been running at 100/hour since admit.  He still appears dehydrated but is starting to require oxygen to maintain saturation, lungs still sound clear on examination.  He received 1 unit of platelets and urine is clearing in collection tubing from neobladder.  Cr increased overnight with hydration from 1.52 to 1.82 and this could be due to early hydration only and expect it to trend back down.  If pressure does not respond to midodrine and IVF then patient will need to transfer to ICU for pressor support.  Midodrine and solucortef started because of hypotension.    12/9 Patient less fatigued and feeling slightly better when compared to yesterday.  He has been able to eat with pain control received by MBX.  Cr trending back down following IV resuscitation and blood pressure has improved with midodrine and cortef - patient refused transfer to ICU yesterday.  Radiation continued today on schedule.    12/10: The patient continue improving, tolerating diet with improving on thrush, creatinine back to his baseline, blood pressure has been stable around 90/50  12/11: No need for home oxygen, with bowel regime he had bowel movement, he does not feel okay for discharge today, labs showed improving on his creatinine.   12/12: His saturation dropped during the night on her exertion, no  change on exam, home oxygen eval showed he needs 1 L on exertion, waiting for oxygen before discharge  12/13: Hypotension without symptoms, labs was ordered to rule out infection, IV bolus with continuous fluid was a started.   12/14: Improving on his blood pressure and creatinine after fluid, will continue IV fluid, labs was reviewed, having 3-5 bowel movements per day.  12/15: Ultrasound showed DVT on his upper extremity, Lovenox was started, blood pressure around 90s, encouraged him to eat, continue IV fluids 75, no significant diarrhea, to CODE STATUS and advanced care planning was discussed with the patient and he wants to be full code.       Consultants/Specialty  none    Code Status  full    Disposition  To be determined later    Review of Systems  Review of Systems   Constitutional: Negative for chills and fever.   HENT: Negative for congestion and sore throat.    Eyes: Negative for blurred vision and photophobia.   Respiratory: Negative for cough and shortness of breath.    Cardiovascular: Negative for chest pain, claudication and leg swelling.   Gastrointestinal: Negative for abdominal pain, constipation, diarrhea, heartburn, nausea and vomiting.   Genitourinary: Negative for dysuria, flank pain and hematuria (better).        Rectal pain     Musculoskeletal: Negative for joint pain and myalgias.   Skin: Negative for itching and rash.   Neurological: Negative for dizziness, sensory change, speech change, weakness and headaches.   Psychiatric/Behavioral: Negative for depression. The patient is not nervous/anxious and does not have insomnia.         Physical Exam  Temp:  [36.6 °C (97.8 °F)-37.1 °C (98.8 °F)] 36.7 °C (98 °F)  Pulse:  [53-60] 60  Resp:  [15-18] 16  BP: (90-98)/(47-56) 90/47  SpO2:  [94 %-96 %] 94 %    Physical Exam  Vitals signs and nursing note reviewed.   Constitutional:       General: He is not in acute distress.     Appearance: Normal appearance.   HENT:      Head: Normocephalic and  atraumatic.   Eyes:      General: No scleral icterus.     Extraocular Movements: Extraocular movements intact.   Neck:      Musculoskeletal: Normal range of motion and neck supple.   Cardiovascular:      Rate and Rhythm: Normal rate and regular rhythm.      Pulses: Normal pulses.      Heart sounds: Normal heart sounds. No murmur.   Pulmonary:      Effort: Pulmonary effort is normal. No respiratory distress.      Breath sounds: Normal breath sounds. No wheezing, rhonchi or rales.   Abdominal:      General: Abdomen is flat. Bowel sounds are normal. There is no distension.      Palpations: Abdomen is soft.      Tenderness: There is no tenderness. There is no guarding or rebound.   Musculoskeletal:         General: Swelling present. No tenderness.      Comments: On the right upper extremity   Lymphadenopathy:      Cervical: No cervical adenopathy.   Skin:     Coloration: Skin is not jaundiced.      Findings: No erythema.      Comments: Rectal sore without evidence of infection, consistent with radiation changes.     Neurological:      General: No focal deficit present.      Mental Status: He is alert and oriented to person, place, and time. Mental status is at baseline.      Cranial Nerves: No cranial nerve deficit.   Psychiatric:         Mood and Affect: Mood normal.         Behavior: Behavior normal.         Fluids    Intake/Output Summary (Last 24 hours) at 12/15/2019 1446  Last data filed at 12/15/2019 0600  Gross per 24 hour   Intake 1011.22 ml   Output 1350 ml   Net -338.78 ml       Laboratory  Recent Labs     12/13/19  1545 12/14/19  0000 12/15/19  0600   WBC 1.4* 1.5* 1.0*   RBC 3.55* 3.00* 2.93*   HEMOGLOBIN 11.5* 9.8* 9.5*   HEMATOCRIT 34.0* 28.5* 27.6*   MCV 95.8 95.0 94.2   MCH 32.4 32.7 32.4   MCHC 33.8 34.4 34.4   RDW 44.1 43.4 43.0   PLATELETCT 52* 51* 78*   MPV 10.6 10.8 10.7     Recent Labs     12/13/19  1545 12/14/19  0000 12/15/19  0600   SODIUM 138 137 139   POTASSIUM 3.7 3.8 4.1   CHLORIDE 104 107  109   CO2 28 27 26   GLUCOSE 151* 139* 156*   BUN 29* 30* 30*   CREATININE 1.61* 1.46* 1.41*   CALCIUM 7.5* 7.0* 7.1*                   Imaging  US-EXTREMITY VENOUS UPPER UNILAT RIGHT   Final Result      US-RENAL   Final Result      1.  There is moderate bilateral hydronephrosis, more than is typically expected for history of cystectomy with neobladder formation.      DX-CHEST-PORTABLE (1 VIEW)   Final Result      No radiographic evidence of acute cardiopulmonary process.           Assessment/Plan  * Limb swelling  Assessment & Plan  On the R Upper Ex  U/S: DVT on axillary and basilic veins adhered to the PICC line.  The patient was not receiving Lovenox due to severe thrombocytopenia due to chemotherapy.  PICC line will be removed after full dose of Lovenox  Lovenox twice daily starting  Risk/benefit of medication was discussed with the patient and he agreed.  Monitor platelets daily    Hypotension  Assessment & Plan  Improved with IV fluid and worsening after holding IV fluid  Patient does not appear septic, lactic acid 1.7  2L normal saline bolus with no significant change in his blood pressure  Patient is not symptomatic   Initiated midodrine and cortef, patient declined transfer to ICU  We will continue IV fluid and repeat labs to rule out infection  Check cortisol tomorrow morning to rule out adrenal deficiency    Dehydration  Assessment & Plan  Improving however after discontinuing IV fluids the patient developed hypotension  Bolused and continuous IV fluids          Diabetes mellitus type 2 in nonobese (HCC)  Assessment & Plan  -accus with sliding scale coverage to begin.  -diabetic diet  -diabetic education  -follow glycohemoglobin levels long term, most recent hemoglobin A1c 6.4  -Hold metformin with elevated renal functions.  I discussed this with the patient  -monitor for hypoglycemic episodes and adjust control if he should get low    Acute renal failure (ARF) (HCC)  Assessment & Plan  Likely due to  dehydration  Renal ultrasound shows moderate bilateral hydronephrosis, more than expected following neobladder   Improving on his creatinine   keep monitoring    Anal carcinoma (HCC)- (present on admission)  Assessment & Plan  Stage IIb rectal cancer  He is receiving radiation therapy during this admission  Follow-up with his oncologist as outpatient    COPD (chronic obstructive pulmonary disease) (HCC)- (present on admission)  Assessment & Plan  No exacerbation   RT protocol, nebulizer treatments, oxygen as needed     ACP (advance care planning)  Assessment & Plan  I had a long discussion with the patient about his diseases, and the poor prognosis of cancer, I answered all his questions, the patient understands that he has noncurable disease, he did not desired yet about the CODE STATUS however he wants to stay full code at this time.     Hematuria  Assessment & Plan  History of bladder cancer   secondary to significant thrombocytopenia at 11, responded to transfusion of platelets  Trend CBC        Stomatitis  Assessment & Plan  Patient appears to have thrush vs mucocytis from chemotherapy.   nystatin swish and swallow   MBX is allowing him to tolerate eating.  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    Current smoker- (present on admission)  Assessment & Plan  -nicotine replacement protocol and cessation education provided       VTE prophylaxis: SCDs, Lovenox, his platelets improved

## 2019-12-15 NOTE — CARE PLAN
Problem: Pain Management  Goal: Pain level will decrease to patient's comfort goal  Outcome: PROGRESSING AS EXPECTED  Note:   Prn meds per MAR     Problem: Venous Thromboembolism (VTW)/Deep Vein Thrombosis (DVT) Prevention:  Goal: Patient will participate in Venous Thrombosis (VTE)/Deep Vein Thrombosis (DVT)Prevention Measures  Outcome: PROGRESSING SLOWER THAN EXPECTED  Note:   +clot on PICC, lovenox initiated

## 2019-12-15 NOTE — PROGRESS NOTES
Lab called with critical result of :  Results for BLACK CARMONA (MRN [id number]) as of 12/15/2019 06:47   Ref. Range 12/15/2019 06:00 12/15/2019 06:01   WBC Latest Ref Range: 4.8 - 10.8 K/uL 1.0 (LL)    Critical lab result read back to Lab  Lionel Lorenzo M.D.notified of critical lab result at(DAY SHIFT RN TO F/U DUE TO CHANGE OF SHIFT).  Critical lab result read back by Lionel Lorenzo M.D.(PENDING)

## 2019-12-15 NOTE — PROGRESS NOTES
RE: BEDSIDE REPORT  REPORT ACCEPTED AND CARE ASSUMED    PLAN OF CARE: MONITOR FOR HYPOTENSION, BASELINE 80-90S/ 40S 50S, PATIENT ASYMPTOMATIC. A.M. LABS VIA DL RIGHT ARM PICC, 2L O2 NC.    D/C PLAN: D/C WITH HOME O2, IV FLUIDS (OKAY TO USE PICC PER DR. BRANDON), MONITOR BOWEL MOVEMENTS. U/S FOR RIGHT ARM EDEMA ORDERED, F/U US EXT.[id number]    FALL PRECAUTIONS WITH PERSON ASSIST; HOURLY ROUNDING ONGOING

## 2019-12-15 NOTE — CARE PLAN
Problem: Communication  Goal: The ability to communicate needs accurately and effectively will improve  Outcome: PROGRESSING AS EXPECTED     Problem: Safety  Goal: Will remain free from injury  Outcome: PROGRESSING AS EXPECTED  Goal: Will remain free from falls  Outcome: PROGRESSING AS EXPECTED     Problem: Infection  Goal: Will remain free from infection  Outcome: PROGRESSING AS EXPECTED     Problem: Venous Thromboembolism (VTW)/Deep Vein Thrombosis (DVT) Prevention:  Goal: Patient will participate in Venous Thrombosis (VTE)/Deep Vein Thrombosis (DVT)Prevention Measures  Outcome: PROGRESSING AS EXPECTED     Problem: Bowel/Gastric:  Goal: Normal bowel function is maintained or improved  Outcome: PROGRESSING AS EXPECTED  Goal: Will not experience complications related to bowel motility  Outcome: PROGRESSING AS EXPECTED     Problem: Knowledge Deficit  Goal: Knowledge of disease process/condition, treatment plan, diagnostic tests, and medications will improve  Outcome: PROGRESSING AS EXPECTED  Goal: Knowledge of the prescribed therapeutic regimen will improve  Outcome: PROGRESSING AS EXPECTED     Problem: Discharge Barriers/Planning  Goal: Patient's continuum of care needs will be met  Outcome: PROGRESSING AS EXPECTED     Problem: Pain Management  Goal: Pain level will decrease to patient's comfort goal  Outcome: PROGRESSING AS EXPECTED     Problem: Fluid Volume:  Goal: Will maintain balanced intake and output  Outcome: PROGRESSING AS EXPECTED     Problem: Skin Integrity  Goal: Risk for impaired skin integrity will decrease  Outcome: PROGRESSING AS EXPECTED     Problem: Urinary Elimination:  Goal: Ability to reestablish a normal urinary elimination pattern will improve  Outcome: PROGRESSING AS EXPECTED     Problem: Respiratory:  Goal: Respiratory status will improve  Outcome: PROGRESSING AS EXPECTED     Problem: Mobility  Goal: Risk for activity intolerance will decrease  Outcome: PROGRESSING AS EXPECTED  CONT.  MONITOR FOR HYPOTENSION; MONITOR LABS; CONT 2L O2 NC.  IV FLUIDS (OKAY TO USE PICC PER DR. BRANDON), MONITOR BOWEL MOVEMENTS. U/S FOR RIGHT ARM EDEMA ORDERED, F/U US EXT.[id number]    D/C PLAN: D/C WITH HOME O2

## 2019-12-15 NOTE — ASSESSMENT & PLAN NOTE
PICC line associated  Lovenox discontinued until platelet count >50.  Transition to comfort care measures only since 1/7

## 2019-12-15 NOTE — PROGRESS NOTES
"Received in bed, on legal hold with 1:1 sitter in room at all times, \"I slept good\", q2 turns observed, seen by Md, will start lovenox, needs attended.   "

## 2019-12-16 NOTE — PROGRESS NOTES
Lab called with critical result of   Results for BLACK CARMONA (MRN [id number]) as of 12/16/2019 03:11   Ref. Range 12/15/2019 06:00 12/15/2019 06:01 12/15/2019 12:54 12/15/2019 17:27 12/15/2019 20:40 12/16/2019 00:40   WBC Latest Ref Range: 4.8 - 10.8 K/uL 1.0 (LL)     2.2 (LL)   This critical lab result is within parameters

## 2019-12-16 NOTE — PROGRESS NOTES
RE: BEDSIDE REPORT  REPORT ACCEPTED AND CARE ASSUMED     PLAN OF CARE: LAB DRAW A.M. LABS DUE TO RIGHT ARM  DVT, DO NOT USE PICC IN RIGHT ARM  CONDOM CATH ON; O2 2 L NC    FALL PRECAUTIONS WITH ONE PERSON ASSIST; HOURLY ROUNDING ONGOING; CALL LIGHT WITHIN REACH; BED IN LOCKED  AND LOW POSITION

## 2019-12-16 NOTE — ON TREATMENT VISIT
ON TREATMENT  NOTE  RADIATION ONCOLOGY DEPARTMENT    Patient name:  Corona Bean    Primary Physician:  Francis Marcano M.D. MRN: [id number]  [facility]: [id number]   Referring physician:  Beau Torrez M.D. : 1945, 74 y.o.     ENCOUNTER DATE:  19    DIAGNOSIS:  Anal carcinoma (HCC)  Staging form: Anus, AJCC 8th Edition  - Clinical stage from 2019: Stage IIB (cT3, cN0, cM0) - Signed by Beau Torrez M.D. on 2019  Tumor location in anus: Anal      TREATMENT SUMMARY:  Aria Treatment Information        Some values may be hidden. Unless noted otherwise, only the newest values recorded on each date are displayed.         Aria Treatment Summary 19   Course First Treatment Date 2019   Course Last Treatment Date 2019   Anal Plan from Course Anal   Fraction 17 of 30   Elapsed Course Days  @ [id number]   Prescribed Fraction Dose 180 cGy   Prescribed Total Dose 5,400 cGy   Anal Reference Point from Course Anal   Elapsed Course Days  @ [id number]   Session Dose 180 cGy   Total Dose 3,060 cGy   Anal CP Reference Point from Course Anal   Elapsed Course Days  @ [id number]   Session Dose 186 cGy   Total Dose 3,170 cGy             SUBJECTIVE:  Hospitalized due to mucositis.    VITAL SIGNS:  KPS: 100, Fully active, able to carry on all pre-disease performed without restriction (ECOG equivalent 0)     Pain Assessment 2019   Pain Assessment Acute Pain -   Pain Score 7 7   Pain Loc Rectum -   What increases pain? sitting/movement -   What decreases pain? tylenol (pt also given rx for lidocaine which he will  today) -   Some recent data might be hidden          PHYSICAL EXAM:    Physical Exam  Vitals signs reviewed.   HENT:      Head: Normocephalic and atraumatic.   Eyes:      Pupils: Pupils are equal, round, and reactive to light.   Neurological:      Mental Status: He is alert.      +mouth mucositis    Toxicity Assessment 2019  11/21/2019   Toxicity Assessment Male Pelvis Male Pelvis Male Pelvis   Fatigue (lethargy, malaise, asthenia) Increased fatigue over baseline, but not altering normal activities Increased fatigue over baseline, but not altering normal activities None   Radiation Dermatitis Faint erythema or dry desquamation Faint erythema or dry desquamation None   Anorexia Loss of appetite None None   Colitis None None None   Constipation None Requiring stool softener or dietary modification None   Dehydration None None None   Diarrhea w/o Colostomy None None None   Flatulence None None None   Nausea None None None   Proctitis None None None   Vomiting None None None   RT - Pain due to RT Moderate pain, pain or analgesics interfering with function, but not interfering with activities of daily living Mild pain not interfering with function None   Tumor Pain (onset or exacerbation of tumor pain due to treatment) Moderate pain, pain or analgesics interfering with function, but not interfering with activities of daily living Mild pain not interfering with function None   Dysuria (painful urination) None None None   Urinary Frequency Normal Normal Normal   Urinary Urgency None None None   Bladder Spasms Absent Absent Absent   Incontinence None Spontaneous, some control None   Urinary Retention Normal Normal Normal       CURRENT MEDICATIONS:  No current facility-administered medications for this encounter.   No current outpatient medications on file.    Facility-Administered Medications Ordered in Other Encounters:   •  enoxaparin (LOVENOX) inj 60 mg, 60 mg, Subcutaneous, Q12HRS, Lionel Lorenzo M.D., 60 mg at 12/16/19 0544  •  calcium carbonate (TUMS) chewable tab 500 mg, 500 mg, Oral, BID, Lionel Lorenzo M.D., 500 mg at 12/16/19 0950  •  NS infusion, , Intravenous, Continuous, Lionel Lorenzo M.D., Last Rate: 100 mL/hr at 12/16/19 1537  •  tiotropium (SPIRIVA) 18 MCG inhalation capsule 1 Cap, 1 Cap, Inhalation, DAILY, Saray ROSE  MARCE MckenzieO.  •  senna-docusate (PERICOLACE or SENOKOT S) 8.6-50 MG per tablet 2 Tab, 2 Tab, Oral, BID, Stopped at 12/13/19 1825 **AND** polyethylene glycol/lytes (MIRALAX) PACKET 1 Packet, 1 Packet, Oral, TID, Stopped at 12/13/19 1825 **AND** magnesium hydroxide (MILK OF MAGNESIA) suspension 30 mL, 30 mL, Oral, BID, Stopped at 12/13/19 1825 **AND** bisacodyl (DULCOLAX) suppository 10 mg, 10 mg, Rectal, QDAY PRN, Lionel Lorenzo M.D.  •  midodrine (PROAMATINE) tablet 10 mg, 10 mg, Oral, TID WITH MEALS, HELENA Khan.O., 10 mg at 12/16/19 1317  •  mineral oil-pet hydrophilic (AQUAPHOR) ointment, , Topical, BID, HELENA Khan.O.  •  Respiratory Care per Protocol, , Nebulization, Continuous RT, HELENA Khan.O.  •  albuterol inhaler 2 Puff, 2 Puff, Inhalation, Q6HRS PRN, Francine Pérez M.D.  •  timolol (TIMOPTIC) 0.25 % ophthalmic solution 1 Drop, 1 Drop, Right Eye, BID, Francine Pérez M.D., 1 Drop at 12/16/19 0529  •  acetaminophen (TYLENOL) tablet 650 mg, 650 mg, Oral, Q6HRS PRN, Francine Pérez M.D., 650 mg at 12/15/19 2323  •  ondansetron (ZOFRAN) syringe/vial injection 4 mg, 4 mg, Intravenous, Q4HRS PRN, Francine Pérez M.D., 4 mg at 12/16/19 1537  •  ondansetron (ZOFRAN ODT) dispertab 4 mg, 4 mg, Oral, Q4HRS PRN, Francine Pérez M.D., 4 mg at 12/15/19 2323  •  Pharmacy Consult Request, 1 Each, Other, PHARMACY TO DOSE, Francine Pérez M.D.  •  labetalol (NORMODYNE/TRANDATE) injection 10 mg, 10 mg, Intravenous, Q4HRS PRN, Francine Pérez M.D.  •  insulin regular (HUMULIN R) injection 1-6 Units, 1-6 Units, Subcutaneous, 4X/DAY ACHS, Stopped at 12/16/19 1100 **AND** Accu-Chek ACHS, , , Q AC AND BEDTIME(S) **AND** [COMPLETED] NOTIFY MD and PharmD, , , Once **AND** glucose 4 g chewable tablet 16 g, 16 g, Oral, Q15 MIN PRN **AND** DEXTROSE 10% BOLUS 250 mL, 250 mL, Intravenous, Q15 MIN PRN, Francine Pérez M.D.  •  oxyCODONE immediate-release (ROXICODONE) tablet 5 mg, 5 mg, Oral, Q4HRS PRN, Francine Pérez M.D., 5 mg  at 12/15/19 2323  •  Maalox Plus - Diphenhydramine - Lidocaine (MBX Oral Solution), 5 mL, Oral, Q6HRS PRN, Francine Pérez M.D., 5 mL at 12/15/19 1047    LABORATORY DATA:   Lab Results   Component Value Date/Time    SODIUM 140 12/16/2019 12:40 AM    POTASSIUM 3.8 12/16/2019 12:40 AM    CHLORIDE 109 12/16/2019 12:40 AM    CO2 25 12/16/2019 12:40 AM    GLUCOSE 166 (H) 12/16/2019 12:40 AM    BUN 37 (H) 12/16/2019 12:40 AM    CREATININE 1.65 (H) 12/16/2019 12:40 AM         Lab Results   Component Value Date/Time    WBC 2.2 (LL) 12/16/2019 12:40 AM    HEMOGLOBIN 11.3 (L) 12/16/2019 12:40 AM    HEMATOCRIT 33.2 (L) 12/16/2019 12:40 AM    MCV 95.4 12/16/2019 12:40 AM    PLATELETCT 113 (L) 12/16/2019 12:40 AM        RADIOLOGY DATA:  Dx-chest-portable (1 View)    Result Date: 12/7/2019 12/7/2019 5:09 PM HISTORY/REASON FOR EXAM: possible sepsis.. TECHNIQUE/EXAM DESCRIPTION AND NUMBER OF VIEWS: Single AP view of the chest. COMPARISON: 6/15/2017 FINDINGS: Hardware: Right upper extremity PICC line tip overlies the SVC. Lungs: No consolidation detected. Large volumes are again seen. Pleura:  No pleural space process is seen. Heart and mediastinum: The cardiomediastinal contours are normal.     No radiographic evidence of acute cardiopulmonary process.    Us-renal    Result Date: 12/7/2019 12/7/2019 5:34 PM HISTORY/REASON FOR EXAM:  Abnormal Labs. History of bladder cancer with prior cystectomy. TECHNIQUE/EXAM DESCRIPTION: Renal ultrasound. COMPARISON:  CT 10/18/2019 FINDINGS: The right kidney measures 9.17 cm. The left kidney measures 9.97 cm. There is bilateral moderate hydronephrosis. There are no abnormal calcifications. 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 Distal ureters are not visualized.     1.  There is moderate bilateral hydronephrosis, more than is typically expected for history of cystectomy with neobladder formation.    Us-extremity Venous Upper Unilat Right    Result Date: 12/15/2019   Upper  Extremity  Venous Duplex Report  Vascular Laboratory  CONCLUSIONS  Acute DVT rt axillary and basilic veins.  called to TASHIA Gregorio  Exam Date:     12/15/2019 08:30  Room #:     Inpatient  Priority:     Routine  Ht (in):             Wt (lb):  Ordering Physician:        AYUSH BRANDON  Referring Physician:       [id number]  Sonographer:               Gema Hyed RVT  Study Type:  Technical Quality:         Adequate  Age:    74    Gender:     M  MRN:    [id number]  :    1945      BSA:  Indications:     Localized swelling, mass and lump, right upper limb, Pain in                    right arm, Edema  CPT Codes:       75650  ICD Codes:       R22.31  M79.601  782.3  History:         Pain (mild) and swelling (mild) in Right upper extremity at                   PICC line site; No previous duplex on file.  Limitations:  PROCEDURES:  Right upper extremity venous duplex imaging.  The following venous structures were evaluated: internal jugular,  subclavian, axillary, brachial, radial, ulnar, cephalic and basilic veins.  Serial compression, augmentation maneuvers,  color and spectral Doppler  flow evaluations were performed.  FINDINGS:  Right upper extremity-  Thrombus, acute & near-occlusive, visualized within the axillary and  basilic veins adhered to the PICC line.  All other veins of the right upper extremity demonstrate normal flow  dynamics with no evidence of deep vein thrombosis or superficial  thrombophlebitis.  Flow was evaluated in the contralateral subclavian vein and normal venous  flow dynamics including spontaneous flow, respiratory phasic variation and  augmentation were demonstrated.  Kwabena Hein MD  (Electronically Signed)  Final Date:      15 December 2019                   09:43      IMPRESSION:  Anal carcinoma (HCC)  Staging form: Anus, AJCC 8th Edition  - Clinical stage from 2019: Stage IIB (cT3, cN0, cM0) - Signed by Beau Torrez  M.D. on 11/5/2019  Tumor location in anus: Anal      PLAN:  No change in treatment plan    Disposition:  Treatment plan reviewed. Questions answered. Continue therapy outlined.     Beau Torrez M.D.    No orders of the defined types were placed in this encounter.

## 2019-12-16 NOTE — PROGRESS NOTES
Bedside report received from night shift RN. Assumed care. Pt is A&Ox4. Pt is in bed. Pt denies pain at this time. Pt was updated on the plan of care for the day. All questions answered.   Pt has call light within reach and bed is in lowest position.  All fall precautions in place. Pt had no other needs at this time, hourly rounding in place.  Condom catheter in place.  Patients last BP 84/39, doctor notified and rate of NS changed to 100mL an hour. Patient is asymptomatic. Will continue to monitor.

## 2019-12-17 NOTE — PROGRESS NOTES
"/56   Pulse 62   Temp 36.7 °C (98 °F) (Oral)   Resp 16   Ht 1.727 m (5' 8\")   Wt 62.1 kg (136 lb 14.5 oz)   SpO2 95%   BMI 20.82 kg/m²   Received report and assumed care of pt at 1900. Pt is A&O x4. Pt has had multiple bouts of diarrhea, MD paged. Pt has condom cath on draining clear straw colored urine. PIV is patent and infusing. Bed is in lowest position and call light is within reach. Hourly rounding is in place.   "

## 2019-12-17 NOTE — CARE PLAN
Problem: Safety  Goal: Will remain free from falls  Outcome: PROGRESSING AS EXPECTED  Intervention: Implement fall precautions  Flowsheets  Taken 12/16/2019 2105  Environmental Precautions: Treaded Slipper Socks on Patient;Personal Belongings, Wastebasket, Call Bell etc. in Easy Reach;Transferred to Stronger Side;Report Given to Other Health Care Providers Regarding Fall Risk;Communication Sign for Patients & Families;Bed in Low Position;Mobility Assessed & Appropriate Sign Placed  Taken 12/16/2019 2231  Chair/Bed Strip Alarm: Yes - Alarm On     Problem: Infection  Goal: Will remain free from infection  Outcome: PROGRESSING AS EXPECTED  Intervention: Assess signs and symptoms of infection  Note:   Vital signs q 4 hours. Pt is afebrile.

## 2019-12-17 NOTE — PROGRESS NOTES
Samia from Lab called with critical result of Ca 6.8 at 0154. Critical lab result read back to Samia.   Dr. Novoa notified of critical lab result at 0209.  Critical lab result read back by Dr. Novoa.

## 2019-12-17 NOTE — CARE PLAN
Problem: Nutritional:  Goal: Achieve adequate nutritional intake  Description  Patient will consume 50% of meals   Outcome: MET     He is eating %.

## 2019-12-17 NOTE — PROGRESS NOTES
[facility] Medicine Daily Progress Note    Date of Service  12/17/2019    Chief Complaint  74 y.o. male admitted 12/7/2019 with oral pain and weight loss.    Hospital Course    Patient with known anal cancer undergoing radiation and chemotherapy  He has had significant oral pain and unable to swallow well the past few weeks.  He has a sore on his bottom that he has been applying hemorrhoidal cream with lidocaine for pain.  He had hematuria develop since admission when platelet count was significantly low.   He developed a DVT in RUE and is on anticoagulation with Lovenox.        Interval Problem Update  12/8 Patient feeling tired and blood pressure has been markedly low, he has received 2 Liters of NS bolus and LR has been running at 100/hour since admit.  He still appears dehydrated but is starting to require oxygen to maintain saturation, lungs still sound clear on examination.  He received 1 unit of platelets and urine is clearing in collection tubing from neobladder.  Cr increased overnight with hydration from 1.52 to 1.82 and this could be due to early hydration only and expect it to trend back down.  If pressure does not respond to midodrine and IVF then patient will need to transfer to ICU for pressor support.  Midodrine and solucortef started because of hypotension.    12/9 Patient less fatigued and feeling slightly better when compared to yesterday.  He has been able to eat with pain control received by MBX.  Cr trending back down following IV resuscitation and blood pressure has improved with midodrine and cortef - patient refused transfer to ICU yesterday.  Radiation continued today on schedule.    12/17 Patient with adequate blood pressure with IV hydration but tends to drop when stopped, he states his oral intake is poor as his taste is very bland and nothing is good anymore.  He is agreeable to take an appetite stimulant with marinol and protein supplements in form of shakes.  Calcium is 6.8 but when  corrected for hypoalbuminemia, is 8.4.  PICC line to be removed today.    Consultants/Specialty  none    Code Status  full    Disposition  tbd    Review of Systems  Review of Systems   Constitutional: Negative for chills and fever.   HENT: Negative for congestion and sore throat.    Eyes: Negative for blurred vision and photophobia.   Respiratory: Negative for cough and shortness of breath.    Cardiovascular: Negative for chest pain, claudication and leg swelling.   Gastrointestinal: Negative for abdominal pain, constipation, diarrhea, heartburn, nausea and vomiting.   Genitourinary: Negative for dysuria, flank pain and hematuria.        Rectal pain     Musculoskeletal: Negative for joint pain and myalgias.   Skin: Negative for itching and rash.   Neurological: Negative for dizziness, sensory change, speech change, weakness and headaches.   Psychiatric/Behavioral: Negative for depression. The patient is not nervous/anxious and does not have insomnia.         Physical Exam  Temp:  [36.7 °C (98 °F)-37.4 °C (99.3 °F)] 36.8 °C (98.3 °F)  Pulse:  [60-70] 66  Resp:  [16] 16  BP: ()/(45-56) 97/49  SpO2:  [93 %-96 %] 96 %    Physical Exam  Vitals signs and nursing note reviewed.   Constitutional:       General: He is not in acute distress.     Appearance: Normal appearance.   HENT:      Head: Normocephalic and atraumatic.   Eyes:      General: No scleral icterus.     Extraocular Movements: Extraocular movements intact.   Neck:      Musculoskeletal: Normal range of motion and neck supple.   Cardiovascular:      Rate and Rhythm: Normal rate and regular rhythm.      Pulses: Normal pulses.      Heart sounds: Normal heart sounds. No murmur.   Pulmonary:      Effort: Pulmonary effort is normal. No respiratory distress.      Breath sounds: Normal breath sounds. No wheezing, rhonchi or rales.   Abdominal:      General: Abdomen is flat. Bowel sounds are normal. There is no distension.      Palpations: Abdomen is soft.       Tenderness: There is no rebound.   Musculoskeletal:         General: No swelling or tenderness.   Lymphadenopathy:      Cervical: No cervical adenopathy.   Skin:     Coloration: Skin is not jaundiced.      Findings: No erythema.      Comments: Rectal sore without evidence of infection, consistent with radiation changes.     Neurological:      General: No focal deficit present.      Mental Status: He is alert and oriented to person, place, and time. Mental status is at baseline.      Cranial Nerves: No cranial nerve deficit.   Psychiatric:         Mood and Affect: Mood normal.         Behavior: Behavior normal.         Fluids    Intake/Output Summary (Last 24 hours) at 12/17/2019 1527  Last data filed at 12/17/2019 1216  Gross per 24 hour   Intake --   Output 2400 ml   Net -2400 ml       Laboratory  Recent Labs     12/15/19  0600 12/16/19  0040 12/17/19  0100   WBC 1.0* 2.2* 1.5*   RBC 2.93* 3.48* 3.09*   HEMOGLOBIN 9.5* 11.3* 9.9*   HEMATOCRIT 27.6* 33.2* 29.8*   MCV 94.2 95.4 96.4   MCH 32.4 32.5 32.0   MCHC 34.4 34.0 33.2*   RDW 43.0 42.9 44.9   PLATELETCT 78* 113* 120*   MPV 10.7 10.0 9.6     Recent Labs     12/15/19  0600 12/16/19  0040 12/17/19  0100   SODIUM 139 140 140   POTASSIUM 4.1 3.8 3.9   CHLORIDE 109 109 111   CO2 26 25 22   GLUCOSE 156* 166* 110*   BUN 30* 37* 35*   CREATININE 1.41* 1.65* 1.34   CALCIUM 7.1* 7.4* 6.8*                   Imaging  US-EXTREMITY VENOUS UPPER UNILAT RIGHT   Final Result      US-RENAL   Final Result      1.  There is moderate bilateral hydronephrosis, more than is typically expected for history of cystectomy with neobladder formation.      DX-CHEST-PORTABLE (1 VIEW)   Final Result      No radiographic evidence of acute cardiopulmonary process.           Assessment/Plan  * Limb swelling  Assessment & Plan  On the R Upper Ex  U/S: DVT on axillary and basilic veins adhered to the PICC line.  The patient was not receiving Lovenox due to severe thrombocytopenia due to  chemotherapy.  PICC line to be removed today  Lovenox twice daily starting  Risk/benefit of medication was discussed with the patient and he agreed.  Monitor platelets daily    Hypotension  Assessment & Plan  Improved with IV fluid and worsening after holding IV fluid  Patient does not appear septic, lactic acid 1.7  2L normal saline bolus with no significant change in his blood pressure  Patient is not symptomatic   Initiated midodrine and cortef, patient declined transfer to ICU  Cortef was discontinued, since no improvement in his cortisone is normal  No signs of infection and blood culture so far is negative  No signs of bleeding      Dehydration  Assessment & Plan  Improving however after discontinuing IV fluids the patient developed hypotension  Bolused and continuous IV fluids  Poor PO intake, start marinol and protein supplements         Diabetes mellitus type 2 in nonobese (HCC)  Assessment & Plan  -accus with sliding scale coverage to begin.  -diabetic diet  -diabetic education  -follow glycohemoglobin levels long term, most recent hemoglobin A1c 6.4  -Hold metformin with elevated renal functions.  I discussed this with the patient  -monitor for hypoglycemic episodes and adjust control if he should get low    Acute renal failure (ARF) (HCC)  Assessment & Plan  Likely due to dehydration  Renal ultrasound shows moderate bilateral hydronephrosis, more than expected following neobladder   Improving on his creatinine   keep monitoring    Anal carcinoma (HCC)- (present on admission)  Assessment & Plan  Stage IIb rectal cancer  He is receiving radiation therapy during this admission  Follow-up with his oncologist as outpatient    COPD (chronic obstructive pulmonary disease) (HCC)- (present on admission)  Assessment & Plan  No exacerbation   RT protocol, nebulizer treatments, oxygen as needed     ACP (advance care planning)  Assessment & Plan  I had a long discussion with the patient about his diseases, and the  poor prognosis of cancer, I answered all his questions, the patient understands that he has noncurable disease, he did not desired yet about the CODE STATUS however he wants to stay full code at this time.     Hematuria  Assessment & Plan  History of bladder cancer   secondary to significant thrombocytopenia at 11, responded to transfusion of platelets  Trend CBC        Stomatitis  Assessment & Plan  Patient appears to have thrush vs mucocytis from chemotherapy.   nystatin swish and swallow   MBX is allowing him to tolerate eating.  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    Current smoker- (present on admission)  Assessment & Plan  -nicotine replacement protocol and cessation education provided       VTE prophylaxis: SCDs, AC c/i due to bleeding and thrombocytopenia.

## 2019-12-17 NOTE — PROGRESS NOTES
Patient was brought down to Radiation Therapy department by transport.  Patient received treatment number   18 of 30

## 2019-12-17 NOTE — PROGRESS NOTES
Hospital Medicine Daily Progress Note    Date of Service  12/16/2019    Chief Complaint  74 y.o. male admitted 12/7/2019 with oral pain and weight loss.    Hospital Course    Patient with known anal cancer undergoing radiation and chemotherapy  He has had significant oral pain and unable to swallow well the past few weeks.  He has a sore on his bottom that he has been applying hemorrhoidal cream with lidocaine for pain.  He had hematuria develop since admission and platelet count was significantly low.      Interval Problem Update  12/8 Patient feeling tired and blood pressure has been markedly low, he has received 2 Liters of NS bolus and LR has been running at 100/hour since admit.  He still appears dehydrated but is starting to require oxygen to maintain saturation, lungs still sound clear on examination.  He received 1 unit of platelets and urine is clearing in collection tubing from neobladder.  Cr increased overnight with hydration from 1.52 to 1.82 and this could be due to early hydration only and expect it to trend back down.  If pressure does not respond to midodrine and IVF then patient will need to transfer to ICU for pressor support.  Midodrine and solucortef started because of hypotension.    12/9 Patient less fatigued and feeling slightly better when compared to yesterday.  He has been able to eat with pain control received by MBX.  Cr trending back down following IV resuscitation and blood pressure has improved with midodrine and cortef - patient refused transfer to ICU yesterday.  Radiation continued today on schedule.    12/10: The patient continue improving, tolerating diet with improving on thrush, creatinine back to his baseline, blood pressure has been stable around 90/50  12/11: No need for home oxygen, with bowel regime he had bowel movement, he does not feel okay for discharge today, labs showed improving on his creatinine.   12/12: His saturation dropped during the night on her exertion, no  change on exam, home oxygen eval showed he needs 1 L on exertion, waiting for oxygen before discharge  12/13: Hypotension without symptoms, labs was ordered to rule out infection, IV bolus with continuous fluid was a started.   12/14: Improving on his blood pressure and creatinine after fluid, will continue IV fluid, labs was reviewed, having 3-5 bowel movements per day.  12/15: Ultrasound showed DVT on his upper extremity, Lovenox was started, blood pressure around 90s, encouraged him to eat, continue IV fluids 75, no significant diarrhea, to CODE STATUS and advanced care planning was discussed with the patient and he wants to be full code.   12/16: The patient is awake and blood pressure dropped down after decrease IV fluid, we went up on IV fluids to 100, he will receive radiation therapy today, the patient is weak.     Consultants/Specialty  Oncologist    Code Status  full    Disposition  We are trying to weaning home off from IV fluid however every time we decrease IV fluids blood pressure will drop down and creatinine will go up, he is trying to eat and drink better.     Review of Systems  Review of Systems   Constitutional: Negative for chills and fever.   HENT: Negative for congestion and sore throat.    Eyes: Negative for blurred vision and photophobia.   Respiratory: Negative for cough and shortness of breath.    Cardiovascular: Negative for chest pain, claudication and leg swelling.   Gastrointestinal: Negative for abdominal pain, constipation, diarrhea, heartburn, nausea and vomiting.   Genitourinary: Negative for dysuria, flank pain and hematuria (better).        Rectal pain     Musculoskeletal: Negative for joint pain and myalgias.   Skin: Negative for itching and rash.   Neurological: Negative for dizziness, sensory change, speech change, weakness and headaches.   Psychiatric/Behavioral: Negative for depression. The patient is not nervous/anxious and does not have insomnia.         Physical  Exam  Temp:  [36.3 °C (97.4 °F)-37.2 °C (99 °F)] 37.1 °C (98.8 °F)  Pulse:  [52-64] 64  Resp:  [16-20] 16  BP: ()/(39-63) 94/50  SpO2:  [94 %-97 %] 94 %    Physical Exam  Vitals signs and nursing note reviewed.   Constitutional:       General: He is not in acute distress.     Appearance: Normal appearance.   HENT:      Head: Normocephalic and atraumatic.   Eyes:      General: No scleral icterus.     Extraocular Movements: Extraocular movements intact.   Neck:      Musculoskeletal: Normal range of motion and neck supple.   Cardiovascular:      Rate and Rhythm: Normal rate and regular rhythm.      Pulses: Normal pulses.      Heart sounds: Normal heart sounds. No murmur.   Pulmonary:      Effort: Pulmonary effort is normal. No respiratory distress.      Breath sounds: Normal breath sounds. No wheezing, rhonchi or rales.   Abdominal:      General: Abdomen is flat. Bowel sounds are normal. There is no distension.      Palpations: Abdomen is soft.      Tenderness: There is no tenderness. There is no guarding or rebound.   Musculoskeletal:         General: Swelling present. No tenderness.      Comments: On the right upper extremity   Lymphadenopathy:      Cervical: No cervical adenopathy.   Skin:     Coloration: Skin is not jaundiced.      Findings: No erythema.      Comments: Rectal sore without evidence of infection, consistent with radiation changes.     Neurological:      General: No focal deficit present.      Mental Status: He is alert and oriented to person, place, and time. Mental status is at baseline.      Cranial Nerves: No cranial nerve deficit.   Psychiatric:         Mood and Affect: Mood normal.         Behavior: Behavior normal.         Fluids    Intake/Output Summary (Last 24 hours) at 12/16/2019 1622  Last data filed at 12/16/2019 0535  Gross per 24 hour   Intake --   Output 2300 ml   Net -2300 ml       Laboratory  Recent Labs     12/14/19  0000 12/15/19  0600 12/16/19  0040   WBC 1.5* 1.0* 2.2*    RBC 3.00* 2.93* 3.48*   HEMOGLOBIN 9.8* 9.5* 11.3*   HEMATOCRIT 28.5* 27.6* 33.2*   MCV 95.0 94.2 95.4   MCH 32.7 32.4 32.5   MCHC 34.4 34.4 34.0   RDW 43.4 43.0 42.9   PLATELETCT 51* 78* 113*   MPV 10.8 10.7 10.0     Recent Labs     12/14/19  0000 12/15/19  0600 12/16/19  0040   SODIUM 137 139 140   POTASSIUM 3.8 4.1 3.8   CHLORIDE 107 109 109   CO2 27 26 25   GLUCOSE 139* 156* 166*   BUN 30* 30* 37*   CREATININE 1.46* 1.41* 1.65*   CALCIUM 7.0* 7.1* 7.4*                   Imaging  US-EXTREMITY VENOUS UPPER UNILAT RIGHT   Final Result      US-RENAL   Final Result      1.  There is moderate bilateral hydronephrosis, more than is typically expected for history of cystectomy with neobladder formation.      DX-CHEST-PORTABLE (1 VIEW)   Final Result      No radiographic evidence of acute cardiopulmonary process.           Assessment/Plan  * Limb swelling  Assessment & Plan  On the R Upper Ex  U/S: DVT on axillary and basilic veins adhered to the PICC line.  The patient was not receiving Lovenox due to severe thrombocytopenia due to chemotherapy.  PICC line will be removed after full dose of Lovenox  Lovenox twice daily starting  Risk/benefit of medication was discussed with the patient and he agreed.  Monitor platelets daily    Hypotension  Assessment & Plan  Improved with IV fluid and worsening after holding IV fluid  Patient does not appear septic, lactic acid 1.7  2L normal saline bolus with no significant change in his blood pressure  Patient is not symptomatic   Initiated midodrine and cortef, patient declined transfer to ICU  Cortef was discontinued, since no improvement in his cortisone is normal  No signs of infection and blood culture so far is negative  No signs of bleeding  Every time we try to decrease IV fluid his blood pressure will drop down.  generalized weakness    Dehydration  Assessment & Plan  Improving however after discontinuing IV fluids the patient developed hypotension  Bolused and continuous  IV fluids          Diabetes mellitus type 2 in nonobese ([facility])  Assessment & Plan  -accus with sliding scale coverage to begin.  -diabetic diet  -diabetic education  -follow glycohemoglobin levels long term, most recent hemoglobin A1c 6.4  -Hold metformin with elevated renal functions.  I discussed this with the patient  -monitor for hypoglycemic episodes and adjust control if he should get low    Acute renal failure (ARF) ([facility])  Assessment & Plan  Likely due to dehydration  Renal ultrasound shows moderate bilateral hydronephrosis, more than expected following neobladder   Improving on his creatinine   keep monitoring    Anal carcinoma ([facility])- (present on admission)  Assessment & Plan  Stage IIb rectal cancer  He is receiving radiation therapy during this admission  Follow-up with his oncologist as outpatient    COPD (chronic obstructive pulmonary disease) ([facility])- (present on admission)  Assessment & Plan  No exacerbation   RT protocol, nebulizer treatments, oxygen as needed     ACP (advance care planning)  Assessment & Plan  I had a long discussion with the patient about his diseases, and the poor prognosis of cancer, I answered all his questions, the patient understands that he has noncurable disease, he did not desired yet about the CODE STATUS however he wants to stay full code at this time.     Hematuria  Assessment & Plan  History of bladder cancer   secondary to significant thrombocytopenia at 11, responded to transfusion of platelets  Trend CBC        Stomatitis  Assessment & Plan  Patient appears to have thrush vs mucocytis from chemotherapy.   nystatin swish and swallow   MBX is allowing him to tolerate eating.  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    Current smoker- (present on admission)  Assessment & Plan  -nicotine replacement protocol and cessation education provided       VTE prophylaxis: SCDs, Lovenox, his platelets improved

## 2019-12-17 NOTE — CARE PLAN
Problem: Safety  Goal: Will remain free from falls  Outcome: PROGRESSING AS EXPECTED  Intervention: Implement fall precautions  Flowsheets  Taken 12/17/2019 1015  Bed Alarm: Yes - Alarm On  Taken 12/17/2019 0856  Environmental Precautions: Treaded Slipper Socks on Patient;Bed in Low Position;Mobility Assessed & Appropriate Sign Placed;Communication Sign for Patients & Families;Personal Belongings, Wastebasket, Call Bell etc. in Easy Reach     Problem: Infection  Goal: Will remain free from infection  Outcome: PROGRESSING AS EXPECTED  Intervention: Assess signs and symptoms of infection  Note:   Q4 vitals in place. Patient on protective precautions. Appropriate sign on door to notify staff of reverse isolation.

## 2019-12-18 PROBLEM — R63.0 POOR APPETITE: Status: ACTIVE | Noted: 2019-01-01

## 2019-12-18 NOTE — CARE PLAN
Problem: Safety  Goal: Will remain free from falls  Outcome: PROGRESSING AS EXPECTED  Intervention: Implement fall precautions  Flowsheets  Taken 12/17/2019 2148  Environmental Precautions: Treaded Slipper Socks on Patient;Personal Belongings, Wastebasket, Call Bell etc. in Easy Reach;Transferred to Stronger Side;Report Given to Other Health Care Providers Regarding Fall Risk;Bed in Low Position;Communication Sign for Patients & Families;Mobility Assessed & Appropriate Sign Placed  Taken 12/18/2019 0012  Chair/Bed Strip Alarm: Yes - Alarm On     Problem: Infection  Goal: Will remain free from infection  Outcome: PROGRESSING AS EXPECTED  Intervention: Assess signs and symptoms of infection  Note:   Vital signs q 8 hours. Pt is afebrile.

## 2019-12-18 NOTE — PROGRESS NOTES
[facility] Medicine Daily Progress Note    Date of Service  12/18/2019    Chief Complaint  74 y.o. male admitted 12/7/2019 with oral pain and weight loss.    Hospital Course    Patient with known anal cancer undergoing radiation and chemotherapy  He has had significant oral pain and unable to swallow well the past few weeks.  He has a sore on his bottom that he has been applying hemorrhoidal cream with lidocaine for pain.  He had hematuria develop since admission when platelet count was significantly low.   He developed a DVT in RUE and is on anticoagulation with Lovenox.        Interval Problem Update  12/8 Patient feeling tired and blood pressure has been markedly low, he has received 2 Liters of NS bolus and LR has been running at 100/hour since admit.  He still appears dehydrated but is starting to require oxygen to maintain saturation, lungs still sound clear on examination.  He received 1 unit of platelets and urine is clearing in collection tubing from neobladder.  Cr increased overnight with hydration from 1.52 to 1.82 and this could be due to early hydration only and expect it to trend back down.  If pressure does not respond to midodrine and IVF then patient will need to transfer to ICU for pressor support.  Midodrine and solucortef started because of hypotension.    12/9 Patient less fatigued and feeling slightly better when compared to yesterday.  He has been able to eat with pain control received by MBX.  Cr trending back down following IV resuscitation and blood pressure has improved with midodrine and cortef - patient refused transfer to ICU yesterday.  Radiation continued today on schedule.    12/17 Patient with adequate blood pressure with IV hydration but tends to drop when stopped, he states his oral intake is poor as his taste is very bland and nothing is good anymore.  He is agreeable to take an appetite stimulant with marinol and protein supplements in form of shakes.  Calcium is 6.8 but when  corrected for hypoalbuminemia, is 8.4.  PICC line to be removed today.  12/18 Patient feeling okay today, appetite has improved with lunch and dinner yesterday after starting marinol.  Will trial decrease of IVF and monitor blood pressure, he is not showing signs of volume overload.  He didn't eat much breakfast so I will give him tid marinol to see if that helps.      Consultants/Specialty  none    Code Status  full    Disposition  tbd    Review of Systems  Review of Systems   Constitutional: Negative for chills and fever.   HENT: Negative for congestion and sore throat.    Eyes: Negative for blurred vision and photophobia.   Respiratory: Negative for cough and shortness of breath.    Cardiovascular: Negative for chest pain, claudication and leg swelling.   Gastrointestinal: Negative for abdominal pain, constipation, diarrhea, heartburn, nausea and vomiting.   Genitourinary: Negative for dysuria, flank pain and hematuria.        Rectal pain     Musculoskeletal: Negative for joint pain and myalgias.   Skin: Negative for itching and rash.   Neurological: Negative for dizziness, sensory change, speech change, weakness and headaches.   Psychiatric/Behavioral: Negative for depression. The patient is not nervous/anxious and does not have insomnia.         Physical Exam  Temp:  [36.6 °C (97.8 °F)-37 °C (98.6 °F)] 36.8 °C (98.3 °F)  Pulse:  [58-67] 58  Resp:  [16-18] 16  BP: (90-99)/(44-50) 90/46  SpO2:  [92 %-95 %] 94 %    Physical Exam  Vitals signs and nursing note reviewed.   Constitutional:       General: He is not in acute distress.     Appearance: Normal appearance.   HENT:      Head: Normocephalic and atraumatic.   Eyes:      General: No scleral icterus.     Extraocular Movements: Extraocular movements intact.   Neck:      Musculoskeletal: Normal range of motion and neck supple.   Cardiovascular:      Rate and Rhythm: Normal rate and regular rhythm.      Pulses: Normal pulses.      Heart sounds: Normal heart  sounds. No murmur.   Pulmonary:      Effort: Pulmonary effort is normal. No respiratory distress.      Breath sounds: Normal breath sounds. No wheezing, rhonchi or rales.   Abdominal:      General: Abdomen is flat. Bowel sounds are normal. There is no distension.      Palpations: Abdomen is soft.      Tenderness: There is no rebound.   Musculoskeletal:         General: No swelling or tenderness.   Lymphadenopathy:      Cervical: No cervical adenopathy.   Skin:     Coloration: Skin is not jaundiced.      Findings: No erythema.      Comments: Rectal sore without evidence of infection, consistent with radiation changes.     Neurological:      General: No focal deficit present.      Mental Status: He is alert and oriented to person, place, and time. Mental status is at baseline.      Cranial Nerves: No cranial nerve deficit.   Psychiatric:         Mood and Affect: Mood normal.         Behavior: Behavior normal.         Fluids    Intake/Output Summary (Last 24 hours) at 12/18/2019 1521  Last data filed at 12/18/2019 1209  Gross per 24 hour   Intake --   Output 2650 ml   Net -2650 ml       Laboratory  Recent Labs     12/16/19  0040 12/17/19  0100 12/18/19  0015   WBC 2.2* 1.5* 1.5*   RBC 3.48* 3.09* 3.10*   HEMOGLOBIN 11.3* 9.9* 10.3*   HEMATOCRIT 33.2* 29.8* 29.9*   MCV 95.4 96.4 96.5   MCH 32.5 32.0 33.2*   MCHC 34.0 33.2* 34.4   RDW 42.9 44.9 45.0   PLATELETCT 113* 120* 141*   MPV 10.0 9.6 10.0     Recent Labs     12/16/19  0040 12/17/19  0100 12/18/19  0015   SODIUM 140 140 138   POTASSIUM 3.8 3.9 3.8   CHLORIDE 109 111 111   CO2 25 22 22   GLUCOSE 166* 110* 131*   BUN 37* 35* 33*   CREATININE 1.65* 1.34 1.40   CALCIUM 7.4* 6.8* 6.8*                   Imaging  US-EXTREMITY VENOUS UPPER UNILAT RIGHT   Final Result      US-RENAL   Final Result      1.  There is moderate bilateral hydronephrosis, more than is typically expected for history of cystectomy with neobladder formation.      DX-CHEST-PORTABLE (1 VIEW)   Final  Result      No radiographic evidence of acute cardiopulmonary process.           Assessment/Plan  * Limb swelling  Assessment & Plan  On the R Upper Ex  U/S: DVT on axillary and basilic veins adhered to the PICC line.  The patient was not receiving Lovenox due to severe thrombocytopenia due to chemotherapy.  PICC line removed  Lovenox twice daily   Risk/benefit of medication was discussed with the patient and he agreed.      Hypotension  Assessment & Plan  Trial of decrease IVF with increase in PO intake.  Patient does not appear septic, lactic acid 1.7  2L normal saline bolus with no significant change in his blood pressure  Patient is asymptomatic   Initiated midodrine  No signs of infection and blood cultures negative  No signs of bleeding      Dehydration  Assessment & Plan  Improving however after discontinuing IV fluids the patient developed hypotension  Bolused and continuous IV fluids  Poor PO intake, start marinol and protein supplements         Diabetes mellitus type 2 in nonobese ([facility])  Assessment & Plan  -accus with sliding scale coverage to begin.  -diabetic diet  -diabetic education  -follow glycohemoglobin levels long term, most recent hemoglobin A1c 6.4  -Hold metformin with elevated renal functions.  I discussed this with the patient  -monitor for hypoglycemic episodes and adjust control if he should get low    Acute renal failure (ARF) ([facility])  Assessment & Plan  Likely due to dehydration  Renal ultrasound shows moderate bilateral hydronephrosis, more than expected following neobladder   Improving on his creatinine   keep monitoring    Anal carcinoma ([facility])- (present on admission)  Assessment & Plan  Stage IIb rectal cancer  He is receiving radiation therapy during this admission  Follow-up with his oncologist as outpatient    COPD (chronic obstructive pulmonary disease) ([facility])- (present on admission)  Assessment & Plan  No exacerbation   RT protocol, nebulizer treatments, oxygen as needed     Poor  appetite  Assessment & Plan  marinol before meals - noted increase in appetite when started.      ACP (advance care planning)  Assessment & Plan  I had a long discussion with the patient about his diseases, and the poor prognosis of cancer, I answered all his questions, the patient understands that he has noncurable disease, he did not desired yet about the CODE STATUS however he wants to stay full code at this time.     Hematuria  Assessment & Plan  Resolved with platelet transfusion  History of bladder cancer   Trend CBC        Stomatitis  Assessment & Plan  Patient appears to have thrush vs mucocytis from chemotherapy.   nystatin swish and swallow   MBX is allowing him to tolerate eating.  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    Current smoker- (present on admission)  Assessment & Plan  -nicotine replacement protocol and cessation education provided       VTE prophylaxis: SCDs, AC c/i due to bleeding and thrombocytopenia.

## 2019-12-18 NOTE — PROGRESS NOTES
"BP (!) 91/45   Pulse 60   Temp 36.6 °C (97.9 °F) (Temporal)   Resp 16   Ht 1.727 m (5' 8\")   Wt 62.1 kg (136 lb 14.5 oz)   SpO2 92%   BMI 20.82 kg/m²   Received report and assumed care of pt at 1900. Pt is A&O x4. Pt denies nausea or SOB. PIV is patent and infusing. POC discussed and updated. Bed is in lowest position and call light is within reach. Hourly rounding is in place.  "

## 2019-12-18 NOTE — PROGRESS NOTES
Spoke with RN caring for pt on [facility]. Per RN, PICC no longer in place and pt will possibly receive chemo inpatient. E-mail to be sent to schedulers to assure there are no gaps in pt's care.

## 2019-12-18 NOTE — CARE PLAN
Problem: Safety  Goal: Will remain free from falls  Outcome: PROGRESSING AS EXPECTED  Intervention: Implement fall precautions  Flowsheets  Taken 12/18/2019 1136  Bed Alarm: Yes - Alarm On  Chair/Bed Strip Alarm: Yes - Alarm On  Taken 12/18/2019 0845  Environmental Precautions: Treaded Slipper Socks on Patient;Bed in Low Position;Communication Sign for Patients & Families;Mobility Assessed & Appropriate Sign Placed;Personal Belongings, Wastebasket, Call Bell etc. in Easy Reach     Problem: Infection  Goal: Will remain free from infection  Outcome: PROGRESSING AS EXPECTED  Intervention: Assess signs and symptoms of infection  Note:   Q4 vitals in place. Patient on protective precautions. Appropriate sign on door to notify staff of reverse isolation.       Problem: Skin Integrity  Goal: Risk for impaired skin integrity will decrease  Outcome: PROGRESSING AS EXPECTED  Intervention: Implement precautions to protect skin integrity in collaboration with the interdisciplinary team  Note:   Silvadene cream order.

## 2019-12-18 NOTE — PROGRESS NOTES
Patient was brought down to Radiation Therapy department by transport.  Patient received treatment number   19 of 30

## 2019-12-18 NOTE — ON TREATMENT VISIT
ON TREATMENT  NOTE  RADIATION ONCOLOGY DEPARTMENT    Patient name:  Corona Bean    Primary Physician:  Francis Marcano M.D. MRN: [id number]  [facility]: [id number]   Referring physician:  Beau Torrez M.D. : 1945, 74 y.o.     ENCOUNTER DATE:  19    DIAGNOSIS:  Visit Diagnoses     ICD-10-CM   1. Anal carcinoma (HCC) C21.0     Anal carcinoma (HCC)  Staging form: Anus, AJCC 8th Edition  - Clinical stage from 2019: Stage IIB (cT3, cN0, cM0) - Signed by Beau Torrez M.D. on 2019  Tumor location in anus: Anal      TREATMENT SUMMARY:  Aria Treatment Information        Some values may be hidden. Unless noted otherwise, only the newest values recorded on each date are displayed.         Aria Treatment Summary 19   Course First Treatment Date 2019   Course Last Treatment Date 2019   Anal Plan from Course Anal   Fraction  30   Elapsed Course Days  @ [id number]   Prescribed Fraction Dose 180 cGy   Prescribed Total Dose 5,400 cGy   Anal Reference Point from Course Anal   Elapsed Course Days  @ [id number]   Session Dose 180 cGy   Total Dose 3,420 cGy   Anal CP Reference Point from Course Anal   Elapsed Course Days  @ [id number]   Session Dose 186 cGy   Total Dose 3,542 cGy             SUBJECTIVE:  Mucositis using baking soda mixture.       VITAL SIGNS:  KPS: 100, Fully active, able to carry on all pre-disease performed without restriction (ECOG equivalent 0)  Encounter Vitals  Temperature: 36.9 °C (98.5 °F)  Blood Pressure : (!) 96/47  Pulse: 60  Pulse Oximetry: 94 %  Pain Assessment 2019   Pain Assessment Acute Pain -   Pain Score 7 7   Pain Loc Rectum -   What increases pain? sitting/movement -   What decreases pain? tylenol (pt also given rx for lidocaine which he will  today) -   Some recent data might be hidden          PHYSICAL EXAM:    Physical Exam  Constitutional:       Appearance: Normal appearance.   Abdominal:       General: There is no distension.      Palpations: Abdomen is soft.   Skin:     Findings: No erythema.   Neurological:      Mental Status: He is alert.          Toxicity Assessment 12/18/2019 12/6/2019 11/26/2019 11/21/2019   Toxicity Assessment Male Pelvis Male Pelvis Male Pelvis Male Pelvis   Fatigue (lethargy, malaise, asthenia) Moderate (e.g., decrease in performance status by 1 ECOG level or 20% Karnofsky) or causing difficulty performing some activities Increased fatigue over baseline, but not altering normal activities Increased fatigue over baseline, but not altering normal activities None   Radiation Dermatitis Faint erythema or dry desquamation Faint erythema or dry desquamation Faint erythema or dry desquamation None   Anorexia Loss of appetite Loss of appetite None None   Colitis None None None None   Constipation None None Requiring stool softener or dietary modification None   Dehydration None None None None   Diarrhea w/o Colostomy Increase of <4 stools/day over pre-treatment None None None   Flatulence None None None None   Nausea None None None None   Proctitis None None None None   Vomiting None None None None   RT - Pain due to RT None Moderate pain, pain or analgesics interfering with function, but not interfering with activities of daily living Mild pain not interfering with function None   Tumor Pain (onset or exacerbation of tumor pain due to treatment) None Moderate pain, pain or analgesics interfering with function, but not interfering with activities of daily living Mild pain not interfering with function None   Dysuria (painful urination) None None None None   Urinary Frequency Normal Normal Normal Normal   Urinary Urgency None None None None   Bladder Spasms Absent Absent Absent Absent   Incontinence None None Spontaneous, some control None   Urinary Retention Normal Normal Normal Normal       CURRENT MEDICATIONS:  No current facility-administered medications for this encounter.   No  current outpatient medications on file.    Facility-Administered Medications Ordered in Other Encounters:   •  dronabinol (MARINOL) capsule 5 mg, 5 mg, Oral, TID AC, Saray Mckenzie D.O.  •  calcium carbonate (TUMS) chewable tab 500 mg, 500 mg, Oral, BID PRN, Corona Novoa D.O., 500 mg at 12/18/19 0510  •  enoxaparin (LOVENOX) inj 60 mg, 60 mg, Subcutaneous, Q12HRS, Lionel Lorenzo M.D., 60 mg at 12/18/19 0459  •  NS infusion, , Intravenous, Continuous, Lionel Lorenzo M.D., Last Rate: 100 mL/hr at 12/18/19 0856  •  tiotropium (SPIRIVA) 18 MCG inhalation capsule 1 Cap, 1 Cap, Inhalation, DAILY, Saray Mckenzie D.O.  •  senna-docusate (PERICOLACE or SENOKOT S) 8.6-50 MG per tablet 2 Tab, 2 Tab, Oral, BID, Stopped at 12/13/19 1825 **AND** polyethylene glycol/lytes (MIRALAX) PACKET 1 Packet, 1 Packet, Oral, TID, Stopped at 12/13/19 1825 **AND** magnesium hydroxide (MILK OF MAGNESIA) suspension 30 mL, 30 mL, Oral, BID, Stopped at 12/13/19 1825 **AND** bisacodyl (DULCOLAX) suppository 10 mg, 10 mg, Rectal, QDAY PRN, Lionel Lorenzo M.D.  •  midodrine (PROAMATINE) tablet 10 mg, 10 mg, Oral, TID WITH MEALS, Saray Mckenzie D.O., 10 mg at 12/18/19 1146  •  mineral oil-pet hydrophilic (AQUAPHOR) ointment, , Topical, BID, Saray Mckenzie D.O.  •  Respiratory Care per Protocol, , Nebulization, Continuous RT, Saray Mckenzie D.O.  •  albuterol inhaler 2 Puff, 2 Puff, Inhalation, Q6HRS PRN, Francine Pérez M.D.  •  timolol (TIMOPTIC) 0.25 % ophthalmic solution 1 Drop, 1 Drop, Right Eye, BID, Francine Pérez M.D., 1 Drop at 12/18/19 0459  •  acetaminophen (TYLENOL) tablet 650 mg, 650 mg, Oral, Q6HRS PRN, Francine Pérez M.D., 650 mg at 12/15/19 2323  •  ondansetron (ZOFRAN) syringe/vial injection 4 mg, 4 mg, Intravenous, Q4HRS PRN, Francine Pérez M.D., 4 mg at 12/16/19 1537  •  ondansetron (ZOFRAN ODT) dispertab 4 mg, 4 mg, Oral, Q4HRS PRN, Francine Pérez M.D., 4 mg at 12/15/19 2323  •  Pharmacy Consult Request, 1 Each,  Other, PHARMACY TO DOSE, Francine Pérez M.D.  •  labetalol (NORMODYNE/TRANDATE) injection 10 mg, 10 mg, Intravenous, Q4HRS PRN, Francine Pérez M.D.  •  insulin regular (HUMULIN R) injection 1-6 Units, 1-6 Units, Subcutaneous, 4X/DAY ACHS, Stopped at 12/16/19 1100 **AND** Accu-Chek ACHS, , , Q AC AND BEDTIME(S) **AND** [COMPLETED] NOTIFY MD and PharmD, , , Once **AND** glucose 4 g chewable tablet 16 g, 16 g, Oral, Q15 MIN PRN **AND** DEXTROSE 10% BOLUS 250 mL, 250 mL, Intravenous, Q15 MIN PRN, Francine Pérez M.D.  •  oxyCODONE immediate-release (ROXICODONE) tablet 5 mg, 5 mg, Oral, Q4HRS PRN, Francine Pérez M.D., 5 mg at 12/17/19 2234  •  Maalox Plus - Diphenhydramine - Lidocaine (MBX Oral Solution), 5 mL, Oral, Q6HRS PRN, Francine Pérez M.D., 5 mL at 12/16/19 2123    LABORATORY DATA:   Lab Results   Component Value Date/Time    SODIUM 138 12/18/2019 12:15 AM    POTASSIUM 3.8 12/18/2019 12:15 AM    CHLORIDE 111 12/18/2019 12:15 AM    CO2 22 12/18/2019 12:15 AM    GLUCOSE 131 (H) 12/18/2019 12:15 AM    BUN 33 (H) 12/18/2019 12:15 AM    CREATININE 1.40 12/18/2019 12:15 AM         Lab Results   Component Value Date/Time    WBC 1.5 (LL) 12/18/2019 12:15 AM    HEMOGLOBIN 10.3 (L) 12/18/2019 12:15 AM    HEMATOCRIT 29.9 (L) 12/18/2019 12:15 AM    MCV 96.5 12/18/2019 12:15 AM    PLATELETCT 141 (L) 12/18/2019 12:15 AM        RADIOLOGY DATA:  Dx-chest-portable (1 View)    Result Date: 12/7/2019 12/7/2019 5:09 PM HISTORY/REASON FOR EXAM: possible sepsis.. TECHNIQUE/EXAM DESCRIPTION AND NUMBER OF VIEWS: Single AP view of the chest. COMPARISON: 6/15/2017 FINDINGS: Hardware: Right upper extremity PICC line tip overlies the SVC. Lungs: No consolidation detected. Large volumes are again seen. Pleura:  No pleural space process is seen. Heart and mediastinum: The cardiomediastinal contours are normal.     No radiographic evidence of acute cardiopulmonary process.    Us-renal    Result Date: 12/7/2019 12/7/2019 5:34 PM HISTORY/REASON  FOR EXAM:  Abnormal Labs. History of bladder cancer with prior cystectomy. TECHNIQUE/EXAM DESCRIPTION: Renal ultrasound. COMPARISON:  CT 10/18/2019 FINDINGS: The right kidney measures 9.17 cm. The left kidney measures 9.97 cm. There is bilateral moderate hydronephrosis. There are no abnormal calcifications. 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 Distal ureters are not visualized.     1.  There is moderate bilateral hydronephrosis, more than is typically expected for history of cystectomy with neobladder formation.    Us-extremity Venous Upper Unilat Right    Result Date: 12/15/2019   Upper Extremity  Venous Duplex Report  Vascular Laboratory  CONCLUSIONS  Acute DVT rt axillary and basilic veins.  called to TASHIA Gregorio  Exam Date:     12/15/2019 08:30  Room #:     Inpatient  Priority:     Routine  Ht (in):             Wt (lb):  Ordering Physician:        AYUSH BRANDON  Referring Physician:       [id number] Zuniga  Sonographer:               Gema Hyde RVT  Study Type:  Technical Quality:         Adequate  Age:    74    Gender:     M  MRN:    [id number]  :    1945      BSA:  Indications:     Localized swelling, mass and lump, right upper limb, Pain in                    right arm, Edema  CPT Codes:       36711  ICD Codes:       R22.31  M79.601  782.3  History:         Pain (mild) and swelling (mild) in Right upper extremity at                   PICC line site; No previous duplex on file.  Limitations:  PROCEDURES:  Right upper extremity venous duplex imaging.  The following venous structures were evaluated: internal jugular,  subclavian, axillary, brachial, radial, ulnar, cephalic and basilic veins.  Serial compression, augmentation maneuvers,  color and spectral Doppler  flow evaluations were performed.  FINDINGS:  Right upper extremity-  Thrombus, acute & near-occlusive, visualized within the axillary and  basilic veins adhered to the PICC  line.  All other veins of the right upper extremity demonstrate normal flow  dynamics with no evidence of deep vein thrombosis or superficial  thrombophlebitis.  Flow was evaluated in the contralateral subclavian vein and normal venous  flow dynamics including spontaneous flow, respiratory phasic variation and  augmentation were demonstrated.  Kwabena Hein MD  (Electronically Signed)  Final Date:      15 December 2019                   09:43      IMPRESSION:  Anal carcinoma (HCC)  Staging form: Anus, AJCC 8th Edition  - Clinical stage from 11/5/2019: Stage IIB (cT3, cN0, cM0) - Signed by Beau Torrez M.D. on 11/5/2019  Tumor location in anus: Anal      PLAN:  No change in treatment plan    Disposition:  Treatment plan reviewed. Questions answered. Continue therapy outlined. Rec cont silvadene and lidocaine gel for anal moist desquamation.     Beau Torrez M.D.    Orders Placed This Encounter   • Rad Onc Aria Session Summary

## 2019-12-19 NOTE — PROGRESS NOTES
Ishaan from Lab called with critical result of WBC of 2.1 at 0100. Critical lab result read back to Ishaan.   This critical lab result is within parameters established by Dr. Mckenzie for this patient.

## 2019-12-19 NOTE — PROGRESS NOTES
Received report & assumed care of patient at 1900. Assessment completed. Patient is A&Ox4, up SBA. L PIV patent, running NS at 75 mL/hr. Patient denies any pain, n/v at this time. Condom catheter in place. Patient is on 2L oxygen via nasal cannula. POC discussed & questions answered. Bed locked and in lowest position, bed alarm is on, call light within reach, non-skid socks in place, hourly rounding. Patient reports no further needs at this time.

## 2019-12-19 NOTE — CARE PLAN
Problem: Communication  Goal: The ability to communicate needs accurately and effectively will improve  Outcome: PROGRESSING AS EXPECTED  Note:   Plan of care discussed with patient, all questions answered.     Problem: Safety  Goal: Will remain free from injury  Outcome: PROGRESSING AS EXPECTED  Intervention: Provide assistance with mobility  Flowsheets  Taken 12/19/2019 0116  Assistance: Standby Assist  Taken 12/18/2019 2022  Ambulation Tolerance: Tolerates Well  Note:   Call light within reach, non-skid socks in place, bed locked and in lowest position, bed alarm on, room near nurses station, hourly rounding.

## 2019-12-19 NOTE — CARE PLAN
Problem: Infection  Goal: Will remain free from infection  Outcome: PROGRESSING AS EXPECTED  Intervention: Assess signs and symptoms of infection  Note:   Q4 vitals in place. Patient on protective precautions. Appropriate sign on door to notify staff of reverse isolation.            Problem: Skin Integrity  Goal: Risk for impaired skin integrity will decrease  Outcome: PROGRESSING AS EXPECTED  Intervention: Assess risk factors for impaired skin integrity and/or pressure ulcers  Note:   Silvadene cream ordered for radiation burns.

## 2019-12-19 NOTE — PROGRESS NOTES
Patient was brought down to Radiation Therapy department by transport.  Patient received treatment number 20 of 30

## 2019-12-19 NOTE — PROGRESS NOTES
Bedside report received. Pt in bed resting comfortably with no s/sx of pain or discomfort.  Pt A&O X 4. Denying pain at this time.  Patient up with standby assist. Pt calls appropriately for medication or assistance as needed. Silvadene cream ordered by MD for burns to rectal area. Patient had shower this AM. MD notified of BP 86/44, midodrine given. Rn assessed BP on left lower extremity. /63. Call light within reach, all appropriate fall precautions in place and personal belongings within reach; hourly rounding in place. No needs at this time. See flowsheets for further assessment details.

## 2019-12-19 NOTE — PROGRESS NOTES
[facility] Medicine Daily Progress Note    Date of Service  12/19/2019    Chief Complaint  74 y.o. male admitted 12/7/2019 with oral pain and weight loss.    Hospital Course    Patient with known anal cancer undergoing radiation and chemotherapy  He has had significant oral pain and unable to swallow well the past few weeks.  He has a sore on his bottom that he has been applying hemorrhoidal cream with lidocaine for pain.  He had hematuria develop since admission when platelet count was significantly low.   He developed a DVT in RUE and is on anticoagulation with Lovenox.        Interval Problem Update  12/8 Patient feeling tired and blood pressure has been markedly low, he has received 2 Liters of NS bolus and LR has been running at 100/hour since admit.  He still appears dehydrated but is starting to require oxygen to maintain saturation, lungs still sound clear on examination.  He received 1 unit of platelets and urine is clearing in collection tubing from neobladder.  Cr increased overnight with hydration from 1.52 to 1.82 and this could be due to early hydration only and expect it to trend back down.  If pressure does not respond to midodrine and IVF then patient will need to transfer to ICU for pressor support.  Midodrine and solucortef started because of hypotension.    12/9 Patient less fatigued and feeling slightly better when compared to yesterday.  He has been able to eat with pain control received by MBX.  Cr trending back down following IV resuscitation and blood pressure has improved with midodrine and cortef - patient refused transfer to ICU yesterday.  Radiation continued today on schedule.    12/17 Patient with adequate blood pressure with IV hydration but tends to drop when stopped, he states his oral intake is poor as his taste is very bland and nothing is good anymore.  He is agreeable to take an appetite stimulant with marinol and protein supplements in form of shakes.  Calcium is 6.8 but when  corrected for hypoalbuminemia, is 8.4.  PICC line to be removed today.  12/18 Patient feeling okay today, appetite has improved with lunch and dinner yesterday after starting marinol.  Will trial decrease of IVF and monitor blood pressure, he is not showing signs of volume overload.  He didn't eat much breakfast so I will give him tid marinol to see if that helps.    12/19 Patient feeling better today, he got out of bed and took a shower.  Blood pressure is doing about the same as yesterday despite dropping IVF rate from 100/h to 75/h slightly.       Consultants/Specialty  none    Code Status  full    Disposition  tbd    Review of Systems  Review of Systems   Constitutional: Negative for chills and fever.   HENT: Negative for congestion and sore throat.    Eyes: Negative for blurred vision and photophobia.   Respiratory: Negative for cough and shortness of breath.    Cardiovascular: Negative for chest pain, claudication and leg swelling.   Gastrointestinal: Negative for abdominal pain, constipation, diarrhea, heartburn, nausea and vomiting.   Genitourinary: Negative for dysuria, flank pain and hematuria.        Rectal pain     Musculoskeletal: Negative for joint pain and myalgias.   Skin: Negative for itching and rash.   Neurological: Negative for dizziness, sensory change, speech change, weakness and headaches.   Psychiatric/Behavioral: Negative for depression. The patient is not nervous/anxious and does not have insomnia.         Physical Exam  Temp:  [36.2 °C (97.2 °F)-36.9 °C (98.5 °F)] 36.9 °C (98.5 °F)  Pulse:  [57-63] 57  Resp:  [16-18] 18  BP: (86-92)/(44-54) 90/54  SpO2:  [93 %-95 %] 95 %    Physical Exam  Vitals signs and nursing note reviewed.   Constitutional:       General: He is not in acute distress.     Appearance: Normal appearance.   HENT:      Head: Normocephalic and atraumatic.   Eyes:      General: No scleral icterus.     Extraocular Movements: Extraocular movements intact.   Neck:       Musculoskeletal: Normal range of motion and neck supple.   Cardiovascular:      Rate and Rhythm: Normal rate and regular rhythm.      Pulses: Normal pulses.      Heart sounds: Normal heart sounds. No murmur.   Pulmonary:      Effort: Pulmonary effort is normal. No respiratory distress.      Breath sounds: Normal breath sounds. No wheezing, rhonchi or rales.   Abdominal:      General: Abdomen is flat. Bowel sounds are normal. There is no distension.      Palpations: Abdomen is soft.      Tenderness: There is no rebound.   Musculoskeletal:         General: No swelling or tenderness.   Lymphadenopathy:      Cervical: No cervical adenopathy.   Skin:     Coloration: Skin is not jaundiced.      Findings: No erythema.      Comments: Rectal sore without evidence of infection, consistent with radiation changes.     Neurological:      General: No focal deficit present.      Mental Status: He is alert and oriented to person, place, and time. Mental status is at baseline.      Cranial Nerves: No cranial nerve deficit.   Psychiatric:         Mood and Affect: Mood normal.         Behavior: Behavior normal.         Fluids    Intake/Output Summary (Last 24 hours) at 12/19/2019 1411  Last data filed at 12/19/2019 0946  Gross per 24 hour   Intake --   Output 1000 ml   Net -1000 ml       Laboratory  Recent Labs     12/17/19  0100 12/18/19  0015 12/19/19  0024   WBC 1.5* 1.5* 2.1*   RBC 3.09* 3.10* 3.11*   HEMOGLOBIN 9.9* 10.3* 10.2*   HEMATOCRIT 29.8* 29.9* 29.6*   MCV 96.4 96.5 95.2   MCH 32.0 33.2* 32.8   MCHC 33.2* 34.4 34.5   RDW 44.9 45.0 44.7   PLATELETCT 120* 141* 142*   MPV 9.6 10.0 10.0     Recent Labs     12/17/19  0100 12/18/19  0015 12/19/19  0024   SODIUM 140 138 136   POTASSIUM 3.9 3.8 3.8   CHLORIDE 111 111 109   CO2 22 22 21   GLUCOSE 110* 131* 163*   BUN 35* 33* 32*   CREATININE 1.34 1.40 1.27   CALCIUM 6.8* 6.8* 7.1*                   Imaging  US-EXTREMITY VENOUS UPPER UNILAT RIGHT   Final Result      US-RENAL    Final Result      1.  There is moderate bilateral hydronephrosis, more than is typically expected for history of cystectomy with neobladder formation.      DX-CHEST-PORTABLE (1 VIEW)   Final Result      No radiographic evidence of acute cardiopulmonary process.           Assessment/Plan  * Limb swelling  Assessment & Plan  On the R Upper Ex  U/S: DVT on axillary and basilic veins adhered to the PICC line.  The patient was not receiving Lovenox due to severe thrombocytopenia due to chemotherapy.  PICC line removed  Lovenox twice daily   Risk/benefit of medication was discussed with the patient and he agreed.      Hypotension  Assessment & Plan  Trial of decrease IVF with increase in PO intake.- stable    Patient does not appear septic, lactic acid 1.7  2L normal saline bolus with no significant change in his blood pressure  Patient is asymptomatic   Initiated midodrine  No signs of infection and blood cultures negative  No signs of bleeding      Dehydration  Assessment & Plan  Improving however after discontinuing IV fluids the patient developed hypotension  Bolused and continuous IV fluids  Poor PO intake, start marinol and protein supplements         Diabetes mellitus type 2 in nonobese (HCC)  Assessment & Plan  -accus with sliding scale coverage to begin.  -diabetic diet  -diabetic education  -follow glycohemoglobin levels long term, most recent hemoglobin A1c 6.4  -Hold metformin with elevated renal functions.  I discussed this with the patient  -monitor for hypoglycemic episodes and adjust control if he should get low    Acute renal failure (ARF) (HCC)  Assessment & Plan  Likely due to dehydration  Renal ultrasound shows moderate bilateral hydronephrosis, more than expected following neobladder   Improving on his creatinine   keep monitoring    Anal carcinoma (HCC)- (present on admission)  Assessment & Plan  Stage IIb rectal cancer  He is receiving radiation therapy during this admission  Follow-up with his  oncologist as outpatient    COPD (chronic obstructive pulmonary disease) ([facility])- (present on admission)  Assessment & Plan  No exacerbation   RT protocol, nebulizer treatments, oxygen as needed     Poor appetite  Assessment & Plan  marinol before meals - noted increase in appetite when started.      ACP (advance care planning)  Assessment & Plan  I had a long discussion with the patient about his diseases, and the poor prognosis of cancer, I answered all his questions, the patient understands that he has noncurable disease, he did not desired yet about the CODE STATUS however he wants to stay full code at this time.     Hematuria  Assessment & Plan  Resolved with platelet transfusion  History of bladder cancer   Trend CBC        Stomatitis  Assessment & Plan  Patient appears to have thrush vs mucocytis from chemotherapy.   nystatin swish and swallow   MBX is allowing him to tolerate eating.  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    Current smoker- (present on admission)  Assessment & Plan  -nicotine replacement protocol and cessation education provided       VTE prophylaxis: SCDs, AC c/i due to bleeding and thrombocytopenia.

## 2019-12-20 NOTE — PROGRESS NOTES
Huma from Lab called with critical result of WBC of 1.8 at 0015. Critical lab result read back to Huma.   This critical lab result is within parameters established by Dr. Mckenzie for this patient.

## 2019-12-20 NOTE — PROGRESS NOTES
Patient brought down to Radiation Therapy and received treatment 21 out of 30. Patient tolerated procedure well. Will continue treatment as planned.

## 2019-12-20 NOTE — CARE PLAN
Problem: Safety  Goal: Will remain free from falls  Outcome: PROGRESSING AS EXPECTED  Note:   Call light within reach, non-skid socks in place, bed locked and in lowest position, bed alarm on, room near nurses station, hourly rounding.  Intervention: Implement fall precautions  Flowsheets  Taken 12/19/2019 2127 by Corinne M. Glade, R.N.  Bed Alarm: Yes - Alarm On  Taken 12/19/2019 2015 by Corinne M. Glade, R.N.  Environmental Precautions: Treaded Slipper Socks on Patient;Personal Belongings, Wastebasket, Call Bell etc. in Easy Reach;Report Given to Other Health Care Providers Regarding Fall Risk;Bed in Low Position;Communication Sign for Patients & Families;Mobility Assessed & Appropriate Sign Placed  Taken 12/18/2019 1136 by Rita March R.N.  Chair/Bed Strip Alarm: Yes - Alarm On     Problem: Pain Management  Goal: Pain level will decrease to patient's comfort goal  Outcome: PROGRESSING AS EXPECTED  Flowsheets (Taken 12/19/2019 2127)  Comfort Goal: Sleep Comfortably; Comfort at Rest  Pain Rating Scale (NPRS): 0  Note:   Patient educated on 0-10 pain rating scale, patient denies any pain at this time.

## 2019-12-20 NOTE — PROGRESS NOTES
[facility] Medicine Daily Progress Note    Date of Service  12/20/2019    Chief Complaint  74 y.o. male admitted 12/7/2019 with oral pain and weight loss.    Hospital Course    Patient with known anal cancer undergoing radiation and chemotherapy  He has had significant oral pain and unable to swallow well the past few weeks.  He has a sore on his bottom that he has been applying hemorrhoidal cream with lidocaine for pain.  He had hematuria develop since admission when platelet count was significantly low.   He developed a DVT in RUE and is on anticoagulation with Lovenox.        Interval Problem Update  12/8 Patient feeling tired and blood pressure has been markedly low, he has received 2 Liters of NS bolus and LR has been running at 100/hour since admit.  He still appears dehydrated but is starting to require oxygen to maintain saturation, lungs still sound clear on examination.  He received 1 unit of platelets and urine is clearing in collection tubing from neobladder.  Cr increased overnight with hydration from 1.52 to 1.82 and this could be due to early hydration only and expect it to trend back down.  If pressure does not respond to midodrine and IVF then patient will need to transfer to ICU for pressor support.  Midodrine and solucortef started because of hypotension.    12/9 Patient less fatigued and feeling slightly better when compared to yesterday.  He has been able to eat with pain control received by MBX.  Cr trending back down following IV resuscitation and blood pressure has improved with midodrine and cortef - patient refused transfer to ICU yesterday.  Radiation continued today on schedule.    12/17 Patient with adequate blood pressure with IV hydration but tends to drop when stopped, he states his oral intake is poor as his taste is very bland and nothing is good anymore.  He is agreeable to take an appetite stimulant with marinol and protein supplements in form of shakes.  Calcium is 6.8 but when  corrected for hypoalbuminemia, is 8.4.  PICC line to be removed today.  12/18 Patient feeling okay today, appetite has improved with lunch and dinner yesterday after starting marinol.  Will trial decrease of IVF and monitor blood pressure, he is not showing signs of volume overload.  He didn't eat much breakfast so I will give him tid marinol to see if that helps.    12/19 Patient feeling better today, he got out of bed and took a shower.  Blood pressure is doing about the same as yesterday despite dropping IVF rate from 100/h to 75/h slightly.     12/20 Patient without complaint, blood pressures have been stable so will try to decrease IVF rate further.  Will continue to encourage patient to get out of bed as much as tolerant.    Consultants/Specialty  none    Code Status  full    Disposition  tbd    Review of Systems  Review of Systems   Constitutional: Negative for chills and fever.   HENT: Negative for congestion and sore throat.    Eyes: Negative for blurred vision and photophobia.   Respiratory: Negative for cough and shortness of breath.    Cardiovascular: Negative for chest pain, claudication and leg swelling.   Gastrointestinal: Negative for abdominal pain, constipation, diarrhea, heartburn, nausea and vomiting.   Genitourinary: Negative for dysuria, flank pain and hematuria.        Rectal pain     Musculoskeletal: Negative for joint pain and myalgias.   Skin: Negative for itching and rash.   Neurological: Negative for dizziness, sensory change, speech change, weakness and headaches.   Psychiatric/Behavioral: Negative for depression. The patient is not nervous/anxious and does not have insomnia.         Physical Exam  Temp:  [36.7 °C (98 °F)-37.2 °C (99 °F)] 36.9 °C (98.5 °F)  Pulse:  [61-71] 66  Resp:  [16-18] 16  BP: (80-96)/(45-55) 91/53  SpO2:  [93 %-97 %] 96 %    Physical Exam  Vitals signs and nursing note reviewed.   Constitutional:       General: He is not in acute distress.     Appearance: Normal  appearance.   HENT:      Head: Normocephalic and atraumatic.   Eyes:      General: No scleral icterus.     Extraocular Movements: Extraocular movements intact.   Neck:      Musculoskeletal: Normal range of motion and neck supple.   Cardiovascular:      Rate and Rhythm: Normal rate and regular rhythm.      Pulses: Normal pulses.      Heart sounds: Normal heart sounds. No murmur.   Pulmonary:      Effort: Pulmonary effort is normal. No respiratory distress.      Breath sounds: Normal breath sounds. No wheezing, rhonchi or rales.   Abdominal:      General: Abdomen is flat. Bowel sounds are normal. There is no distension.      Palpations: Abdomen is soft.      Tenderness: There is no rebound.   Musculoskeletal:         General: No swelling or tenderness.   Lymphadenopathy:      Cervical: No cervical adenopathy.   Skin:     Coloration: Skin is not jaundiced.      Findings: No erythema.      Comments: Rectal sore without evidence of infection, consistent with radiation changes.     Neurological:      General: No focal deficit present.      Mental Status: He is alert and oriented to person, place, and time. Mental status is at baseline.      Cranial Nerves: No cranial nerve deficit.   Psychiatric:         Mood and Affect: Mood normal.         Behavior: Behavior normal.         Fluids    Intake/Output Summary (Last 24 hours) at 12/20/2019 1500  Last data filed at 12/20/2019 1000  Gross per 24 hour   Intake 9300 ml   Output 2400 ml   Net 6900 ml       Laboratory  Recent Labs     12/18/19  0015 12/19/19  0024 12/20/19  0052   WBC 1.5* 2.1* 1.8*   RBC 3.10* 3.11* 3.19*   HEMOGLOBIN 10.3* 10.2* 10.3*   HEMATOCRIT 29.9* 29.6* 30.0*   MCV 96.5 95.2 94.0   MCH 33.2* 32.8 32.3   MCHC 34.4 34.5 34.3   RDW 45.0 44.7 44.6   PLATELETCT 141* 142* 149*   MPV 10.0 10.0 9.9     Recent Labs     12/18/19  0015 12/19/19  0024 12/20/19  0052   SODIUM 138 136 138   POTASSIUM 3.8 3.8 3.6   CHLORIDE 111 109 112   CO2 22 21 21   GLUCOSE 131* 163*  143*   BUN 33* 32* 27*   CREATININE 1.40 1.27 1.12   CALCIUM 6.8* 7.1* 7.0*                   Imaging  US-EXTREMITY VENOUS UPPER UNILAT RIGHT   Final Result      US-RENAL   Final Result      1.  There is moderate bilateral hydronephrosis, more than is typically expected for history of cystectomy with neobladder formation.      DX-CHEST-PORTABLE (1 VIEW)   Final Result      No radiographic evidence of acute cardiopulmonary process.           Assessment/Plan  * Limb swelling  Assessment & Plan  On the R Upper Ex  U/S: DVT on axillary and basilic veins adhered to the PICC line.  The patient was not receiving Lovenox due to severe thrombocytopenia due to chemotherapy.  PICC line removed  Lovenox twice daily   Risk/benefit of medication was discussed with the patient and he agreed.      Hypotension  Assessment & Plan  Trial of decrease IVF with increase in PO intake.- stable    Patient does not appear septic, lactic acid 1.7  2L normal saline bolus with no significant change in his blood pressure  Patient is asymptomatic   Initiated midodrine  No signs of infection and blood cultures negative  No signs of bleeding      Dehydration  Assessment & Plan  Improving however after discontinuing IV fluids the patient developed hypotension  Bolused and continuous IV fluids  Poor PO intake, start marinol and protein supplements         Diabetes mellitus type 2 in nonobese (HCC)  Assessment & Plan  -accus with sliding scale coverage to begin.  -diabetic diet  -diabetic education  -follow glycohemoglobin levels long term, most recent hemoglobin A1c 6.4  -Hold metformin with elevated renal functions.  I discussed this with the patient  -monitor for hypoglycemic episodes and adjust control if he should get low    Acute renal failure (ARF) ([facility])  Assessment & Plan  Likely due to dehydration  Renal ultrasound shows moderate bilateral hydronephrosis, more than expected following neobladder   Improving on his creatinine   keep  monitoring    Anal carcinoma (HCC)- (present on admission)  Assessment & Plan  Stage IIb rectal cancer  He is receiving radiation therapy during this admission  Follow-up with his oncologist as outpatient    COPD (chronic obstructive pulmonary disease) (HCC)- (present on admission)  Assessment & Plan  No exacerbation   RT protocol, nebulizer treatments, oxygen as needed     Poor appetite  Assessment & Plan  marinol before meals - noted increase in appetite when started.      ACP (advance care planning)  Assessment & Plan  I had a long discussion with the patient about his diseases, and the poor prognosis of cancer, I answered all his questions, the patient understands that he has noncurable disease, he did not desired yet about the CODE STATUS however he wants to stay full code at this time.     Hematuria  Assessment & Plan  Resolved with platelet transfusion  History of bladder cancer   Trend CBC        Stomatitis  Assessment & Plan  Patient appears to have thrush vs mucocytis from chemotherapy.   nystatin swish and swallow   MBX is allowing him to tolerate eating.  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    Current smoker- (present on admission)  Assessment & Plan  -nicotine replacement protocol and cessation education provided       VTE prophylaxis: SCDs, AC c/i due to bleeding and thrombocytopenia.

## 2019-12-21 NOTE — PROGRESS NOTES
Patient is alert and oriented. Patient has had multiple loose stools this am. Patient given Imodium, will continue to monitor. Reviewed POC with patient, bed alarm in place for patient safety. Reviewed POC with patient for today. No radiation therapy on the weekends. Call light within reach hourly rounding in practice.

## 2019-12-21 NOTE — CARE PLAN
Problem: Safety  Goal: Will remain free from falls  Outcome: PROGRESSING AS EXPECTED  Intervention: Implement fall precautions  Flowsheets  Taken 12/20/2019 2222  Bed Alarm: Yes - Alarm On  Taken 12/20/2019 2000  Environmental Precautions: Treaded Slipper Socks on Patient;Personal Belongings, Wastebasket, Call Bell etc. in Easy Reach;Report Given to Other Health Care Providers Regarding Fall Risk;Bed in Low Position;Communication Sign for Patients & Families;Mobility Assessed & Appropriate Sign Placed     Problem: Skin Integrity  Goal: Risk for impaired skin integrity will decrease  Outcome: PROGRESSING AS EXPECTED  Intervention: Assess and monitor skin integrity, appearance and/or temperature  Note:   Patient turns self in bed, silvadene cream in use for radiation burn to rectum.

## 2019-12-21 NOTE — PROGRESS NOTES
Received report & assumed care of patient at 1900. Assessment completed. Patient is A&Ox4, up SBA. L PIV patent, running NS at 50 mL/hr. Patient denies any pain, n/v at this time Condom catheter in place. Patient is on 2L oxygen via nasal cannula. POC discussed & questions answered. Bed locked and in lowest position, bed alarm is on, call light within reach, non-skid socks in place, hourly rounding. Patient reports no further needs at this time.

## 2019-12-21 NOTE — PROGRESS NOTES
[facility] Medicine Daily Progress Note    Date of Service  12/21/2019    Chief Complaint  74 y.o. male admitted 12/7/2019 with oral pain and weight loss.    Hospital Course    Patient with known anal cancer undergoing radiation and chemotherapy  He has had significant oral pain and unable to swallow well the past few weeks.  He has a sore on his bottom that he has been applying hemorrhoidal cream with lidocaine for pain.  He had hematuria develop since admission when platelet count was significantly low.   He developed a DVT in RUE and is on anticoagulation with Lovenox.        Interval Problem Update  12/8 Patient feeling tired and blood pressure has been markedly low, he has received 2 Liters of NS bolus and LR has been running at 100/hour since admit.  He still appears dehydrated but is starting to require oxygen to maintain saturation, lungs still sound clear on examination.  He received 1 unit of platelets and urine is clearing in collection tubing from neobladder.  Cr increased overnight with hydration from 1.52 to 1.82 and this could be due to early hydration only and expect it to trend back down.  If pressure does not respond to midodrine and IVF then patient will need to transfer to ICU for pressor support.  Midodrine and solucortef started because of hypotension.    12/9 Patient less fatigued and feeling slightly better when compared to yesterday.  He has been able to eat with pain control received by MBX.  Cr trending back down following IV resuscitation and blood pressure has improved with midodrine and cortef - patient refused transfer to ICU yesterday.  Radiation continued today on schedule.    12/17 Patient with adequate blood pressure with IV hydration but tends to drop when stopped, he states his oral intake is poor as his taste is very bland and nothing is good anymore.  He is agreeable to take an appetite stimulant with marinol and protein supplements in form of shakes.  Calcium is 6.8 but when  corrected for hypoalbuminemia, is 8.4.  PICC line to be removed today.  12/18 Patient feeling okay today, appetite has improved with lunch and dinner yesterday after starting marinol.  Will trial decrease of IVF and monitor blood pressure, he is not showing signs of volume overload.  He didn't eat much breakfast so I will give him tid marinol to see if that helps.    12/19 Patient feeling better today, he got out of bed and took a shower.  Blood pressure is doing about the same as yesterday despite dropping IVF rate from 100/h to 75/h slightly.     12/20 Patient without complaint, blood pressures have been stable so will try to decrease IVF rate further.  Will continue to encourage patient to get out of bed as much as tolerant.  12/21 Patient having nausea and diarrhea today, suspect as s/e of his radiation therapy, he is responding to anti-emetic and I will also start PRN imodium.  No acute events.  BP stable with rate drop of IVF to 50cc/h.      Consultants/Specialty  none    Code Status  full    Disposition  tbd    Review of Systems  Review of Systems   Constitutional: Negative for chills and fever.   HENT: Negative for congestion and sore throat.    Eyes: Negative for blurred vision and photophobia.   Respiratory: Negative for cough and shortness of breath.    Cardiovascular: Negative for chest pain, claudication and leg swelling.   Gastrointestinal: Positive for diarrhea and nausea. Negative for abdominal pain, constipation, heartburn and vomiting.   Genitourinary: Negative for dysuria, flank pain and hematuria.        Rectal pain     Musculoskeletal: Negative for joint pain and myalgias.   Skin: Negative for itching and rash.   Neurological: Negative for dizziness, sensory change, speech change, weakness and headaches.   Psychiatric/Behavioral: Negative for depression. The patient is not nervous/anxious and does not have insomnia.         Physical Exam  Temp:  [36.5 °C (97.7 °F)-37.1 °C (98.7 °F)] 36.5 °C  (97.7 °F)  Pulse:  [68-71] 71  Resp:  [16-18] 16  BP: (89-94)/(52-55) 89/55  SpO2:  [93 %-97 %] 93 %    Physical Exam  Vitals signs and nursing note reviewed.   Constitutional:       General: He is not in acute distress.     Appearance: Normal appearance.   HENT:      Head: Normocephalic and atraumatic.   Eyes:      General: No scleral icterus.     Extraocular Movements: Extraocular movements intact.   Neck:      Musculoskeletal: Normal range of motion and neck supple.   Cardiovascular:      Rate and Rhythm: Normal rate and regular rhythm.      Pulses: Normal pulses.      Heart sounds: Normal heart sounds. No murmur.   Pulmonary:      Effort: Pulmonary effort is normal. No respiratory distress.      Breath sounds: Normal breath sounds. No wheezing, rhonchi or rales.   Abdominal:      General: Abdomen is flat. Bowel sounds are normal. There is no distension.      Palpations: Abdomen is soft.      Tenderness: There is no rebound.   Musculoskeletal:         General: No swelling or tenderness.   Lymphadenopathy:      Cervical: No cervical adenopathy.   Skin:     Coloration: Skin is not jaundiced.      Findings: No erythema.      Comments: Rectal sore without evidence of infection, consistent with radiation changes.     Neurological:      General: No focal deficit present.      Mental Status: He is alert and oriented to person, place, and time. Mental status is at baseline.      Cranial Nerves: No cranial nerve deficit.   Psychiatric:         Mood and Affect: Mood normal.         Behavior: Behavior normal.         Fluids    Intake/Output Summary (Last 24 hours) at 12/21/2019 1442  Last data filed at 12/21/2019 0532  Gross per 24 hour   Intake --   Output 1700 ml   Net -1700 ml       Laboratory  Recent Labs     12/19/19  0024 12/20/19  0052   WBC 2.1* 1.8*   RBC 3.11* 3.19*   HEMOGLOBIN 10.2* 10.3*   HEMATOCRIT 29.6* 30.0*   MCV 95.2 94.0   MCH 32.8 32.3   MCHC 34.5 34.3   RDW 44.7 44.6   PLATELETCT 142* 149*   MPV 10.0  9.9     Recent Labs     12/19/19  0024 12/20/19  0052   SODIUM 136 138   POTASSIUM 3.8 3.6   CHLORIDE 109 112   CO2 21 21   GLUCOSE 163* 143*   BUN 32* 27*   CREATININE 1.27 1.12   CALCIUM 7.1* 7.0*                   Imaging  US-EXTREMITY VENOUS UPPER UNILAT RIGHT   Final Result      US-RENAL   Final Result      1.  There is moderate bilateral hydronephrosis, more than is typically expected for history of cystectomy with neobladder formation.      DX-CHEST-PORTABLE (1 VIEW)   Final Result      No radiographic evidence of acute cardiopulmonary process.           Assessment/Plan  * Limb swelling  Assessment & Plan  On the R Upper Ex  U/S: DVT on axillary and basilic veins adhered to the PICC line.  The patient was not receiving Lovenox due to severe thrombocytopenia due to chemotherapy.  PICC line removed  Lovenox twice daily   Risk/benefit of medication was discussed with the patient and he agreed.      Hypotension  Assessment & Plan  Trial of decrease IVF with increase in PO intake.- stable    Patient does not appear septic, lactic acid 1.7  2L normal saline bolus with no significant change in his blood pressure  Patient is asymptomatic   Initiated midodrine  No signs of infection and blood cultures negative  No signs of bleeding      Dehydration  Assessment & Plan  Improving however after discontinuing IV fluids the patient developed hypotension  Bolused and continuous IV fluids  Poor PO intake, start marinol and protein supplements         Diabetes mellitus type 2 in nonobese (HCC)  Assessment & Plan  -accus with sliding scale coverage to begin.  -diabetic diet  -diabetic education  -follow glycohemoglobin levels long term, most recent hemoglobin A1c 6.4  -Hold metformin with elevated renal functions.  I discussed this with the patient  -monitor for hypoglycemic episodes and adjust control if he should get low    Acute renal failure (ARF) (HCC)  Assessment & Plan  Likely due to dehydration  Renal ultrasound shows  moderate bilateral hydronephrosis, more than expected following neobladder   Improving on his creatinine   keep monitoring    Anal carcinoma (HCC)- (present on admission)  Assessment & Plan  Stage IIb rectal cancer  He is receiving radiation therapy during this admission  Follow-up with his oncologist as outpatient    COPD (chronic obstructive pulmonary disease) (HCC)- (present on admission)  Assessment & Plan  No exacerbation   RT protocol, nebulizer treatments, oxygen as needed     Poor appetite  Assessment & Plan  marinol before meals - noted increase in appetite when started.      ACP (advance care planning)  Assessment & Plan  I had a long discussion with the patient about his diseases, and the poor prognosis of cancer, I answered all his questions, the patient understands that he has noncurable disease, he did not desired yet about the CODE STATUS however he wants to stay full code at this time.     Hematuria  Assessment & Plan  Resolved with platelet transfusion  History of bladder cancer   Trend CBC        Stomatitis  Assessment & Plan  Patient appears to have thrush vs mucocytis from chemotherapy.   nystatin swish and swallow   MBX is allowing him to tolerate eating.  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    Current smoker- (present on admission)  Assessment & Plan  -nicotine replacement protocol and cessation education provided       VTE prophylaxis: SCDs, AC c/i due to bleeding and thrombocytopenia.

## 2019-12-21 NOTE — CARE PLAN
Problem: Communication  Goal: The ability to communicate needs accurately and effectively will improve  Outcome: PROGRESSING AS EXPECTED     Problem: Safety  Goal: Will remain free from injury  Outcome: PROGRESSING AS EXPECTED     Problem: Infection  Goal: Will remain free from infection  Outcome: PROGRESSING AS EXPECTED     Problem: Bowel/Gastric:  Goal: Normal bowel function is maintained or improved  Outcome: PROGRESSING SLOWER THAN EXPECTED

## 2019-12-21 NOTE — PROGRESS NOTES
Patient alert and oriented. Patient did have radiation treatment today 21/30 patient did come back in pain. Pain medication given. Reviewed POC with patient. Call light within reach. Hourly rounding in practice.

## 2019-12-22 NOTE — PROGRESS NOTES
Ishaan from Lab called with critical result of WBC 2.5 at 0105. Critical lab result read back to Ishaan.   This critical lab result is within parameters established by  for this patient

## 2019-12-22 NOTE — CARE PLAN
"  Problem: Respiratory:  Goal: Respiratory status will improve  Outcome: PROGRESSING SLOWER THAN EXPECTED  Intervention: Educate and encourage incentive spirometry usage  Note:   Patient encouraged to use IS 10 times per hour. Patient feels this is not reasonable. Encouraged patient to attempt to use IS during Every TV commercial to start.      Problem: Mobility  Goal: Risk for activity intolerance will decrease  Outcome: PROGRESSING SLOWER THAN EXPECTED  Intervention: Assess and monitor signs of activity intolerance  Note:   Patient encouraged to get out of bed for meals and take a shower today since there is no radiation today. Patient states \"We'll see\". Patient reports feeling fatigued.     "

## 2019-12-22 NOTE — PROGRESS NOTES
Bedside report received. Pt in bed resting comfortably with no s/sx of pain or discomfort.  Pt A&O X 4. Denying pain at this time.  Standby assist. Pt calls appropriately for medication or assistance as needed. Plan for PICC placement for chemo tomorrow. Call light within reach, all appropriate fall precautions in place and personal belongings within reach; hourly rounding in place. No needs at this time. See flowsheets for further assessment details.

## 2019-12-22 NOTE — CARE PLAN
Problem: Venous Thromboembolism (VTW)/Deep Vein Thrombosis (DVT) Prevention:  Goal: Patient will participate in Venous Thrombosis (VTE)/Deep Vein Thrombosis (DVT)Prevention Measures  Outcome: PROGRESSING AS EXPECTED  Flowsheets (Taken 12/21/2019 2230)  SCDs, Sequential Compression Device: On  Pharmacologic Prophylaxis Used: LMWH: Enoxaparin(Lovenox)  Note:   +Clot on PICC, lovenox BID     Problem: Pain Management  Goal: Pain level will decrease to patient's comfort goal  Outcome: PROGRESSING AS EXPECTED  Note:   Pain reported 5/10, medicated per MAR - Tylenol per patients request.

## 2019-12-22 NOTE — PROGRESS NOTES
[facility] Medicine Daily Progress Note    Date of Service  12/22/2019    Chief Complaint  74 y.o. male admitted 12/7/2019 with oral pain and weight loss.    Hospital Course    Patient with known anal cancer undergoing radiation and chemotherapy  He has had significant oral pain and unable to swallow well the past few weeks.  He has a sore on his bottom that he has been applying hemorrhoidal cream with lidocaine for pain.  He had hematuria develop since admission when platelet count was significantly low.   He developed a DVT in RUE and is on anticoagulation with Lovenox.        Interval Problem Update  12/8 Patient feeling tired and blood pressure has been markedly low, he has received 2 Liters of NS bolus and LR has been running at 100/hour since admit.  He still appears dehydrated but is starting to require oxygen to maintain saturation, lungs still sound clear on examination.  He received 1 unit of platelets and urine is clearing in collection tubing from neobladder.  Cr increased overnight with hydration from 1.52 to 1.82 and this could be due to early hydration only and expect it to trend back down.  If pressure does not respond to midodrine and IVF then patient will need to transfer to ICU for pressor support.  Midodrine and solucortef started because of hypotension.    12/9 Patient less fatigued and feeling slightly better when compared to yesterday.  He has been able to eat with pain control received by MBX.  Cr trending back down following IV resuscitation and blood pressure has improved with midodrine and cortef - patient refused transfer to ICU yesterday.  Radiation continued today on schedule.    12/17 Patient with adequate blood pressure with IV hydration but tends to drop when stopped, he states his oral intake is poor as his taste is very bland and nothing is good anymore.  He is agreeable to take an appetite stimulant with marinol and protein supplements in form of shakes.  Calcium is 6.8 but when  corrected for hypoalbuminemia, is 8.4.  PICC line to be removed today.  12/18 Patient feeling okay today, appetite has improved with lunch and dinner yesterday after starting marinol.  Will trial decrease of IVF and monitor blood pressure, he is not showing signs of volume overload.  He didn't eat much breakfast so I will give him tid marinol to see if that helps.    12/19 Patient feeling better today, he got out of bed and took a shower.  Blood pressure is doing about the same as yesterday despite dropping IVF rate from 100/h to 75/h slightly.     12/20 Patient without complaint, blood pressures have been stable so will try to decrease IVF rate further.  Will continue to encourage patient to get out of bed as much as tolerant.  12/21 Patient having nausea and diarrhea today, suspect as s/e of his radiation therapy, he is responding to anti-emetic and I will also start PRN imodium.  No acute events.  BP stable with rate drop of IVF to 50cc/h.  12/22 Patient doing about same with blood pressure, states nausea from yesterday has subsided.  He is eating.  Discussed with Dr Deras and oncology will see him by tomorrow to determine if chemotherapy is appropriate to be given on schedule tomorrow.    Consultants/Specialty  none    Code Status  full    Disposition  tbd    Review of Systems  Review of Systems   Constitutional: Negative for chills and fever.   HENT: Negative for congestion and sore throat.    Eyes: Negative for blurred vision and photophobia.   Respiratory: Negative for cough and shortness of breath.    Cardiovascular: Negative for chest pain, claudication and leg swelling.   Gastrointestinal: Positive for diarrhea (responding to imodium) and nausea. Negative for abdominal pain, constipation, heartburn and vomiting.   Genitourinary: Negative for dysuria, flank pain and hematuria.        Rectal pain     Musculoskeletal: Negative for joint pain and myalgias.   Skin: Negative for itching and rash.    Neurological: Negative for dizziness, sensory change, speech change, weakness and headaches.   Psychiatric/Behavioral: Negative for depression. The patient is not nervous/anxious and does not have insomnia.         Physical Exam  Temp:  [36.4 °C (97.6 °F)-37.1 °C (98.7 °F)] 36.6 °C (97.9 °F)  Pulse:  [63-73] 66  Resp:  [16-18] 18  BP: (85-96)/(49-52) 96/50  SpO2:  [91 %-94 %] 94 %    Physical Exam  Vitals signs and nursing note reviewed.   Constitutional:       General: He is not in acute distress.     Appearance: Normal appearance.   HENT:      Head: Normocephalic and atraumatic.   Eyes:      General: No scleral icterus.     Extraocular Movements: Extraocular movements intact.   Neck:      Musculoskeletal: Normal range of motion and neck supple.   Cardiovascular:      Rate and Rhythm: Normal rate and regular rhythm.      Pulses: Normal pulses.      Heart sounds: Normal heart sounds. No murmur.   Pulmonary:      Effort: Pulmonary effort is normal. No respiratory distress.      Breath sounds: Normal breath sounds. No wheezing, rhonchi or rales.   Abdominal:      General: Abdomen is flat. Bowel sounds are normal. There is no distension.      Palpations: Abdomen is soft.      Tenderness: There is no rebound.   Musculoskeletal:         General: No swelling or tenderness.   Lymphadenopathy:      Cervical: No cervical adenopathy.   Skin:     Coloration: Skin is not jaundiced.      Findings: No erythema.      Comments: Rectal sore without evidence of infection, consistent with radiation changes.     Neurological:      General: No focal deficit present.      Mental Status: He is alert and oriented to person, place, and time. Mental status is at baseline.      Cranial Nerves: No cranial nerve deficit.   Psychiatric:         Mood and Affect: Mood normal.         Behavior: Behavior normal.         Fluids    Intake/Output Summary (Last 24 hours) at 12/22/2019 1101  Last data filed at 12/22/2019 1000  Gross per 24 hour    Intake 240 ml   Output 400 ml   Net -160 ml       Laboratory  Recent Labs     12/20/19  0052 12/22/19  0022   WBC 1.8* 2.5*   RBC 3.19* 3.14*   HEMOGLOBIN 10.3* 10.1*   HEMATOCRIT 30.0* 30.6*   MCV 94.0 97.5   MCH 32.3 32.2   MCHC 34.3 33.0*   RDW 44.6 47.0   PLATELETCT 149* 162*   MPV 9.9 10.0     Recent Labs     12/20/19  0052 12/22/19  0022   SODIUM 138 137   POTASSIUM 3.6 3.7   CHLORIDE 112 110   CO2 21 22   GLUCOSE 143* 148*   BUN 27* 24*   CREATININE 1.12 1.31   CALCIUM 7.0* 7.0*                   Imaging  US-EXTREMITY VENOUS UPPER UNILAT RIGHT   Final Result      US-RENAL   Final Result      1.  There is moderate bilateral hydronephrosis, more than is typically expected for history of cystectomy with neobladder formation.      DX-CHEST-PORTABLE (1 VIEW)   Final Result      No radiographic evidence of acute cardiopulmonary process.      IR-PICC LINE PLACEMENT W/ GUIDANCE > AGE 5    (Results Pending)        Assessment/Plan  * Limb swelling  Assessment & Plan  On the R Upper Ex  U/S: DVT on axillary and basilic veins adhered to the PICC line.  The patient was not receiving Lovenox due to severe thrombocytopenia due to chemotherapy.  PICC line removed  Lovenox twice daily   Risk/benefit of medication was discussed with the patient and he agreed.      Hypotension  Assessment & Plan  Trial of decrease IVF with increase in PO intake.- stable    Patient does not appear septic, lactic acid 1.7  2L normal saline bolus with no significant change in his blood pressure  Patient is asymptomatic   Initiated midodrine  No signs of infection and blood cultures negative  No signs of bleeding      Dehydration  Assessment & Plan  Improving however after discontinuing IV fluids the patient developed hypotension  Bolused and continuous IV fluids  Poor PO intake, start marinol and protein supplements         Diabetes mellitus type 2 in nonobese (HCC)  Assessment & Plan  -accus with sliding scale coverage to begin.  -diabetic  diet  -diabetic education  -follow glycohemoglobin levels long term, most recent hemoglobin A1c 6.4  -Hold metformin with elevated renal functions.  I discussed this with the patient  -monitor for hypoglycemic episodes and adjust control if he should get low    Acute renal failure (ARF) (HCC)  Assessment & Plan  Likely due to dehydration  Renal ultrasound shows moderate bilateral hydronephrosis, more than expected following neobladder   Improving on his creatinine   keep monitoring    Anal carcinoma (HCC)- (present on admission)  Assessment & Plan  Stage IIb rectal cancer  He is receiving radiation therapy during this admission  Oncology consult today/tomorrow as chemotherapy due 12/23  PICC line to be placed prior to chemo.      COPD (chronic obstructive pulmonary disease) (HCC)- (present on admission)  Assessment & Plan  No exacerbation   RT protocol, nebulizer treatments, oxygen as needed     Poor appetite  Assessment & Plan  marinol before meals - noted increase in appetite when started.      ACP (advance care planning)  Assessment & Plan  I had a long discussion with the patient about his diseases, and the poor prognosis of cancer, I answered all his questions, the patient understands that he has noncurable disease, he did not desired yet about the CODE STATUS however he wants to stay full code at this time.     Hematuria  Assessment & Plan  Resolved with platelet transfusion  History of bladder cancer   Trend CBC        Stomatitis  Assessment & Plan  Patient appears to have thrush vs mucocytis from chemotherapy.   nystatin swish and swallow   MBX is allowing him to tolerate eating.  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    Current smoker- (present on admission)  Assessment & Plan  -nicotine replacement protocol and cessation education provided       VTE prophylaxis: SCDs, AC c/i due to bleeding and thrombocytopenia.

## 2019-12-22 NOTE — PROGRESS NOTES
Received report and assumed care of patient at 1900. Reviewed chart and completed assessment. Patient is A&o x 4, PIV in LFA with old drainage + blood return running NS @50mL/hr. Patient up with SBA, encouraged patient to call for help. Condom catheter in place, o2 2li per NC. Patient c/o 5/10 pain, requested tylenol, medicated per MAR. Oral care completed. Bed in lowest locked position, call light with in reach. All needs met at this time, will continue to monitor.

## 2019-12-23 NOTE — PROGRESS NOTES
Pt sleeping. Pt with several incontinent loose/soft stools this night. Medicated with Imodium per MAR. Medicated for pain and nausea per MAR. Cleansing rectum/sacrum with barrier wipes and applying silvadene cream. Condom catheter in use, great output. On 2 L NC, 93%. Discussed POC, PICC placement today. Safety precautions in place. Rounding in place. All needs met at this moment.

## 2019-12-23 NOTE — PROGRESS NOTES
Spoke with Dr. Mckenzie this morning about PICC placement and the chances of developing a clot in this PICC.  Dr. Mckenzie decided not to place PICC at this time and use PIV's instead.

## 2019-12-23 NOTE — CARE PLAN
Problem: Safety  Goal: Will remain free from injury  Outcome: PROGRESSING AS EXPECTED  Goal: Will remain free from falls  Outcome: PROGRESSING AS EXPECTED  Intervention: Implement fall precautions  Flowsheets  Taken 12/23/2019 1032  Bed Alarm: Yes - Alarm On  Taken 12/23/2019 0930  Environmental Precautions: Treaded Slipper Socks on Patient;Bed in Low Position;Personal Belongings, Wastebasket, Call Bell etc. in Easy Reach;Mobility Assessed & Appropriate Sign Placed;Communication Sign for Patients & Families     Problem: Venous Thromboembolism (VTW)/Deep Vein Thrombosis (DVT) Prevention:  Goal: Patient will participate in Venous Thrombosis (VTE)/Deep Vein Thrombosis (DVT)Prevention Measures  Outcome: PROGRESSING SLOWER THAN EXPECTED  Intervention: Assess and monitor for anticoagulation complications  Note:   Monitoring CBC. No hematuria or dark stools.

## 2019-12-23 NOTE — PROGRESS NOTES
Bedside report received. Pt in bed resting comfortably with no s/sx of pain or discomfort.  Pt A&O X 4. Denying pain at this time.  Standby assist. Pt calls appropriately for medication or assistance as needed. Chemo on hold until ECHO finished per Dr. Bee. Barrier wipes in use. Silvadene creme for radiation burns. Call light within reach, all appropriate fall precautions in place and personal belongings within reach; hourly rounding in place. No needs at this time. See flowsheets for further assessment details.

## 2019-12-23 NOTE — PROGRESS NOTES
Patient brought down to radiation oncology and received treatment 22 of 33. Patient will return tomorrow for further treatment.

## 2019-12-23 NOTE — PROGRESS NOTES
[facility] Medicine Daily Progress Note    Date of Service  12/23/2019    Chief Complaint  74 y.o. male admitted 12/7/2019 with oral pain and weight loss.    Hospital Course    Patient with known anal cancer undergoing radiation and chemotherapy  He has had significant oral pain and unable to swallow well the past few weeks.  He has a sore on his bottom that he has been applying hemorrhoidal cream with lidocaine for pain.  He had hematuria develop since admission when platelet count was significantly low.   He developed a DVT in RUE and is on anticoagulation with Lovenox.        Interval Problem Update  12/8 Patient feeling tired and blood pressure has been markedly low, he has received 2 Liters of NS bolus and LR has been running at 100/hour since admit.  He still appears dehydrated but is starting to require oxygen to maintain saturation, lungs still sound clear on examination.  He received 1 unit of platelets and urine is clearing in collection tubing from neobladder.  Cr increased overnight with hydration from 1.52 to 1.82 and this could be due to early hydration only and expect it to trend back down.  If pressure does not respond to midodrine and IVF then patient will need to transfer to ICU for pressor support.  Midodrine and solucortef started because of hypotension.    12/9 Patient less fatigued and feeling slightly better when compared to yesterday.  He has been able to eat with pain control received by MBX.  Cr trending back down following IV resuscitation and blood pressure has improved with midodrine and cortef - patient refused transfer to ICU yesterday.  Radiation continued today on schedule.    12/17 Patient with adequate blood pressure with IV hydration but tends to drop when stopped, he states his oral intake is poor as his taste is very bland and nothing is good anymore.  He is agreeable to take an appetite stimulant with marinol and protein supplements in form of shakes.  Calcium is 6.8 but when  corrected for hypoalbuminemia, is 8.4.  PICC line to be removed today.  12/18 Patient feeling okay today, appetite has improved with lunch and dinner yesterday after starting marinol.  Will trial decrease of IVF and monitor blood pressure, he is not showing signs of volume overload.  He didn't eat much breakfast so I will give him tid marinol to see if that helps.    12/19 Patient feeling better today, he got out of bed and took a shower.  Blood pressure is doing about the same as yesterday despite dropping IVF rate from 100/h to 75/h slightly.     12/20 Patient without complaint, blood pressures have been stable so will try to decrease IVF rate further.  Will continue to encourage patient to get out of bed as much as tolerant.  12/21 Patient having nausea and diarrhea today, suspect as s/e of his radiation therapy, he is responding to anti-emetic and I will also start PRN imodium.  No acute events.  BP stable with rate drop of IVF to 50cc/h.  12/22 Patient doing about same with blood pressure, states nausea from yesterday has subsided.  He is eating.  Discussed with Dr Deras and oncology will see him by tomorrow to determine if chemotherapy is appropriate to be given on schedule tomorrow.  12/23 Patient with stable, low blood pressure.  Initially PICC was to be placed today but after speaking with vascular access team, they feel he is too high of a risk for another PICC induced clot and recommend either tunneled central line or PORT.  Patient only has 1 drug that needs to be received via central line (mitomycin for about 45 minute infusion).  He should have an IJ placed for chemotherapy and then this to be discontinued.    Consultants/Specialty  none    Code Status  full    Disposition  tbd    Review of Systems  Review of Systems   Constitutional: Negative for chills and fever.   HENT: Negative for congestion and sore throat.    Eyes: Negative for blurred vision and photophobia.   Respiratory: Negative for cough  and shortness of breath.    Cardiovascular: Negative for chest pain, claudication and leg swelling.   Gastrointestinal: Positive for diarrhea (responding to imodium) and nausea. Negative for abdominal pain, constipation, heartburn and vomiting.   Genitourinary: Negative for dysuria, flank pain and hematuria.        Rectal pain     Musculoskeletal: Negative for joint pain and myalgias.   Skin: Negative for itching and rash.   Neurological: Negative for dizziness, sensory change, speech change, weakness and headaches.   Psychiatric/Behavioral: Negative for depression. The patient is not nervous/anxious and does not have insomnia.         Physical Exam  Temp:  [36.6 °C (97.9 °F)-37.1 °C (98.7 °F)] 36.8 °C (98.2 °F)  Pulse:  [63-75] 68  Resp:  [17-18] 18  BP: ()/(49-54) 90/50  SpO2:  [93 %-95 %] 94 %    Physical Exam  Vitals signs and nursing note reviewed.   Constitutional:       General: He is not in acute distress.     Appearance: Normal appearance.   HENT:      Head: Normocephalic and atraumatic.   Eyes:      General: No scleral icterus.     Extraocular Movements: Extraocular movements intact.   Neck:      Musculoskeletal: Normal range of motion and neck supple.   Cardiovascular:      Rate and Rhythm: Normal rate and regular rhythm.      Pulses: Normal pulses.      Heart sounds: Normal heart sounds. No murmur.   Pulmonary:      Effort: Pulmonary effort is normal. No respiratory distress.      Breath sounds: Normal breath sounds. No wheezing, rhonchi or rales.   Abdominal:      General: Abdomen is flat. Bowel sounds are normal. There is no distension.      Palpations: Abdomen is soft.      Tenderness: There is no rebound.   Musculoskeletal:         General: No swelling or tenderness.   Lymphadenopathy:      Cervical: No cervical adenopathy.   Skin:     Coloration: Skin is not jaundiced.      Findings: No erythema.      Comments: Rectal sore without evidence of infection, consistent with radiation changes.      Neurological:      General: No focal deficit present.      Mental Status: He is alert and oriented to person, place, and time. Mental status is at baseline.      Cranial Nerves: No cranial nerve deficit.   Psychiatric:         Mood and Affect: Mood normal.         Behavior: Behavior normal.         Fluids    Intake/Output Summary (Last 24 hours) at 12/23/2019 1514  Last data filed at 12/23/2019 0442  Gross per 24 hour   Intake 4280 ml   Output 3100 ml   Net 1180 ml       Laboratory  Recent Labs     12/22/19  0022 12/23/19  0018   WBC 2.5* 4.5*   RBC 3.14* 3.50*   HEMOGLOBIN 10.1* 11.4*   HEMATOCRIT 30.6* 34.0*   MCV 97.5 97.1   MCH 32.2 32.6   MCHC 33.0* 33.5*   RDW 47.0 47.8   PLATELETCT 162* 163*   MPV 10.0 9.7     Recent Labs     12/22/19  0022 12/23/19  0018   SODIUM 137 136   POTASSIUM 3.7 3.8   CHLORIDE 110 109   CO2 22 22   GLUCOSE 148* 162*   BUN 24* 21   CREATININE 1.31 1.03   CALCIUM 7.0* 7.0*                   Imaging  EC-ECHOCARDIOGRAM COMPLETE W/O CONT   Final Result      US-EXTREMITY VENOUS UPPER UNILAT RIGHT   Final Result      US-RENAL   Final Result      1.  There is moderate bilateral hydronephrosis, more than is typically expected for history of cystectomy with neobladder formation.      DX-CHEST-PORTABLE (1 VIEW)   Final Result      No radiographic evidence of acute cardiopulmonary process.           Assessment/Plan  * Limb swelling  Assessment & Plan  On the R Upper Ex  U/S: DVT on axillary and basilic veins adhered to the PICC line.  The patient was not receiving Lovenox due to severe thrombocytopenia due to chemotherapy.  PICC line removed  Lovenox twice daily   Risk/benefit of medication was discussed with the patient and he agreed.      Hypotension  Assessment & Plan  Trial of decrease IVF with increase in PO intake.- stable    Patient does not appear septic, lactic acid 1.7  2L normal saline bolus with no significant change in his blood pressure  Patient is asymptomatic   Initiated  midodrine  No signs of infection and blood cultures negative  No signs of bleeding      Dehydration  Assessment & Plan  Improving however after discontinuing IV fluids the patient developed hypotension  Bolused and continuous IV fluids  Poor PO intake, start marinol and protein supplements         Diabetes mellitus type 2 in nonobese (HCC)  Assessment & Plan  -accus with sliding scale coverage to begin.  -diabetic diet  -diabetic education  -follow glycohemoglobin levels long term, most recent hemoglobin A1c 6.4  -Hold metformin with elevated renal functions.  I discussed this with the patient  -monitor for hypoglycemic episodes and adjust control if he should get low    Acute renal failure (ARF) ([facility])  Assessment & Plan  Likely due to dehydration  Renal ultrasound shows moderate bilateral hydronephrosis, more than expected following neobladder   Improving on his creatinine   keep monitoring    Anal carcinoma ([facility])- (present on admission)  Assessment & Plan  Stage IIb rectal cancer  He is receiving radiation therapy during this admission  Oncology consulted as chemotherapy due 12/23 - Dr Bee  PICC will not be placed as c/i because of previous DVT from previous PICC  Central line needs to be placed for mitomycin only.       COPD (chronic obstructive pulmonary disease) ([facility])- (present on admission)  Assessment & Plan  No exacerbation   RT protocol, nebulizer treatments, oxygen as needed     Poor appetite  Assessment & Plan  marinol before meals - noted increase in appetite when started.      ACP (advance care planning)  Assessment & Plan  I had a long discussion with the patient about his diseases, and the poor prognosis of cancer, I answered all his questions, the patient understands that he has noncurable disease, he did not desired yet about the CODE STATUS however he wants to stay full code at this time.     Hematuria  Assessment & Plan  Resolved with platelet transfusion  History of bladder cancer   Trend  CBC        Stomatitis  Assessment & Plan  Patient appears to have thrush vs mucocytis from chemotherapy.   nystatin swish and swallow   MBX is allowing him to tolerate eating.  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    Current smoker- (present on admission)  Assessment & Plan  -nicotine replacement protocol and cessation education provided       VTE prophylaxis: SCDs, AC c/i due to bleeding and thrombocytopenia.

## 2019-12-23 NOTE — PROGRESS NOTES
"Pharmacy Chemotherapy calculation:    DX: Anal cancer    Cycle 1    Day 29 - delayed 1 day for central line placement  Previous treatment = C1 D1 11/25/19    Regimen: Fluorouracil + mitomycin + XRT  Fluorouracil 1000mg/m2/day x 4 days  on days 1-4 and 29-32  -12/24/19- day 29-32 dose reduced due to cytopenias per Dr. Bee   Mitomycin 10mg/m2([id number]) IVP on days 1 and 29\  -12/24/19- day 29 dose reduced due to cytopenias per Dr. Bee  NCCN guidelines for Anal Carcinoma V.1.2019  Travis WALKER, et al - GAYATHRI. 2008 Apr 23;299(16):1914-21. doi: [id number].  Will LEGER et al - Lancet Oncol. 2013 May;14(6):516-24. doi: 10.1016/-[id number]. Epub 2013 Apr 9.       BP (!) 88/57 Comment: RN notified.  Pulse 81   Temp 36.5 °C (97.7 °F) (Temporal)   Resp 18   Ht 1.727 m (5' 8\")   Wt 59.1 kg (130 lb 4.7 oz)   SpO2 98%   BMI 19.81 kg/m²   Body surface area is 1.68 meters squared.     All lab results 12/24/19 within treatment parameters.       Fluorouracil 800mg/m2/day x 1.68m2 = 1344mg   <10% difference, ok to treat with final dose = 1345mg CIVI daily on days 29-32   Total dose = 3200mg/m2 over 96 hours(days 29-32)    Mitomycin 8mg/m2 x 1.68m2 = 13.44mg   <10% difference, ok to treat with final dose = 13.44mg IV on day 29      MARCE Gan Pharm.D.      "

## 2019-12-24 PROBLEM — E83.42 HYPOMAGNESEMIA: Status: ACTIVE | Noted: 2019-01-01

## 2019-12-24 PROBLEM — N13.30 HYDRONEPHROSIS, BILATERAL: Status: ACTIVE | Noted: 2019-01-01

## 2019-12-24 PROBLEM — I82.409 DVT (DEEP VENOUS THROMBOSIS) (HCC): Status: ACTIVE | Noted: 2019-01-01

## 2019-12-24 PROBLEM — J96.01 ACUTE RESPIRATORY FAILURE WITH HYPOXIA (HCC): Status: ACTIVE | Noted: 2019-01-01

## 2019-12-24 NOTE — PROGRESS NOTES
Samia from Lab called with critical result of calcium of 6.8 at 0115. Critical lab result read back to Samia.   This critical lab result is within parameters established by Dr. Mckenzie for this patient (corrected calcium is 8.2).

## 2019-12-24 NOTE — PROGRESS NOTES
Bedside report received. Pt in bed resting comfortably with no s/sx of pain or discomfort.  Pt A&O X 4. Denying pain at this time.  Standby assist. IJ placed to right neck. Placement verified with Xray. Plan for chemo this shift. Denies nausea. Reports mild pain to IJ site. Medicated with tylenol PRN. Imodium given for frequent loose stools. Pt calls appropriately for medication or assistance as needed. Call light within reach, all appropriate fall precautions in place and personal belongings within reach; hourly rounding in place. No needs at this time. See flowsheets for further assessment details.

## 2019-12-24 NOTE — PROGRESS NOTES
Received report & assumed care of patient at 1900. Assessment completed. Patient is A&Ox4, up x1. L PIV patent, running NS at 50 mL/hr. Patient denies any pain, n/v at this time. Patient is on 2L oxygen via nasal cannula. Condom cath in place. POC discussed & questions answered. Bed locked and in lowest position, bed alarm is on, call light within reach, non-skid socks in place, hourly rounding. Patient reports no further needs at this time.

## 2019-12-24 NOTE — PROCEDURES
Central Line Insertion  Date/Time: 12/24/2019 11:14 AM  Performed by: Josue Rust M.D.  Authorized by: Josue Rust M.D.     Consent:     Consent obtained:  Written    Consent given by:  Patient    Risks discussed:  Arterial puncture, incorrect placement, infection, bleeding and pneumothorax  Pre-procedure details:     Hand hygiene: Hand hygiene performed prior to insertion      Sterile barrier technique: All elements of maximal sterile technique followed      Skin preparation:  2% chlorhexidine  Sedation:     Sedation type:  None  Anesthesia:     Anesthesia method:  Local infiltration    Local anesthetic:  Lidocaine 1% w/o epi  Procedure details:     Location:  R internal jugular    Patient position:  Flat    Procedural supplies:  Triple lumen    Catheter size:  7 Fr    Landmarks identified: yes      Ultrasound guidance: yes (The right IJ was identified by ultrasound. No DVT was present. The vessel was evaluated proximally and distally to the clavicle.)      Number of attempts:  1    Successful placement: yes    Post-procedure details:     Post-procedure:  Line sutured    Assessment:  Blood return through all ports and free fluid flow    Patient tolerance of procedure:  Tolerated well, no immediate complications (CXR pending)  Comments:      I was asked by Dr. Almeida to place a CVC to facilitate chemotherapy administration.  A PICC line was removed do to development of  DVT.

## 2019-12-24 NOTE — PROGRESS NOTES
Salvador from Lab called with critical result of WBC of 2.2 at 0220. Critical lab result read back to Salvador.   This critical lab result is within parameters established by Dr. Mckenzie for this patient.

## 2019-12-24 NOTE — PROGRESS NOTES
Chemotherapy Verification - PRIMARY RN      Height = 172.7 cm  Weight = 59.1 kg  BSA = 1.68 m2       Medication: Mitomycin  Dose: 8mg/m2  Calculated Dose: 13.44 mg (ordered dose 13.44 mg)                             (In mg/m2, AUC, mg/kg)     Medication: Fluorouracil  Dose: 800 mg/m2  Calculated Dose: 1344 mg (ordered dose 1344 mg)                             (In mg/m2, AUC, mg/kg)        I confirm this process was performed independently with the BSA and all final chemotherapy dosing calculations congruent.  Any discrepancies of 10% or greater have been addressed with the chemotherapy pharmacist. The resolution of the discrepancy has been documented in the EPIC progress notes.

## 2019-12-24 NOTE — ON TREATMENT VISIT
ON TREATMENT  NOTE  RADIATION ONCOLOGY DEPARTMENT    Patient name:  Corona Bean    Primary Physician:  Francis Marcano M.D. MRN: [id number]  [facility]: [id number]   Referring physician:  Beau Torrez M.D. : 1945, 74 y.o.     ENCOUNTER DATE:  19    DIAGNOSIS:  Anal carcinoma (HCC)  Staging form: Anus, AJCC 8th Edition  - Clinical stage from 2019: Stage IIB (cT3, cN0, cM0) - Signed by Beau Torrez M.D. on 2019  Tumor location in anus: Anal      TREATMENT SUMMARY:  Aria Treatment Information        Some values may be hidden. Unless noted otherwise, only the newest values recorded on each date are displayed.         Aria Treatment Summary 19   Course First Treatment Date 2019   Course Last Treatment Date 2019   Anal Plan from Course Anal   Fraction  30   Elapsed Course Days 34 @ [id number]   Prescribed Fraction Dose 180 cGy   Prescribed Total Dose 5,400 cGy   Anal Reference Point from Course Anal   Elapsed Course Days 34 @ [id number]   Session Dose 180 cGy   Total Dose 4,140 cGy   Anal CP Reference Point from Course Anal   Elapsed Course Days 34 @    Session Dose 186 cGy   Total Dose 4,288 cGy             SUBJECTIVE:  Rectal pain, hyperpigmentation of skin using lidocaine and silvadene.      VITAL SIGNS:  KPS: 80, Normal activity with effort; some signs or symptoms of disease (ECOG equivalent 1)     Pain Assessment 2019   Pain Assessment Acute Pain -   Pain Score 7 7   Pain Loc Rectum -   What increases pain? sitting/movement -   What decreases pain? tylenol (pt also given rx for lidocaine which he will  today) -   Some recent data might be hidden          PHYSICAL EXAM:    Physical Exam  Genitourinary:             Toxicity Assessment 2019   Toxicity Assessment Male Pelvis Male Pelvis Male Pelvis Male Pelvis   Fatigue (lethargy, malaise, asthenia) Moderate (e.g., decrease in  performance status by 1 ECOG level or 20% Karnofsky) or causing difficulty performing some activities Increased fatigue over baseline, but not altering normal activities Increased fatigue over baseline, but not altering normal activities None   Radiation Dermatitis Faint erythema or dry desquamation Faint erythema or dry desquamation Faint erythema or dry desquamation None   Anorexia Loss of appetite Loss of appetite None None   Colitis None None None None   Constipation None None Requiring stool softener or dietary modification None   Dehydration None None None None   Diarrhea w/o Colostomy Increase of <4 stools/day over pre-treatment None None None   Flatulence None None None None   Nausea None None None None   Proctitis None None None None   Vomiting None None None None   RT - Pain due to RT None Moderate pain, pain or analgesics interfering with function, but not interfering with activities of daily living Mild pain not interfering with function None   Tumor Pain (onset or exacerbation of tumor pain due to treatment) None Moderate pain, pain or analgesics interfering with function, but not interfering with activities of daily living Mild pain not interfering with function None   Dysuria (painful urination) None None None None   Urinary Frequency Normal Normal Normal Normal   Urinary Urgency None None None None   Bladder Spasms Absent Absent Absent Absent   Incontinence None None Spontaneous, some control None   Urinary Retention Normal Normal Normal Normal       CURRENT MEDICATIONS:  No current facility-administered medications for this encounter.   No current outpatient medications on file.    Facility-Administered Medications Ordered in Other Encounters:   •  prochlorperazine (COMPAZINE) injection 10 mg, 10 mg, Intravenous, Q6HRS PRN, Saray Mckenzie, D.O., 10 mg at 12/22/19 4098  •  loperamide (IMODIUM) capsule 2-4 mg, 2-4 mg, Oral, 4X/DAY PRN, Saray Mckenzie, D.O., 4 mg at 12/24/19 1033  •  silver sulfADIAZINE  (SILVADENE) 1 % cream, , Topical, BID, HELENA Khan.O., 1 g at 12/23/19 1958  •  dronabinol (MARINOL) capsule 5 mg, 5 mg, Oral, TID AC, HELENA Khan.O., 5 mg at 12/23/19 1748  •  senna-docusate (PERICOLACE or SENOKOT S) 8.6-50 MG per tablet 2 Tab, 2 Tab, Oral, QDAY PRN **AND** polyethylene glycol/lytes (MIRALAX) PACKET 1 Packet, 1 Packet, Oral, TID PRN **AND** magnesium hydroxide (MILK OF MAGNESIA) suspension 30 mL, 30 mL, Oral, BID PRN **AND** bisacodyl (DULCOLAX) suppository 10 mg, 10 mg, Rectal, QDAY PRN, HELENA Khan.O.  •  calcium carbonate (TUMS) chewable tab 500 mg, 500 mg, Oral, BID PRN, Corona Novoa D.O., 500 mg at 12/19/19 1725  •  enoxaparin (LOVENOX) inj 60 mg, 60 mg, Subcutaneous, Q12HRS, Lionel Lorenzo M.D., 60 mg at 12/24/19 1015  •  NS infusion, , Intravenous, Continuous, HELENA Khan.ODebbie, Last Rate: 50 mL/hr at 12/23/19 1748  •  tiotropium (SPIRIVA) 18 MCG inhalation capsule 1 Cap, 1 Cap, Inhalation, DAILY, Saray Mckenzie D.O.  •  midodrine (PROAMATINE) tablet 10 mg, 10 mg, Oral, TID WITH MEALS, HELENA Khan.O., 10 mg at 12/24/19 0932  •  mineral oil-pet hydrophilic (AQUAPHOR) ointment, , Topical, BID, HELENA Khan.O., Stopped at 12/20/19 0900  •  Respiratory Care per Protocol, , Nebulization, Continuous RT, HELENA Khan.O.  •  albuterol inhaler 2 Puff, 2 Puff, Inhalation, Q6HRS PRN, Levente Levai, M.D.  •  timolol (TIMOPTIC) 0.25 % ophthalmic solution 1 Drop, 1 Drop, Right Eye, BID, Francine Pérez M.D., 1 Drop at 12/24/19 0613  •  acetaminophen (TYLENOL) tablet 650 mg, 650 mg, Oral, Q6HRS PRN, Francine Pérez M.D., 650 mg at 12/22/19 2326  •  ondansetron (ZOFRAN) syringe/vial injection 4 mg, 4 mg, Intravenous, Q4HRS PRN, Francine Pérez M.D., 4 mg at 12/21/19 0802  •  ondansetron (ZOFRAN ODT) dispertab 4 mg, 4 mg, Oral, Q4HRS PRN, Francine Pérez M.D., 4 mg at 12/15/19 2323  •  Pharmacy Consult Request, 1 Each, Other, PHARMACY TO DOSE, Francine Pérez M.D.  •   labetalol (NORMODYNE/TRANDATE) injection 10 mg, 10 mg, Intravenous, Q4HRS PRN, Francine Pérez M.D.  •  [DISCONTINUED] insulin regular (HUMULIN R) injection 1-6 Units, 1-6 Units, Subcutaneous, 4X/DAY ACHS, Stopped at 12/16/19 1100 **AND** [CANCELED] Accu-Chek ACHS, , , Q AC AND BEDTIME(S) **AND** [COMPLETED] NOTIFY MD and PharmD, , , Once **AND** glucose 4 g chewable tablet 16 g, 16 g, Oral, Q15 MIN PRN **AND** DEXTROSE 10% BOLUS 250 mL, 250 mL, Intravenous, Q15 MIN PRN, Francine Pérez M.D.  •  oxyCODONE immediate-release (ROXICODONE) tablet 5 mg, 5 mg, Oral, Q4HRS PRN, Francine Pérez M.D., 5 mg at 12/22/19 2326  •  Maalox Plus - Diphenhydramine - Lidocaine (MBX Oral Solution), 5 mL, Oral, Q6HRS PRN, Francine Pérez M.D., 5 mL at 12/16/19 2123    LABORATORY DATA:   Lab Results   Component Value Date/Time    SODIUM 134 (L) 12/24/2019 12:20 AM    POTASSIUM 4.0 12/24/2019 12:20 AM    CHLORIDE 108 12/24/2019 12:20 AM    CO2 21 12/24/2019 12:20 AM    GLUCOSE 136 (H) 12/24/2019 12:20 AM    BUN 24 (H) 12/24/2019 12:20 AM    CREATININE 1.25 12/24/2019 12:20 AM         Lab Results   Component Value Date/Time    WBC 2.2 (LL) 12/24/2019 12:20 AM    HEMOGLOBIN 9.0 (L) 12/24/2019 12:20 AM    HEMATOCRIT 27.3 (L) 12/24/2019 12:20 AM    MCV 97.8 12/24/2019 12:20 AM    PLATELETCT 134 (L) 12/24/2019 12:20 AM        RADIOLOGY DATA:  Dx-chest-for Line Placement Perform Procedure In: Patient's Room    Result Date: 12/24/2019 12/24/2019 11:08 AM HISTORY/REASON FOR EXAM:  Right IJ Central line placement. TECHNIQUE/EXAM DESCRIPTION AND NUMBER OF VIEWS: Single view of the chest. COMPARISON: 12/7/2019 FINDINGS: LUNGS: Mild interstitial prominence, likely mild edema. No focal consolidation. No effusions. PNEUMOTHORAX: None. LINES AND TUBES: Right IJ central catheter tip is in the mid/distal SVC. MEDIASTINUM: No cardiomegaly. MUSCULOSKELETAL STRUCTURES: No acute fracture.     1.  Right IJ central catheter tip is in the mid/distal SVC. No  pneumothorax. 2.  Mild interstitial prominence, likely mild edema. No focal consolidation or pleural effusions.    Dx-chest-portable (1 View)    Result Date: 2019 5:09 PM HISTORY/REASON FOR EXAM: possible sepsis.. TECHNIQUE/EXAM DESCRIPTION AND NUMBER OF VIEWS: Single AP view of the chest. COMPARISON: 6/15/2017 FINDINGS: Hardware: Right upper extremity PICC line tip overlies the SVC. Lungs: No consolidation detected. Large volumes are again seen. Pleura:  No pleural space process is seen. Heart and mediastinum: The cardiomediastinal contours are normal.     No radiographic evidence of acute cardiopulmonary process.    Us-renal    Result Date: 2019 5:34 PM HISTORY/REASON FOR EXAM:  Abnormal Labs. History of bladder cancer with prior cystectomy. TECHNIQUE/EXAM DESCRIPTION: Renal ultrasound. COMPARISON:  CT 10/18/2019 FINDINGS: The right kidney measures 9.17 cm. The left kidney measures 9.97 cm. There is bilateral moderate hydronephrosis. There are no abnormal calcifications. 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 Distal ureters are not visualized.     1.  There is moderate bilateral hydronephrosis, more than is typically expected for history of cystectomy with neobladder formation.    Us-extremity Venous Upper Unilat Right    Result Date: 12/15/2019   Upper Extremity  Venous Duplex Report  Vascular Laboratory  CONCLUSIONS  Acute DVT rt axillary and basilic veins.  called to TASHIA Gregorio  Exam Date:     12/15/2019 08:30  Room #:     Inpatient  Priority:     Routine  Ht (in):             Wt (lb):  Ordering Physician:        AYUSH BRANDON  Referring Physician:       [id number] Sorenson  Sonographer:               Gema Hyde RVT  Study Type:  Technical Quality:         Adequate  Age:    74    Gender:     M  MRN:    [id number]  :    1945      BSA:  Indications:     Localized swelling, mass and lump, right upper limb, Pain  in                    right arm, Edema  CPT Codes:       29661  ICD Codes:       R22.31  M79.601  782.3  History:         Pain (mild) and swelling (mild) in Right upper extremity at                   PICC line site; No previous duplex on file.  Limitations:  PROCEDURES:  Right upper extremity venous duplex imaging.  The following venous structures were evaluated: internal jugular,  subclavian, axillary, brachial, radial, ulnar, cephalic and basilic veins.  Serial compression, augmentation maneuvers,  color and spectral Doppler  flow evaluations were performed.  FINDINGS:  Right upper extremity-  Thrombus, acute & near-occlusive, visualized within the axillary and  basilic veins adhered to the PICC line.  All other veins of the right upper extremity demonstrate normal flow  dynamics with no evidence of deep vein thrombosis or superficial  thrombophlebitis.  Flow was evaluated in the contralateral subclavian vein and normal venous  flow dynamics including spontaneous flow, respiratory phasic variation and  augmentation were demonstrated.  Kwabena Hein MD  (Electronically Signed)  Final Date:      15 December 2019                   09:43    Ec-echocardiogram Complete W/o Cont    Result Date: 12/23/2019  Transthoracic Echo Report Echocardiography Laboratory CONCLUSIONS Normal left ventricular size and systolic function. Left ventricular ejection fraction is visually estimated to be 75%. Normal regional wall motion. Aortic sclerosis without stenosis. Structurally normal mitral valve without significant stenosis or regurgitation. Trace tricuspid regurgitation. Unable to estimate pulmonary artery pressure due to an inadequate tricuspid regurgitant jet. Normal pericardium without effusion. Compared to the images of the prior study done 11/14//2019-  there has been no significant change. TASHIA CARMONA Exam Date:         12/23/2019                    08:57 Exam Location:     Inpatient Priority:          Routine Ordering  Physician:        LAUREN VALE Referring Physician: Sonographer:               Ariela Sandoval RDCS Age:    74     Gender:    M MRN:    [id number] :    1945 BSA:    1.78   Ht (in):    68     Wt (lb):    144 Exam Type:     Complete Indications:     Pre-Chemo Therapy ICD Codes:       V49.89 CPT Codes:       66763 BP:   87     /   49     HR:   66 Technical Quality:       Fair MEASUREMENTS  (Male / Female) Normal Values 2D ECHO LV Diastolic Diameter PLAX        3.7 cm                4.2 - 5.9 / 3.9 - 5.3 cm LV Systolic Diameter PLAX         1.6 cm                2.1 - 4.0 cm IVS Diastolic Thickness           0.73 cm               LVPW Diastolic Thickness          0.75 cm               RV Diameter 4C                    3.5 cm                2.5 - 2.9 cm LVOT Diameter                     2 cm                  RA Diameter                       3.3 cm                Estimated LV Ejection Fraction    75 %                  LV Ejection Fraction MOD BP       62.9 %                >= 55  % LV Ejection Fraction MOD 4C       61.4 %                LV Ejection Fraction MOD 2C       76.9 %                LA Volume Index                   11.5 cm???/m???           16 - 28 cm???/m??? IVC Diameter                      0.92 cm               DOPPLER AV Peak Velocity                  1.3 m/s               AV Peak Gradient                  6.4 mmHg              AV Mean Gradient                  3.7 mmHg              LVOT Peak Velocity                1 m/s                 AV Area Cont Eq vti               2.3 cm???               Mitral E Point Velocity           0.68 m/s              Mitral E to A Ratio               0.79                  MV Pressure Half Time             109 ms                MV Area PHT                       2 cm???                 MV Deceleration Time              377 ms                TR Peak Velocity                  218 cm/s              PV Peak Velocity                  1.2 m/s               PV Peak Gradient                   5.8 mmHg              RVOT Peak Velocity                0.79 m/s              * Indicates values subject to auto-interpretation LV EF:  75    % FINDINGS Left Ventricle Normal left ventricular size and systolic function. Normal regional wall motion. Normal diastolic function. Left ventricular ejection fraction is visually estimated to be 75%. Normal left ventricular wall thickness. Right Ventricle Right ventricle not well visualized but appeared mildly enlarged. Right Atrium Normal right atrial size. Normal inferior vena cava size and inspiratory collapse. Left Atrium Normal left atrial size. Mitral Valve Structurally normal mitral valve without significant stenosis or regurgitation. Aortic Valve Tricuspid aortic valve. Aortic sclerosis without stenosis. No aortic insufficiency. Tricuspid Valve Structurally normal tricuspid valve. Trace tricuspid regurgitation. Right atrial pressure is estimated to be 3 mmHg. Unable to estimate pulmonary artery pressure due to an inadequate tricuspid regurgitant jet. Pulmonic Valve Structurally normal pulmonic valve without significant stenosis or regurgitation. Pericardium Normal pericardium without effusion. Aorta Normal aorta. Lore Garnett M.D. (Electronically Signed) Final Date:     23 December 2019                 09:38      IMPRESSION:  Anal carcinoma (HCC)  Staging form: Anus, AJCC 8th Edition  - Clinical stage from 11/5/2019: Stage IIB (cT3, cN0, cM0) - Signed by Beau Torrez M.D. on 11/5/2019  Tumor location in anus: Anal      PLAN:  No change in treatment plan    Disposition:  Treatment plan reviewed. Questions answered. Continue therapy outlined.     Beau Torrez M.D.    No orders of the defined types were placed in this encounter.

## 2019-12-24 NOTE — CARE PLAN
Problem: Communication  Goal: The ability to communicate needs accurately and effectively will improve  Outcome: PROGRESSING AS EXPECTED  Note:   Plan of care discussed with patient, all questions answered.     Problem: Safety  Goal: Will remain free from injury  Outcome: PROGRESSING AS EXPECTED  Note:   Call light within reach, non-skid socks in place, bed locked and in lowest position, bed alarm on, room near nurses station, hourly rounding.

## 2019-12-24 NOTE — DISCHARGE PLANNING
Anticipated Discharge Disposition: Home vs SNF    Action: Discussed in IDT rounds that the patient will possibly need SNF after his chemotherapy is complete due to weakness and deconditioning.     Barriers to Discharge: Medical clearance    Plan: will continue to follow for any discharge needs/barriers.

## 2019-12-24 NOTE — PROGRESS NOTES
"Chemotherapy Verification - SECONDARY RN       Height = 68\"  Weight = 59.1kg  BSA = 1.68m2       Medication: Mitomycin  Dose: 8mg/m2  Calculated Dose: 13.44mg                             (In mg/m2, AUC, mg/kg)     Medication: 5FU  Dose: 800mg/m2  Calculated Dose: 1344mg (1345mg=ordered dose)                             (In mg/m2, AUC, mg/kg)      I confirm that this process was performed independently.   "

## 2019-12-24 NOTE — PROGRESS NOTES
"Pharmacy Chemotherapy Calculation    Patient Name: Corona Bean   Protocol: Fluorouracil/MitoMYcin with concurrent radiation     Fluorouracil 1000 mg/m2 IV continuous infusion over 24 hours daily on Days 1-4 and 29-32  Dose reduced by 20% (800 mg/m2/day) for neutropenia after Days 1-4 of therapy   Mitomycin 10 mg/m2 (maximum 20 mg) IV push on Days 1 and 29  Dose reduced by 20% (8 mg/m2) for neutropenia after Days 1-4 of therapy   NCCN Guidelines for Anal Carcinoma V.1.2019  Travis ARDON, et al. JULITA. 2008;299(16):1914-21  Will LEGER et. Al. Lancet Oncol. 2013;14(6):516-24    Dx: Anal Carcinoma     Allergies:  Patient has no known allergies.       BP (!) 88/57 Comment: RN notified.  Pulse 81   Temp 36.5 °C (97.7 °F) (Temporal)   Resp 18   Ht 1.727 m (5' 8\")   Wt 59.1 kg (130 lb 4.7 oz)   SpO2 98%   BMI 19.81 kg/m²  Body surface area is 1.68 meters squared.    Labs 12/24/19:  ANC~ 1610 Plt = 134k   Hgb = 9     SCr = 1.25mg/dL CrCl ~ 49mL/min     Labs 12/23/19:  AST/ALT/AP = 10/13/33 TBili = 0.2  Mag = 1.3      **Dr. Bee aware pt has COPD and that mitomycin may affect PFTs.  Mitomycin dose already being reduced and today will be patient's final dose**     Drug Order   (Drug name, dose, route, IV Fluid & volume, frequency, number of doses) Cycle: 1 Day 29   Previous treatment: C1 Days 1-4 on 11/25-11/28/19     Medication = Fluorouracil   Base Dose= 800 mg/m2/day (3200 mg/m2 total)  Calc Dose: Base Dose x 1.68 m2 = 1344 mg  Final Dose = 1345 mg  Route = CIVI   Fluid & Volume =  1000 mL   Admin Duration = Over 24 hours on Days 29-32   Days 29-32       < 10% difference okay to treat with final dose   Medication = Mitomycin   Base Dose= 8 mg/m2  Calc Dose: Base Dose x 1.68 m2 = 13.44 mg  Final Dose = 13.44 mg  Route = IV   Fluid & Volume =  mL  Admin Duration = Over 15-30 minutes    Day 29       < 10% difference okay to treat with final dose      By my signature below, I confirm this process was performed " independently with the BSA and all final chemotherapy dosing calculations congruent. I have reviewed the above chemotherapy order and that my calculation of the final dose and BSA (when applicable) corroborate those calculations of the  pharmacist. Discrepancies of 10% or greater in the written dose have been addressed and documented within the EPIC Progress notes.    Jaida Simon, PharmD, BCOP

## 2019-12-24 NOTE — PROGRESS NOTES
Oncology/Hematology Progress Note               Author: Abebeautumn Rohit Date & Time created: 12/24/2019  3:14 PM   Diagnosis-anal cancer.  For day 29 of mitomycin-C 5-FU treatment  Interval History:  Stable at this time.  Has central line placed.  No difficulty breathing.  Oral intake is fair.    Review of Systems:  Review of Systems   Constitutional: Positive for malaise/fatigue. Negative for fever.   Respiratory: Positive for sputum production. Negative for cough and shortness of breath.    Cardiovascular: Negative for chest pain, palpitations and claudication.   Gastrointestinal: Negative for abdominal pain, heartburn and nausea.   Genitourinary: Negative for dysuria and hematuria.   Musculoskeletal: Positive for back pain. Negative for myalgias.   Skin: Negative for itching and rash.   Psychiatric/Behavioral: Negative for depression. The patient is nervous/anxious.        Physical Exam:  Physical Exam  Constitutional:       General: He is not in acute distress.     Appearance: He is not toxic-appearing.   HENT:      Head: Normocephalic and atraumatic.      Mouth/Throat:      Mouth: Mucous membranes are moist.      Pharynx: Oropharynx is clear.   Eyes:      General: No scleral icterus.     Pupils: Pupils are equal, round, and reactive to light.   Abdominal:      General: There is no distension.      Palpations: There is no mass.      Tenderness: There is no tenderness. There is no guarding.         Labs:          Recent Labs     12/22/19 0022 12/23/19 0018 12/24/19 0020   SODIUM 137 136 134*   POTASSIUM 3.7 3.8 4.0   CHLORIDE 110 109 108   CO2 22 22 21   BUN 24* 21 24*   CREATININE 1.31 1.03 1.25   MAGNESIUM  --  1.3*  --    CALCIUM 7.0* 7.0* 6.8*     Recent Labs     12/22/19 0022 12/23/19 0018 12/24/19  0020   ALTSGPT  --  13  --    ASTSGOT  --  10*  --    ALKPHOSPHAT  --  33  --    TBILIRUBIN  --  0.2  --    GLUCOSE 148* 162* 136*     Recent Labs     12/22/19 0022 12/23/19 0018 12/24/19  0020   RBC 3.14*  3.50* 2.79*   HEMOGLOBIN 10.1* 11.4* 9.0*   HEMATOCRIT 30.6* 34.0* 27.3*   PLATELETCT 162* 163* 134*     Recent Labs     19  0022 19  0018 19  0020   WBC 2.5* 4.5* 2.2*   NEUTSPOLYS 69.60 80.70* 72.50*   LYMPHOCYTES 6.10* 2.60* 6.20*   MONOCYTES 16.50* 15.80* 18.60*   EOSINOPHILS 0.00 0.90 0.00   BASOPHILS 0.00 0.00 0.90   ASTSGOT  --  10*  --    ALTSGPT  --  13  --    ALKPHOSPHAT  --  33  --    TBILIRUBIN  --  0.2  --      Recent Labs     19  0022 19  0018 19  0020   SODIUM 137 136 134*   POTASSIUM 3.7 3.8 4.0   CHLORIDE 110 109 108   CO2 22 22 21   GLUCOSE 148* 162* 136*   BUN 24* 21 24*   CREATININE 1.31 1.03 1.25   CALCIUM 7.0* 7.0* 6.8*     Hemodynamics:  Temp (24hrs), Av.6 °C (97.8 °F), Min:36.4 °C (97.5 °F), Max:36.9 °C (98.4 °F)  Temperature: 36.5 °C (97.7 °F)  Pulse  Av.6  Min: 52  Max: 100   Blood Pressure : (!) 88/57(RN notified.)     Respiratory:    Respiration: 18, Pulse Oximetry: 98 %     Work Of Breathing / Effort: Mild  RUL Breath Sounds: Diminished, RML Breath Sounds: Diminished, RLL Breath Sounds: Diminished, VILLA Breath Sounds: Diminished, LLL Breath Sounds: Diminished  Fluids:    Intake/Output Summary (Last 24 hours) at 2019 1514  Last data filed at 2019 0529  Gross per 24 hour   Intake 1570 ml   Output 3000 ml   Net -1430 ml     Weight: 59.1 kg (130 lb 4.7 oz)  GI/Nutrition:  Orders Placed This Encounter   Procedures   • Diet Order Low Fiber(GI Soft)     Standing Status:   Standing     Number of Occurrences:   1     Order Specific Question:   Diet:     Answer:   Low Fiber(GI Soft) [2]     Medical Decision Making, by Problem:  Active Hospital Problems    Diagnosis   • *DVT (deep venous thrombosis) (HCC) [I82.409]   • Hypotension [I95.9]   • Anal carcinoma (HCC) [C21.0]   • Hydronephrosis, bilateral [N13.30]   • Acute respiratory failure with hypoxia (HCC) [J96.01]   • Pancytopenia (HCC) [D61.818]   • Hematuria [R31.9]   • MANDIE (acute kidney  injury) (HCC) [N17.9]   • Type 2 diabetes mellitus without complication, without long-term current use of insulin (HCC) [E11.9]   • Stomatitis [K12.1]   • COPD (chronic obstructive pulmonary disease) (HCC) [J44.9]   • Current smoker [F17.200]   • 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  Dose of chemotherapy has been reduced because of the cytopenias he experienced.  Central line placed no bleeding noted.  Orders for chemotherapy written.  High risk medication requiring close follow-up and risk of complications.  DVT-on Lovenox continue Lovenox as before.  No evidence of bleeding or bruising.    Quality-Core Measures

## 2019-12-24 NOTE — PROGRESS NOTES
Pt provided informed consent from Dr Rust for central line placement, consents signed by patient. Time out completed at 1050, no allergies, all agree with time out with patient participation.

## 2019-12-24 NOTE — CONSULTS
DATE OF SERVICE:  12/23/2019    Consultation was called by Dr. Saray Mckenzie.    REASON FOR CONSULTATION:  1. Anal carcinoma, status post chemoradiation.  2. For day 29 of mitomycin 5-FU.  3. There is history of DVTs.    HISTORY OF PRESENT ILLNESS:  The patient is a very pleasant gentleman who was   diagnosed with squamous cell carcinoma of the anus for which the patient was   started on combined chemoradiation therapy with chemotherapy consisting of   5-FU and mitomycin C on 11/25/2019.  The first cycle of chemotherapy and   radiation was complicated by development of DVT.  The patient also developed   neutropenia and thrombocytopenia with the lowest platelet count being 10,000   after his last chemotherapy.  The patient is admitted to the hospital and has   a radiation proctitis.  Otherwise, his blood pressure has also been running   lowish.  His baseline blood pressure on the day he received day 1 of his   chemotherapy was approximately 105/70; however, currently it is running in the   80/60 range.  The patient was also started on midodrine 3 times a day on   12/08/2019.  Otherwise, he is asymptomatic from his hypotension and denies any   dizziness, although he does complain of some fatigue.  He denies any chest   pain or palpitations.  Last echocardiogram was done on 11/14/2019 and showed   an ejection fraction of 70%.  Last EKG from 10/15/2019 was fairly unremarkable   and showed the patient to be in normal sinus rhythm.  Heme/onc consultation   was called for further management of his condition.    PAST MEDICAL HISTORY:  History of bladder cancer, COPD, anal carcinoma,   diabetes, glaucoma, GERD, urinary incontinence.    PAST SURGICAL HISTORY:  Cystectomy, neobladder, appendectomy, hernia repair,   TURBT, prostatectomy.    ALLERGIES:  None known.    FAMILY HISTORY:  Mother had an unknown malignancy.    PERSONAL AND SOCIAL HISTORY:  Ex-smoker.  No history of drug or alcohol abuse.    Lives in [place].   Single.    REVIEW OF SYSTEMS:  GENERAL AND CONSTITUTIONAL:  Complaining of fatigue.  No fevers or chills.  HEAD, NECK, EARS, NOSE AND THROAT:  Denies any headaches or any visual   symptoms.  RESPIRATORY:  Has history of COPD, ex-smoker.  No hemoptysis.  CARDIOVASCULAR:  No chest pain or palpitations.  GASTROINTESTINAL:  No nausea, vomiting or diarrhea.  NEUROLOGICAL:  Denies any seizures or any history of stroke.  PSYCHIATRIC:  Anxious, but denies any depression.  Denies being suicidal.  GASTROINTESTINAL:  See history of present illness.  Did develop some diarrhea   after his last chemo, which is now better.  Denies any abdominal pain.  GENITOURINARY:  Status post cystectomy and neobladder creation.  Denies any   blood in the urine.  NEUROLOGIC:  Denies any seizures or any stroke.  He denies any weakness.  PSYCHIATRIC:  Anxious, but denies any depression or being suicidal.  SKIN AND INTEGUMENTARY:  Denies any rash.  Has occasional bruising on his   skin.    PHYSICAL EXAMINATION:  GENERAL:  Patient is alert and oriented x3, lying comfortably in the bed, not   in any distress.  VITAL SIGNS:  Temperature 36.8, pulse 68, respiration rate 18, BP 90/50.  HEENT:  Pupils are equal.  Oral mucosa reveals no mucositis.  Oropharynx   normal.  NECK:  Supple, with no JVD or lymphadenopathy.  LUNGS:  Clear to auscultation with good bilateral air entry.  HEART:  Reveals no S3 or S4.  ABDOMEN:  Reveals surgical site is well healed.  There is no tenderness.    Bowel sounds are normally heard.  EXTREMITIES:  There is trace bilateral lower extremity edema.  SKIN:  Reveals occasional bruising, but no rash.  NEUROLOGIC:  Reveals cranial nerves appear intact.  He is able to move all 4   extremities.  PSYCHIATRIC:  Reveals some anxiety, but no depression.    LABORATORY DATA:  WBC 4500, hemoglobin 11.4, hematocrit 34.0, platelets   163,000, neutrophils 80.70%, creatinine 1.03, albumin 2.2, calcium 7.0,   magnesium 1.3.  Echocardiogram  12/23/2019, ejection fraction 75%.    ASSESSMENT AND PLAN:  1. Anal squamous cell carcinoma.  The patient will start day 29 of his   chemotherapy dose of his mitomycin C and 5-FU reduced since the patient   developed neutropenia and thrombocytopenia.  He is also on anticoagulation.    We will continue to monitor him very closely.  We are having some issues with   venous access and the chemo will be given to him through a peripheral vein.    Patient understands the risk associated with the drug extravasation.  I   reviewed all his records going back about a month at least.  I also discussed   the case with the pharmacy.  Orders for his chemotherapy would be written,   high risk medication requiring closer risk of complications.  2. Hypomagnesemia.  This is being replaced.  3. Hypocalcemia.  This is well controlled.  He continues to take calcium   supplements.  4. DVT.  He is on Lovenox, which he will continue.  His renal function appears   to be unremarkable.  I will continue to follow him up.             ____________________________________     MD SHELBIE Morrissey / SANGITA    DD:  12/23/2019 14:15:31  DT:  12/23/2019 18:17:25    D#:  [id number]  Job#:  [id number]

## 2019-12-25 PROBLEM — N18.30 CKD (CHRONIC KIDNEY DISEASE) STAGE 3, GFR 30-59 ML/MIN: Status: ACTIVE | Noted: 2019-01-01

## 2019-12-25 NOTE — CARE PLAN
Problem: Safety  Goal: Will remain free from falls  Outcome: PROGRESSING AS EXPECTED  Intervention: Implement fall precautions  Flowsheets  Taken 12/25/2019 1323  Bed Alarm: Yes - Alarm On  Taken 12/25/2019 0930  Environmental Precautions: Treaded Slipper Socks on Patient;Bed in Low Position;Communication Sign for Patients & Families;Mobility Assessed & Appropriate Sign Placed     Problem: Mobility  Goal: Risk for activity intolerance will decrease  Outcome: PROGRESSING SLOWER THAN EXPECTED  Intervention: Encourage patient to increase activity level in collaboration with Interdisciplinary Team  Note:   Patient refusing to ambulate.

## 2019-12-25 NOTE — PROGRESS NOTES
[facility] Medicine Daily Progress Note    Date of Service  12/25/2019    Chief Complaint  74 y.o. male admitted 12/7/2019 with acute kidney injury.    Hospital Course  Admitted with acute kidney injury, noted to have PICC line associated DVT, known history of anal cancer.    Interval Problem Update  MANDIE - crea 1.2  DVT - picc line removed  Anal CA - central line placed  Diabetes - BS low 100s  Hypotension - sbp 80-90  Resp failure - O2 2 lpm NC  Pancytopenia - ANC 1400, hgb 9.6, platelets [id number]  Low magnesium    Consultants/Specialty  Oncology    Code Status  Full    Disposition  TBD    Review of Systems  Review of Systems   Constitutional: Positive for malaise/fatigue. Negative for chills, diaphoresis and fever.   HENT: Negative for hearing loss and sore throat.    Eyes: Negative for blurred vision.   Respiratory: Negative for cough, shortness of breath and wheezing.    Cardiovascular: Negative for chest pain, palpitations and leg swelling.   Gastrointestinal: Negative for abdominal pain, diarrhea, heartburn, nausea and vomiting.   Genitourinary: Negative for dysuria, flank pain and hematuria.   Musculoskeletal: Negative for back pain and neck pain.   Skin: Negative for rash.   Neurological: Positive for weakness. Negative for dizziness, sensory change, speech change, focal weakness and headaches.   Psychiatric/Behavioral: The patient is not nervous/anxious.         Physical Exam  Temp:  [36.4 °C (97.5 °F)-37.1 °C (98.7 °F)] 36.8 °C (98.2 °F)  Pulse:  [64-70] 70  Resp:  [18-20] 18  BP: ()/(44-60) 98/60  SpO2:  [91 %-98 %] 91 %    Physical Exam  HENT:      Head: Normocephalic and atraumatic.      Nose: No congestion.      Mouth/Throat:      Mouth: Mucous membranes are moist.   Eyes:      Conjunctiva/sclera: Conjunctivae normal.      Pupils: Pupils are equal, round, and reactive to light.   Cardiovascular:      Rate and Rhythm: Normal rate and regular rhythm.   Pulmonary:      Effort: Pulmonary effort is  normal.      Breath sounds: Normal breath sounds.   Abdominal:      General: Bowel sounds are normal. There is no distension.      Palpations: Abdomen is soft.      Tenderness: There is no tenderness. There is no guarding or rebound.   Musculoskeletal:      Right lower leg: No edema.      Left lower leg: No edema.   Skin:     General: Skin is warm and dry.   Neurological:      General: No focal deficit present.      Mental Status: He is alert and oriented to person, place, and time.      Cranial Nerves: No cranial nerve deficit.         Fluids    Intake/Output Summary (Last 24 hours) at 12/25/2019 1344  Last data filed at 12/25/2019 0500  Gross per 24 hour   Intake --   Output 3100 ml   Net -3100 ml       Laboratory  Recent Labs     12/23/19  0018 12/24/19  0020 12/25/19  0010   WBC 4.5* 2.2* 2.0*   RBC 3.50* 2.79* 2.93*   HEMOGLOBIN 11.4* 9.0* 9.6*   HEMATOCRIT 34.0* 27.3* 28.5*   MCV 97.1 97.8 97.3   MCH 32.6 32.3 32.8   MCHC 33.5* 33.0* 33.7   RDW 47.8 48.6 48.3   PLATELETCT 163* 134* 134*   MPV 9.7 10.0 10.2     Recent Labs     12/23/19  0018 12/24/19  0020 12/25/19  0010   SODIUM 136 134* 140   POTASSIUM 3.8 4.0 4.0   CHLORIDE 109 108 111   CO2 22 21 23   GLUCOSE 162* 136* 153*   BUN 21 24* 25*   CREATININE 1.03 1.25 1.26   CALCIUM 7.0* 6.8* 6.8*                   Imaging  DX-CHEST-FOR LINE PLACEMENT Perform procedure in: Patient's Room   Final Result      1.  Right IJ central catheter tip is in the mid/distal SVC. No pneumothorax.   2.  Mild interstitial prominence, likely mild edema. No focal consolidation or pleural effusions.      EC-ECHOCARDIOGRAM COMPLETE W/O CONT   Final Result      US-EXTREMITY VENOUS UPPER UNILAT RIGHT   Final Result      US-RENAL   Final Result      1.  There is moderate bilateral hydronephrosis, more than is typically expected for history of cystectomy with neobladder formation.      DX-CHEST-PORTABLE (1 VIEW)   Final Result      No radiographic evidence of acute cardiopulmonary  process.           Assessment/Plan  * DVT (deep venous thrombosis) ([facility])  Assessment & Plan  PICC line associated  Lovenox      Hypotension- (present on admission)  Assessment & Plan  Midodrine  IVF NS      Anal carcinoma ([facility])- (present on admission)  Assessment & Plan  Chemotherapy - 5-FU and Mitomycin C        CKD (chronic kidney disease) stage 3, GFR 30-59 ml/min ([facility])- (present on admission)  Assessment & Plan  PTH elevated  Follow bmp    Hypomagnesemia- (present on admission)  Assessment & Plan  Start oral Mg    Acute respiratory failure with hypoxia ([facility])- (present on admission)  Assessment & Plan  RT protocol    Hydronephrosis, bilateral- (present on admission)  Assessment & Plan  Follow bmp closely    Pancytopenia ([facility])- (present on admission)  Assessment & Plan  Transfused 1 unit platelets  Follow CBC    Hematuria- (present on admission)  Assessment & Plan  Resolved        Stomatitis- (present on admission)  Assessment & Plan  thrush vs mucositis   Nystatin    Type 2 diabetes mellitus without complication, without long-term current use of insulin ([facility])- (present on admission)  Assessment & Plan  diet controlled    MANDIE (acute kidney injury) ([facility])- (present on admission)  Assessment & Plan  IVF NS, follow bmp    COPD (chronic obstructive pulmonary disease) ([facility])- (present on admission)  Assessment & Plan  Spiriva, RT protocol    Glaucoma of right eye- (present on admission)  Assessment & Plan  timolol eyedrops    History of bladder cancer- (present on admission)  Assessment & Plan  Neobladder.    Current smoker- (present on admission)  Assessment & Plan  counseling       VTE prophylaxis: Lovenox

## 2019-12-25 NOTE — PROGRESS NOTES
Bedside report received. Pt in bed resting comfortably with no s/sx of pain or discomfort.  Pt A&O X 4. Denying pain at this time.  Standby assist. 5-FU infusing through right IJ. Condom Cath in place. Pt calls appropriately for medication or assistance as needed. Call light within reach, all appropriate fall precautions in place and personal belongings within reach; hourly rounding in place. No needs at this time. See flowsheets for further assessment details.

## 2019-12-25 NOTE — PROGRESS NOTES
Oncology/Hematology Progress Note               Author: Fran Bee Date & Time created: 12/25/2019  11:18 AM   Diagnosis-anal cancer.  For day 29 of mitomycin-C 5-FU treatment  Interval History:  Stable at this time.  Has central line placed. Started chemo day 29 on 12/24/19.  Review of Systems:  Review of Systems   Constitutional: Positive for malaise/fatigue. Negative for fever.   Respiratory: Positive for sputum production. Negative for cough and shortness of breath.    Cardiovascular: Negative for chest pain, palpitations and claudication.   Gastrointestinal: Negative for abdominal pain, heartburn and nausea.   Genitourinary: Negative for dysuria and hematuria.   Musculoskeletal: Positive for back pain. Negative for myalgias.   Skin: Negative for itching and rash.   Neurological: Negative for dizziness, sensory change and headaches.   Psychiatric/Behavioral: Negative for depression. The patient is not nervous/anxious.        Physical Exam:  Physical Exam  Constitutional:       General: He is not in acute distress.     Appearance: He is not toxic-appearing.   HENT:      Head: Normocephalic and atraumatic.      Mouth/Throat:      Mouth: Mucous membranes are moist.      Pharynx: Oropharynx is clear.   Eyes:      General: No scleral icterus.     Pupils: Pupils are equal, round, and reactive to light.   Cardiovascular:      Rate and Rhythm: Normal rate and regular rhythm.   Abdominal:      General: There is no distension.      Palpations: There is no mass.      Tenderness: There is no tenderness. There is no guarding or rebound.   Neurological:      Cranial Nerves: No cranial nerve deficit.      Sensory: No sensory deficit.         Labs:          Recent Labs     12/23/19  0018 12/24/19  0020 12/25/19  0010   SODIUM 136 134* 140   POTASSIUM 3.8 4.0 4.0   CHLORIDE 109 108 111   CO2 22 21 23   BUN 21 24* 25*   CREATININE 1.03 1.25 1.26   MAGNESIUM 1.3*  --  1.7   PHOSPHORUS  --   --  3.8   CALCIUM 7.0* 6.8* 6.8*      Recent Labs     19  0018 19  0020 19  0010   ALTSGPT 13  --   --    ASTSGOT 10*  --   --    ALKPHOSPHAT 33  --   --    TBILIRUBIN 0.2  --   --    GLUCOSE 162* 136* 153*     Recent Labs     19  0018 19  0020 19  0010   RBC 3.50* 2.79* 2.93*   HEMOGLOBIN 11.4* 9.0* 9.6*   HEMATOCRIT 34.0* 27.3* 28.5*   PLATELETCT 163* 134* 134*     Recent Labs     19  0018 19  0020 19  0010   WBC 4.5* 2.2* 2.0*   NEUTSPOLYS 80.70* 72.50* 71.90   LYMPHOCYTES 2.60* 6.20* 9.70*   MONOCYTES 15.80* 18.60* 12.30   EOSINOPHILS 0.90 0.00 2.60   BASOPHILS 0.00 0.90 0.00   ASTSGOT 10*  --   --    ALTSGPT 13  --   --    ALKPHOSPHAT 33  --   --    TBILIRUBIN 0.2  --   --      Recent Labs     19  0018 19  0020 19  0010   SODIUM 136 134* 140   POTASSIUM 3.8 4.0 4.0   CHLORIDE 109 108 111   CO2 22 21 23   GLUCOSE 162* 136* 153*   BUN 21 24* 25*   CREATININE 1.03 1.25 1.26   CALCIUM 7.0* 6.8* 6.8*     Hemodynamics:  Temp (24hrs), Av.6 °C (97.9 °F), Min:36.4 °C (97.5 °F), Max:37.1 °C (98.7 °F)  Temperature: 36.6 °C (97.8 °F)  Pulse  Av.5  Min: 52  Max: 100   Blood Pressure : (!) 88/44     Respiratory:    Respiration: 20, Pulse Oximetry: 98 %     Work Of Breathing / Effort: Mild  RUL Breath Sounds: Diminished, RML Breath Sounds: Diminished, RLL Breath Sounds: Diminished, VILLA Breath Sounds: Diminished, LLL Breath Sounds: Diminished  Fluids:    Intake/Output Summary (Last 24 hours) at 2019 6234  Last data filed at 2019 0529  Gross per 24 hour   Intake 1570 ml   Output 3000 ml   Net -1430 ml        GI/Nutrition:  Orders Placed This Encounter   Procedures   • Diet Order Low Fiber(GI Soft)     Standing Status:   Standing     Number of Occurrences:   1     Order Specific Question:   Diet:     Answer:   Low Fiber(GI Soft) [2]     Medical Decision Making, by Problem:  Active Hospital Problems    Diagnosis   • *DVT (deep venous thrombosis) (HCC) [I82.409]   •  Hypotension [I95.9]   • Anal carcinoma (HCC) [C21.0]   • Hydronephrosis, bilateral [N13.30]   • Acute respiratory failure with hypoxia (HCC) [J96.01]   • Pancytopenia (HCC) [D61.818]   • Hematuria [R31.9]   • MANDIE (acute kidney injury) (HCC) [N17.9]   • Type 2 diabetes mellitus without complication, without long-term current use of insulin (HCC) [E11.9]   • Stomatitis [K12.1]   • COPD (chronic obstructive pulmonary disease) (HCC) [J44.9]   • Current smoker [F17.200]   • 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  Dose of chemotherapy has been reduced because of the cytopenias he experienced.  Central line placed no bleeding noted.  Day 30 of chemo. Well tolerated so far. High risk medication requiring close follow-up and risk of complications.  DVT-on Lovenox continue Lovenox as before.  No evidence of bleeding or bruising.    Quality-Core Measures

## 2019-12-25 NOTE — PROGRESS NOTES
"Pharmacy Chemotherapy Calculation    Dx: Anal Carcinoma         Protocol: Fluorouracil/MitoMYcin with concurrent radiation     Fluorouracil 1000 mg/m2 IV continuous infusion over 24 hours daily on Days 1-4 and 29-32  Days 29-32 dose reduced by 20% (800 mg/m2/day) for cytopenias  Mitomycin 10 mg/m2 (maximum 20 mg) IV push on Days 1 and 29  Day 29 dose reduced by 20% (8 mg/m2) for cytopenias   NCCN Guidelines for Anal Carcinoma V.1.2019  Travis ARDON, et al. JULITA. 2008;299(16):1914-21  Will LEGER, et. Al. Lancet Oncol. 2013;14(6):516-24    Allergies:  Patient has no known allergies.       BP (!) 97/54   Pulse 68   Temp 37.1 °C (98.7 °F) (Temporal)   Resp 20   Ht 1.727 m (5' 8\")   Wt 59.1 kg (130 lb 4.7 oz)   SpO2 92%   BMI 19.81 kg/m²  Body surface area is 1.68 meters squared.    All lab results 12/25/19 within treatment parameters.   **Dr. Bee aware pt has COPD and that mitomycin may affect PFTs.  Mitomycin dose already being reduced and today will be patient's final dose**     Drug Order   (Drug name, dose, route, IV Fluid & volume, frequency, number of doses) Cycle: 1 Day 30   Previous treatment: C1 Days 1-4 on 11/25-11/28/19   Medication = Fluorouracil   Base Dose= 800 mg/m2/day (3200 mg/m2 total)  Calc Dose: Base Dose x 1.68 m2 = 1344 mg  Final Dose = 1345 mg  Route = CIVI   Fluid & Volume =  1000 mL   Admin Duration = Over 24 hours on Days 29-32   Days 29-32       < 10% difference okay to treat with final dose     By my signature below, I confirm this process was performed independently with the BSA and all final chemotherapy dosing calculations congruent. I have reviewed the above chemotherapy order and that my calculation of the final dose and BSA (when applicable) corroborate those calculations of the  pharmacist. Discrepancies of 10% or greater in the written dose have been addressed and documented within the [facility] Progress notes.    Pedro Gan, PharmD      "

## 2019-12-25 NOTE — PROGRESS NOTES
Chemotherapy Verification - PRIMARY RN      Height = 172.7 cm  Weight = 59.1 kg  BSA = 1.68 m2        Medication: fluorouracil  Dose: 800 mg/m2  Calculated Dose: 1,344 mg   Ordered dose: 1,345 mg        I confirm this process was performed independently with the BSA and all final chemotherapy dosing calculations congruent.  Any discrepancies of 10% or greater have been addressed with the chemotherapy pharmacist. The resolution of the discrepancy has been documented in the EPIC progress notes.

## 2019-12-26 NOTE — PROGRESS NOTES
"Pharmacy Chemotherapy Verification  Patient Name: Eliseo Bean  Dx: Anal Carcinoma         Protocol: Fluorouracil/MitoMYcin with concurrent radiation     Fluorouracil 1000 mg/m2 IV continuous infusion over 24 hours daily on Days 1-4 and 29-32   -Days 29-32 dose reduced by 20% (800 mg/m2/day) for cytopenias  MitoMYcin 10 mg/m2 (maximum 20 mg) IV push on Days 1 and 29   -Day 29 dose reduced by 20% (8 mg/m2) for cytopenias   NCCN Guidelines for Anal Carcinoma V.1.2019  Travis ARDON, et al. JULITA. 2008;299(16):1914-21  Will LEGER, et. Al. Lancet Oncol. 2013;14(6):516-24    Allergies:  Patient has no known allergies.     BP (!) 92/64   Pulse 69   Temp 36.4 °C (97.6 °F) (Oral)   Resp 18   Ht 1.727 m (5' 8\")   Wt 59.1 kg (130 lb 4.7 oz)   SpO2 93%   BMI 19.81 kg/m²  Body surface area is 1.68 meters squared.    Labs 12/26/19  ANC 1590 Hgb 9.8 Plt 122k  SCr 1.14 CrCl 47.9 mL/min   AST/ALT/AP = 10/12/32 Tbili =0.2    Drug Order   (Drug name, dose, route, IV Fluid & volume, frequency, number of doses) Cycle: 1 Day 31  Previous treatment: C1 Days 1-4 on 11/25-11/28/19   Medication = Fluorouracil   Base Dose = 800 mg/m2/day (3200 mg/m2 total)  Calc Dose: Base Dose x 1.68 m2 = 1344 mg  Final Dose = 1345 mg  Route = CIVI   Fluid & Volume =  1000 mL   Admin Duration = Over 24 hours on Days 29-32   Days 29-32       <10% difference okay to treat with final dose     By my signature below, I confirm this process was performed independently with the BSA and all final chemotherapy dosing calculations congruent. I have reviewed the above chemotherapy order and that my calculation of the final dose and BSA (when applicable) corroborate those calculations of the  pharmacist. Discrepancies of 10% or greater in the written dose have been addressed and documented within the [facility] Progress notes.    Meri Canada, PharmD, BCOP      "

## 2019-12-26 NOTE — CARE PLAN
Problem: Communication  Goal: The ability to communicate needs accurately and effectively will improve  Outcome: PROGRESSING AS EXPECTED  Note:   Pt is involved in POC. Addressed questions/concerns     Problem: Bowel/Gastric:  Goal: Normal bowel function is maintained or improved  Outcome: PROGRESSING AS EXPECTED  Goal: Will not experience complications related to bowel motility  Outcome: PROGRESSING AS EXPECTED  Note:   Medication available/provided to aid in loose stools.

## 2019-12-26 NOTE — PROGRESS NOTES
Received shift report from day shift RN. Pt is AO4 and reports pain in his rectal area, rest, repositioned and medicated per MAR. RIJ + blood return. Denies N/V. Discussed POC. Assessed and administered evening medications. Bed alarm on, bed in lowest position, call light and personal belongings within reach, educated to call if in need of assistance, non skid socks, all needs met at this time.

## 2019-12-26 NOTE — PROGRESS NOTES
Lab called with critical result of WBC 2.2 at 0059. Critical lab result read back to Lab.   This critical lab result is within parameters established by [facility] policy for this patient

## 2019-12-26 NOTE — PROGRESS NOTES
Bedside report received from night shift RN. Assumed care. Pt is A&Ox4. Pt is in bed. Pt denies pain at this time.   Pt was updated on the plan of care for the day. All questions answered.   Pt has call light within reach and bed is in lowest position.  All fall precautions in place. Pt had no other needs at this time, hourly rounding in place.  Patient has chemo running: mitomycin/5 Fu at 47ml/hr.

## 2019-12-26 NOTE — PROGRESS NOTES
Patient brought down to radiation oncology and received treatment 24 of 30. Patient will return tomorrow for further treatment.

## 2019-12-26 NOTE — PROGRESS NOTES
Oncology/Hematology Progress Note               Author: Fran Rohit Date & Time created: 12/26/2019  1:19 PM   Diagnosis-anal cancer.  For day 29 of mitomycin-C 5-FU treatment  Interval History:  Stable at this time.  Has central line placed. Started chemo day 29 on 12/24/19.  Day 31 of chemotherapy.  No nausea.  Minimal diarrhea  Review of Systems:  Review of Systems   Constitutional: Positive for malaise/fatigue. Negative for fever.   Respiratory: Positive for sputum production. Negative for cough and shortness of breath.    Cardiovascular: Negative for chest pain, palpitations and claudication.   Gastrointestinal: Positive for diarrhea. Negative for abdominal pain, constipation, heartburn and nausea.   Genitourinary: Negative for dysuria and hematuria.   Musculoskeletal: Positive for back pain. Negative for myalgias.   Skin: Negative for itching and rash.   Neurological: Negative for dizziness, sensory change and headaches.   Psychiatric/Behavioral: Negative for depression. The patient is not nervous/anxious.        Physical Exam:  Physical Exam  Constitutional:       General: He is not in acute distress.     Appearance: He is not ill-appearing, toxic-appearing or diaphoretic.   HENT:      Head: Normocephalic and atraumatic.      Mouth/Throat:      Mouth: Mucous membranes are moist.      Pharynx: Oropharynx is clear.   Eyes:      General: No scleral icterus.     Pupils: Pupils are equal, round, and reactive to light.   Cardiovascular:      Rate and Rhythm: Normal rate and regular rhythm.   Abdominal:      General: There is no distension.      Palpations: There is no mass.      Tenderness: There is no tenderness. There is no guarding or rebound.   Neurological:      General: No focal deficit present.      Cranial Nerves: No cranial nerve deficit.      Sensory: No sensory deficit.         Labs:          Recent Labs     12/24/19  0020 12/25/19  0010 12/26/19  0020   SODIUM 134* 140 137   POTASSIUM 4.0 4.0 3.7    CHLORIDE 108 111 110   CO2 21 23 23   BUN 24* 25* 20   CREATININE 1.25 1.26 1.14   MAGNESIUM  --  1.7  --    PHOSPHORUS  --  3.8  --    CALCIUM 6.8* 6.8* 6.8*     Recent Labs     19  0020 19  0010 19  0020   ALTSGPT  --   --  12   ASTSGOT  --   --  10*   ALKPHOSPHAT  --   --  32   TBILIRUBIN  --   --  0.2   GLUCOSE 136* 153* 128*     Recent Labs     19  0020 19  0010 19  0020   RBC 2.79* 2.93* 2.98*   HEMOGLOBIN 9.0* 9.6* 9.8*   HEMATOCRIT 27.3* 28.5* 29.2*   PLATELETCT 134* 134* 122*     Recent Labs     190 190 19  0020   WBC 2.2* 2.0* 2.2*   NEUTSPOLYS 72.50* 71.90 72.50*   LYMPHOCYTES 6.20* 9.70* 5.00*   MONOCYTES 18.60* 12.30 20.10*   EOSINOPHILS 0.00 2.60 0.50   BASOPHILS 0.90 0.00 0.50   ASTSGOT  --   --  10*   ALTSGPT  --   --  12   ALKPHOSPHAT  --   --  32   TBILIRUBIN  --   --  0.2     Recent Labs     190 19  0010 19  0020   SODIUM 134* 140 137   POTASSIUM 4.0 4.0 3.7   CHLORIDE 108 111 110   CO2 21 23 23   GLUCOSE 136* 153* 128*   BUN 24* 25* 20   CREATININE 1.25 1.26 1.14   CALCIUM 6.8* 6.8* 6.8*     Hemodynamics:  Temp (24hrs), Av.5 °C (97.7 °F), Min:36.3 °C (97.3 °F), Max:36.8 °C (98.2 °F)  Temperature: 36.8 °C (98.2 °F)  Pulse  Av.6  Min: 52  Max: 100   Blood Pressure : (!) 99/62     Respiratory:    Respiration: 16, Pulse Oximetry: 97 %     Work Of Breathing / Effort: Mild  RUL Breath Sounds: Diminished, RML Breath Sounds: Diminished, RLL Breath Sounds: Diminished, VILLA Breath Sounds: Diminished, LLL Breath Sounds: Diminished  Fluids:    Intake/Output Summary (Last 24 hours) at 2019 1514  Last data filed at 2019 0529  Gross per 24 hour   Intake 1570 ml   Output 3000 ml   Net -1430 ml        GI/Nutrition:  Orders Placed This Encounter   Procedures   • Diet Order Low Fiber(GI Soft)     Standing Status:   Standing     Number of Occurrences:   1     Order Specific Question:   Diet:     Answer:   Low  Fiber(GI Soft) [2]     Medical Decision Making, by Problem:  Active Hospital Problems    Diagnosis   • *DVT (deep venous thrombosis) (HCC) [I82.409]   • Hypotension [I95.9]   • Anal carcinoma (HCC) [C21.0]   • Hydronephrosis, bilateral [N13.30]   • Acute respiratory failure with hypoxia (HCC) [J96.01]   • Pancytopenia (HCC) [D61.818]   • Hematuria [R31.9]   • MANDIE (acute kidney injury) (HCC) [N17.9]   • Type 2 diabetes mellitus without complication, without long-term current use of insulin (HCC) [E11.9]   • Stomatitis [K12.1]   • COPD (chronic obstructive pulmonary disease) (HCC) [J44.9]   • Current smoker [F17.200]   • 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  Dose of chemotherapy has been reduced because of the cytopenias he experienced.  Central line placed no bleeding noted.  Day 31 of chemo. Well tolerated so far. High risk medication requiring close follow-up and risk of complications.  DVT-on Lovenox continue Lovenox as before.  No evidence of bleeding or bruising.    Quality-Core Measures

## 2019-12-26 NOTE — PROGRESS NOTES
"Pharmacy Chemotherapy Verification  Patient Name: Eliseo Bean  Dx: Anal Carcinoma         Protocol: Fluorouracil/MitoMYcin with concurrent radiation     Fluorouracil 1000 mg/m2 IV continuous infusion over 24 hours daily on Days 1-4 and 29-32   -Days 29-32 dose reduced by 20% (800 mg/m2/day) for cytopenias  MitoMYcin 10 mg/m2 (maximum 20 mg) IV push on Days 1 and 29   -Day 29 dose reduced by 20% (8 mg/m2) for cytopenias   NCCN Guidelines for Anal Carcinoma V.1.2019  Travis ARDON, et al. JULITA. 2008;299(16):1914-21  Will LEGER, et. Al. Lancet Oncol. 2013;14(6):516-24    Allergies:  Patient has no known allergies.     BP (!) 95/55   Pulse 82   Temp 37.3 °C (99.2 °F) (Temporal)   Resp 16   Ht 1.727 m (5' 8\")   Wt 59.1 kg (130 lb 4.7 oz)   SpO2 92%   BMI 19.81 kg/m²  Body surface area is 1.68 meters squared.    Labs 12/27/19  ANC 2010 Hgb 9.7 Plt 103k  SCr 1.21 CrCl 45.2 mL/min   AST/ALT/AP = 11/13/33 Tbili = 0.2  K = 4  Mg = 1.6    Drug Order   (Drug name, dose, route, IV Fluid & volume, frequency, number of doses) Cycle: 1 Day 32  Previous treatment: C1 Days 1-4 on 11/25-11/28/19   Medication = Fluorouracil   Base Dose = 800 mg/m2/day (3200 mg/m2 total)  Calc Dose: Base Dose x 1.68 m2 = 1344 mg  Final Dose = 1345 mg  Route = CIVI   Fluid & Volume =  1000 mL   Admin Duration = Over 24 hours on Days 29-32   Days 29-32       <10% difference okay to treat with final dose     By my signature below, I confirm this process was performed independently with the BSA and all final chemotherapy dosing calculations congruent. I have reviewed the above chemotherapy order and that my calculation of the final dose and BSA (when applicable) corroborate those calculations of the  pharmacist. Discrepancies of 10% or greater in the written dose have been addressed and documented within the [facility] Progress notes.    Meri Canada, PharmD, BCOP      "

## 2019-12-26 NOTE — PROGRESS NOTES
Chemotherapy Verification - PRIMARY RN      Height = 172.7 cm  Weight = 59.1kg  BSA = 1.68 m2       Medication: Fluorouracil  Dose: 800 mg/m2  Calculated Dose: 1344 mg (ordered dose 1345 mg)                             (In mg/m2, AUC, mg/kg)           I confirm this process was performed independently with the BSA and all final chemotherapy dosing calculations congruent.  Any discrepancies of 10% or greater have been addressed with the chemotherapy pharmacist. The resolution of the discrepancy has been documented in the EPIC progress notes.

## 2019-12-26 NOTE — PROGRESS NOTES
Hospital Medicine Daily Progress Note    Date of Service  12/26/2019    Chief Complaint  74 y.o. male admitted 12/7/2019 with acute kidney injury.    Hospital Course  Admitted with acute kidney injury, noted to have PICC line associated DVT, known history of anal cancer.    Interval Problem Update  MANDIE - crea 1.1  DVT - picc line removed  Anal CA - chemotherapy ongoing  Diabetes - BS low 100s  Hypotension - sbp 90s  Resp failure - O2 2 lpm NC  Pancytopenia - ANC 1500, hgb 9.8, platelets [id number]    Consultants/Specialty  Oncology    Code Status  Full    Disposition  TBD    Review of Systems  Review of Systems   Constitutional: Positive for malaise/fatigue. Negative for chills, diaphoresis and fever.   HENT: Negative for hearing loss and sore throat.    Eyes: Negative for blurred vision.   Respiratory: Negative for cough, shortness of breath and wheezing.    Cardiovascular: Negative for chest pain, palpitations and leg swelling.   Gastrointestinal: Negative for abdominal pain, diarrhea, heartburn, nausea and vomiting.   Genitourinary: Negative for dysuria, flank pain and hematuria.   Musculoskeletal: Negative for back pain and neck pain.   Skin: Negative for rash.   Neurological: Positive for weakness. Negative for dizziness, sensory change, speech change, focal weakness and headaches.   Psychiatric/Behavioral: The patient is not nervous/anxious.         Physical Exam  Temp:  [36.3 °C (97.3 °F)-36.8 °C (98.2 °F)] 36.6 °C (97.9 °F)  Pulse:  [69-72] 72  Resp:  [17-18] 17  BP: (90-99)/(55-64) 99/55  SpO2:  [91 %-93 %] 92 %    Physical Exam  HENT:      Head: Normocephalic and atraumatic.      Nose: No congestion.      Mouth/Throat:      Mouth: Mucous membranes are moist.   Eyes:      Conjunctiva/sclera: Conjunctivae normal.      Pupils: Pupils are equal, round, and reactive to light.   Cardiovascular:      Rate and Rhythm: Normal rate and regular rhythm.   Pulmonary:      Effort: Pulmonary effort is normal.      Breath  sounds: Normal breath sounds.   Abdominal:      General: Bowel sounds are normal. There is no distension.      Palpations: Abdomen is soft.      Tenderness: There is no tenderness. There is no guarding or rebound.   Musculoskeletal:      Right lower leg: No edema.      Left lower leg: No edema.   Skin:     General: Skin is warm and dry.   Neurological:      General: No focal deficit present.      Mental Status: He is alert and oriented to person, place, and time.      Cranial Nerves: No cranial nerve deficit.         Fluids    Intake/Output Summary (Last 24 hours) at 12/26/2019 1143  Last data filed at 12/26/2019 0551  Gross per 24 hour   Intake --   Output 2200 ml   Net -2200 ml       Laboratory  Recent Labs     12/24/19  0020 12/25/19  0010 12/26/19  0020   WBC 2.2* 2.0* 2.2*   RBC 2.79* 2.93* 2.98*   HEMOGLOBIN 9.0* 9.6* 9.8*   HEMATOCRIT 27.3* 28.5* 29.2*   MCV 97.8 97.3 98.0*   MCH 32.3 32.8 32.9   MCHC 33.0* 33.7 33.6*   RDW 48.6 48.3 49.5   PLATELETCT 134* 134* 122*   MPV 10.0 10.2 10.3     Recent Labs     12/24/19  0020 12/25/19  0010 12/26/19  0020   SODIUM 134* 140 137   POTASSIUM 4.0 4.0 3.7   CHLORIDE 108 111 110   CO2 21 23 23   GLUCOSE 136* 153* 128*   BUN 24* 25* 20   CREATININE 1.25 1.26 1.14   CALCIUM 6.8* 6.8* 6.8*                   Imaging  DX-CHEST-FOR LINE PLACEMENT Perform procedure in: Patient's Room   Final Result      1.  Right IJ central catheter tip is in the mid/distal SVC. No pneumothorax.   2.  Mild interstitial prominence, likely mild edema. No focal consolidation or pleural effusions.      EC-ECHOCARDIOGRAM COMPLETE W/O CONT   Final Result      US-EXTREMITY VENOUS UPPER UNILAT RIGHT   Final Result      US-RENAL   Final Result      1.  There is moderate bilateral hydronephrosis, more than is typically expected for history of cystectomy with neobladder formation.      DX-CHEST-PORTABLE (1 VIEW)   Final Result      No radiographic evidence of acute cardiopulmonary process.            Assessment/Plan  * DVT (deep venous thrombosis) ([facility])  Assessment & Plan  PICC line associated  Lovenox      Hypotension- (present on admission)  Assessment & Plan  Midodrine  IVF NS      Anal carcinoma (HCC)- (present on admission)  Assessment & Plan  Chemotherapy - 5-FU and Mitomycin C        CKD (chronic kidney disease) stage 3, GFR 30-59 ml/min ([facility])- (present on admission)  Assessment & Plan  PTH elevated  Follow bmp    Hypomagnesemia- (present on admission)  Assessment & Plan  oral Mg    Acute respiratory failure with hypoxia ([facility])- (present on admission)  Assessment & Plan  RT protocol    Hydronephrosis, bilateral- (present on admission)  Assessment & Plan  Follow bmp closely    Pancytopenia ([facility])- (present on admission)  Assessment & Plan  Transfused 1 unit platelets  Follow CBC    Hematuria- (present on admission)  Assessment & Plan  Resolved        Stomatitis- (present on admission)  Assessment & Plan  thrush vs mucositis   Nystatin    Type 2 diabetes mellitus without complication, without long-term current use of insulin ([facility])- (present on admission)  Assessment & Plan  diet controlled    MANDIE (acute kidney injury) ([facility])- (present on admission)  Assessment & Plan  IVF NS, follow bmp    COPD (chronic obstructive pulmonary disease) ([facility])- (present on admission)  Assessment & Plan  Spiriva, RT protocol    Glaucoma of right eye- (present on admission)  Assessment & Plan  timolol eyedrops    History of bladder cancer- (present on admission)  Assessment & Plan  Neobladder.    Current smoker- (present on admission)  Assessment & Plan  counseling       VTE prophylaxis: Lovenox

## 2019-12-26 NOTE — PROGRESS NOTES
Chemotherapy Verification - SECONDARY RN       Height = 172.7cm  Weight = 59.1kg  BSA = 1.68m^2       Medication: Fluorouracil  Dose: 800mg/m^2  Calculated Dose: 1344mg  (ordered dose: 1345mg)                             (In mg/m2, AUC, mg/kg)     I confirm that this process was performed independently.

## 2019-12-27 NOTE — PROGRESS NOTES
Assumed patient care at 1900. Pt is A&Ox4 and a standby assist. Bed alarm is on and in the low and locked position. Call light within reach, wearing non-slip socks, and rounded on hourly. Patient calls appropriately. Patient resting comfortably with all needs met at this time.

## 2019-12-27 NOTE — PROGRESS NOTES
Basil from Lab called with critical result of WBC at 2.1. Critical lab result read back to Baisl.   This critical lab result is within parameters established by  for this patient

## 2019-12-27 NOTE — PROGRESS NOTES
[facility] Medicine Daily Progress Note    Date of Service  12/27/2019    Chief Complaint  74 y.o. male admitted 12/7/2019 with acute kidney injury.    Hospital Course  Admitted with acute kidney injury, noted to have PICC line associated DVT, known history of anal cancer.    Interval Problem Update  MANDIE - crea 1.2  DVT - picc line removed  Anal CA - chemotherapy given  Diabetes - BS low 100s  Hypotension - sbp 90s  Resp failure - O2 2 lpm NC  Pancytopenia - ANC 2000, hgb 9.7, platelets [id number]    Consultants/Specialty  Oncology    Code Status  Full    Disposition  TBD    Review of Systems  Review of Systems   Constitutional: Positive for malaise/fatigue. Negative for chills, diaphoresis and fever.   HENT: Negative for hearing loss and sore throat.    Eyes: Negative for blurred vision.   Respiratory: Negative for cough, shortness of breath and wheezing.    Cardiovascular: Negative for chest pain, palpitations and leg swelling.   Gastrointestinal: Negative for abdominal pain, diarrhea, heartburn, nausea and vomiting.   Genitourinary: Negative for dysuria, flank pain and hematuria.   Musculoskeletal: Negative for back pain and neck pain.   Skin: Negative for rash.   Neurological: Positive for weakness. Negative for dizziness, sensory change, speech change, focal weakness and headaches.   Psychiatric/Behavioral: The patient is not nervous/anxious.         Physical Exam  Temp:  [37.1 °C (98.8 °F)-37.3 °C (99.2 °F)] 37.2 °C (98.9 °F)  Pulse:  [69-82] 75  Resp:  [16] 16  BP: (95-97)/(52-59) 97/52  SpO2:  [92 %-93 %] 93 %    Physical Exam  HENT:      Head: Normocephalic and atraumatic.      Nose: No congestion.      Mouth/Throat:      Mouth: Mucous membranes are moist.   Eyes:      Conjunctiva/sclera: Conjunctivae normal.      Pupils: Pupils are equal, round, and reactive to light.   Cardiovascular:      Rate and Rhythm: Normal rate and regular rhythm.   Pulmonary:      Effort: Pulmonary effort is normal.      Breath sounds:  Normal breath sounds.   Abdominal:      General: Bowel sounds are normal. There is no distension.      Palpations: Abdomen is soft.      Tenderness: There is no tenderness. There is no guarding or rebound.   Musculoskeletal:      Right lower leg: No edema.      Left lower leg: No edema.   Skin:     General: Skin is warm and dry.   Neurological:      General: No focal deficit present.      Mental Status: He is alert and oriented to person, place, and time.      Cranial Nerves: No cranial nerve deficit.         Fluids    Intake/Output Summary (Last 24 hours) at 12/27/2019 1312  Last data filed at 12/27/2019 1253  Gross per 24 hour   Intake 3563.53 ml   Output 3375 ml   Net 188.53 ml       Laboratory  Recent Labs     12/25/19  0010 12/26/19  0020 12/27/19  0108   WBC 2.0* 2.2* 2.1*   RBC 2.93* 2.98* 3.02*   HEMOGLOBIN 9.6* 9.8* 9.7*   HEMATOCRIT 28.5* 29.2* 29.4*   MCV 97.3 98.0* 97.4   MCH 32.8 32.9 32.1   MCHC 33.7 33.6* 33.0*   RDW 48.3 49.5 50.3*   PLATELETCT 134* 122* 103*   MPV 10.2 10.3 9.9     Recent Labs     12/25/19  0010 12/26/19  0020 12/27/19  0108   SODIUM 140 137 136   POTASSIUM 4.0 3.7 4.0   CHLORIDE 111 110 109   CO2 23 23 23   GLUCOSE 153* 128* 120*   BUN 25* 20 21   CREATININE 1.26 1.14 1.21   CALCIUM 6.8* 6.8* 7.0*                   Imaging  DX-CHEST-FOR LINE PLACEMENT Perform procedure in: Patient's Room   Final Result      1.  Right IJ central catheter tip is in the mid/distal SVC. No pneumothorax.   2.  Mild interstitial prominence, likely mild edema. No focal consolidation or pleural effusions.      EC-ECHOCARDIOGRAM COMPLETE W/O CONT   Final Result      US-EXTREMITY VENOUS UPPER UNILAT RIGHT   Final Result      US-RENAL   Final Result      1.  There is moderate bilateral hydronephrosis, more than is typically expected for history of cystectomy with neobladder formation.      DX-CHEST-PORTABLE (1 VIEW)   Final Result      No radiographic evidence of acute cardiopulmonary process.            Assessment/Plan  * DVT (deep venous thrombosis) ([facility])  Assessment & Plan  PICC line associated  Lovenox      Hypotension- (present on admission)  Assessment & Plan  Midodrine  IVF NS      Anal carcinoma (HCC)- (present on admission)  Assessment & Plan  Chemotherapy - 5-FU and Mitomycin C        CKD (chronic kidney disease) stage 3, GFR 30-59 ml/min ([facility])- (present on admission)  Assessment & Plan  PTH elevated  Follow bmp    Hypomagnesemia- (present on admission)  Assessment & Plan  oral Mg    Acute respiratory failure with hypoxia ([facility])- (present on admission)  Assessment & Plan  RT protocol    Hydronephrosis, bilateral- (present on admission)  Assessment & Plan  Follow bmp closely    Pancytopenia ([facility])- (present on admission)  Assessment & Plan  Transfused 1 unit platelets  Follow CBC    Hematuria- (present on admission)  Assessment & Plan  Resolved        Stomatitis- (present on admission)  Assessment & Plan  thrush vs mucositis   Nystatin    Type 2 diabetes mellitus without complication, without long-term current use of insulin ([facility])- (present on admission)  Assessment & Plan  diet controlled    MANDIE (acute kidney injury) ([facility])- (present on admission)  Assessment & Plan  IVF NS, follow bmp    COPD (chronic obstructive pulmonary disease) ([facility])- (present on admission)  Assessment & Plan  Spiriva, RT protocol    Glaucoma of right eye- (present on admission)  Assessment & Plan  timolol eyedrops    History of bladder cancer- (present on admission)  Assessment & Plan  Neobladder.    Current smoker- (present on admission)  Assessment & Plan  counseling       VTE prophylaxis: Lovenox

## 2019-12-27 NOTE — PROGRESS NOTES
Bedside report received from night shift RN. Assumed care. Pt is A&Ox4. Pt is in bed. Pt denies pain at this time. Pt was updated on the plan of care for the day. All questions answered.   Pt has call light within reach and bed is in lowest position.  All fall precautions in place. Pt had no other needs at this time, hourly rounding in place.  Patient is running 24 hour chemotherapy. Final hang, day 32, being hung this night shift.

## 2019-12-27 NOTE — PROGRESS NOTES
Chemotherapy Verification - PRIMARY RN  Cycle: 1, Day 31      Height = 172.7cm  Weight = 59.1kg  BSA = 1.68m2       Medication: Fluorouracil  Dose: 800mg/m2  Calculated Dose: 1344mg  (Ordered dose: 1345mg)   Within <10% of difference                             (In mg/m2, AUC, mg/kg)         I confirm this process was performed independently with the BSA and all final chemotherapy dosing calculations congruent.  Any discrepancies of 10% or greater have been addressed with the chemotherapy pharmacist. The resolution of the discrepancy has been documented in the EPIC progress notes.

## 2019-12-27 NOTE — PROGRESS NOTES
Oncology/Hematology Progress Note               Author: Abebeautumn Rohit Date & Time created: 12/27/2019  2:36 PM   Diagnosis-anal cancer.  For day 29 of mitomycin-C 5-FU treatment  Interval History:  Stable at this time.  Has central line placed. Started chemo day 29 on 12/24/19.  Day 32 of chemotherapy.  No nausea.  Minimal diarrhea.  Minimal mucositis  Review of Systems:  Review of Systems   Constitutional: Positive for malaise/fatigue. Negative for fever.   HENT: Negative for hearing loss and sore throat.    Respiratory: Positive for sputum production. Negative for cough and shortness of breath.    Cardiovascular: Negative for chest pain, palpitations and claudication.   Gastrointestinal: Negative for abdominal pain, constipation, diarrhea, heartburn and nausea.   Genitourinary: Negative for dysuria and hematuria.   Musculoskeletal: Positive for back pain. Negative for myalgias.   Skin: Negative for itching and rash.   Neurological: Negative for dizziness, sensory change and headaches.   Psychiatric/Behavioral: Negative for depression. The patient is not nervous/anxious.        Physical Exam:  Physical Exam  Constitutional:       General: He is not in acute distress.     Appearance: Normal appearance. He is not ill-appearing, toxic-appearing or diaphoretic.   HENT:      Head: Normocephalic and atraumatic.      Mouth/Throat:      Mouth: Mucous membranes are moist.      Pharynx: Oropharynx is clear.   Eyes:      General: No scleral icterus.     Pupils: Pupils are equal, round, and reactive to light.   Cardiovascular:      Rate and Rhythm: Normal rate and regular rhythm.   Abdominal:      General: There is no distension.      Palpations: There is no mass.      Tenderness: There is no tenderness. There is no guarding or rebound.   Neurological:      General: No focal deficit present.      Mental Status: He is alert.      Cranial Nerves: No cranial nerve deficit.      Sensory: No sensory deficit.         Labs:           Recent Labs     190 190 19   SODIUM 140 137 136   POTASSIUM 4.0 3.7 4.0   CHLORIDE 111 110 109   CO2 23 23 23   BUN 25* 20 21   CREATININE 1.26 1.14 1.21   MAGNESIUM 1.7  --  1.6   PHOSPHORUS 3.8  --   --    CALCIUM 6.8* 6.8* 7.0*     Recent Labs     19   ALTSGPT  --  12 13   ASTSGOT  --  10* 11*   ALKPHOSPHAT  --  32 33   TBILIRUBIN  --  0.2 0.2   GLUCOSE 153* 128* 120*     Recent Labs     19   RBC 2.93* 2.98* 3.02*   HEMOGLOBIN 9.6* 9.8* 9.7*   HEMATOCRIT 28.5* 29.2* 29.4*   PLATELETCT 134* 122* 103*     Recent Labs     19   WBC 2.0* 2.2* 2.1*   NEUTSPOLYS 71.90 72.50* 95.60*   LYMPHOCYTES 9.70* 5.00* 2.60*   MONOCYTES 12.30 20.10* 1.80   EOSINOPHILS 2.60 0.50 0.00   BASOPHILS 0.00 0.50 0.00   ASTSGOT  --  10* 11*   ALTSGPT  --  12 13   ALKPHOSPHAT  --  32 33   TBILIRUBIN  --  0.2 0.2     Recent Labs     19   SODIUM 140 137 136   POTASSIUM 4.0 3.7 4.0   CHLORIDE 111 110 109   CO2 23 23 23   GLUCOSE 153* 128* 120*   BUN 25* 20 21   CREATININE 1.26 1.14 1.21   CALCIUM 6.8* 6.8* 7.0*     Hemodynamics:  Temp (24hrs), Av.2 °C (98.9 °F), Min:37.1 °C (98.8 °F), Max:37.3 °C (99.2 °F)  Temperature: 37.2 °C (98.9 °F)  Pulse  Av.9  Min: 52  Max: 100   Blood Pressure : (!) 97/52     Respiratory:    Respiration: 16, Pulse Oximetry: 93 %     Work Of Breathing / Effort: Mild  RUL Breath Sounds: Diminished, RML Breath Sounds: Diminished, RLL Breath Sounds: Diminished, VILLA Breath Sounds: Diminished, LLL Breath Sounds: Diminished  Fluids:    Intake/Output Summary (Last 24 hours) at 2019 1514  Last data filed at 2019 0529  Gross per 24 hour   Intake 1570 ml   Output 3000 ml   Net -1430 ml        GI/Nutrition:  Orders Placed This Encounter   Procedures   • Diet Order Low Fiber(GI Soft)     Standing Status:    Standing     Number of Occurrences:   1     Order Specific Question:   Diet:     Answer:   Low Fiber(GI Soft) [2]     Medical Decision Making, by Problem:  Active Hospital Problems    Diagnosis   • *DVT (deep venous thrombosis) (HCC) [I82.409]   • Hypotension [I95.9]   • Anal carcinoma (HCC) [C21.0]   • Hydronephrosis, bilateral [N13.30]   • Acute respiratory failure with hypoxia (HCC) [J96.01]   • Pancytopenia (HCC) [D61.818]   • Hematuria [R31.9]   • MANDIE (acute kidney injury) (HCC) [N17.9]   • Type 2 diabetes mellitus without complication, without long-term current use of insulin (HCC) [E11.9]   • Stomatitis [K12.1]   • COPD (chronic obstructive pulmonary disease) (HCC) [J44.9]   • Current smoker [F17.200]   • 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  Dose of chemotherapy has been reduced because of the cytopenias he experienced.  Central line placed no bleeding noted.  Day 32 of chemo. Well tolerated so far. High risk medication requiring close follow-up and risk of complications.  DVT-on Lovenox continue Lovenox as before.  No evidence of bleeding or bruising.  Renal function- his creatinine has stabilized and will continue to observe him closely.  Number cytopenia-platelet count is staying about 100,000.  We will continue to monitor him closely.    Quality-Core Measures

## 2019-12-27 NOTE — CARE PLAN
Problem: Safety  Goal: Will remain free from falls  Outcome: PROGRESSING AS EXPECTED  Note:   Call light within reach, bed in the low and locked position, patient wearing non-slip socks, and rounded on hourly. Patient calls appropriately.     Problem: Knowledge Deficit  Goal: Knowledge of the prescribed therapeutic regimen will improve  Outcome: PROGRESSING AS EXPECTED  Note:   Patient educated on medications, nursing interventions, and their diagnosis. Patient demonstrates desire to be involved in his care.

## 2019-12-27 NOTE — PROGRESS NOTES
Patient was brought down to Radiation Therapy department by transport.  Patient received treatment number   25 of 30

## 2019-12-28 NOTE — PROGRESS NOTES
Patients scrotal skin and skin extending into pelvis is excoriated and very red from radiation burns. Condom catheter keeps falling off and urine is soaking skin. Patient reports disposable pads cannot keep him dry and refuses them. Pt has home briefs he would like to wear. Charge nurse notified that patient will be in incontinent briefs to give skin a chance to dry.

## 2019-12-28 NOTE — PROGRESS NOTES
Bedside report received from night shift RN. Assumed care. Pt is A&Ox4. Pt is in bed. Pt denies pain at this time.   Pt was updated on the plan of care for the day. All questions answered.   Pt has call light within reach and bed is in lowest position.  All fall precautions in place. Pt had no other needs at this time, hourly rounding in place.  Patient is encouraged to get out of bed today for a walk and to take a shower. Condom catheter in place.  Chemotherapy of 5FU is running at 47mL/hr. Patient tolerating without complaints.

## 2019-12-28 NOTE — PROGRESS NOTES
Chemotherapy Verification - SECONDARY RN     Cycle 1, Day 32    Height = 172.7 cm Weight = 59.1 kg  BSA = 168.2 m2       Medication: Flourouracil  Dose: 800 mg/m2  Calculated Dose: 1347 mg  (ordered dose 1345 mg)   (In mg/m2, AUC, mg/kg)       I confirm that this process was performed independently with the BSA and all final chemotherapy dosing calculations congruent. Any discrepancies of 10% or greater have been addressed with the chemotherapy pharmacist. The resolution of the discrepancy has been documented in the Epic progress notes.

## 2019-12-28 NOTE — PROGRESS NOTES
Oncology/Hematology Progress Note               Author: Fran Rohit Date & Time created: 12/28/2019  1:08 PM   Diagnosis-anal cancer.  For day 29 of mitomycin-C 5-FU treatment  Interval History:  Stable at this time.  Has central line placed. Started chemo day 29 on 12/24/19.  Last day of chemotherapy.  Has completed 25 of the 30 planned radiation treatments so far   review of Systems:  Review of Systems   Constitutional: Positive for malaise/fatigue. Negative for fever.   HENT: Negative for hearing loss and sore throat.    Respiratory: Negative for cough, sputum production and shortness of breath.    Cardiovascular: Negative for chest pain, palpitations and claudication.   Gastrointestinal: Negative for abdominal pain, constipation, diarrhea, heartburn and nausea.   Genitourinary: Negative for dysuria and hematuria.   Musculoskeletal: Positive for back pain. Negative for myalgias.   Skin: Negative for itching and rash.   Neurological: Negative for dizziness, sensory change and headaches.   Psychiatric/Behavioral: Positive for depression. The patient is nervous/anxious.        Physical Exam:  Physical Exam  Constitutional:       General: He is not in acute distress.     Appearance: Normal appearance. He is not ill-appearing, toxic-appearing or diaphoretic.   HENT:      Head: Normocephalic and atraumatic.      Mouth/Throat:      Mouth: Mucous membranes are moist.      Pharynx: Oropharynx is clear.   Eyes:      General: No scleral icterus.     Pupils: Pupils are equal, round, and reactive to light.   Cardiovascular:      Rate and Rhythm: Normal rate and regular rhythm.   Abdominal:      General: There is no distension.      Palpations: There is no mass.      Tenderness: There is no tenderness. There is no guarding or rebound.   Neurological:      General: No focal deficit present.      Mental Status: He is alert and oriented to person, place, and time.      Cranial Nerves: No cranial nerve deficit.      Sensory: No  sensory deficit.   Psychiatric:      Comments: Slightly anxious and depressed         Labs:          Recent Labs     19  0020 19  010   SODIUM 137 136   POTASSIUM 3.7 4.0   CHLORIDE 110 109   CO2 23 23   BUN 20 21   CREATININE 1.14 1.21   MAGNESIUM  --  1.6   CALCIUM 6.8* 7.0*     Recent Labs     19  0020 19   ALTSGPT 12 13   ASTSGOT 10* 11*   ALKPHOSPHAT 32 33   TBILIRUBIN 0.2 0.2   GLUCOSE 128* 120*     Recent Labs     19  0020 19  0108 19  0055   RBC 2.98* 3.02* 2.78*   HEMOGLOBIN 9.8* 9.7* 9.3*   HEMATOCRIT 29.2* 29.4* 26.7*   PLATELETCT 122* 103* 108*     Recent Labs     190 19  0055   WBC 2.2* 2.1* 2.2*   NEUTSPOLYS 72.50* 95.60* 90.00*   LYMPHOCYTES 5.00* 2.60* 2.70*   MONOCYTES 20.10* 1.80 5.40   EOSINOPHILS 0.50 0.00 0.50   BASOPHILS 0.50 0.00 0.50   ASTSGOT 10* 11*  --    ALTSGPT 12 13  --    ALKPHOSPHAT 32 33  --    TBILIRUBIN 0.2 0.2  --      Recent Labs     190 19  010   SODIUM 137 136   POTASSIUM 3.7 4.0   CHLORIDE 110 109   CO2 23 23   GLUCOSE 128* 120*   BUN 20 21   CREATININE 1.14 1.21   CALCIUM 6.8* 7.0*     Hemodynamics:  Temp (24hrs), Av.2 °C (99 °F), Min:37.1 °C (98.8 °F), Max:37.6 °C (99.6 °F)  Temperature: 37.1 °C (98.8 °F)  Pulse  Av  Min: 52  Max: 100   Blood Pressure : (!) 89/51     Respiratory:    Respiration: 18, Pulse Oximetry: 95 %     Work Of Breathing / Effort: Mild  RUL Breath Sounds: Diminished;Crackles, RML Breath Sounds: Diminished;Crackles, RLL Breath Sounds: Diminished;Crackles, VILLA Breath Sounds: Diminished;Crackles, LLL Breath Sounds: Diminished;Crackles  Fluids:    Intake/Output Summary (Last 24 hours) at 2019 1514  Last data filed at 2019 0529  Gross per 24 hour   Intake 1570 ml   Output 3000 ml   Net -1430 ml        GI/Nutrition:  Orders Placed This Encounter   Procedures   • Diet Order Low Fiber(GI Soft)     Standing Status:   Standing     Number of  Occurrences:   1     Order Specific Question:   Diet:     Answer:   Low Fiber(GI Soft) [2]     Medical Decision Making, by Problem:  Active Hospital Problems    Diagnosis   • *DVT (deep venous thrombosis) (HCC) [I82.409]   • Hypotension [I95.9]   • Anal carcinoma (HCC) [C21.0]   • Hydronephrosis, bilateral [N13.30]   • Acute respiratory failure with hypoxia (HCC) [J96.01]   • Pancytopenia (HCC) [D61.818]   • Hematuria [R31.9]   • MANDIE (acute kidney injury) (HCC) [N17.9]   • Type 2 diabetes mellitus without complication, without long-term current use of insulin (HCC) [E11.9]   • Stomatitis [K12.1]   • COPD (chronic obstructive pulmonary disease) (HCC) [J44.9]   • Current smoker [F17.200]   • 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  Dose of chemotherapy has been reduced because of the cytopenias he experienced.  Central line placed no bleeding noted.  Day 33 of chemo. Well tolerated so far. High risk medication requiring close follow-up and risk of complications.  DVT-on Lovenox continue Lovenox as before.  No evidence of bleeding or bruising.  Renal function- his creatinine has stabilized and will continue to observe him closely.  Number cytopenia-platelet count is staying acceptable.   Depression-appears slightly depressed.  Not suicidal.  If needed will start on antidepressant   we will continue to monitor him closely.    Quality-Core Measures

## 2019-12-28 NOTE — PROGRESS NOTES
Chemotherapy Verification - PRIMARY RN  Cycle 1, Day 32    Height = 172.7 cm  Weight = 59.1 kg  BSA = 1.68 m2       Medication: Fluorouracil  Dose: 800 mg/m2  Calculated Dose: 1347 mg (ordered dose: 1345 mg)                             (In mg/m2, AUC, mg/kg)       I confirm this process was performed independently with the BSA and all final chemotherapy dosing calculations congruent.  Any discrepancies of 10% or greater have been addressed with the chemotherapy pharmacist. The resolution of the discrepancy has been documented in the EPIC progress notes.

## 2019-12-28 NOTE — PROGRESS NOTES
Assumed patient care at 1900. Pt is A&Ox4 and a standby assist. Bed alarm is on and in the low and locked position. Call light within reach, wearing non-slip socks, and rounded on hourly. Patient's sacrum is red and painful from radiation. Cream applied and medication for pain offered per MAR. Patient sleeping comfortably at this time.

## 2019-12-28 NOTE — CARE PLAN
Problem: Safety  Goal: Will remain free from falls  Outcome: PROGRESSING AS EXPECTED  Intervention: Assess risk factors for falls  Note:   Call light within reach, bed in the low and locked position, patient wearing non-slip socks, and rounded on hourly. Patient calls appropriately.     Problem: Knowledge Deficit  Goal: Knowledge of disease process/condition, treatment plan, diagnostic tests, and medications will improve  Outcome: PROGRESSING AS EXPECTED  Note:   Patient educated on medications, nursing interventions, and their diagnosis. Patient demonstrates desire to be involved in his care.

## 2019-12-28 NOTE — PROGRESS NOTES
Hospital Medicine Daily Progress Note    Date of Service  12/28/2019    Chief Complaint  74 y.o. male admitted 12/7/2019 with acute kidney injury.    Hospital Course  Admitted with acute kidney injury, noted to have PICC line associated DVT, known history of anal cancer.    Interval Problem Update  MANDIE - crea appears stable  DVT - PICC line removed  Anal CA - chemotherapy ongoing  Diabetes - BS 120s  Hypotension - sbp 90s  Resp failure - O2 2 lpm NC  Pancytopenia - ANC 2000, hgb 9.3, platelets [id number]    Consultants/Specialty  Oncology    Code Status  Full    Disposition  TBD    Review of Systems  Review of Systems   Constitutional: Positive for malaise/fatigue. Negative for chills, diaphoresis and fever.   HENT: Negative for hearing loss and sore throat.    Eyes: Negative for blurred vision.   Respiratory: Negative for cough, shortness of breath and wheezing.    Cardiovascular: Negative for chest pain, palpitations and leg swelling.   Gastrointestinal: Negative for abdominal pain, diarrhea, heartburn, nausea and vomiting.   Genitourinary: Negative for dysuria, flank pain and hematuria.   Musculoskeletal: Negative for back pain and neck pain.   Skin: Negative for rash.   Neurological: Positive for weakness. Negative for dizziness, sensory change, speech change, focal weakness and headaches.   Psychiatric/Behavioral: The patient is not nervous/anxious.         Physical Exam  Temp:  [37.1 °C (98.8 °F)-37.6 °C (99.6 °F)] 37.1 °C (98.8 °F)  Pulse:  [55-86] 75  Resp:  [16-18] 18  BP: (89-99)/(49-52) 89/51  SpO2:  [93 %-96 %] 95 %    Physical Exam  HENT:      Head: Normocephalic and atraumatic.      Nose: No congestion.      Mouth/Throat:      Mouth: Mucous membranes are moist.   Eyes:      Conjunctiva/sclera: Conjunctivae normal.      Pupils: Pupils are equal, round, and reactive to light.   Cardiovascular:      Rate and Rhythm: Normal rate and regular rhythm.   Pulmonary:      Effort: Pulmonary effort is normal.       Breath sounds: Normal breath sounds.   Abdominal:      General: Bowel sounds are normal. There is no distension.      Palpations: Abdomen is soft.      Tenderness: There is no tenderness. There is no guarding or rebound.   Musculoskeletal:      Right lower leg: No edema.      Left lower leg: No edema.   Skin:     General: Skin is warm and dry.   Neurological:      General: No focal deficit present.      Mental Status: He is alert and oriented to person, place, and time.      Cranial Nerves: No cranial nerve deficit.         Fluids    Intake/Output Summary (Last 24 hours) at 12/28/2019 1134  Last data filed at 12/27/2019 2200  Gross per 24 hour   Intake --   Output 1900 ml   Net -1900 ml       Laboratory  Recent Labs     12/26/19  0020 12/27/19  0108 12/28/19  0055   WBC 2.2* 2.1* 2.2*   RBC 2.98* 3.02* 2.78*   HEMOGLOBIN 9.8* 9.7* 9.3*   HEMATOCRIT 29.2* 29.4* 26.7*   MCV 98.0* 97.4 96.0   MCH 32.9 32.1 33.5*   MCHC 33.6* 33.0* 34.8   RDW 49.5 50.3* 49.8   PLATELETCT 122* 103* 108*   MPV 10.3 9.9 10.1     Recent Labs     12/26/19  0020 12/27/19  0108   SODIUM 137 136   POTASSIUM 3.7 4.0   CHLORIDE 110 109   CO2 23 23   GLUCOSE 128* 120*   BUN 20 21   CREATININE 1.14 1.21   CALCIUM 6.8* 7.0*                   Imaging  DX-CHEST-FOR LINE PLACEMENT Perform procedure in: Patient's Room   Final Result      1.  Right IJ central catheter tip is in the mid/distal SVC. No pneumothorax.   2.  Mild interstitial prominence, likely mild edema. No focal consolidation or pleural effusions.      EC-ECHOCARDIOGRAM COMPLETE W/O CONT   Final Result      US-EXTREMITY VENOUS UPPER UNILAT RIGHT   Final Result      US-RENAL   Final Result      1.  There is moderate bilateral hydronephrosis, more than is typically expected for history of cystectomy with neobladder formation.      DX-CHEST-PORTABLE (1 VIEW)   Final Result      No radiographic evidence of acute cardiopulmonary process.           Assessment/Plan  * DVT (deep venous thrombosis)  (HCC)  Assessment & Plan  PICC line associated  Lovenox      Hypotension- (present on admission)  Assessment & Plan  Midodrine  IVF NS      Anal carcinoma (HCC)- (present on admission)  Assessment & Plan  Chemotherapy - 5-FU and Mitomycin C        CKD (chronic kidney disease) stage 3, GFR 30-59 ml/min (HCC)- (present on admission)  Assessment & Plan  PTH elevated  Follow bmp    Hypomagnesemia- (present on admission)  Assessment & Plan  oral Mg    Acute respiratory failure with hypoxia (HCC)- (present on admission)  Assessment & Plan  RT protocol    Hydronephrosis, bilateral- (present on admission)  Assessment & Plan  Follow bmp closely    Pancytopenia (HCC)- (present on admission)  Assessment & Plan  Transfused 1 unit platelets  Follow CBC    Hematuria- (present on admission)  Assessment & Plan  Resolved        Stomatitis- (present on admission)  Assessment & Plan  thrush vs mucositis   Nystatin    Type 2 diabetes mellitus without complication, without long-term current use of insulin (HCC)- (present on admission)  Assessment & Plan  diet controlled    MANDIE (acute kidney injury) ([facility])- (present on admission)  Assessment & Plan  IVF NS, follow bmp    COPD (chronic obstructive pulmonary disease) (HCC)- (present on admission)  Assessment & Plan  Spiriva, RT protocol    Glaucoma of right eye- (present on admission)  Assessment & Plan  timolol eyedrops    History of bladder cancer- (present on admission)  Assessment & Plan  Neobladder.    Current smoker- (present on admission)  Assessment & Plan  counseling       VTE prophylaxis: Lovenox

## 2019-12-28 NOTE — PROGRESS NOTES
Basil from Lab called with critical result of WBC at 2.2. Critical lab result read back to Basil.   This critical lab result is within parameters established by  for this patient

## 2019-12-29 NOTE — PROGRESS NOTES
No changes from epic. AOx4. Hypotensive, baseline, oral midodrine, MD aware. 1-2x assist, refusing to get out of bed. Patient has been refusing most care- turns, getting up to chair, ambulating, oral care. Education provided multiple times, continues to refuse, MD aware. Suggested to MD pt may need placement at discharge, has no family/friends and non-compliant with completing ADL's. Last PT note from 12/10 states he can go home, but at this time pt cannot. Will need re-eval. PIV infusing IVF, R IJ in place, SL. Chemotherapy is completed and will not get any in upcoming future or this admit, confirmed with pharmacist, will discuss with MD removing IJ to prevent CLABSI. Condom cath fell off, keeping off due to possible pressure injury or skin tear to penis. It is very red and painful. Extreme excoriation to groin and buttocks area. Open wound near anus, radiation wound. Pictures taken, uploaded into epic, LDA opened, and wound consult modified. Pt educated by RN and MD he may need a sinclair catheter to help with moisture and wound, as this could lead to infection of wound. Pt refused, states he doesn't want to get a UTI. Agreeable to monitor wound for 1-2 days and place sinclair if wound deteriorates. Waffle overlay in place. If pt does not get sinclair, could benefit from a low airloss bed despite not being immobile, pt is capable to get up but refuses. Bed alarm in place. Call light and belongings within reach, able to make needs known, rounding in place, will monitor.

## 2019-12-29 NOTE — PROGRESS NOTES
Basil from Lab called with critical result of WBC 1.4 at 0457. Critical lab result read back to Basil.   This critical lab result is within parameters established by Dr. Bee for this patient

## 2019-12-29 NOTE — PROGRESS NOTES
Discussed with MD about removing IJ from pt as he will no longer get chemo and is trending down to neutropenia, at risk for infection. Pt has working PIV.

## 2019-12-29 NOTE — PROGRESS NOTES
Oncology/Hematology Progress Note               Author: Fran Rohit Date & Time created: 12/29/2019  1:39 PM   Diagnosis-anal cancer.  For day 29 of mitomycin-C 5-FU treatment  Interval History:  Stable at this time.  Has central line placed. Started chemo day 29 on 12/24/19.  Last day of chemotherapy was 12/28/19.  Has completed 25 of the 30 planned radiation treatments so far   review of Systems:  Review of Systems   Constitutional: Positive for malaise/fatigue. Negative for fever.   HENT: Positive for sore throat. Negative for hearing loss.    Respiratory: Negative for cough, sputum production and shortness of breath.    Cardiovascular: Negative for chest pain, palpitations and claudication.   Gastrointestinal: Negative for abdominal pain, constipation, diarrhea, heartburn and nausea.        Perirectal pain   Genitourinary: Negative for dysuria and hematuria.   Musculoskeletal: Positive for back pain. Negative for myalgias.   Skin: Negative for itching and rash.   Neurological: Negative for dizziness, sensory change and headaches.   Psychiatric/Behavioral: Positive for depression. The patient is not nervous/anxious.        Physical Exam:  Physical Exam  Constitutional:       General: He is not in acute distress.     Appearance: Normal appearance. He is not ill-appearing, toxic-appearing or diaphoretic.   HENT:      Head: Normocephalic and atraumatic.      Mouth/Throat:      Mouth: Mucous membranes are moist.      Pharynx: Oropharynx is clear.   Eyes:      General: No scleral icterus.     Pupils: Pupils are equal, round, and reactive to light.   Cardiovascular:      Rate and Rhythm: Normal rate and regular rhythm.   Abdominal:      General: There is no distension.      Palpations: There is no mass.      Tenderness: There is no tenderness. There is no guarding or rebound.   Neurological:      General: No focal deficit present.      Mental Status: He is alert and oriented to person, place, and time.      Cranial  Nerves: No cranial nerve deficit.      Sensory: No sensory deficit.   Psychiatric:         Mood and Affect: Mood normal.         Thought Content: Thought content normal.      Comments: Slightly anxious and depressed         Labs:          Recent Labs     198 19   SODIUM 136 136   POTASSIUM 4.0 3.7   CHLORIDE 109 111   CO2 23 22   BUN 21 24*   CREATININE 1.21 1.13   MAGNESIUM 1.6  --    CALCIUM 7.0* 6.6*     Recent Labs     19   ALTSGPT 13 15   ASTSGOT 11* 12   ALKPHOSPHAT 33 32   TBILIRUBIN 0.2 0.2   GLUCOSE 120* 125*     Recent Labs     19   RBC 3.02* 2.78* 2.61*   HEMOGLOBIN 9.7* 9.3* 8.5*   HEMATOCRIT 29.4* 26.7* 25.4*   PLATELETCT 103* 108* 79*     Recent Labs     19   WBC 2.1* 2.2* 1.4*   NEUTSPOLYS 95.60* 90.00* 92.20*   LYMPHOCYTES 2.60* 2.70* 4.30*   MONOCYTES 1.80 5.40 0.90   EOSINOPHILS 0.00 0.50 0.00   BASOPHILS 0.00 0.50 0.00   ASTSGOT 11*  --  12   ALTSGPT 13  --  15   ALKPHOSPHAT 33  --  32   TBILIRUBIN 0.2  --  0.2     Recent Labs     19   SODIUM 136 136   POTASSIUM 4.0 3.7   CHLORIDE 109 111   CO2 23 22   GLUCOSE 120* 125*   BUN 21 24*   CREATININE 1.21 1.13   CALCIUM 7.0* 6.6*     Hemodynamics:  Temp (24hrs), Av.4 °C (99.3 °F), Min:37.2 °C (99 °F), Max:37.6 °C (99.7 °F)  Temperature: 37.3 °C (99.2 °F)  Pulse  Av.5  Min: 52  Max: 100   Blood Pressure : (!) 80/52     Respiratory:    Respiration: 18, Pulse Oximetry: 93 %     Work Of Breathing / Effort: Mild  RUL Breath Sounds: Diminished, RML Breath Sounds: Diminished, RLL Breath Sounds: Diminished, VILLA Breath Sounds: Diminished, LLL Breath Sounds: Diminished  Fluids:    Intake/Output Summary (Last 24 hours) at 2019 1514  Last data filed at 2019 0529  Gross per 24 hour   Intake 1570 ml   Output 3000 ml   Net -1430 ml        GI/Nutrition:  Orders Placed This Encounter    Procedures   • Diet Order Low Fiber(GI Soft)     Standing Status:   Standing     Number of Occurrences:   1     Order Specific Question:   Diet:     Answer:   Low Fiber(GI Soft) [2]     Medical Decision Making, by Problem:  Active Hospital Problems    Diagnosis   • *DVT (deep venous thrombosis) (HCC) [I82.409]   • Hypotension [I95.9]   • Anal carcinoma (HCC) [C21.0]   • Hydronephrosis, bilateral [N13.30]   • Acute respiratory failure with hypoxia (HCC) [J96.01]   • Pancytopenia (HCC) [D61.818]   • Hematuria [R31.9]   • MANDIE (acute kidney injury) (HCC) [N17.9]   • Type 2 diabetes mellitus without complication, without long-term current use of insulin (HCC) [E11.9]   • Stomatitis [K12.1]   • COPD (chronic obstructive pulmonary disease) (HCC) [J44.9]   • Current smoker [F17.200]   • 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  Dose of chemotherapy has been reduced because of the cytopenias he experienced.  Central line placed no bleeding noted.  Day 34 of chemo. Well tolerated so far. High risk medication requiring close follow-up and risk of complications.  With his first cycle of chemotherapy, the patient developed severe neutropenia and thrombocytopenia hence dose of chemotherapy was decreased by 20% with his last chemo dose.  DVT-on Lovenox continue Lovenox as before.  No evidence of bleeding or bruising.  Renal function- his creatinine has stabilized and will continue to observe him closely.  thrombo cytopenia-platelet count is showing a downward trend and needs to be watched closely  Depression- better if needed will start on antidepressant   we will continue to monitor him closely.    Quality-Core Measures

## 2019-12-29 NOTE — PROGRESS NOTES
[facility] Medicine Daily Progress Note    Date of Service  12/29/2019    Chief Complaint  74 y.o. male admitted 12/7/2019 with acute kidney injury.    Hospital Course  Admitted with acute kidney injury, noted to have PICC line associated DVT, known history of anal cancer.    Interval Problem Update  MANDIE - crea 1.1  DVT - PICC line removed  Anal CA - chemotherapy given  Diabetes - BS 120s  Hypotension - sbp 90s  Resp failure - O2 2 lpm NC  Pancytopenia - ANC 1300, hgb 8.5, platelets 13240, Tmax 99.7    Consultants/Specialty  Oncology    Code Status  Full    Disposition  TBD    Review of Systems  Review of Systems   Constitutional: Positive for malaise/fatigue. Negative for chills, diaphoresis and fever.   HENT: Negative for hearing loss and sore throat.    Eyes: Negative for blurred vision.   Respiratory: Negative for cough, shortness of breath and wheezing.    Cardiovascular: Negative for chest pain, palpitations and leg swelling.   Gastrointestinal: Negative for abdominal pain, diarrhea, heartburn, nausea and vomiting.   Genitourinary: Negative for dysuria, flank pain and hematuria.   Musculoskeletal: Negative for back pain and neck pain.   Skin: Negative for rash.   Neurological: Positive for weakness. Negative for dizziness, sensory change, speech change, focal weakness and headaches.   Psychiatric/Behavioral: The patient is not nervous/anxious.         Physical Exam  Temp:  [37.2 °C (99 °F)-37.6 °C (99.7 °F)] 37.3 °C (99.2 °F)  Pulse:  [81-89] 85  Resp:  [17-18] 18  BP: (80-99)/(47-52) 80/52  SpO2:  [93 %-95 %] 93 %    Physical Exam  HENT:      Head: Normocephalic and atraumatic.      Nose: No congestion.      Mouth/Throat:      Mouth: Mucous membranes are moist.   Eyes:      Conjunctiva/sclera: Conjunctivae normal.      Pupils: Pupils are equal, round, and reactive to light.   Cardiovascular:      Rate and Rhythm: Normal rate and regular rhythm.   Pulmonary:      Effort: Pulmonary effort is normal.      Breath  sounds: Normal breath sounds.   Abdominal:      General: Bowel sounds are normal. There is no distension.      Palpations: Abdomen is soft.      Tenderness: There is no tenderness. There is no guarding or rebound.   Musculoskeletal:      Right lower leg: No edema.      Left lower leg: No edema.   Skin:     General: Skin is warm and dry.   Neurological:      General: No focal deficit present.      Mental Status: He is alert and oriented to person, place, and time.      Cranial Nerves: No cranial nerve deficit.         Fluids    Intake/Output Summary (Last 24 hours) at 12/29/2019 1130  Last data filed at 12/29/2019 0800  Gross per 24 hour   Intake 2215.05 ml   Output 600 ml   Net 1615.05 ml       Laboratory  Recent Labs     12/27/19  0108 12/28/19  0055 12/29/19  0430   WBC 2.1* 2.2* 1.4*   RBC 3.02* 2.78* 2.61*   HEMOGLOBIN 9.7* 9.3* 8.5*   HEMATOCRIT 29.4* 26.7* 25.4*   MCV 97.4 96.0 97.3   MCH 32.1 33.5* 32.6   MCHC 33.0* 34.8 33.5*   RDW 50.3* 49.8 50.0   PLATELETCT 103* 108* 79*   MPV 9.9 10.1 10.5     Recent Labs     12/27/19  0108 12/29/19  0430   SODIUM 136 136   POTASSIUM 4.0 3.7   CHLORIDE 109 111   CO2 23 22   GLUCOSE 120* 125*   BUN 21 24*   CREATININE 1.21 1.13   CALCIUM 7.0* 6.6*                   Imaging  DX-CHEST-FOR LINE PLACEMENT Perform procedure in: Patient's Room   Final Result      1.  Right IJ central catheter tip is in the mid/distal SVC. No pneumothorax.   2.  Mild interstitial prominence, likely mild edema. No focal consolidation or pleural effusions.      EC-ECHOCARDIOGRAM COMPLETE W/O CONT   Final Result      US-EXTREMITY VENOUS UPPER UNILAT RIGHT   Final Result      US-RENAL   Final Result      1.  There is moderate bilateral hydronephrosis, more than is typically expected for history of cystectomy with neobladder formation.      DX-CHEST-PORTABLE (1 VIEW)   Final Result      No radiographic evidence of acute cardiopulmonary process.           Assessment/Plan  * DVT (deep venous  thrombosis) (HCC)  Assessment & Plan  PICC line associated  Lovenox      Hypotension- (present on admission)  Assessment & Plan  Midodrine  IVF NS      Anal carcinoma (HCC)- (present on admission)  Assessment & Plan  Chemotherapy - 5-FU and Mitomycin C        CKD (chronic kidney disease) stage 3, GFR 30-59 ml/min (HCC)- (present on admission)  Assessment & Plan  PTH elevated  Follow bmp    Hypomagnesemia- (present on admission)  Assessment & Plan  oral Mg    Acute respiratory failure with hypoxia ([facility])- (present on admission)  Assessment & Plan  RT protocol    Hydronephrosis, bilateral- (present on admission)  Assessment & Plan  Follow bmp closely    Pancytopenia (HCC)- (present on admission)  Assessment & Plan  Transfused 1 unit platelets  Follow CBC    Hematuria- (present on admission)  Assessment & Plan  Resolved        Stomatitis- (present on admission)  Assessment & Plan  thrush vs mucositis   Finished course of Nystatin    Type 2 diabetes mellitus without complication, without long-term current use of insulin ([facility])- (present on admission)  Assessment & Plan  diet controlled    MANDIE (acute kidney injury) ([facility])- (present on admission)  Assessment & Plan  IVF NS, follow bmp    COPD (chronic obstructive pulmonary disease) ([facility])- (present on admission)  Assessment & Plan  Spiriva, RT protocol    Glaucoma of right eye- (present on admission)  Assessment & Plan  timolol eyedrops    History of bladder cancer- (present on admission)  Assessment & Plan  Neobladder.    Current smoker- (present on admission)  Assessment & Plan  counseling       VTE prophylaxis: Lovenox

## 2019-12-29 NOTE — PROGRESS NOTES
Assumed patient care at 1900. Pt is A&Ox4 and a x1 assist. Bed alarm is on and in the low and locked position. Call light within reach, wearing non-slip socks, and rounded on hourly. Patient pulled condom catheter off during the previous shift. Due to incontinence of urine, further skin breakdown occurred and sacral wound opened. Wound consult completed. Images to be taken when patient compliant with turn. Barrier cream applied and condom catheter put back on to prevent further skin breakdown. Patient's scrotal area and sacrum very red due to radiation burns.

## 2019-12-29 NOTE — PROGRESS NOTES
Received call from Summer in Lab regarding a critical lab value of Calcim 6.6. corrected calcium value is 8.0 - non-critical calcium level.

## 2019-12-29 NOTE — CARE PLAN
Problem: Safety  Goal: Will remain free from falls  Outcome: PROGRESSING AS EXPECTED  Note:   Call light within reach, bed in the low and locked position, patient wearing non-slip socks, and rounded on hourly.      Problem: Urinary Elimination:  Goal: Ability to reestablish a normal urinary elimination pattern will improve  Outcome: PROGRESSING SLOWER THAN EXPECTED  Note:   Patient pulled condom catheter off during previous shift. Patient very wet due to incontinence. Condom catheter put back on to avoid further skin breakdown.

## 2019-12-30 NOTE — PROGRESS NOTES
Oncology/Hematology Progress Note               Author: Daniel sam Date & Time created: 12/30/2019  11:57 AM   Diagnosis-anal cancer.  For day 29 of mitomycin-C 5-FU treatment  Interval History:   Started chemo day 29 on 12/24/19.  Last day of chemotherapy was 12/28/19.  XRT supposed to be finished 1/4.  Has chronic fatigue.  No significant diarrhea  review of Systems:  Review of Systems   Constitutional: Positive for malaise/fatigue. Negative for fever.   HENT: Positive for sore throat. Negative for hearing loss.    Respiratory: Negative for cough, sputum production and shortness of breath.    Cardiovascular: Negative for chest pain, palpitations and claudication.   Gastrointestinal: Negative for abdominal pain, constipation, diarrhea, heartburn and nausea.        Perirectal pain   Genitourinary: Negative for dysuria and hematuria.   Musculoskeletal: Positive for back pain. Negative for myalgias.   Skin: Negative for itching and rash.   Neurological: Negative for dizziness, sensory change and headaches.   Psychiatric/Behavioral: Positive for depression. The patient is not nervous/anxious.        Physical Exam:  Physical Exam  Constitutional:       General: He is not in acute distress.     Appearance: Normal appearance. He is not ill-appearing, toxic-appearing or diaphoretic.   HENT:      Head: Normocephalic and atraumatic.      Mouth/Throat:      Mouth: Mucous membranes are moist.      Pharynx: Oropharynx is clear.   Eyes:      General: No scleral icterus.     Pupils: Pupils are equal, round, and reactive to light.   Cardiovascular:      Rate and Rhythm: Normal rate and regular rhythm.   Abdominal:      General: There is no distension.      Palpations: There is no mass.      Tenderness: There is no tenderness. There is no guarding or rebound.   Neurological:      General: No focal deficit present.      Mental Status: He is alert and oriented to person, place, and time.      Cranial Nerves: No cranial nerve  deficit.      Sensory: No sensory deficit.   Psychiatric:         Mood and Affect: Mood normal.         Thought Content: Thought content normal.      Comments: Slightly anxious and depressed         Labs:          Recent Labs     19   SODIUM 136 135   POTASSIUM 3.7 3.8   CHLORIDE 111 109   CO2 22 21   BUN 24* 25*   CREATININE 1.13 1.00   CALCIUM 6.6* 7.1*     Recent Labs     19   ALTSGPT 15 13   ASTSGOT 12 7*   ALKPHOSPHAT 32 30   TBILIRUBIN 0.2 0.3   GLUCOSE 125* 122*     Recent Labs     19  00519   RBC 2.78* 2.61* 2.47*   HEMOGLOBIN 9.3* 8.5* 8.1*   HEMATOCRIT 26.7* 25.4* 24.2*   PLATELETCT 108* 79* 73*     Recent Labs     19   WBC 2.2* 1.4* 1.0*   NEUTSPOLYS 90.00* 92.20* 92.50*   LYMPHOCYTES 2.70* 4.30* 7.50*   MONOCYTES 5.40 0.90 0.00   EOSINOPHILS 0.50 0.00 0.00   BASOPHILS 0.50 0.00 0.00   ASTSGOT  --  12 7*   ALTSGPT  --  15 13   ALKPHOSPHAT  --  32 30   TBILIRUBIN  --  0.2 0.3     Recent Labs     19   SODIUM 136 135   POTASSIUM 3.7 3.8   CHLORIDE 111 109   CO2 22 21   GLUCOSE 125* 122*   BUN 24* 25*   CREATININE 1.13 1.00   CALCIUM 6.6* 7.1*     Hemodynamics:  Temp (24hrs), Av.9 °C (98.5 °F), Min:36.7 °C (98 °F), Max:37.6 °C (99.7 °F)  Temperature: 36.8 °C (98.3 °F)  Pulse  Av.7  Min: 52  Max: 100   Blood Pressure : 101/47     Respiratory:    Respiration: 18, Pulse Oximetry: 94 %     Work Of Breathing / Effort: Mild  RUL Breath Sounds: Diminished, RML Breath Sounds: Diminished, RLL Breath Sounds: Diminished;Fine Crackles, VILLA Breath Sounds: Diminished, LLL Breath Sounds: Diminished;Crackles  Fluids:    Intake/Output Summary (Last 24 hours) at 2019 1514  Last data filed at 2019 0529  Gross per 24 hour   Intake 1570 ml   Output 3000 ml   Net -1430 ml        GI/Nutrition:  Orders Placed This Encounter   Procedures   • Diet Order Low  Fiber(GI Soft)     Standing Status:   Standing     Number of Occurrences:   1     Order Specific Question:   Diet:     Answer:   Low Fiber(GI Soft) [2]     Medical Decision Making, by Problem:  Active Hospital Problems    Diagnosis   • *DVT (deep venous thrombosis) ([facility]) [I82.409]   • Hypotension [I95.9]   • Anal carcinoma (HCC) [C21.0]   • Hydronephrosis, bilateral [N13.30]   • Acute respiratory failure with hypoxia (HCC) [J96.01]   • Pancytopenia (HCC) [D61.818]   • Hematuria [R31.9]   • MANDIE (acute kidney injury) (HCC) [N17.9]   • Type 2 diabetes mellitus without complication, without long-term current use of insulin (HCC) [E11.9]   • Stomatitis [K12.1]   • COPD (chronic obstructive pulmonary disease) (HCC) [J44.9]   • Current smoker [F17.200]   • Glaucoma of right eye [H40.9]   • History of bladder cancer [Z85.51]       Plan:  Anal cancer -   chemotherapy with 5-FU and mitomycin-C. Day 35 of chemo. 20% DR with d29 chemo,   He was inquiring about stopping radiation sooner.  Informed him that it is important to finish the planned radiation as his treatment is curative.  He voiced understanding.  Developing neutropenia-on Granix.  Low threshold for starting antibiotics given his immunosuppression and possible GI source of infection.    DVT-on Lovenox continue Lovenox as before.  No evidence of bleeding or bruising.  Renal function- his creatinine has stabilized and will continue to observe him closely.  thrombo cytopenia-platelet count is showing a downward trend and needs to be watched closely  Depression- stable.   we will continue to monitor him closely.    Quality-Core Measures

## 2019-12-30 NOTE — PROGRESS NOTES
Patient was brought down to Radiation Therapy department by transport.  Patient received treatment number   26 of 30

## 2019-12-30 NOTE — PROGRESS NOTES
Received report from day RN and assumed care of patient at 1900.  Assessed patient and reviewed labs and notes.  Patient is AOx4, fatigued.  Patient in better spirits overnight than last; more agreeable to nursing interventions.  Patient refused to mobilize overnight; education provided.  Sacrum/groin/penis red and painful from radiation.  Open sacral wound.  Silvadene cream and barrier paste applied. Patient incontinent, not tolerating condom catheter.  Frequent hygiene and linen changes overnight.  Per day RN, MD may consider sinclair catheter for patient with neobladder due to risk for infection resulting from incontinence/open sacral wounds.  Patient now neutropenic with .  Protective precautions placed.  No patient education provided overnight; please provide education in AM.  Chemotherapy completed 12/29; please review central line necessity with MD.  Plan of care reviewed, patient board updated, safety precautions including bed alarm in use, and frequent rounding in practice.

## 2019-12-30 NOTE — PROGRESS NOTES
Basil from Lab called with critical result of WBC 1.0 at 0230. Critical lab result read back to Basil.   This critical lab result is within parameters established by  for this patient

## 2019-12-30 NOTE — PROGRESS NOTES
[facility] Medicine Daily Progress Note    Date of Service  12/30/2019    Chief Complaint  74 y.o. male admitted 12/7/2019 with acute kidney injury.    Hospital Course  Admitted with acute kidney injury, noted to have PICC line associated DVT, known history of anal cancer.    Interval Problem Update  MANDIE - crea 1.0  DVT - PICC line removed, venous duplex today reviewed   Anal CA - chemotherapy given  Diabetes - BS 120s  Hypotension - sbp   Resp failure - O2 2 lpm NC  Pancytopenia - Tmax 99.7, , hgb 8.1, platelets 07136    Consultants/Specialty  Oncology    Code Status  Full    Disposition  TBD    Review of Systems  Review of Systems   Constitutional: Positive for malaise/fatigue. Negative for chills, diaphoresis and fever.   HENT: Negative for hearing loss and sore throat.    Eyes: Negative for blurred vision.   Respiratory: Negative for cough, shortness of breath and wheezing.    Cardiovascular: Negative for chest pain, palpitations and leg swelling.   Gastrointestinal: Negative for abdominal pain, diarrhea, heartburn, nausea and vomiting.   Genitourinary: Negative for dysuria, flank pain and hematuria.   Musculoskeletal: Negative for back pain and neck pain.   Skin: Negative for rash.   Neurological: Positive for weakness. Negative for dizziness, sensory change, speech change, focal weakness and headaches.   Psychiatric/Behavioral: The patient is not nervous/anxious.         Physical Exam  Temp:  [36.7 °C (98 °F)-37.6 °C (99.7 °F)] 36.8 °C (98.3 °F)  Pulse:  [72-78] 72  Resp:  [16-18] 18  BP: ()/(47-81) 101/47  SpO2:  [91 %-96 %] 94 %    Physical Exam  HENT:      Head: Normocephalic and atraumatic.      Nose: No congestion.      Mouth/Throat:      Mouth: Mucous membranes are moist.   Eyes:      Conjunctiva/sclera: Conjunctivae normal.      Pupils: Pupils are equal, round, and reactive to light.   Cardiovascular:      Rate and Rhythm: Normal rate and regular rhythm.   Pulmonary:      Effort:  Pulmonary effort is normal.      Breath sounds: Normal breath sounds.   Abdominal:      General: Bowel sounds are normal. There is no distension.      Palpations: Abdomen is soft.      Tenderness: There is no tenderness. There is no guarding or rebound.   Musculoskeletal:      Right lower leg: No edema.      Left lower leg: No edema.   Skin:     General: Skin is warm and dry.   Neurological:      General: No focal deficit present.      Mental Status: He is alert and oriented to person, place, and time.      Cranial Nerves: No cranial nerve deficit.         Fluids    Intake/Output Summary (Last 24 hours) at 12/30/2019 1150  Last data filed at 12/29/2019 1800  Gross per 24 hour   Intake 1495 ml   Output --   Net 1495 ml       Laboratory  Recent Labs     12/28/19  0055 12/29/19  0430 12/30/19  0211   WBC 2.2* 1.4* 1.0*   RBC 2.78* 2.61* 2.47*   HEMOGLOBIN 9.3* 8.5* 8.1*   HEMATOCRIT 26.7* 25.4* 24.2*   MCV 96.0 97.3 98.0*   MCH 33.5* 32.6 32.8   MCHC 34.8 33.5* 33.5*   RDW 49.8 50.0 50.2*   PLATELETCT 108* 79* 73*   MPV 10.1 10.5 10.5     Recent Labs     12/29/19  0430 12/30/19  0211   SODIUM 136 135   POTASSIUM 3.7 3.8   CHLORIDE 111 109   CO2 22 21   GLUCOSE 125* 122*   BUN 24* 25*   CREATININE 1.13 1.00   CALCIUM 6.6* 7.1*                   Imaging  US-EXTREMITY VENOUS UPPER UNILAT RIGHT   Final Result      DX-CHEST-FOR LINE PLACEMENT Perform procedure in: Patient's Room   Final Result      1.  Right IJ central catheter tip is in the mid/distal SVC. No pneumothorax.   2.  Mild interstitial prominence, likely mild edema. No focal consolidation or pleural effusions.      EC-ECHOCARDIOGRAM COMPLETE W/O CONT   Final Result      US-EXTREMITY VENOUS UPPER UNILAT RIGHT   Final Result      US-RENAL   Final Result      1.  There is moderate bilateral hydronephrosis, more than is typically expected for history of cystectomy with neobladder formation.      DX-CHEST-PORTABLE (1 VIEW)   Final Result      No radiographic evidence  of acute cardiopulmonary process.           Assessment/Plan  * DVT (deep venous thrombosis) ([facility])  Assessment & Plan  PICC line associated  Lovenox      Pancytopenia (HCC)- (present on admission)  Assessment & Plan  Transfused 1 unit platelets  Follow CBC  Start Granix    Hypotension- (present on admission)  Assessment & Plan  Midodrine  IVF NS      Anal carcinoma ([facility])- (present on admission)  Assessment & Plan  Chemotherapy given - 5-FU and Mitomycin C  Remove central line      CKD (chronic kidney disease) stage 3, GFR 30-59 ml/min ([facility])- (present on admission)  Assessment & Plan  PTH elevated  Follow bmp    Hypomagnesemia- (present on admission)  Assessment & Plan  oral Mg    Acute respiratory failure with hypoxia ([facility])- (present on admission)  Assessment & Plan  RT protocol    Hydronephrosis, bilateral- (present on admission)  Assessment & Plan  Follow bmp closely    Hematuria- (present on admission)  Assessment & Plan  Resolved        Stomatitis- (present on admission)  Assessment & Plan  thrush vs mucositis   Finished course of Nystatin    Type 2 diabetes mellitus without complication, without long-term current use of insulin ([facility])- (present on admission)  Assessment & Plan  diet controlled    MANDIE (acute kidney injury) ([facility])- (present on admission)  Assessment & Plan  Decrease IVF NS, follow bmp    COPD (chronic obstructive pulmonary disease) ([facility])- (present on admission)  Assessment & Plan  Spiriva, RT protocol    Glaucoma of right eye- (present on admission)  Assessment & Plan  timolol eyedrops    History of bladder cancer- (present on admission)  Assessment & Plan  Neobladder.    Current smoker- (present on admission)  Assessment & Plan  counseling       VTE prophylaxis: Lovenox

## 2019-12-30 NOTE — PROGRESS NOTES
On 12/29/2019 at 1900  There are no Chlorhexidine wipes on this unit at this time or down in patient supply. Daily Chlorhexidine bath could not be completed at this time. Agnieszka Daley R.N. is aware and notified to just make a note.

## 2019-12-30 NOTE — CARE PLAN
Problem: Communication  Goal: The ability to communicate needs accurately and effectively will improve  Outcome: PROGRESSING AS EXPECTED     Problem: Safety  Goal: Will remain free from injury  Outcome: PROGRESSING AS EXPECTED  Goal: Will remain free from falls  Outcome: PROGRESSING AS EXPECTED     Problem: Venous Thromboembolism (VTW)/Deep Vein Thrombosis (DVT) Prevention:  Goal: Patient will participate in Venous Thrombosis (VTE)/Deep Vein Thrombosis (DVT)Prevention Measures  Outcome: PROGRESSING AS EXPECTED     Problem: Infection  Goal: Will remain free from infection  Outcome: PROGRESSING SLOWER THAN EXPECTED

## 2019-12-30 NOTE — PROGRESS NOTES
Received report from Lupillo FRANKS RN.  Bed in lowest position, alarm on, wheels locked, call light within reach.

## 2019-12-31 NOTE — PROGRESS NOTES
Received report & assumed care of patient at 1900. Assessment completed. Patient is A&Ox4, up x1 with FWW. L PIV patent, running NS at 75 mL/hr. Patient reports pain of 5/10, no medication requested at this time. Patient is on 2L oxygen via nasal cannula. Patient is incontinent of stool and urine, barrier cream in use. POC discussed & questions answered. Bed locked and in lowest position, bed alarm is on, call light within reach, non-skid socks in place, hourly rounding. Patient reports no further needs at this time.

## 2019-12-31 NOTE — PROGRESS NOTES
Patient brought down to radiation oncology and received treatment 27 of 30. Patient will return Thursday for further treatment.

## 2019-12-31 NOTE — PROGRESS NOTES
Patient has extreme excoriation on sacral/rectum/pelvic areas and has extreme pain and crying out with cleaning. Skin is peeling when touched or wiped and he begs to be stopped. Pain medication dilaudid ordered for patient per Dr. Mckenzie. Updated doctor.

## 2019-12-31 NOTE — PROGRESS NOTES
[facility] Medicine Daily Progress Note    Date of Service  12/31/2019    Chief Complaint  74 y.o. male admitted 12/7/2019 with oral pain and weight loss.    Hospital Course    Patient with known anal cancer undergoing radiation and chemotherapy  He has had significant oral pain and unable to swallow well the past few weeks.  He has a sore on his bottom that he has been applying hemorrhoidal cream with lidocaine for pain.  He had hematuria develop since admission when platelet count was significantly low.   He developed a DVT in RUE and is on anticoagulation with Lovenox.  He has completed chemotherapy and will complete radiation on Monday,  1/6/20.        Interval Problem Update  12/8 Patient feeling tired and blood pressure has been markedly low, he has received 2 Liters of NS bolus and LR has been running at 100/hour since admit.  He still appears dehydrated but is starting to require oxygen to maintain saturation, lungs still sound clear on examination.  He received 1 unit of platelets and urine is clearing in collection tubing from neobladder.  Cr increased overnight with hydration from 1.52 to 1.82 and this could be due to early hydration only and expect it to trend back down.  If pressure does not respond to midodrine and IVF then patient will need to transfer to ICU for pressor support.  Midodrine and solucortef started because of hypotension.    12/9 Patient less fatigued and feeling slightly better when compared to yesterday.  He has been able to eat with pain control received by MBX.  Cr trending back down following IV resuscitation and blood pressure has improved with midodrine and cortef - patient refused transfer to ICU yesterday.  Radiation continued today on schedule.    12/17 Patient with adequate blood pressure with IV hydration but tends to drop when stopped, he states his oral intake is poor as his taste is very bland and nothing is good anymore.  He is agreeable to take an appetite stimulant with  marinol and protein supplements in form of shakes.  Calcium is 6.8 but when corrected for hypoalbuminemia, is 8.4.  PICC line to be removed today.  12/18 Patient feeling okay today, appetite has improved with lunch and dinner yesterday after starting marinol.  Will trial decrease of IVF and monitor blood pressure, he is not showing signs of volume overload.  He didn't eat much breakfast so I will give him tid marinol to see if that helps.    12/19 Patient feeling better today, he got out of bed and took a shower.  Blood pressure is doing about the same as yesterday despite dropping IVF rate from 100/h to 75/h slightly.     12/20 Patient without complaint, blood pressures have been stable so will try to decrease IVF rate further.  Will continue to encourage patient to get out of bed as much as tolerant.  12/21 Patient having nausea and diarrhea today, suspect as s/e of his radiation therapy, he is responding to anti-emetic and I will also start PRN imodium.  No acute events.  BP stable with rate drop of IVF to 50cc/h.  12/22 Patient doing about same with blood pressure, states nausea from yesterday has subsided.  He is eating.  Discussed with Dr Deras and oncology will see him by tomorrow to determine if chemotherapy is appropriate to be given on schedule tomorrow.  12/23 Patient with stable, low blood pressure.  Initially PICC was to be placed today but after speaking with vascular access team, they feel he is too high of a risk for another PICC induced clot and recommend either tunneled central line or PORT.  Patient only has 1 drug that needs to be received via central line (mitomycin for about 45 minute infusion).  He should have an IJ placed for chemotherapy and then this to be discontinued.    12/31 Patient appears weaker than when I saw him last week, he is post chemo and on the final few days of radiation treatment and suffering the side effects of this with skin breakdown in the perineum/rectal area.  His  blood pressures remain low though stable.  Renal function stable.    Consultants/Specialty  none    Code Status  full    Disposition  tbd    Review of Systems  Review of Systems   Constitutional: Positive for malaise/fatigue. Negative for chills and fever.   HENT: Negative for congestion and sore throat.    Eyes: Negative for blurred vision and photophobia.   Respiratory: Negative for cough and shortness of breath.    Cardiovascular: Negative for chest pain, claudication and leg swelling.   Gastrointestinal: Positive for diarrhea and nausea (intermittent). Negative for abdominal pain, constipation, heartburn and vomiting.   Genitourinary: Negative for dysuria, flank pain and hematuria.        Rectal pain     Musculoskeletal: Negative for joint pain and myalgias.   Skin: Negative for itching and rash.   Neurological: Negative for dizziness, sensory change, speech change, weakness and headaches.   Psychiatric/Behavioral: Negative for depression. The patient is not nervous/anxious and does not have insomnia.         Physical Exam  Temp:  [36.7 °C (98 °F)-38.1 °C (100.5 °F)] 36.7 °C (98 °F)  Pulse:  [70-91] 80  Resp:  [16-18] 16  BP: ()/(42-56) 92/50  SpO2:  [91 %-97 %] 93 %    Physical Exam  Vitals signs and nursing note reviewed.   Constitutional:       General: He is not in acute distress.     Appearance: Normal appearance.   HENT:      Head: Normocephalic and atraumatic.   Eyes:      General: No scleral icterus.     Extraocular Movements: Extraocular movements intact.   Neck:      Musculoskeletal: Normal range of motion and neck supple.   Cardiovascular:      Rate and Rhythm: Normal rate and regular rhythm.      Pulses: Normal pulses.      Heart sounds: Normal heart sounds. No murmur.   Pulmonary:      Effort: Pulmonary effort is normal. No respiratory distress.      Breath sounds: Normal breath sounds. No wheezing, rhonchi or rales.   Abdominal:      General: Abdomen is flat. Bowel sounds are normal. There is  no distension.      Palpations: Abdomen is soft.      Tenderness: There is no rebound.   Genitourinary:     Comments: Radiation breakdown perineum/rectum.    Musculoskeletal:         General: No swelling or tenderness.   Lymphadenopathy:      Cervical: No cervical adenopathy.   Skin:     Coloration: Skin is not jaundiced.      Findings: No erythema.      Comments: Rectal sore without evidence of infection, consistent with radiation changes.     Neurological:      General: No focal deficit present.      Mental Status: He is alert and oriented to person, place, and time. Mental status is at baseline.      Cranial Nerves: No cranial nerve deficit.   Psychiatric:         Mood and Affect: Mood normal.         Behavior: Behavior normal.         Fluids  No intake or output data in the 24 hours ending 12/31/19 1520    Laboratory  Recent Labs     12/29/19  0430 12/30/19  0211 12/31/19  0020   WBC 1.4* 1.0* 3.4*   RBC 2.61* 2.47* 2.66*   HEMOGLOBIN 8.5* 8.1* 8.6*   HEMATOCRIT 25.4* 24.2* 25.6*   MCV 97.3 98.0* 96.2   MCH 32.6 32.8 32.3   MCHC 33.5* 33.5* 33.6*   RDW 50.0 50.2* 49.6   PLATELETCT 79* 73* 77*   MPV 10.5 10.5 10.4     Recent Labs     12/29/19  0430 12/30/19  0211 12/31/19  0020   SODIUM 136 135 135   POTASSIUM 3.7 3.8 4.0   CHLORIDE 111 109 107   CO2 22 21 21   GLUCOSE 125* 122* 131*   BUN 24* 25* 28*   CREATININE 1.13 1.00 1.28   CALCIUM 6.6* 7.1* 7.0*                   Imaging  US-EXTREMITY VENOUS UPPER UNILAT RIGHT   Final Result      DX-CHEST-FOR LINE PLACEMENT Perform procedure in: Patient's Room   Final Result      1.  Right IJ central catheter tip is in the mid/distal SVC. No pneumothorax.   2.  Mild interstitial prominence, likely mild edema. No focal consolidation or pleural effusions.      EC-ECHOCARDIOGRAM COMPLETE W/O CONT   Final Result      US-EXTREMITY VENOUS UPPER UNILAT RIGHT   Final Result      US-RENAL   Final Result      1.  There is moderate bilateral hydronephrosis, more than is typically  expected for history of cystectomy with neobladder formation.      DX-CHEST-PORTABLE (1 VIEW)   Final Result      No radiographic evidence of acute cardiopulmonary process.           Assessment/Plan  * DVT (deep venous thrombosis) ([facility])  Assessment & Plan  PICC line associated  Lovenox      Pancytopenia ([facility])- (present on admission)  Assessment & Plan  Transfused 1 unit platelets  Follow CBC  Start Granix    Hypotension- (present on admission)  Assessment & Plan  Midodrine  IVF NS      Anal carcinoma ([facility])- (present on admission)  Assessment & Plan  Chemotherapy given - 5-FU and Mitomycin C  Remove central line      CKD (chronic kidney disease) stage 3, GFR 30-59 ml/min ([facility])- (present on admission)  Assessment & Plan  PTH elevated  Follow bmp    Hypomagnesemia- (present on admission)  Assessment & Plan  oral Mg    Acute respiratory failure with hypoxia ([facility])- (present on admission)  Assessment & Plan  RT protocol    Hydronephrosis, bilateral- (present on admission)  Assessment & Plan  Follow bmp closely    Hematuria- (present on admission)  Assessment & Plan  Resolved        Stomatitis- (present on admission)  Assessment & Plan  thrush vs mucositis   Finished course of Nystatin    Type 2 diabetes mellitus without complication, without long-term current use of insulin ([facility])- (present on admission)  Assessment & Plan  diet controlled    MANDIE (acute kidney injury) ([facility])- (present on admission)  Assessment & Plan  Decrease IVF NS, follow bmp    COPD (chronic obstructive pulmonary disease) ([facility])- (present on admission)  Assessment & Plan  Spiriva, RT protocol    Glaucoma of right eye- (present on admission)  Assessment & Plan  timolol eyedrops    History of bladder cancer- (present on admission)  Assessment & Plan  Neobladder.    Current smoker- (present on admission)  Assessment & Plan  counseling       VTE prophylaxis: lovenox

## 2019-12-31 NOTE — PROGRESS NOTES
Triple lumen IJ removed from Right side.  Pressure held for 5 minutes. Dry gauze with tegaderm film applied.  Dressing is C D & I.  Patient to lie flat for 1/2 hour.  Catheter intact

## 2020-01-01 ENCOUNTER — HOSPICE ADMISSION (OUTPATIENT)
Dept: HOSPICE | Facility: HOSPICE | Age: 75
End: 2020-01-01

## 2020-01-01 ENCOUNTER — HOME CARE VISIT (OUTPATIENT)
Dept: HOSPICE | Facility: HOSPICE | Age: 75
End: 2020-01-01

## 2020-01-01 ENCOUNTER — HOSPITAL ENCOUNTER (OUTPATIENT)
Dept: RADIATION ONCOLOGY | Facility: MEDICAL CENTER | Age: 75
End: 2020-01-31
Attending: RADIOLOGY
Payer: MEDICARE

## 2020-01-01 ENCOUNTER — APPOINTMENT (OUTPATIENT)
Dept: RADIATION ONCOLOGY | Facility: MEDICAL CENTER | Age: 75
End: 2020-01-01
Attending: RADIOLOGY
Payer: MEDICARE

## 2020-01-01 ENCOUNTER — APPOINTMENT (OUTPATIENT)
Dept: RADIOLOGY | Facility: MEDICAL CENTER | Age: 75
DRG: 374 | End: 2020-01-01
Attending: INTERNAL MEDICINE
Payer: MEDICARE

## 2020-01-01 VITALS
DIASTOLIC BLOOD PRESSURE: 51 MMHG | BODY MASS INDEX: 19.75 KG/M2 | HEART RATE: 112 BPM | OXYGEN SATURATION: 81 % | TEMPERATURE: 99.2 F | HEIGHT: 68 IN | SYSTOLIC BLOOD PRESSURE: 84 MMHG | RESPIRATION RATE: 18 BRPM | WEIGHT: 130.29 LBS

## 2020-01-01 LAB
ABO GROUP BLD: NORMAL
ABO GROUP BLD: NORMAL
ALBUMIN SERPL BCP-MCNC: 2 G/DL (ref 3.2–4.9)
ALBUMIN SERPL BCP-MCNC: 2 G/DL (ref 3.2–4.9)
ALBUMIN SERPL BCP-MCNC: 2.1 G/DL (ref 3.2–4.9)
ALBUMIN SERPL BCP-MCNC: 2.2 G/DL (ref 3.2–4.9)
ALBUMIN SERPL BCP-MCNC: 2.3 G/DL (ref 3.2–4.9)
ALBUMIN/GLOB SERPL: 0.8 G/DL
ALBUMIN/GLOB SERPL: 0.8 G/DL
ALBUMIN/GLOB SERPL: 0.9 G/DL
ALBUMIN/GLOB SERPL: 1 G/DL
ALP SERPL-CCNC: 33 U/L (ref 30–99)
ALP SERPL-CCNC: 34 U/L (ref 30–99)
ALP SERPL-CCNC: 35 U/L (ref 30–99)
ALP SERPL-CCNC: 41 U/L (ref 30–99)
ALP SERPL-CCNC: 43 U/L (ref 30–99)
ALP SERPL-CCNC: 45 U/L (ref 30–99)
ALP SERPL-CCNC: 52 U/L (ref 30–99)
ALT SERPL-CCNC: 13 U/L (ref 2–50)
ALT SERPL-CCNC: 13 U/L (ref 2–50)
ALT SERPL-CCNC: 14 U/L (ref 2–50)
ALT SERPL-CCNC: 15 U/L (ref 2–50)
ALT SERPL-CCNC: 16 U/L (ref 2–50)
ALT SERPL-CCNC: 17 U/L (ref 2–50)
ALT SERPL-CCNC: 19 U/L (ref 2–50)
ANION GAP SERPL CALC-SCNC: 6 MMOL/L (ref 0–11.9)
ANION GAP SERPL CALC-SCNC: 7 MMOL/L (ref 0–11.9)
ANION GAP SERPL CALC-SCNC: 7 MMOL/L (ref 0–11.9)
ANION GAP SERPL CALC-SCNC: 8 MMOL/L (ref 0–11.9)
ANISOCYTOSIS BLD QL SMEAR: ABNORMAL
AST SERPL-CCNC: 10 U/L (ref 12–45)
AST SERPL-CCNC: 10 U/L (ref 12–45)
AST SERPL-CCNC: 11 U/L (ref 12–45)
AST SERPL-CCNC: 11 U/L (ref 12–45)
AST SERPL-CCNC: 12 U/L (ref 12–45)
AST SERPL-CCNC: 7 U/L (ref 12–45)
AST SERPL-CCNC: 8 U/L (ref 12–45)
BACTERIA BLD CULT: NORMAL
BACTERIA BLD CULT: NORMAL
BARCODED ABORH UBTYP: 6200
BARCODED PRD CODE UBPRD: NORMAL
BARCODED UNIT NUM UBUNT: NORMAL
BASOPHILS # BLD AUTO: 0 % (ref 0–1.8)
BASOPHILS # BLD AUTO: ABNORMAL % (ref 0–1.8)
BASOPHILS # BLD: 0 K/UL (ref 0–0.12)
BASOPHILS # BLD: ABNORMAL K/UL (ref 0–0.12)
BILIRUB SERPL-MCNC: 0.3 MG/DL (ref 0.1–1.5)
BILIRUB SERPL-MCNC: 0.4 MG/DL (ref 0.1–1.5)
BILIRUB SERPL-MCNC: 0.5 MG/DL (ref 0.1–1.5)
BILIRUB SERPL-MCNC: 0.5 MG/DL (ref 0.1–1.5)
BILIRUB SERPL-MCNC: 0.7 MG/DL (ref 0.1–1.5)
BILIRUB SERPL-MCNC: 0.7 MG/DL (ref 0.1–1.5)
BILIRUB SERPL-MCNC: 0.8 MG/DL (ref 0.1–1.5)
BLD GP AB SCN SERPL QL: NORMAL
BLD GP AB SCN SERPL QL: NORMAL
BUN SERPL-MCNC: 27 MG/DL (ref 8–22)
BUN SERPL-MCNC: 27 MG/DL (ref 8–22)
BUN SERPL-MCNC: 35 MG/DL (ref 8–22)
BUN SERPL-MCNC: 43 MG/DL (ref 8–22)
BUN SERPL-MCNC: 45 MG/DL (ref 8–22)
BUN SERPL-MCNC: 45 MG/DL (ref 8–22)
BUN SERPL-MCNC: 46 MG/DL (ref 8–22)
CALCIUM SERPL-MCNC: 6.9 MG/DL (ref 8.5–10.5)
CALCIUM SERPL-MCNC: 7.1 MG/DL (ref 8.5–10.5)
CALCIUM SERPL-MCNC: 7.3 MG/DL (ref 8.5–10.5)
CALCIUM SERPL-MCNC: 7.3 MG/DL (ref 8.5–10.5)
CALCIUM SERPL-MCNC: 7.4 MG/DL (ref 8.5–10.5)
CALCIUM SERPL-MCNC: 7.4 MG/DL (ref 8.5–10.5)
CALCIUM SERPL-MCNC: 7.5 MG/DL (ref 8.5–10.5)
CHEMOTHERAPY INFUSION START DATE: NORMAL
CHEMOTHERAPY INFUSION STOP DATE: NORMAL
CHEMOTHERAPY RECORDS: 1.8
CHEMOTHERAPY RECORDS: 5400
CHEMOTHERAPY RECORDS: NORMAL
CHEMOTHERAPY RX CANCER: NORMAL
CHLORIDE SERPL-SCNC: 107 MMOL/L (ref 96–112)
CHLORIDE SERPL-SCNC: 108 MMOL/L (ref 96–112)
CHLORIDE SERPL-SCNC: 109 MMOL/L (ref 96–112)
CHLORIDE SERPL-SCNC: 110 MMOL/L (ref 96–112)
CHLORIDE SERPL-SCNC: 112 MMOL/L (ref 96–112)
CHLORIDE SERPL-SCNC: 115 MMOL/L (ref 96–112)
CHLORIDE SERPL-SCNC: 116 MMOL/L (ref 96–112)
CO2 SERPL-SCNC: 17 MMOL/L (ref 20–33)
CO2 SERPL-SCNC: 17 MMOL/L (ref 20–33)
CO2 SERPL-SCNC: 18 MMOL/L (ref 20–33)
CO2 SERPL-SCNC: 20 MMOL/L (ref 20–33)
CO2 SERPL-SCNC: 23 MMOL/L (ref 20–33)
COMPONENT P 8504P: NORMAL
COMPONENT R 8504R: NORMAL
COMPONENT R 8504R: NORMAL
CREAT SERPL-MCNC: 1.25 MG/DL (ref 0.5–1.4)
CREAT SERPL-MCNC: 1.34 MG/DL (ref 0.5–1.4)
CREAT SERPL-MCNC: 1.38 MG/DL (ref 0.5–1.4)
CREAT SERPL-MCNC: 1.46 MG/DL (ref 0.5–1.4)
CREAT SERPL-MCNC: 1.51 MG/DL (ref 0.5–1.4)
CREAT SERPL-MCNC: 1.65 MG/DL (ref 0.5–1.4)
CREAT SERPL-MCNC: 1.87 MG/DL (ref 0.5–1.4)
DATE 1ST CHEMO CANCER: NORMAL
EOSINOPHIL # BLD AUTO: 0 K/UL (ref 0–0.51)
EOSINOPHIL # BLD AUTO: 0 K/UL (ref 0–0.51)
EOSINOPHIL # BLD AUTO: 0.01 K/UL (ref 0–0.51)
EOSINOPHIL # BLD AUTO: ABNORMAL K/UL (ref 0–0.51)
EOSINOPHIL NFR BLD: 0 % (ref 0–6.9)
EOSINOPHIL NFR BLD: 0 % (ref 0–6.9)
EOSINOPHIL NFR BLD: 0.9 % (ref 0–6.9)
EOSINOPHIL NFR BLD: ABNORMAL % (ref 0–6.9)
ERYTHROCYTE [DISTWIDTH] IN BLOOD BY AUTOMATED COUNT: 47.5 FL (ref 35.9–50)
ERYTHROCYTE [DISTWIDTH] IN BLOOD BY AUTOMATED COUNT: 49.5 FL (ref 35.9–50)
ERYTHROCYTE [DISTWIDTH] IN BLOOD BY AUTOMATED COUNT: 51.4 FL (ref 35.9–50)
ERYTHROCYTE [DISTWIDTH] IN BLOOD BY AUTOMATED COUNT: 53.1 FL (ref 35.9–50)
ERYTHROCYTE [DISTWIDTH] IN BLOOD BY AUTOMATED COUNT: 54.7 FL (ref 35.9–50)
ERYTHROCYTE [DISTWIDTH] IN BLOOD BY AUTOMATED COUNT: 54.7 FL (ref 35.9–50)
ERYTHROCYTE [DISTWIDTH] IN BLOOD BY AUTOMATED COUNT: 55.3 FL (ref 35.9–50)
GAMMA INTERFERON BACKGROUND BLD IA-ACNC: 0.06 IU/ML
GLOBULIN SER CALC-MCNC: 2.1 G/DL (ref 1.9–3.5)
GLOBULIN SER CALC-MCNC: 2.2 G/DL (ref 1.9–3.5)
GLOBULIN SER CALC-MCNC: 2.3 G/DL (ref 1.9–3.5)
GLOBULIN SER CALC-MCNC: 2.4 G/DL (ref 1.9–3.5)
GLOBULIN SER CALC-MCNC: 2.5 G/DL (ref 1.9–3.5)
GLUCOSE SERPL-MCNC: 125 MG/DL (ref 65–99)
GLUCOSE SERPL-MCNC: 138 MG/DL (ref 65–99)
GLUCOSE SERPL-MCNC: 139 MG/DL (ref 65–99)
GLUCOSE SERPL-MCNC: 147 MG/DL (ref 65–99)
GLUCOSE SERPL-MCNC: 156 MG/DL (ref 65–99)
GLUCOSE SERPL-MCNC: 162 MG/DL (ref 65–99)
GLUCOSE SERPL-MCNC: 172 MG/DL (ref 65–99)
HCT VFR BLD AUTO: 18.4 % (ref 42–52)
HCT VFR BLD AUTO: 21.2 % (ref 42–52)
HCT VFR BLD AUTO: 21.8 % (ref 42–52)
HCT VFR BLD AUTO: 24.7 % (ref 42–52)
HCT VFR BLD AUTO: 24.8 % (ref 42–52)
HCT VFR BLD AUTO: 25.2 % (ref 42–52)
HCT VFR BLD AUTO: 28.5 % (ref 42–52)
HGB BLD-MCNC: 6.3 G/DL (ref 14–18)
HGB BLD-MCNC: 6.8 G/DL (ref 14–18)
HGB BLD-MCNC: 7.5 G/DL (ref 14–18)
HGB BLD-MCNC: 8.2 G/DL (ref 14–18)
HGB BLD-MCNC: 8.2 G/DL (ref 14–18)
HGB BLD-MCNC: 8.6 G/DL (ref 14–18)
HGB BLD-MCNC: 9.2 G/DL (ref 14–18)
HYPOCHROMIA BLD QL SMEAR: ABNORMAL
IMM GRANULOCYTES # BLD AUTO: ABNORMAL K/UL (ref 0–0.11)
IMM GRANULOCYTES NFR BLD AUTO: ABNORMAL % (ref 0–0.9)
LYMPHOCYTES # BLD AUTO: 0.02 K/UL (ref 1–4.8)
LYMPHOCYTES # BLD AUTO: 0.05 K/UL (ref 1–4.8)
LYMPHOCYTES # BLD AUTO: 0.17 K/UL (ref 1–4.8)
LYMPHOCYTES # BLD AUTO: ABNORMAL K/UL (ref 1–4.8)
LYMPHOCYTES NFR BLD: 1.8 % (ref 22–41)
LYMPHOCYTES NFR BLD: 28.7 % (ref 22–41)
LYMPHOCYTES NFR BLD: 9.7 % (ref 22–41)
LYMPHOCYTES NFR BLD: ABNORMAL % (ref 22–41)
M TB IFN-G BLD-IMP: NEGATIVE
M TB IFN-G CD4+ BCKGRND COR BLD-ACNC: 0 IU/ML
MANUAL DIFF BLD: NORMAL
MCH RBC QN AUTO: 30.4 PG (ref 27–33)
MCH RBC QN AUTO: 31.1 PG (ref 27–33)
MCH RBC QN AUTO: 31.4 PG (ref 27–33)
MCH RBC QN AUTO: 31.5 PG (ref 27–33)
MCH RBC QN AUTO: 31.7 PG (ref 27–33)
MCH RBC QN AUTO: 32.4 PG (ref 27–33)
MCH RBC QN AUTO: 32.8 PG (ref 27–33)
MCHC RBC AUTO-ENTMCNC: 32.1 G/DL (ref 33.7–35.3)
MCHC RBC AUTO-ENTMCNC: 32.3 G/DL (ref 33.7–35.3)
MCHC RBC AUTO-ENTMCNC: 32.7 G/DL (ref 33.7–35.3)
MCHC RBC AUTO-ENTMCNC: 33.1 G/DL (ref 33.7–35.3)
MCHC RBC AUTO-ENTMCNC: 33.2 G/DL (ref 33.7–35.3)
MCHC RBC AUTO-ENTMCNC: 34.1 G/DL (ref 33.7–35.3)
MCHC RBC AUTO-ENTMCNC: 34.2 G/DL (ref 33.7–35.3)
MCV RBC AUTO: 100.4 FL (ref 81.4–97.8)
MCV RBC AUTO: 92 FL (ref 81.4–97.8)
MCV RBC AUTO: 93.9 FL (ref 81.4–97.8)
MCV RBC AUTO: 94.6 FL (ref 81.4–97.8)
MCV RBC AUTO: 95 FL (ref 81.4–97.8)
MCV RBC AUTO: 95.8 FL (ref 81.4–97.8)
MCV RBC AUTO: 97 FL (ref 81.4–97.8)
METAMYELOCYTES NFR BLD MANUAL: 0.6 %
MICROCYTES BLD QL SMEAR: ABNORMAL
MITOGEN IGNF BCKGRD COR BLD-ACNC: >10 IU/ML
MONOCYTES # BLD AUTO: 0.02 K/UL (ref 0–0.85)
MONOCYTES # BLD AUTO: 0.07 K/UL (ref 0–0.85)
MONOCYTES # BLD AUTO: 0.09 K/UL (ref 0–0.85)
MONOCYTES # BLD AUTO: ABNORMAL K/UL (ref 0–0.85)
MONOCYTES NFR BLD AUTO: 1.8 % (ref 0–13.4)
MONOCYTES NFR BLD AUTO: 12.1 % (ref 0–13.4)
MONOCYTES NFR BLD AUTO: 17.7 % (ref 0–13.4)
MONOCYTES NFR BLD AUTO: ABNORMAL % (ref 0–13.4)
MORPHOLOGY BLD-IMP: NORMAL
NEUTROPHILS # BLD AUTO: 0.35 K/UL (ref 1.82–7.42)
NEUTROPHILS # BLD AUTO: 0.36 K/UL (ref 1.82–7.42)
NEUTROPHILS # BLD AUTO: 1.24 K/UL (ref 1.82–7.42)
NEUTROPHILS # BLD AUTO: ABNORMAL K/UL (ref 1.82–7.42)
NEUTROPHILS NFR BLD: 54.8 % (ref 44–72)
NEUTROPHILS NFR BLD: 67.3 % (ref 44–72)
NEUTROPHILS NFR BLD: 88.4 % (ref 44–72)
NEUTROPHILS NFR BLD: ABNORMAL % (ref 44–72)
NEUTS BAND NFR BLD MANUAL: 3.8 % (ref 0–10)
NEUTS BAND NFR BLD MANUAL: 5.3 % (ref 0–10)
NEUTS BAND NFR BLD MANUAL: 7.1 % (ref 0–10)
NRBC # BLD AUTO: 0 K/UL
NRBC BLD-RTO: 0 /100 WBC
PLATELET # BLD AUTO: 13 K/UL (ref 164–446)
PLATELET # BLD AUTO: 17 K/UL (ref 164–446)
PLATELET # BLD AUTO: 21 K/UL (ref 164–446)
PLATELET # BLD AUTO: 26 K/UL (ref 164–446)
PLATELET # BLD AUTO: 52 K/UL (ref 164–446)
PLATELET # BLD AUTO: 67 K/UL (ref 164–446)
PLATELET # BLD AUTO: 7 K/UL (ref 164–446)
PLATELET BLD QL SMEAR: NORMAL
PLATELETS.RETICULATED NFR BLD AUTO: 2.3 K/UL (ref 0.6–13.1)
PLATELETS.RETICULATED NFR BLD AUTO: 2.6 K/UL (ref 0.6–13.1)
PLATELETS.RETICULATED NFR BLD AUTO: 5.4 K/UL (ref 0.6–13.1)
PLATELETS.RETICULATED NFR BLD AUTO: 5.4 K/UL (ref 0.6–13.1)
PMV BLD AUTO: 10.4 FL (ref 9–12.9)
PMV BLD AUTO: 10.8 FL (ref 9–12.9)
PMV BLD AUTO: 11.4 FL (ref 9–12.9)
PMV BLD AUTO: 11.5 FL (ref 9–12.9)
PMV BLD AUTO: 12.4 FL (ref 9–12.9)
PMV BLD AUTO: 14.1 FL (ref 9–12.9)
POIKILOCYTOSIS BLD QL SMEAR: NORMAL
POTASSIUM SERPL-SCNC: 3.9 MMOL/L (ref 3.6–5.5)
POTASSIUM SERPL-SCNC: 4.1 MMOL/L (ref 3.6–5.5)
POTASSIUM SERPL-SCNC: 4.2 MMOL/L (ref 3.6–5.5)
POTASSIUM SERPL-SCNC: 4.2 MMOL/L (ref 3.6–5.5)
POTASSIUM SERPL-SCNC: 4.3 MMOL/L (ref 3.6–5.5)
PRODUCT TYPE UPROD: NORMAL
PROT SERPL-MCNC: 4.2 G/DL (ref 6–8.2)
PROT SERPL-MCNC: 4.2 G/DL (ref 6–8.2)
PROT SERPL-MCNC: 4.3 G/DL (ref 6–8.2)
PROT SERPL-MCNC: 4.4 G/DL (ref 6–8.2)
PROT SERPL-MCNC: 4.5 G/DL (ref 6–8.2)
PROT SERPL-MCNC: 4.5 G/DL (ref 6–8.2)
PROT SERPL-MCNC: 4.6 G/DL (ref 6–8.2)
QFT TB2 - NIL TBQ2: 0.01 IU/ML
RAD ONC ARIA COURSE LAST TREATMENT DATE: NORMAL
RAD ONC ARIA COURSE TREATMENT ELAPSED DAYS: NORMAL
RAD ONC ARIA REFERENCE POINT DOSAGE GIVEN TO DATE: 48.6
RAD ONC ARIA REFERENCE POINT DOSAGE GIVEN TO DATE: 50.34
RAD ONC ARIA REFERENCE POINT ID: NORMAL
RAD ONC ARIA REFERENCE POINT ID: NORMAL
RBC # BLD AUTO: 1.92 M/UL (ref 4.7–6.1)
RBC # BLD AUTO: 2.24 M/UL (ref 4.7–6.1)
RBC # BLD AUTO: 2.3 M/UL (ref 4.7–6.1)
RBC # BLD AUTO: 2.6 M/UL (ref 4.7–6.1)
RBC # BLD AUTO: 2.64 M/UL (ref 4.7–6.1)
RBC # BLD AUTO: 2.74 M/UL (ref 4.7–6.1)
RBC # BLD AUTO: 2.84 M/UL (ref 4.7–6.1)
RBC BLD AUTO: PRESENT
RH BLD: NORMAL
RH BLD: NORMAL
SIGNIFICANT IND 70042: NORMAL
SIGNIFICANT IND 70042: NORMAL
SITE SITE: NORMAL
SITE SITE: NORMAL
SODIUM SERPL-SCNC: 134 MMOL/L (ref 135–145)
SODIUM SERPL-SCNC: 134 MMOL/L (ref 135–145)
SODIUM SERPL-SCNC: 135 MMOL/L (ref 135–145)
SODIUM SERPL-SCNC: 136 MMOL/L (ref 135–145)
SODIUM SERPL-SCNC: 137 MMOL/L (ref 135–145)
SODIUM SERPL-SCNC: 139 MMOL/L (ref 135–145)
SODIUM SERPL-SCNC: 139 MMOL/L (ref 135–145)
SOURCE SOURCE: NORMAL
SOURCE SOURCE: NORMAL
TOXIC GRANULES BLD QL SMEAR: NORMAL
TOXIC GRANULES BLD QL SMEAR: SLIGHT
UNIT STATUS USTAT: NORMAL
WBC # BLD AUTO: 0.1 K/UL (ref 4.8–10.8)
WBC # BLD AUTO: 0.2 K/UL (ref 4.8–10.8)
WBC # BLD AUTO: 0.5 K/UL (ref 4.8–10.8)
WBC # BLD AUTO: 0.6 K/UL (ref 4.8–10.8)
WBC # BLD AUTO: 1.3 K/UL (ref 4.8–10.8)

## 2020-01-01 PROCEDURE — 87040 BLOOD CULTURE FOR BACTERIA: CPT

## 2020-01-01 PROCEDURE — 86850 RBC ANTIBODY SCREEN: CPT

## 2020-01-01 PROCEDURE — 770004 HCHG ROOM/CARE - ONCOLOGY PRIVATE *

## 2020-01-01 PROCEDURE — 80053 COMPREHEN METABOLIC PANEL: CPT

## 2020-01-01 PROCEDURE — A9270 NON-COVERED ITEM OR SERVICE: HCPCS | Performed by: HOSPITALIST

## 2020-01-01 PROCEDURE — 700102 HCHG RX REV CODE 250 W/ 637 OVERRIDE(OP): Performed by: HOSPITALIST

## 2020-01-01 PROCEDURE — P9016 RBC LEUKOCYTES REDUCED: HCPCS

## 2020-01-01 PROCEDURE — 700102 HCHG RX REV CODE 250 W/ 637 OVERRIDE(OP): Performed by: INTERNAL MEDICINE

## 2020-01-01 PROCEDURE — 36415 COLL VENOUS BLD VENIPUNCTURE: CPT

## 2020-01-01 PROCEDURE — 86923 COMPATIBILITY TEST ELECTRIC: CPT

## 2020-01-01 PROCEDURE — 99232 SBSQ HOSP IP/OBS MODERATE 35: CPT | Performed by: HOSPITALIST

## 2020-01-01 PROCEDURE — 700101 HCHG RX REV CODE 250: Performed by: HOSPITALIST

## 2020-01-01 PROCEDURE — 85055 RETICULATED PLATELET ASSAY: CPT

## 2020-01-01 PROCEDURE — 99233 SBSQ HOSP IP/OBS HIGH 50: CPT | Performed by: INTERNAL MEDICINE

## 2020-01-01 PROCEDURE — 86901 BLOOD TYPING SEROLOGIC RH(D): CPT

## 2020-01-01 PROCEDURE — A9270 NON-COVERED ITEM OR SERVICE: HCPCS | Performed by: INTERNAL MEDICINE

## 2020-01-01 PROCEDURE — 99232 SBSQ HOSP IP/OBS MODERATE 35: CPT | Performed by: INTERNAL MEDICINE

## 2020-01-01 PROCEDURE — 85025 COMPLETE CBC W/AUTO DIFF WBC: CPT

## 2020-01-01 PROCEDURE — 700111 HCHG RX REV CODE 636 W/ 250 OVERRIDE (IP): Performed by: INTERNAL MEDICINE

## 2020-01-01 PROCEDURE — 700105 HCHG RX REV CODE 258: Performed by: INTERNAL MEDICINE

## 2020-01-01 PROCEDURE — 99497 ADVNCD CARE PLAN 30 MIN: CPT | Performed by: HOSPITALIST

## 2020-01-01 PROCEDURE — 700102 HCHG RX REV CODE 250 W/ 637 OVERRIDE(OP): Performed by: FAMILY MEDICINE

## 2020-01-01 PROCEDURE — A9270 NON-COVERED ITEM OR SERVICE: HCPCS | Performed by: FAMILY MEDICINE

## 2020-01-01 PROCEDURE — 86900 BLOOD TYPING SEROLOGIC ABO: CPT

## 2020-01-01 PROCEDURE — 85027 COMPLETE CBC AUTOMATED: CPT

## 2020-01-01 PROCEDURE — 85007 BL SMEAR W/DIFF WBC COUNT: CPT

## 2020-01-01 PROCEDURE — A6250 SKIN SEAL PROTECT MOISTURIZR: HCPCS | Performed by: HOSPITALIST

## 2020-01-01 PROCEDURE — 700105 HCHG RX REV CODE 258: Performed by: FAMILY MEDICINE

## 2020-01-01 PROCEDURE — P9034 PLATELETS, PHERESIS: HCPCS

## 2020-01-01 PROCEDURE — 99231 SBSQ HOSP IP/OBS SF/LOW 25: CPT | Performed by: HOSPITALIST

## 2020-01-01 PROCEDURE — 36430 TRANSFUSION BLD/BLD COMPNT: CPT

## 2020-01-01 PROCEDURE — 86945 BLOOD PRODUCT/IRRADIATION: CPT

## 2020-01-01 PROCEDURE — 700111 HCHG RX REV CODE 636 W/ 250 OVERRIDE (IP): Performed by: HOSPITALIST

## 2020-01-01 PROCEDURE — 700111 HCHG RX REV CODE 636 W/ 250 OVERRIDE (IP): Mod: JG | Performed by: INTERNAL MEDICINE

## 2020-01-01 PROCEDURE — 86644 CMV ANTIBODY: CPT

## 2020-01-01 PROCEDURE — 86480 TB TEST CELL IMMUN MEASURE: CPT

## 2020-01-01 PROCEDURE — 71045 X-RAY EXAM CHEST 1 VIEW: CPT

## 2020-01-01 PROCEDURE — 99233 SBSQ HOSP IP/OBS HIGH 50: CPT | Performed by: HOSPITALIST

## 2020-01-01 RX ORDER — LINEZOLID 600 MG/1
600 TABLET, FILM COATED ORAL EVERY 12 HOURS
Status: DISCONTINUED | OUTPATIENT
Start: 2020-01-01 | End: 2020-01-01

## 2020-01-01 RX ORDER — ATROPINE SULFATE 10 MG/ML
2 SOLUTION/ DROPS OPHTHALMIC EVERY 4 HOURS PRN
Status: DISCONTINUED | OUTPATIENT
Start: 2020-01-01 | End: 2020-01-01 | Stop reason: HOSPADM

## 2020-01-01 RX ORDER — ACETAMINOPHEN 325 MG/1
650 TABLET ORAL ONCE
Status: COMPLETED | OUTPATIENT
Start: 2020-01-01 | End: 2020-01-01

## 2020-01-01 RX ORDER — LORAZEPAM 2 MG/ML
0.5 INJECTION INTRAMUSCULAR
Status: DISCONTINUED | OUTPATIENT
Start: 2020-01-01 | End: 2020-01-01 | Stop reason: HOSPADM

## 2020-01-01 RX ORDER — LACTULOSE 20 G/30ML
10 SOLUTION ORAL
Status: DISCONTINUED | OUTPATIENT
Start: 2020-01-01 | End: 2020-01-01

## 2020-01-01 RX ORDER — DIPHENHYDRAMINE HCL 25 MG
25 TABLET ORAL ONCE
Status: COMPLETED | OUTPATIENT
Start: 2020-01-01 | End: 2020-01-01

## 2020-01-01 RX ORDER — HYDROMORPHONE HYDROCHLORIDE 1 MG/ML
1-2 INJECTION, SOLUTION INTRAMUSCULAR; INTRAVENOUS; SUBCUTANEOUS
Status: DISCONTINUED | OUTPATIENT
Start: 2020-01-01 | End: 2020-01-01 | Stop reason: HOSPADM

## 2020-01-01 RX ORDER — MEPERIDINE HYDROCHLORIDE 25 MG/ML
25 INJECTION INTRAMUSCULAR; INTRAVENOUS; SUBCUTANEOUS ONCE
Status: COMPLETED | OUTPATIENT
Start: 2020-01-01 | End: 2020-01-01

## 2020-01-01 RX ORDER — LORAZEPAM 2 MG/ML
0.5 CONCENTRATE ORAL EVERY 4 HOURS PRN
Status: DISCONTINUED | OUTPATIENT
Start: 2020-01-01 | End: 2020-01-01 | Stop reason: HOSPADM

## 2020-01-01 RX ORDER — POLYVINYL ALCOHOL 14 MG/ML
2 SOLUTION/ DROPS OPHTHALMIC EVERY 6 HOURS PRN
Status: DISCONTINUED | OUTPATIENT
Start: 2020-01-01 | End: 2020-01-01 | Stop reason: HOSPADM

## 2020-01-01 RX ORDER — MORPHINE SULFATE 100 MG/5ML
5 SOLUTION ORAL
Status: DISCONTINUED | OUTPATIENT
Start: 2020-01-01 | End: 2020-01-01

## 2020-01-01 RX ORDER — DOCUSATE SODIUM 100 MG/1
100 CAPSULE, LIQUID FILLED ORAL EVERY 12 HOURS
Status: DISCONTINUED | OUTPATIENT
Start: 2020-01-01 | End: 2020-01-01 | Stop reason: HOSPADM

## 2020-01-01 RX ORDER — MORPHINE SULFATE 100 MG/5ML
5-10 SOLUTION ORAL
Status: DISCONTINUED | OUTPATIENT
Start: 2020-01-01 | End: 2020-01-01 | Stop reason: HOSPADM

## 2020-01-01 RX ADMIN — TIMOLOL MALEATE 1 DROP: 2.5 SOLUTION OPHTHALMIC at 17:55

## 2020-01-01 RX ADMIN — HYDROMORPHONE HYDROCHLORIDE 1 MG: 1 INJECTION, SOLUTION INTRAMUSCULAR; INTRAVENOUS; SUBCUTANEOUS at 22:12

## 2020-01-01 RX ADMIN — MIDODRINE HYDROCHLORIDE 10 MG: 5 TABLET ORAL at 10:31

## 2020-01-01 RX ADMIN — HYDROMORPHONE HYDROCHLORIDE 1 MG: 1 INJECTION, SOLUTION INTRAMUSCULAR; INTRAVENOUS; SUBCUTANEOUS at 12:28

## 2020-01-01 RX ADMIN — HYDROMORPHONE HYDROCHLORIDE 2 MG: 1 INJECTION, SOLUTION INTRAMUSCULAR; INTRAVENOUS; SUBCUTANEOUS at 11:27

## 2020-01-01 RX ADMIN — CEFEPIME 2 G: 2 INJECTION, POWDER, FOR SOLUTION INTRAVENOUS at 17:56

## 2020-01-01 RX ADMIN — SODIUM CHLORIDE: 9 INJECTION, SOLUTION INTRAVENOUS at 17:32

## 2020-01-01 RX ADMIN — HYDROMORPHONE HYDROCHLORIDE 1 MG: 1 INJECTION, SOLUTION INTRAMUSCULAR; INTRAVENOUS; SUBCUTANEOUS at 22:31

## 2020-01-01 RX ADMIN — LINEZOLID 600 MG: 600 TABLET, FILM COATED ORAL at 17:43

## 2020-01-01 RX ADMIN — HYDROMORPHONE HYDROCHLORIDE 1 MG: 1 INJECTION, SOLUTION INTRAMUSCULAR; INTRAVENOUS; SUBCUTANEOUS at 06:18

## 2020-01-01 RX ADMIN — TIMOLOL MALEATE 1 DROP: 2.5 SOLUTION OPHTHALMIC at 16:49

## 2020-01-01 RX ADMIN — HYDROMORPHONE HYDROCHLORIDE 1 MG: 1 INJECTION, SOLUTION INTRAMUSCULAR; INTRAVENOUS; SUBCUTANEOUS at 09:11

## 2020-01-01 RX ADMIN — ACETAMINOPHEN 650 MG: 325 TABLET, FILM COATED ORAL at 09:11

## 2020-01-01 RX ADMIN — Medication 400 MG: at 06:07

## 2020-01-01 RX ADMIN — MIDODRINE HYDROCHLORIDE 10 MG: 5 TABLET ORAL at 08:01

## 2020-01-01 RX ADMIN — ENOXAPARIN SODIUM 60 MG: 100 INJECTION SUBCUTANEOUS at 04:32

## 2020-01-01 RX ADMIN — TIMOLOL MALEATE 1 DROP: 2.5 SOLUTION OPHTHALMIC at 18:00

## 2020-01-01 RX ADMIN — TIMOLOL MALEATE 1 DROP: 2.5 SOLUTION OPHTHALMIC at 04:28

## 2020-01-01 RX ADMIN — MORPHINE SULFATE 5 MG: 100 SOLUTION ORAL at 11:35

## 2020-01-01 RX ADMIN — MORPHINE SULFATE 10 MG: 100 SOLUTION ORAL at 21:59

## 2020-01-01 RX ADMIN — HYDROMORPHONE HYDROCHLORIDE 1 MG: 1 INJECTION, SOLUTION INTRAMUSCULAR; INTRAVENOUS; SUBCUTANEOUS at 08:46

## 2020-01-01 RX ADMIN — MORPHINE SULFATE 5 MG: 100 SOLUTION ORAL at 02:34

## 2020-01-01 RX ADMIN — MORPHINE SULFATE 5 MG: 100 SOLUTION ORAL at 00:00

## 2020-01-01 RX ADMIN — GLYCOPYRROLATE 1 CAPSULE: 15.6 CAPSULE RESPIRATORY (INHALATION) at 06:28

## 2020-01-01 RX ADMIN — DRONABINOL 5 MG: 5 CAPSULE ORAL at 11:27

## 2020-01-01 RX ADMIN — TIMOLOL MALEATE 1 DROP: 2.5 SOLUTION OPHTHALMIC at 06:23

## 2020-01-01 RX ADMIN — DIPHENHYDRAMINE HYDROCHLORIDE 25 MG: 25 TABLET ORAL at 04:28

## 2020-01-01 RX ADMIN — MORPHINE SULFATE 10 MG: 100 SOLUTION ORAL at 16:48

## 2020-01-01 RX ADMIN — MIDODRINE HYDROCHLORIDE 10 MG: 5 TABLET ORAL at 12:28

## 2020-01-01 RX ADMIN — OXYCODONE HYDROCHLORIDE 5 MG: 5 TABLET ORAL at 06:47

## 2020-01-01 RX ADMIN — DRONABINOL 5 MG: 5 CAPSULE ORAL at 16:35

## 2020-01-01 RX ADMIN — Medication 400 MG: at 06:12

## 2020-01-01 RX ADMIN — ANTACID TABLETS 1000 MG: 500 TABLET, CHEWABLE ORAL at 06:18

## 2020-01-01 RX ADMIN — TBO-FILGRASTIM 300 MCG: 300 INJECTION, SOLUTION SUBCUTANEOUS at 16:39

## 2020-01-01 RX ADMIN — TIMOLOL MALEATE 1 DROP: 2.5 SOLUTION OPHTHALMIC at 16:50

## 2020-01-01 RX ADMIN — CEFEPIME 2 G: 2 INJECTION, POWDER, FOR SOLUTION INTRAVENOUS at 04:43

## 2020-01-01 RX ADMIN — SILVER SULFADIAZINE 1 G: 10 CREAM TOPICAL at 16:33

## 2020-01-01 RX ADMIN — HYDROMORPHONE HYDROCHLORIDE 1 MG: 1 INJECTION, SOLUTION INTRAMUSCULAR; INTRAVENOUS; SUBCUTANEOUS at 16:41

## 2020-01-01 RX ADMIN — MEPERIDINE HYDROCHLORIDE 25 MG: 25 INJECTION INTRAMUSCULAR; INTRAVENOUS; SUBCUTANEOUS at 19:00

## 2020-01-01 RX ADMIN — MORPHINE SULFATE 10 MG: 100 SOLUTION ORAL at 09:33

## 2020-01-01 RX ADMIN — CEFEPIME 2 G: 2 INJECTION, POWDER, FOR SOLUTION INTRAVENOUS at 23:09

## 2020-01-01 RX ADMIN — SODIUM CHLORIDE: 9 INJECTION, SOLUTION INTRAVENOUS at 06:07

## 2020-01-01 RX ADMIN — CEFEPIME 2 G: 2 INJECTION, POWDER, FOR SOLUTION INTRAVENOUS at 06:13

## 2020-01-01 RX ADMIN — HYDROMORPHONE HYDROCHLORIDE 2 MG: 1 INJECTION, SOLUTION INTRAMUSCULAR; INTRAVENOUS; SUBCUTANEOUS at 20:46

## 2020-01-01 RX ADMIN — CEFEPIME 2 G: 2 INJECTION, POWDER, FOR SOLUTION INTRAVENOUS at 18:36

## 2020-01-01 RX ADMIN — DRONABINOL 5 MG: 5 CAPSULE ORAL at 08:05

## 2020-01-01 RX ADMIN — HYDROMORPHONE HYDROCHLORIDE 2 MG: 1 INJECTION, SOLUTION INTRAMUSCULAR; INTRAVENOUS; SUBCUTANEOUS at 15:53

## 2020-01-01 RX ADMIN — HYDROMORPHONE HYDROCHLORIDE 1 MG: 1 INJECTION, SOLUTION INTRAMUSCULAR; INTRAVENOUS; SUBCUTANEOUS at 17:28

## 2020-01-01 RX ADMIN — TBO-FILGRASTIM 300 MCG: 300 INJECTION, SOLUTION SUBCUTANEOUS at 13:21

## 2020-01-01 RX ADMIN — HYDROMORPHONE HYDROCHLORIDE 1 MG: 1 INJECTION, SOLUTION INTRAMUSCULAR; INTRAVENOUS; SUBCUTANEOUS at 10:00

## 2020-01-01 RX ADMIN — Medication 400 MG: at 04:28

## 2020-01-01 RX ADMIN — SODIUM CHLORIDE: 9 INJECTION, SOLUTION INTRAVENOUS at 08:54

## 2020-01-01 RX ADMIN — TBO-FILGRASTIM 300 MCG: 300 INJECTION, SOLUTION SUBCUTANEOUS at 18:42

## 2020-01-01 RX ADMIN — MIDODRINE HYDROCHLORIDE 10 MG: 5 TABLET ORAL at 09:15

## 2020-01-01 RX ADMIN — MIDODRINE HYDROCHLORIDE 10 MG: 5 TABLET ORAL at 17:43

## 2020-01-01 RX ADMIN — MORPHINE SULFATE 10 MG: 100 SOLUTION ORAL at 01:10

## 2020-01-01 RX ADMIN — MIDODRINE HYDROCHLORIDE 10 MG: 5 TABLET ORAL at 16:28

## 2020-01-01 RX ADMIN — MIDODRINE HYDROCHLORIDE 10 MG: 5 TABLET ORAL at 06:25

## 2020-01-01 RX ADMIN — CEFEPIME 2 G: 2 INJECTION, POWDER, FOR SOLUTION INTRAVENOUS at 06:09

## 2020-01-01 RX ADMIN — HYDROMORPHONE HYDROCHLORIDE 1 MG: 1 INJECTION, SOLUTION INTRAMUSCULAR; INTRAVENOUS; SUBCUTANEOUS at 08:05

## 2020-01-01 RX ADMIN — TBO-FILGRASTIM 300 MCG: 300 INJECTION, SOLUTION SUBCUTANEOUS at 14:18

## 2020-01-01 RX ADMIN — TIMOLOL MALEATE 1 DROP: 2.5 SOLUTION OPHTHALMIC at 17:43

## 2020-01-01 RX ADMIN — TBO-FILGRASTIM 300 MCG: 300 INJECTION, SOLUTION SUBCUTANEOUS at 18:40

## 2020-01-01 RX ADMIN — SILVER SULFADIAZINE 1 G: 10 CREAM TOPICAL at 18:00

## 2020-01-01 RX ADMIN — CEFEPIME 2 G: 2 INJECTION, POWDER, FOR SOLUTION INTRAVENOUS at 06:19

## 2020-01-01 RX ADMIN — ENOXAPARIN SODIUM 60 MG: 100 INJECTION SUBCUTANEOUS at 16:32

## 2020-01-01 RX ADMIN — ENOXAPARIN SODIUM 60 MG: 100 INJECTION SUBCUTANEOUS at 06:05

## 2020-01-01 RX ADMIN — DRONABINOL 5 MG: 5 CAPSULE ORAL at 08:01

## 2020-01-01 RX ADMIN — HYDROMORPHONE HYDROCHLORIDE 1 MG: 1 INJECTION, SOLUTION INTRAMUSCULAR; INTRAVENOUS; SUBCUTANEOUS at 16:28

## 2020-01-01 RX ADMIN — OXYCODONE HYDROCHLORIDE 5 MG: 5 TABLET ORAL at 06:18

## 2020-01-01 RX ADMIN — VANCOMYCIN HYDROCHLORIDE 1500 MG: 500 INJECTION, POWDER, LYOPHILIZED, FOR SOLUTION INTRAVENOUS at 13:18

## 2020-01-01 RX ADMIN — ANTACID TABLETS 1000 MG: 500 TABLET, CHEWABLE ORAL at 16:27

## 2020-01-01 RX ADMIN — DRONABINOL 5 MG: 5 CAPSULE ORAL at 10:27

## 2020-01-01 RX ADMIN — MIDODRINE HYDROCHLORIDE 10 MG: 5 TABLET ORAL at 18:39

## 2020-01-01 RX ADMIN — ACETAMINOPHEN 650 MG: 325 TABLET, FILM COATED ORAL at 04:27

## 2020-01-01 RX ADMIN — HYDROMORPHONE HYDROCHLORIDE 1 MG: 1 INJECTION, SOLUTION INTRAMUSCULAR; INTRAVENOUS; SUBCUTANEOUS at 16:35

## 2020-01-01 RX ADMIN — Medication 400 MG: at 17:29

## 2020-01-01 RX ADMIN — DRONABINOL 5 MG: 5 CAPSULE ORAL at 17:29

## 2020-01-01 RX ADMIN — CEFEPIME 2 G: 2 INJECTION, POWDER, FOR SOLUTION INTRAVENOUS at 22:31

## 2020-01-01 RX ADMIN — Medication 400 MG: at 16:28

## 2020-01-01 RX ADMIN — SODIUM CHLORIDE: 9 INJECTION, SOLUTION INTRAVENOUS at 20:47

## 2020-01-01 RX ADMIN — DRONABINOL 5 MG: 5 CAPSULE ORAL at 06:07

## 2020-01-01 RX ADMIN — LINEZOLID 600 MG: 600 TABLET, FILM COATED ORAL at 06:07

## 2020-01-01 RX ADMIN — Medication 400 MG: at 06:18

## 2020-01-01 RX ADMIN — ENOXAPARIN SODIUM 60 MG: 100 INJECTION SUBCUTANEOUS at 04:28

## 2020-01-01 RX ADMIN — HYDROMORPHONE HYDROCHLORIDE 2 MG: 1 INJECTION, SOLUTION INTRAMUSCULAR; INTRAVENOUS; SUBCUTANEOUS at 04:28

## 2020-01-01 RX ADMIN — TIMOLOL MALEATE 1 DROP: 2.5 SOLUTION OPHTHALMIC at 17:14

## 2020-01-01 RX ADMIN — MIDODRINE HYDROCHLORIDE 10 MG: 5 TABLET ORAL at 16:35

## 2020-01-01 RX ADMIN — HYDROMORPHONE HYDROCHLORIDE 2 MG: 1 INJECTION, SOLUTION INTRAMUSCULAR; INTRAVENOUS; SUBCUTANEOUS at 20:39

## 2020-01-01 RX ADMIN — SODIUM CHLORIDE: 9 INJECTION, SOLUTION INTRAVENOUS at 21:47

## 2020-01-01 RX ADMIN — ANTACID TABLETS 1000 MG: 500 TABLET, CHEWABLE ORAL at 17:29

## 2020-01-01 RX ADMIN — CEFEPIME 2 G: 2 INJECTION, POWDER, FOR SOLUTION INTRAVENOUS at 13:29

## 2020-01-01 RX ADMIN — MIDODRINE HYDROCHLORIDE 10 MG: 5 TABLET ORAL at 08:06

## 2020-01-01 RX ADMIN — OXYCODONE HYDROCHLORIDE 5 MG: 5 TABLET ORAL at 07:17

## 2020-01-01 RX ADMIN — LINEZOLID 600 MG: 600 TABLET, FILM COATED ORAL at 18:38

## 2020-01-01 RX ADMIN — LIDOCAINE HYDROCHLORIDE 5 ML: 20 SOLUTION OROPHARYNGEAL at 04:27

## 2020-01-01 RX ADMIN — HYDROMORPHONE HYDROCHLORIDE 1 MG: 1 INJECTION, SOLUTION INTRAMUSCULAR; INTRAVENOUS; SUBCUTANEOUS at 17:18

## 2020-01-01 RX ADMIN — ANTACID TABLETS 1000 MG: 500 TABLET, CHEWABLE ORAL at 18:36

## 2020-01-01 RX ADMIN — Medication 400 MG: at 18:38

## 2020-01-01 RX ADMIN — SODIUM CHLORIDE: 9 INJECTION, SOLUTION INTRAVENOUS at 22:54

## 2020-01-01 RX ADMIN — LIDOCAINE HYDROCHLORIDE 5 ML: 20 SOLUTION OROPHARYNGEAL at 17:05

## 2020-01-01 RX ADMIN — HYDROMORPHONE HYDROCHLORIDE 1 MG: 1 INJECTION, SOLUTION INTRAMUSCULAR; INTRAVENOUS; SUBCUTANEOUS at 17:09

## 2020-01-01 RX ADMIN — MIDODRINE HYDROCHLORIDE 10 MG: 5 TABLET ORAL at 08:54

## 2020-01-01 RX ADMIN — DRONABINOL 5 MG: 5 CAPSULE ORAL at 08:54

## 2020-01-01 RX ADMIN — Medication 400 MG: at 06:00

## 2020-01-01 RX ADMIN — DRONABINOL 5 MG: 5 CAPSULE ORAL at 18:38

## 2020-01-01 RX ADMIN — MIDODRINE HYDROCHLORIDE 10 MG: 5 TABLET ORAL at 11:27

## 2020-01-01 RX ADMIN — ANTACID TABLETS 1000 MG: 500 TABLET, CHEWABLE ORAL at 16:35

## 2020-01-01 RX ADMIN — DRONABINOL 5 MG: 5 CAPSULE ORAL at 16:28

## 2020-01-01 RX ADMIN — LORAZEPAM 0.5 MG: 2 INJECTION INTRAMUSCULAR; INTRAVENOUS at 03:17

## 2020-01-01 RX ADMIN — DRONABINOL 5 MG: 5 CAPSULE ORAL at 18:53

## 2020-01-01 RX ADMIN — MIDODRINE HYDROCHLORIDE 10 MG: 5 TABLET ORAL at 10:01

## 2020-01-01 RX ADMIN — MIDODRINE HYDROCHLORIDE 10 MG: 5 TABLET ORAL at 07:17

## 2020-01-01 RX ADMIN — OXYCODONE HYDROCHLORIDE 5 MG: 5 TABLET ORAL at 14:15

## 2020-01-01 RX ADMIN — SILVER SULFADIAZINE 5 G: 10 CREAM TOPICAL at 04:25

## 2020-01-01 RX ADMIN — DRONABINOL 5 MG: 5 CAPSULE ORAL at 09:11

## 2020-01-01 RX ADMIN — MORPHINE SULFATE 5 MG: 100 SOLUTION ORAL at 08:06

## 2020-01-01 RX ADMIN — HYDROMORPHONE HYDROCHLORIDE 1 MG: 1 INJECTION, SOLUTION INTRAMUSCULAR; INTRAVENOUS; SUBCUTANEOUS at 04:32

## 2020-01-01 RX ADMIN — SILVER SULFADIAZINE 1 G: 10 CREAM TOPICAL at 04:37

## 2020-01-01 RX ADMIN — SILVER SULFADIAZINE 1 G: 10 CREAM TOPICAL at 06:23

## 2020-01-01 RX ADMIN — CEFEPIME 2 G: 2 INJECTION, POWDER, FOR SOLUTION INTRAVENOUS at 16:36

## 2020-01-01 RX ADMIN — HYDROMORPHONE HYDROCHLORIDE 1 MG: 1 INJECTION, SOLUTION INTRAMUSCULAR; INTRAVENOUS; SUBCUTANEOUS at 17:55

## 2020-01-01 RX ADMIN — Medication 400 MG: at 04:34

## 2020-01-01 RX ADMIN — TIMOLOL MALEATE 1 DROP: 2.5 SOLUTION OPHTHALMIC at 17:27

## 2020-01-01 RX ADMIN — MIDODRINE HYDROCHLORIDE 10 MG: 5 TABLET ORAL at 21:39

## 2020-01-01 RX ADMIN — SODIUM CHLORIDE: 9 INJECTION, SOLUTION INTRAVENOUS at 09:15

## 2020-01-01 RX ADMIN — HYDROMORPHONE HYDROCHLORIDE 0.5 MG: 1 INJECTION, SOLUTION INTRAMUSCULAR; INTRAVENOUS; SUBCUTANEOUS at 21:43

## 2020-01-01 RX ADMIN — HYDROMORPHONE HYDROCHLORIDE 2 MG: 1 INJECTION, SOLUTION INTRAMUSCULAR; INTRAVENOUS; SUBCUTANEOUS at 21:21

## 2020-01-01 RX ADMIN — DRONABINOL 5 MG: 5 CAPSULE ORAL at 12:28

## 2020-01-01 RX ADMIN — MORPHINE SULFATE 10 MG: 100 SOLUTION ORAL at 15:16

## 2020-01-01 RX ADMIN — HYDROMORPHONE HYDROCHLORIDE 1 MG: 1 INJECTION, SOLUTION INTRAMUSCULAR; INTRAVENOUS; SUBCUTANEOUS at 13:25

## 2020-01-01 RX ADMIN — SODIUM CHLORIDE: 9 INJECTION, SOLUTION INTRAVENOUS at 02:18

## 2020-01-01 RX ADMIN — TIMOLOL MALEATE 1 DROP: 2.5 SOLUTION OPHTHALMIC at 06:12

## 2020-01-01 RX ADMIN — LINEZOLID 600 MG: 600 TABLET, FILM COATED ORAL at 06:22

## 2020-01-01 RX ADMIN — ACETAMINOPHEN 650 MG: 325 TABLET, FILM COATED ORAL at 19:37

## 2020-01-01 RX ADMIN — TIMOLOL MALEATE 1 DROP: 2.5 SOLUTION OPHTHALMIC at 06:07

## 2020-01-01 RX ADMIN — Medication 400 MG: at 18:00

## 2020-01-01 RX ADMIN — MORPHINE SULFATE 5 MG: 100 SOLUTION ORAL at 16:05

## 2020-01-01 RX ADMIN — TIMOLOL MALEATE 1 DROP: 2.5 SOLUTION OPHTHALMIC at 18:40

## 2020-01-01 RX ADMIN — HYDROMORPHONE HYDROCHLORIDE 1 MG: 1 INJECTION, SOLUTION INTRAMUSCULAR; INTRAVENOUS; SUBCUTANEOUS at 03:10

## 2020-01-01 RX ADMIN — ATROPINE SULFATE 2 DROP: 10 SOLUTION OPHTHALMIC at 03:12

## 2020-01-01 RX ADMIN — ONDANSETRON 4 MG: 2 INJECTION INTRAMUSCULAR; INTRAVENOUS at 12:28

## 2020-01-01 RX ADMIN — HYDROMORPHONE HYDROCHLORIDE 1 MG: 1 INJECTION, SOLUTION INTRAMUSCULAR; INTRAVENOUS; SUBCUTANEOUS at 10:29

## 2020-01-01 RX ADMIN — Medication 400 MG: at 16:35

## 2020-01-01 RX ADMIN — SILVER SULFADIAZINE 1 G: 10 CREAM TOPICAL at 08:01

## 2020-01-01 RX ADMIN — TIMOLOL MALEATE 1 DROP: 2.5 SOLUTION OPHTHALMIC at 06:05

## 2020-01-01 RX ADMIN — DRONABINOL 5 MG: 5 CAPSULE ORAL at 10:31

## 2020-01-01 RX ADMIN — CEFEPIME 2 G: 2 INJECTION, POWDER, FOR SOLUTION INTRAVENOUS at 12:13

## 2020-01-01 RX ADMIN — MIDODRINE HYDROCHLORIDE 10 MG: 5 TABLET ORAL at 10:27

## 2020-01-01 RX ADMIN — HYDROMORPHONE HYDROCHLORIDE 1 MG: 1 INJECTION, SOLUTION INTRAMUSCULAR; INTRAVENOUS; SUBCUTANEOUS at 23:07

## 2020-01-01 RX ADMIN — MORPHINE SULFATE 10 MG: 100 SOLUTION ORAL at 13:42

## 2020-01-01 ASSESSMENT — ENCOUNTER SYMPTOMS
PHOTOPHOBIA: 0
NERVOUS/ANXIOUS: 0
NAUSEA: 0
WEAKNESS: 0
NERVOUS/ANXIOUS: 0
MYALGIAS: 1
HEARTBURN: 0
DIZZINESS: 0
SORE THROAT: 1
NAUSEA: 0
CONSTIPATION: 0
DIZZINESS: 0
BACK PAIN: 1
HEADACHES: 0
INSOMNIA: 0
BLURRED VISION: 0
DEPRESSION: 1
DIARRHEA: 1
FEVER: 0
DIZZINESS: 0
FEVER: 0
FEVER: 0
SPEECH CHANGE: 0
CHILLS: 0
BLURRED VISION: 0
SHORTNESS OF BREATH: 0
COUGH: 0
CHILLS: 0
CHILLS: 0
VOMITING: 0
FLANK PAIN: 0
HEADACHES: 0
NAUSEA: 0
HEARTBURN: 0
CONSTIPATION: 0
FLANK PAIN: 0
DIARRHEA: 1
MYALGIAS: 0
INSOMNIA: 0
HEARTBURN: 0
PALPITATIONS: 0
NAUSEA: 0
SPUTUM PRODUCTION: 0
ABDOMINAL PAIN: 0
CLAUDICATION: 0
VOMITING: 0
WEAKNESS: 1
SPEECH CHANGE: 0
DOUBLE VISION: 0
CONSTIPATION: 0
SORE THROAT: 0
FEVER: 1
SENSORY CHANGE: 0
NERVOUS/ANXIOUS: 0
ABDOMINAL PAIN: 0
WEIGHT LOSS: 1
MYALGIAS: 0
FEVER: 0
PHOTOPHOBIA: 0
DIARRHEA: 1
DEPRESSION: 0
SORE THROAT: 0
DIZZINESS: 0
DEPRESSION: 0
BLURRED VISION: 0
CLAUDICATION: 0
DEPRESSION: 0
DIARRHEA: 0
HEADACHES: 0
DIZZINESS: 0
ABDOMINAL PAIN: 0
SPEECH CHANGE: 0
FLANK PAIN: 0
ROS GI COMMENTS: PERIRECTAL PAIN
FEVER: 0
BLURRED VISION: 0
SPEECH CHANGE: 0
PHOTOPHOBIA: 0
PALPITATIONS: 0
FLANK PAIN: 0
INSOMNIA: 0
DIARRHEA: 1
COUGH: 0
SORE THROAT: 0
SORE THROAT: 0
PHOTOPHOBIA: 0
SORE THROAT: 0
NERVOUS/ANXIOUS: 0
CLAUDICATION: 0
SPEECH CHANGE: 0
FLANK PAIN: 0
COUGH: 0
BACK PAIN: 1
CHILLS: 0
INSOMNIA: 0
CHILLS: 0
FEVER: 0
COUGH: 0
DIZZINESS: 0
NAUSEA: 0
BLURRED VISION: 0
MYALGIAS: 0
MYALGIAS: 1
DEPRESSION: 1
FEVER: 0
SENSORY CHANGE: 0
SPUTUM PRODUCTION: 0
INSOMNIA: 0
CONSTIPATION: 0
MYALGIAS: 0
SORE THROAT: 1
NAUSEA: 0
MYALGIAS: 1
CONSTIPATION: 0
BRUISES/BLEEDS EASILY: 0
DEPRESSION: 0
ABDOMINAL PAIN: 0
DIARRHEA: 0
CONSTIPATION: 0
WEAKNESS: 1
DIARRHEA: 1
SHORTNESS OF BREATH: 0
INSOMNIA: 0
COUGH: 0
ABDOMINAL PAIN: 0
FLANK PAIN: 0
VOMITING: 0
HEADACHES: 0
SENSORY CHANGE: 0
MYALGIAS: 0
SORE THROAT: 0
SORE THROAT: 0
BACK PAIN: 1
PHOTOPHOBIA: 0
COUGH: 0
CONSTIPATION: 0
INSOMNIA: 0
HEARTBURN: 0
BRUISES/BLEEDS EASILY: 1
HEARTBURN: 0
SENSORY CHANGE: 0
SPUTUM PRODUCTION: 0
SHORTNESS OF BREATH: 0
MYALGIAS: 0
DEPRESSION: 0
SORE THROAT: 0
MYALGIAS: 0
NAUSEA: 0
CHILLS: 0
NAUSEA: 0
HEADACHES: 0
SHORTNESS OF BREATH: 0
BLURRED VISION: 0
DEPRESSION: 1
DIARRHEA: 0
DIZZINESS: 0
HEADACHES: 0
SORE THROAT: 0
NERVOUS/ANXIOUS: 0
DEPRESSION: 0
CONSTIPATION: 0
CHILLS: 0
ABDOMINAL PAIN: 0
HEADACHES: 0
VOMITING: 0
ROS GI COMMENTS: PERIRECTAL PAIN
SHORTNESS OF BREATH: 0
DEPRESSION: 0
SENSORY CHANGE: 0
NERVOUS/ANXIOUS: 0
SHORTNESS OF BREATH: 0
SHORTNESS OF BREATH: 0
DIZZINESS: 0
VOMITING: 0
CONSTIPATION: 0
SPEECH CHANGE: 0
SPEECH CHANGE: 0
SENSORY CHANGE: 0
CLAUDICATION: 0
INSOMNIA: 0
SHORTNESS OF BREATH: 0
FEVER: 0
SORE THROAT: 0
WEAKNESS: 1
HEARTBURN: 0
WEAKNESS: 1
DIARRHEA: 1
SORE THROAT: 1
WEAKNESS: 1
SPEECH CHANGE: 0
VOMITING: 0
SPEECH CHANGE: 0
ABDOMINAL PAIN: 0
CLAUDICATION: 0
PHOTOPHOBIA: 0
FLANK PAIN: 0
HEARTBURN: 0
ABDOMINAL PAIN: 0
NAUSEA: 0
COUGH: 0
INSOMNIA: 0
SENSORY CHANGE: 0
DIARRHEA: 1
SHORTNESS OF BREATH: 0
HEADACHES: 0
WEAKNESS: 1
FEVER: 0
CLAUDICATION: 0
CHILLS: 0
VOMITING: 0
HEARTBURN: 0
WEAKNESS: 1
DIARRHEA: 1
PALPITATIONS: 0
DIARRHEA: 1
DEPRESSION: 1
BLURRED VISION: 0
NERVOUS/ANXIOUS: 0
BLURRED VISION: 0
DEPRESSION: 0
SHORTNESS OF BREATH: 0
CLAUDICATION: 0
NERVOUS/ANXIOUS: 0
COUGH: 0
VOMITING: 0
ABDOMINAL PAIN: 0
FLANK PAIN: 0
COUGH: 0
BLURRED VISION: 0
HEADACHES: 0
FEVER: 0
CLAUDICATION: 0
CHILLS: 0
FLANK PAIN: 0
SENSORY CHANGE: 0
PHOTOPHOBIA: 0
HEARTBURN: 0
DIZZINESS: 0
HEARTBURN: 0
VOMITING: 0
VOMITING: 0
WEAKNESS: 1
FLANK PAIN: 0
PHOTOPHOBIA: 0
SENSORY CHANGE: 0
CLAUDICATION: 0
ABDOMINAL PAIN: 0
HEADACHES: 0
NERVOUS/ANXIOUS: 0
CLAUDICATION: 0
PHOTOPHOBIA: 0
SENSORY CHANGE: 0
INSOMNIA: 0
CONSTIPATION: 0
ROS GI COMMENTS: PERIRECTAL PAIN
NAUSEA: 0
COUGH: 0
DEPRESSION: 0
BLURRED VISION: 0
DIZZINESS: 0
CHILLS: 0
DEPRESSION: 0
SPEECH CHANGE: 0
PHOTOPHOBIA: 0
NERVOUS/ANXIOUS: 0

## 2020-01-01 ASSESSMENT — PATIENT HEALTH QUESTIONNAIRE - PHQ9
SUM OF ALL RESPONSES TO PHQ9 QUESTIONS 1 AND 2: 0
1. LITTLE INTEREST OR PLEASURE IN DOING THINGS: NOT AT ALL
2. FEELING DOWN, DEPRESSED, IRRITABLE, OR HOPELESS: NOT AT ALL

## 2020-01-01 ASSESSMENT — ACTIVITIES OF DAILY LIVING (ADL)
BATHING_REQUIRES_ASSISTANCE: 1
AMBULATION_REQUIRES_ASSISTANCE: 1
CONTINENCE_REQUIRES_ASSISTANCE: 1
DRESSING_REQUIRES_ASSISTANCE: 1

## 2020-01-01 ASSESSMENT — PAIN SCALES - WONG BAKER: WONGBAKER_NUMERICALRESPONSE: HURTS A WHOLE LOT

## 2020-01-01 NOTE — PROGRESS NOTES
"BP (!) 92/53   Pulse 90   Temp 36.7 °C (98.1 °F) (Oral)   Resp 16   Ht 1.727 m (5' 8\")   Wt 59.1 kg (130 lb 4.7 oz)   SpO2 93%   BMI 19.81 kg/m²   Received report and assumed care of pt at 1900. Pt is A&O x4. Pt denies nausea or SOB. PIV is patent and infusing. POC discussed and updated. Skin around groin and buttocks assessed and cleaned. Pt educated about briefs causing further skin breakdown. Bed is in lowest position and call light is within reach. Hourly rounding is in place.    "

## 2020-01-01 NOTE — PROGRESS NOTES
[facility] Medicine Daily Progress Note    Date of Service  1/1/2020    Chief Complaint  74 y.o. male admitted 12/7/2019 with oral pain and weight loss.    Hospital Course    Patient with known anal cancer undergoing radiation and chemotherapy  He has had significant oral pain and unable to swallow well the past few weeks.  He has a sore on his bottom that he has been applying hemorrhoidal cream with lidocaine for pain.  He had hematuria develop since admission when platelet count was significantly low.   He developed a DVT in RUE and is on anticoagulation with Lovenox.  He has completed chemotherapy and will complete radiation on Monday,  1/6/20.        Interval Problem Update  12/8 Patient feeling tired and blood pressure has been markedly low, he has received 2 Liters of NS bolus and LR has been running at 100/hour since admit.  He still appears dehydrated but is starting to require oxygen to maintain saturation, lungs still sound clear on examination.  He received 1 unit of platelets and urine is clearing in collection tubing from neobladder.  Cr increased overnight with hydration from 1.52 to 1.82 and this could be due to early hydration only and expect it to trend back down.  If pressure does not respond to midodrine and IVF then patient will need to transfer to ICU for pressor support.  Midodrine and solucortef started because of hypotension.    12/9 Patient less fatigued and feeling slightly better when compared to yesterday.  He has been able to eat with pain control received by MBX.  Cr trending back down following IV resuscitation and blood pressure has improved with midodrine and cortef - patient refused transfer to ICU yesterday.  Radiation continued today on schedule.    12/17 Patient with adequate blood pressure with IV hydration but tends to drop when stopped, he states his oral intake is poor as his taste is very bland and nothing is good anymore.  He is agreeable to take an appetite stimulant with  marinol and protein supplements in form of shakes.  Calcium is 6.8 but when corrected for hypoalbuminemia, is 8.4.  PICC line to be removed today.  12/18 Patient feeling okay today, appetite has improved with lunch and dinner yesterday after starting marinol.  Will trial decrease of IVF and monitor blood pressure, he is not showing signs of volume overload.  He didn't eat much breakfast so I will give him tid marinol to see if that helps.    12/19 Patient feeling better today, he got out of bed and took a shower.  Blood pressure is doing about the same as yesterday despite dropping IVF rate from 100/h to 75/h slightly.     12/20 Patient without complaint, blood pressures have been stable so will try to decrease IVF rate further.  Will continue to encourage patient to get out of bed as much as tolerant.  12/21 Patient having nausea and diarrhea today, suspect as s/e of his radiation therapy, he is responding to anti-emetic and I will also start PRN imodium.  No acute events.  BP stable with rate drop of IVF to 50cc/h.  12/22 Patient doing about same with blood pressure, states nausea from yesterday has subsided.  He is eating.  Discussed with Dr Deras and oncology will see him by tomorrow to determine if chemotherapy is appropriate to be given on schedule tomorrow.  12/23 Patient with stable, low blood pressure.  Initially PICC was to be placed today but after speaking with vascular access team, they feel he is too high of a risk for another PICC induced clot and recommend either tunneled central line or PORT.  Patient only has 1 drug that needs to be received via central line (mitomycin for about 45 minute infusion).  He should have an IJ placed for chemotherapy and then this to be discontinued.    12/31 Patient appears weaker than when I saw him last week, he is post chemo and on the final few days of radiation treatment and suffering the side effects of this with skin breakdown in the perineum/rectal area.  His  blood pressures remain low though stable.  Renal function stable.  1/1 Patient without much change today, RN encouraged him to get up to shower today and he refused secondary to pain but agreed to bed bath with pain medication given.  Improvement in hygiene encouraged to the patient.  ANC dropped with dc of Granix - 1.24 today, resume granix.    Consultants/Specialty  none    Code Status  full    Disposition  tbd    Review of Systems  Review of Systems   Constitutional: Positive for malaise/fatigue. Negative for chills and fever.   HENT: Negative for congestion and sore throat.    Eyes: Negative for blurred vision and photophobia.   Respiratory: Negative for cough and shortness of breath.    Cardiovascular: Negative for chest pain, claudication and leg swelling.   Gastrointestinal: Positive for diarrhea. Negative for abdominal pain, constipation, heartburn, nausea and vomiting.   Genitourinary: Negative for dysuria, flank pain and hematuria.        Rectal pain     Musculoskeletal: Negative for joint pain and myalgias.   Skin: Negative for itching and rash.   Neurological: Negative for dizziness, sensory change, speech change, weakness and headaches.   Psychiatric/Behavioral: Negative for depression. The patient is not nervous/anxious and does not have insomnia.         Physical Exam  Temp:  [36.7 °C (98.1 °F)-37.2 °C (99 °F)] 37.2 °C (98.9 °F)  Pulse:  [79-91] 81  Resp:  [16-18] 16  BP: (81-94)/(49-55) 86/49  SpO2:  [90 %-97 %] 97 %    Physical Exam  Vitals signs and nursing note reviewed.   Constitutional:       General: He is not in acute distress.     Appearance: Normal appearance.   HENT:      Head: Normocephalic and atraumatic.   Eyes:      General: No scleral icterus.     Extraocular Movements: Extraocular movements intact.   Neck:      Musculoskeletal: Normal range of motion and neck supple.   Cardiovascular:      Rate and Rhythm: Normal rate and regular rhythm.      Pulses: Normal pulses.      Heart sounds:  Normal heart sounds. No murmur.   Pulmonary:      Effort: Pulmonary effort is normal. No respiratory distress.      Breath sounds: Normal breath sounds. No wheezing, rhonchi or rales.   Abdominal:      General: Abdomen is flat. Bowel sounds are normal. There is no distension.      Palpations: Abdomen is soft.      Tenderness: There is no rebound.   Genitourinary:     Comments: Radiation breakdown perineum/rectum.    Musculoskeletal:         General: No swelling or tenderness.   Lymphadenopathy:      Cervical: No cervical adenopathy.   Skin:     Coloration: Skin is not jaundiced.      Findings: No erythema.      Comments: Rectal sore without evidence of infection, consistent with radiation changes.     Neurological:      General: No focal deficit present.      Mental Status: He is alert and oriented to person, place, and time. Mental status is at baseline.      Cranial Nerves: No cranial nerve deficit.   Psychiatric:         Mood and Affect: Mood normal.         Behavior: Behavior normal.         Fluids  No intake or output data in the 24 hours ending 01/01/20 1234    Laboratory  Recent Labs     12/30/19  0211 12/31/19  0020 01/01/20  0046   WBC 1.0* 3.4* 1.3*   RBC 2.47* 2.66* 2.84*   HEMOGLOBIN 8.1* 8.6* 9.2*   HEMATOCRIT 24.2* 25.6* 28.5*   MCV 98.0* 96.2 100.4*   MCH 32.8 32.3 32.4   MCHC 33.5* 33.6* 32.3*   RDW 50.2* 49.6 51.4*   PLATELETCT 73* 77* 67*   MPV 10.5 10.4 10.4     Recent Labs     12/30/19  0211 12/31/19  0020 01/01/20  0046   SODIUM 135 135 137   POTASSIUM 3.8 4.0 4.1   CHLORIDE 109 107 107   CO2 21 21 23   GLUCOSE 122* 131* 139*   BUN 25* 28* 27*   CREATININE 1.00 1.28 1.34   CALCIUM 7.1* 7.0* 7.3*                   Imaging  US-EXTREMITY VENOUS UPPER UNILAT RIGHT   Final Result      DX-CHEST-FOR LINE PLACEMENT Perform procedure in: Patient's Room   Final Result      1.  Right IJ central catheter tip is in the mid/distal SVC. No pneumothorax.   2.  Mild interstitial prominence, likely mild edema. No  focal consolidation or pleural effusions.      EC-ECHOCARDIOGRAM COMPLETE W/O CONT   Final Result      US-EXTREMITY VENOUS UPPER UNILAT RIGHT   Final Result      US-RENAL   Final Result      1.  There is moderate bilateral hydronephrosis, more than is typically expected for history of cystectomy with neobladder formation.      DX-CHEST-PORTABLE (1 VIEW)   Final Result      No radiographic evidence of acute cardiopulmonary process.           Assessment/Plan  * DVT (deep venous thrombosis) ([facility])  Assessment & Plan  PICC line associated  Lovenox      Pancytopenia ([facility])- (present on admission)  Assessment & Plan  Transfused 1 unit platelets  Follow CBC  Start Granix    Hypotension- (present on admission)  Assessment & Plan  Midodrine  IVF NS      Anal carcinoma ([facility])- (present on admission)  Assessment & Plan  Chemotherapy given - 5-FU and Mitomycin C - completed  Remove central line  Maceration of perineum/rectum - secondary to radiation - painful requiring IV dilaudid for mansi-care.        CKD (chronic kidney disease) stage 3, GFR 30-59 ml/min ([facility])- (present on admission)  Assessment & Plan  PTH elevated  Follow bmp    Hypomagnesemia- (present on admission)  Assessment & Plan  oral Mg    Acute respiratory failure with hypoxia ([facility])- (present on admission)  Assessment & Plan  RT protocol    Hydronephrosis, bilateral- (present on admission)  Assessment & Plan  Follow bmp closely    Hematuria- (present on admission)  Assessment & Plan  Resolved        Stomatitis- (present on admission)  Assessment & Plan  thrush vs mucositis   Finished course of Nystatin    Type 2 diabetes mellitus without complication, without long-term current use of insulin ([facility])- (present on admission)  Assessment & Plan  diet controlled    MANDIE (acute kidney injury) ([facility])- (present on admission)  Assessment & Plan  Decrease IVF NS, follow bmp    COPD (chronic obstructive pulmonary disease) ([facility])- (present on admission)  Assessment & Plan  Spiriva,  RT protocol    Glaucoma of right eye- (present on admission)  Assessment & Plan  timolol eyedrops    History of bladder cancer- (present on admission)  Assessment & Plan  Neobladder.    Current smoker- (present on admission)  Assessment & Plan  counseling       VTE prophylaxis: lovenox

## 2020-01-01 NOTE — WOUND TEAM
[facility]  Inpatient Services  Initial Wound and Skin Care Evaluation    Admission Date: 12/7/2019     Consult Date:1/1/19  HPI, PMH, SH: Reviewed    Unit where seen by Wound Team: [id number]     WOUND CONSULT RELATED TO:  Radiation burn to sacrum, groin and penis       Self Report / Pain Level:  Max pain with site care    OBJECTIVE:  Pt on pressure redistribution mattress with waffle overlay.    WOUND TYPE, LOCATION, CHARACTERISTICS (Pressure Injuries: location, stage, POA or date identified)       Wound 12/07/19 Radiation Perineum;Buttocks;Sacrum;Penis (Active)   Wound Image    1/1/2020  1:45 PM   Site Assessment Red;Fragile;Painful \   Natali-wound Assessment Painful;Red;Fragile    Margins Undefined edges    Wound Width (cm) 1 cm    Wound Depth (cm) 0.2 cm    Wound Surface Area (cm^2) 4 cm^2    Tunneling 0 cm    Undermining 0 cm    Closure Open to air    Drainage Amount None    Non-staged Wound Description Partial thickness    Treatments Site care    Periwound Protectant Barrier Paste    Dressing Options Viscopaste    Dressing Cleansing/Solutions Not Applicable    Dressing Status Intact    Dressing Change Frequency As Needed    NEXT Weekly Photo (Inpatient Only) 01/08/20    WOUND NURSE ONLY - Odor Mild    WOUND NURSE ONLY - Exposed Structures None    WOUND NURSE ONLY - Tissue Type and Percentage 100% red        Vascular:    Dorsal Pedal pulses:  NA    Posterior tib pulses:   NA    HALIMA:     NA    Lab Values:    Lab Results   Component Value Date/Time    WBC 1.3 (LL) 01/01/2020 12:46 AM    RBC 2.84 (L) 01/01/2020 12:46 AM    HEMOGLOBIN 9.2 (L) 01/01/2020 12:46 AM    HEMATOCRIT 28.5 (L) 01/01/2020 12:46 AM        Lab Results   Component Value Date/Time    HBA1C 6.4 (H) 10/03/2019 01:34 PM           Culture:   NA      INTERVENTIONS BY WOUND TEAM:  RN and CNA turned pt to right side to provide perineal care, cleansed with water. Wound RN obtained pictures and measurements of affected area. Applied strips  of of visco paste along wound on sacrum, bilateral groin, scrotum and penis.     Dressing Selection:  viscopaste    Interdisciplinary consultation: Patient, Bedside RN (Giuliana), ELIER    EVALUATION: Pt presenting with radiation burn, partial thickness wounds extending from sacrum to penis. Per Giuliana RN pt will be completing radiation therapy the end of this week. Wounds are extremely painful to touch as pt is experiencing pain simply from the weight of the sheet on the wound. Pt is also incontinent of bowel and bladder which requires frequent sheet changes. Pt is not able to tolerate condom cath application due to pain. For this reason, pt will benefit from low air loss mattress. Visco paste and barrier strips applied to sacrum, groin and penis to help soothe open area. Nursing to perform dressing changes as needed. Wound will revisit after completion of radiation therapy to assess efficacy of of dressing. Aquaphor ointment to sacrum discontinued, may continue with silvadene application per MD order.  No pressure injury noted to penis.    Factors affecting wound healing: radiation therapy, impaired mobility, pain, anal carcinoma, DMII, CKD, tobacco.  Goals: Steady decrease in wound area and depth weekly.    NURSING PLAN OF CARE ORDERS (X):    Dressing changes: See Dressing Care orders: x  Skin care: See Skin Care orders:   Rectal tube care: See Rectal Tube Care orders:   Other orders:    RSKIN: CURRENT (X) ORDERED (O):   Q shift William:  X  Q shift pressure point assessments:  X  Pressure redistribution mattress  x          NETO   O       Bariatric NETO         Bariatric foam           Heel float boots      Heel silicone dressing      Float Heels off Bed with Pillows   x            Barrier wipes   x      Barrier Cream         Barrier paste  x        Sacral silicone dressing     Silicone O2 tubing         Anchorfast         Cannula fixation Device (Tender )          Gray Foam Ear protectors           Trach  with Optifoam split foam                 Waffle cushion        Waffle Overlay  x       Rectal tube or BMS   Purwick/Condom Cath         Antifungal tx      Interdry          Reposition q 2 hours  Encourage pt turning      Up to chair        Ambulate      PT/OT        Dietician x       Diabetes Education      PO x    TF     TPN     NPO   # days   Other        WOUND TEAM PLAN OF CARE (X):   NPWT change 3 x week:        Dressing changes by wound team:       Follow up as needed:     Nursing to perform dressing change, wound team to follow weekly  Other (explain):     Anticipated discharge plans (X):   SNF vs Home    - may benefit from ongoing wound care     Home Care:           Outpatient Wound Center:            Self Care:            Other:

## 2020-01-01 NOTE — CARE PLAN
Problem: Safety  Goal: Will remain free from falls  Outcome: PROGRESSING AS EXPECTED  Intervention: Implement fall precautions  Flowsheets  Taken 12/31/2019 2144  Environmental Precautions: Treaded Slipper Socks on Patient;Personal Belongings, Wastebasket, Call Bell etc. in Easy Reach;Transferred to Stronger Side;Report Given to Other Health Care Providers Regarding Fall Risk;Bed in Low Position;Communication Sign for Patients & Families;Mobility Assessed & Appropriate Sign Placed  Taken 1/1/2020 0310  Chair/Bed Strip Alarm: Yes - Alarm On     Problem: Infection  Goal: Will remain free from infection  Outcome: PROGRESSING AS EXPECTED  Intervention: Assess signs and symptoms of infection  Note:   Vital signs q 8 hours. Pt is afebrile.

## 2020-01-01 NOTE — CARE PLAN
Problem: Communication  Goal: The ability to communicate needs accurately and effectively will improve  Outcome: PROGRESSING AS EXPECTED  Note:   Patient is able to express needs appropriately and uses call light correctly.     Problem: Pain Management  Goal: Pain level will decrease to patient's comfort goal  Outcome: PROGRESSING AS EXPECTED  Flowsheets  Taken 12/31/2019 0900  Comfort Goal: Comfort at Rest  Pain Rating Scale (NPRS): 6  Taken 12/31/2019 1803  Non Verbal Scale : Grimacing;Tense Body Language;Moaning  Tang-Szymanski Scale : 6   Note:   Patient able to have comfort at rest with use of dilaudid IV pain medications. Medicated per MAR.

## 2020-01-01 NOTE — PROGRESS NOTES
Oncology/Hematology Progress Note               Author: Daniel sam Date & Time created: 1/1/2020  9:13 AM   Diagnosis-anal cancer.  For chemo XRT /mitomycin-C 5-FU treatment  Interval History:  1/1/20 Started chemo day 29 on 12/24/19.  Last day of chemotherapy was 12/28/19.  XRT supposed to be finished 1/4.  Has chronic fatigue.  No significant diarrhea, feels like his buttock area is getting raw  review of Systems:  Review of Systems   Constitutional: Positive for malaise/fatigue. Negative for fever.   HENT: Positive for sore throat. Negative for hearing loss.    Respiratory: Negative for cough, sputum production and shortness of breath.    Cardiovascular: Negative for chest pain, palpitations and claudication.   Gastrointestinal: Negative for abdominal pain, constipation, diarrhea, heartburn and nausea.        Perirectal pain   Genitourinary: Negative for dysuria and hematuria.   Musculoskeletal: Positive for back pain. Negative for myalgias.   Skin: Negative for itching and rash.   Neurological: Negative for dizziness, sensory change and headaches.   Psychiatric/Behavioral: Positive for depression. The patient is not nervous/anxious.        Physical Exam:  Physical Exam  Constitutional:       General: He is not in acute distress.     Appearance: Normal appearance. He is not ill-appearing, toxic-appearing or diaphoretic.   HENT:      Head: Normocephalic and atraumatic.      Mouth/Throat:      Mouth: Mucous membranes are moist.      Pharynx: Oropharynx is clear.   Eyes:      General: No scleral icterus.     Pupils: Pupils are equal, round, and reactive to light.   Cardiovascular:      Rate and Rhythm: Normal rate and regular rhythm.   Abdominal:      General: There is no distension.      Palpations: There is no mass.      Tenderness: There is no tenderness. There is no guarding or rebound.   Neurological:      General: No focal deficit present.      Mental Status: He is alert and oriented to person, place, and  time.      Cranial Nerves: No cranial nerve deficit.      Sensory: No sensory deficit.   Psychiatric:         Mood and Affect: Mood normal.         Thought Content: Thought content normal.      Comments: Slightly anxious and depressed         Labs:          Recent Labs     190 20  0046   SODIUM 135 135 137   POTASSIUM 3.8 4.0 4.1   CHLORIDE 109 107 107   CO2 21 21 23   BUN 25* 28* 27*   CREATININE 1.00 1.28 1.34   CALCIUM 7.1* 7.0* 7.3*     Recent Labs     190 20  0046   ALTSGPT 13 16 16   ASTSGOT 7* 12 11*   ALKPHOSPHAT 30 37 45   TBILIRUBIN 0.3 0.4 0.3   GLUCOSE 122* 131* 139*     Recent Labs     190 20  0046   RBC 2.47* 2.66* 2.84*   HEMOGLOBIN 8.1* 8.6* 9.2*   HEMATOCRIT 24.2* 25.6* 28.5*   PLATELETCT 73* 77* 67*     Recent Labs     190 20  0046   WBC 1.0* 3.4* 1.3*   NEUTSPOLYS 92.50* 93.00* 88.40*   LYMPHOCYTES 7.50* 2.60* 1.80*   MONOCYTES 0.00 0.90 1.80   EOSINOPHILS 0.00 0.00 0.90   BASOPHILS 0.00 0.00 0.00   ASTSGOT 7* 12 11*   ALTSGPT 13 16 16   ALKPHOSPHAT 30 37 45   TBILIRUBIN 0.3 0.4 0.3     Recent Labs     190 20  0046   SODIUM 135 135 137   POTASSIUM 3.8 4.0 4.1   CHLORIDE 109 107 107   CO2 21 21 23   GLUCOSE 122* 131* 139*   BUN 25* 28* 27*   CREATININE 1.00 1.28 1.34   CALCIUM 7.1* 7.0* 7.3*     Hemodynamics:  Temp (24hrs), Av.1 °C (98.7 °F), Min:36.7 °C (98.1 °F), Max:37.2 °C (99 °F)  Temperature: 37.2 °C (98.9 °F)  Pulse  Av.6  Min: 52  Max: 100   Blood Pressure : (!) 86/49(reported to RN)     Respiratory:    Respiration: 16, Pulse Oximetry: 97 %     Work Of Breathing / Effort: Mild  RUL Breath Sounds: Diminished;Fine Crackles, RML Breath Sounds: Diminished;Fine Crackles, RLL Breath Sounds: Diminished, VILLA Breath Sounds: Diminished, LLL Breath Sounds: Diminished  Fluids:    Intake/Output Summary (Last 24 hours) at 2019  1514  Last data filed at 12/24/2019 0529  Gross per 24 hour   Intake 1570 ml   Output 3000 ml   Net -1430 ml        GI/Nutrition:  Orders Placed This Encounter   Procedures   • Diet Order Low Fiber(GI Soft)     Standing Status:   Standing     Number of Occurrences:   1     Order Specific Question:   Diet:     Answer:   Low Fiber(GI Soft) [2]     Medical Decision Making, by Problem:  Active Hospital Problems    Diagnosis   • *DVT (deep venous thrombosis) ([facility]) [I82.409]   • Hypotension [I95.9]   • Anal carcinoma ([facility]) [C21.0]   • Hydronephrosis, bilateral [N13.30]   • Acute respiratory failure with hypoxia ([facility]) [J96.01]   • Pancytopenia ([facility]) [D61.818]   • Hematuria [R31.9]   • MANDIE (acute kidney injury) ([facility]) [N17.9]   • Type 2 diabetes mellitus without complication, without long-term current use of insulin ([facility]) [E11.9]   • Stomatitis [K12.1]   • COPD (chronic obstructive pulmonary disease) ([facility]) [J44.9]   • Current smoker [F17.200]   • Glaucoma of right eye [H40.9]   • History of bladder cancer [Z85.51]       Plan:  Anal cancer -   chemotherapy with 5-FU and mitomycin-C. Day 37 of chemo. 20% DR with d29 chemo,   He is done with the chemotherapy and will be finishing radiation early next week.    Chemoradiation induced neutropenia-on Granix.  ANC improving-  low threshold for starting antibiotics given his immunosuppression and possible GI source of infection.    DVT-on Lovenox continue Lovenox as before.  No evidence of bleeding or bruising.  Hold if platelets start dropping below 50,000  Renal function- his creatinine has stabilized and will continue to observe him closely.  Depression- stable.       Quality-Core Measures

## 2020-01-01 NOTE — PROGRESS NOTES
Oncology/Hematology Progress Note               Author: Daniel sam Date & Time created: 12/31/2019  5:57 PM   Diagnosis-anal cancer.  For day 29 of mitomycin-C 5-FU treatment  Interval History:   Started chemo day 29 on 12/24/19.  Last day of chemotherapy was 12/28/19.  XRT supposed to be finished 1/4.  Has chronic fatigue.  No significant diarrhea, feels like his buttock area is getting raw  review of Systems:  Review of Systems   Constitutional: Positive for malaise/fatigue. Negative for fever.   HENT: Positive for sore throat. Negative for hearing loss.    Respiratory: Negative for cough, sputum production and shortness of breath.    Cardiovascular: Negative for chest pain, palpitations and claudication.   Gastrointestinal: Negative for abdominal pain, constipation, diarrhea, heartburn and nausea.        Perirectal pain   Genitourinary: Negative for dysuria and hematuria.   Musculoskeletal: Positive for back pain. Negative for myalgias.   Skin: Negative for itching and rash.   Neurological: Negative for dizziness, sensory change and headaches.   Psychiatric/Behavioral: Positive for depression. The patient is not nervous/anxious.        Physical Exam:  Physical Exam  Constitutional:       General: He is not in acute distress.     Appearance: Normal appearance. He is not ill-appearing, toxic-appearing or diaphoretic.   HENT:      Head: Normocephalic and atraumatic.      Mouth/Throat:      Mouth: Mucous membranes are moist.      Pharynx: Oropharynx is clear.   Eyes:      General: No scleral icterus.     Pupils: Pupils are equal, round, and reactive to light.   Cardiovascular:      Rate and Rhythm: Normal rate and regular rhythm.   Abdominal:      General: There is no distension.      Palpations: There is no mass.      Tenderness: There is no tenderness. There is no guarding or rebound.   Neurological:      General: No focal deficit present.      Mental Status: He is alert and oriented to person, place, and  time.      Cranial Nerves: No cranial nerve deficit.      Sensory: No sensory deficit.   Psychiatric:         Mood and Affect: Mood normal.         Thought Content: Thought content normal.      Comments: Slightly anxious and depressed         Labs:          Recent Labs     19  0020   SODIUM 136 135 135   POTASSIUM 3.7 3.8 4.0   CHLORIDE 111 109 107   CO2 22 21 21   BUN 24* 25* 28*   CREATININE 1.13 1.00 1.28   CALCIUM 6.6* 7.1* 7.0*     Recent Labs     19  0020   ALTSGPT 15 13 16   ASTSGOT 12 7* 12   ALKPHOSPHAT 32 30 37   TBILIRUBIN 0.2 0.3 0.4   GLUCOSE 125* 122* 131*     Recent Labs     19  0020   RBC 2.61* 2.47* 2.66*   HEMOGLOBIN 8.5* 8.1* 8.6*   HEMATOCRIT 25.4* 24.2* 25.6*   PLATELETCT 79* 73* 77*     Recent Labs     19  0020   WBC 1.4* 1.0* 3.4*   NEUTSPOLYS 92.20* 92.50* 93.00*   LYMPHOCYTES 4.30* 7.50* 2.60*   MONOCYTES 0.90 0.00 0.90   EOSINOPHILS 0.00 0.00 0.00   BASOPHILS 0.00 0.00 0.00   ASTSGOT 12 7* 12   ALTSGPT 15 13 16   ALKPHOSPHAT 32 30 37   TBILIRUBIN 0.2 0.3 0.4     Recent Labs     19  0020   SODIUM 136 135 135   POTASSIUM 3.7 3.8 4.0   CHLORIDE 111 109 107   CO2 22 21 21   GLUCOSE 125* 122* 131*   BUN 24* 25* 28*   CREATININE 1.13 1.00 1.28   CALCIUM 6.6* 7.1* 7.0*     Hemodynamics:  Temp (24hrs), Av.3 °C (99.1 °F), Min:36.7 °C (98 °F), Max:38.1 °C (100.5 °F)  Temperature: 37.2 °C (99 °F)  Pulse  Av.2  Min: 52  Max: 100   Blood Pressure : (!) 89/50     Respiratory:    Respiration: 16, Pulse Oximetry: 94 %     Work Of Breathing / Effort: Mild  RUL Breath Sounds: Diminished;Fine Crackles, RML Breath Sounds: Diminished;Fine Crackles, RLL Breath Sounds: Diminished, VILLA Breath Sounds: Diminished, LLL Breath Sounds: Diminished  Fluids:    Intake/Output Summary (Last 24 hours) at 2019 1514  Last data filed at  12/24/2019 0529  Gross per 24 hour   Intake 1570 ml   Output 3000 ml   Net -1430 ml        GI/Nutrition:  Orders Placed This Encounter   Procedures   • Diet Order Low Fiber(GI Soft)     Standing Status:   Standing     Number of Occurrences:   1     Order Specific Question:   Diet:     Answer:   Low Fiber(GI Soft) [2]     Medical Decision Making, by Problem:  Active Hospital Problems    Diagnosis   • *DVT (deep venous thrombosis) (formerly [facility]) [I82.409]   • Hypotension [I95.9]   • Anal carcinoma (HCC) [C21.0]   • Hydronephrosis, bilateral [N13.30]   • Acute respiratory failure with hypoxia (formerly [facility]) [J96.01]   • Pancytopenia (formerly [facility]) [D61.818]   • Hematuria [R31.9]   • MANDIE (acute kidney injury) (formerly [facility]) [N17.9]   • Type 2 diabetes mellitus without complication, without long-term current use of insulin (formerly [facility]) [E11.9]   • Stomatitis [K12.1]   • COPD (chronic obstructive pulmonary disease) (formerly [facility]) [J44.9]   • Current smoker [F17.200]   • Glaucoma of right eye [H40.9]   • History of bladder cancer [Z85.51]       Plan:  Anal cancer -   chemotherapy with 5-FU and mitomycin-C. Day 36 of chemo. 20% DR with d29 chemo,   He is done with the chemotherapy and will be finishing radiation early next week.    Chemoradiation induced neutropenia-on Granix.  ANC improving low threshold for starting antibiotics given his immunosuppression and possible GI source of infection.    DVT-on Lovenox continue Lovenox as before.  No evidence of bleeding or bruising.  Hold if platelets start dropping below 50,000  Renal function- his creatinine has stabilized and will continue to observe him closely.  Depression- stable.   we will continue to monitor him closely.    Quality-Core Measures

## 2020-01-02 PROBLEM — R50.81 NEUTROPENIC FEVER (HCC): Status: ACTIVE | Noted: 2020-01-01

## 2020-01-02 PROBLEM — D70.9 NEUTROPENIC FEVER (HCC): Status: ACTIVE | Noted: 2020-01-01

## 2020-01-02 NOTE — PROGRESS NOTES
[facility] Medicine Daily Progress Note    Date of Service  1/2/2020    Chief Complaint  74 y.o. male admitted 12/7/2019 with oral pain and weight loss.    Hospital Course    Patient with known anal cancer undergoing radiation and chemotherapy  He has had significant oral pain and unable to swallow well the past few weeks.  He has a sore on his bottom that he has been applying hemorrhoidal cream with lidocaine for pain.  He had hematuria develop since admission when platelet count was significantly low.   He developed a DVT in RUE and is on anticoagulation with Lovenox.  He has completed chemotherapy and will complete radiation on Monday,  1/6/20.        Interval Problem Update  12/8 Patient feeling tired and blood pressure has been markedly low, he has received 2 Liters of NS bolus and LR has been running at 100/hour since admit.  He still appears dehydrated but is starting to require oxygen to maintain saturation, lungs still sound clear on examination.  He received 1 unit of platelets and urine is clearing in collection tubing from neobladder.  Cr increased overnight with hydration from 1.52 to 1.82 and this could be due to early hydration only and expect it to trend back down.  If pressure does not respond to midodrine and IVF then patient will need to transfer to ICU for pressor support.  Midodrine and solucortef started because of hypotension.    12/9 Patient less fatigued and feeling slightly better when compared to yesterday.  He has been able to eat with pain control received by MBX.  Cr trending back down following IV resuscitation and blood pressure has improved with midodrine and cortef - patient refused transfer to ICU yesterday.  Radiation continued today on schedule.    12/17 Patient with adequate blood pressure with IV hydration but tends to drop when stopped, he states his oral intake is poor as his taste is very bland and nothing is good anymore.  He is agreeable to take an appetite stimulant with  marinol and protein supplements in form of shakes.  Calcium is 6.8 but when corrected for hypoalbuminemia, is 8.4.  PICC line to be removed today.  12/18 Patient feeling okay today, appetite has improved with lunch and dinner yesterday after starting marinol.  Will trial decrease of IVF and monitor blood pressure, he is not showing signs of volume overload.  He didn't eat much breakfast so I will give him tid marinol to see if that helps.    12/19 Patient feeling better today, he got out of bed and took a shower.  Blood pressure is doing about the same as yesterday despite dropping IVF rate from 100/h to 75/h slightly.     12/20 Patient without complaint, blood pressures have been stable so will try to decrease IVF rate further.  Will continue to encourage patient to get out of bed as much as tolerant.  12/21 Patient having nausea and diarrhea today, suspect as s/e of his radiation therapy, he is responding to anti-emetic and I will also start PRN imodium.  No acute events.  BP stable with rate drop of IVF to 50cc/h.  12/22 Patient doing about same with blood pressure, states nausea from yesterday has subsided.  He is eating.  Discussed with Dr Deras and oncology will see him by tomorrow to determine if chemotherapy is appropriate to be given on schedule tomorrow.  12/23 Patient with stable, low blood pressure.  Initially PICC was to be placed today but after speaking with vascular access team, they feel he is too high of a risk for another PICC induced clot and recommend either tunneled central line or PORT.  Patient only has 1 drug that needs to be received via central line (mitomycin for about 45 minute infusion).  He should have an IJ placed for chemotherapy and then this to be discontinued.    12/31 Patient appears weaker than when I saw him last week, he is post chemo and on the final few days of radiation treatment and suffering the side effects of this with skin breakdown in the perineum/rectal area.  His  blood pressures remain low though stable.  Renal function stable.  1/1 Patient without much change today, RN encouraged him to get up to shower today and he refused secondary to pain but agreed to bed bath with pain medication given.  Improvement in hygiene encouraged to the patient.  ANC dropped with dc of Granix - 1.24 today, resume granix.  1/2 Patient febrile yesterday afternoon, not reported to me.  Febrile again this am and neutropenic fever w/u underway with CXR, blood cultures and UA but patient is not able to provide sample and refuses catheterization of any sort (straight or indwelling).  CXR shows no evidence of infection or consolidation.  Empiric antibiotics started.  Poor prognosis discussed with patient and he changed his code status from full to DNR.      Consultants/Specialty  none    Code Status  DNR/DNI  No ICU transfer    Disposition  tbd    Review of Systems  Review of Systems   Constitutional: Positive for fever and malaise/fatigue. Negative for chills.   HENT: Negative for congestion and sore throat.    Eyes: Negative for blurred vision and photophobia.   Respiratory: Negative for cough and shortness of breath.    Cardiovascular: Negative for chest pain, claudication and leg swelling.   Gastrointestinal: Positive for diarrhea. Negative for abdominal pain, constipation, heartburn, nausea and vomiting.   Genitourinary: Negative for dysuria, flank pain and hematuria.        Rectal pain     Musculoskeletal: Negative for joint pain and myalgias.   Skin: Negative for itching and rash.   Neurological: Positive for weakness. Negative for dizziness, sensory change, speech change and headaches.   Psychiatric/Behavioral: Negative for depression. The patient is not nervous/anxious and does not have insomnia.         Physical Exam  Temp:  [36.6 °C (97.8 °F)-38.8 °C (101.9 °F)] 36.6 °C (97.8 °F)  Pulse:  [] 105  Resp:  [17-20] 18  BP: ()/(48-67) 92/48  SpO2:  [90 %-96 %] 96 %    Physical  Exam  Vitals signs and nursing note reviewed.   Constitutional:       General: He is not in acute distress.     Appearance: Normal appearance.   HENT:      Head: Normocephalic and atraumatic.   Eyes:      General: No scleral icterus.     Extraocular Movements: Extraocular movements intact.   Neck:      Musculoskeletal: Normal range of motion and neck supple.   Cardiovascular:      Rate and Rhythm: Normal rate and regular rhythm.      Pulses: Normal pulses.      Heart sounds: Normal heart sounds. No murmur.   Pulmonary:      Effort: Pulmonary effort is normal. No respiratory distress.      Breath sounds: Normal breath sounds. No wheezing, rhonchi or rales.   Abdominal:      General: Abdomen is flat. Bowel sounds are normal. There is no distension.      Palpations: Abdomen is soft.      Tenderness: There is no rebound.   Genitourinary:     Comments: Radiation breakdown perineum/rectum.    Musculoskeletal:         General: No swelling or tenderness.   Lymphadenopathy:      Cervical: No cervical adenopathy.   Skin:     Coloration: Skin is not jaundiced.      Findings: No erythema.      Comments: Rectal sore without evidence of infection, consistent with radiation changes.     Neurological:      General: No focal deficit present.      Mental Status: He is alert and oriented to person, place, and time. Mental status is at baseline.      Cranial Nerves: No cranial nerve deficit.   Psychiatric:         Mood and Affect: Mood normal.         Behavior: Behavior normal.         Fluids  No intake or output data in the 24 hours ending 01/02/20 1444    Laboratory  Recent Labs     12/31/19  0020 01/01/20  0046 01/02/20  0014   WBC 3.4* 1.3* 0.2*   RBC 2.66* 2.84* 2.30*   HEMOGLOBIN 8.6* 9.2* 7.5*   HEMATOCRIT 25.6* 28.5* 21.8*   MCV 96.2 100.4* 97.0   MCH 32.3 32.4 31.7   MCHC 33.6* 32.3* 32.7*   RDW 49.6 51.4* 49.5   PLATELETCT 77* 67* 52*   MPV 10.4 10.4 10.8     Recent Labs     12/31/19  0020 01/01/20  0046 01/02/20  0014    SODIUM 135 137 134*   POTASSIUM 4.0 4.1 3.9   CHLORIDE 107 107 108   CO2 21 23 20   GLUCOSE 131* 139* 172*   BUN 28* 27* 27*   CREATININE 1.28 1.34 1.25   CALCIUM 7.0* 7.3* 6.9*                   Imaging  DX-CHEST-PORTABLE (1 VIEW)   Final Result      Interstitial opacities in the mid/lower lungs, which may represent early interstitial edema. No focal consolidation or pleural effusions.      US-EXTREMITY VENOUS UPPER UNILAT RIGHT   Final Result      DX-CHEST-FOR LINE PLACEMENT Perform procedure in: Patient's Room   Final Result      1.  Right IJ central catheter tip is in the mid/distal SVC. No pneumothorax.   2.  Mild interstitial prominence, likely mild edema. No focal consolidation or pleural effusions.      EC-ECHOCARDIOGRAM COMPLETE W/O CONT   Final Result      US-EXTREMITY VENOUS UPPER UNILAT RIGHT   Final Result      US-RENAL   Final Result      1.  There is moderate bilateral hydronephrosis, more than is typically expected for history of cystectomy with neobladder formation.      DX-CHEST-PORTABLE (1 VIEW)   Final Result      No radiographic evidence of acute cardiopulmonary process.           Assessment/Plan  * DVT (deep venous thrombosis) ([facility])  Assessment & Plan  PICC line associated  Lovenox      Pancytopenia (HCC)- (present on admission)  Assessment & Plan  Transfused 1 unit platelets  Follow CBC  Start Granix    Hypotension- (present on admission)  Assessment & Plan  Midodrine  IVF NS      Anal carcinoma (HCC)- (present on admission)  Assessment & Plan  Chemotherapy given - 5-FU and Mitomycin C - completed  Remove central line  Maceration of perineum/rectum - secondary to radiation - painful requiring IV dilaudid for mansi-care.        CKD (chronic kidney disease) stage 3, GFR 30-59 ml/min ([facility])- (present on admission)  Assessment & Plan  PTH elevated  Follow bmp    Hypomagnesemia- (present on admission)  Assessment & Plan  oral Mg    Acute respiratory failure with hypoxia ([facility])- (present on  admission)  Assessment & Plan  RT protocol    Hydronephrosis, bilateral- (present on admission)  Assessment & Plan  Follow bmp closely    Hematuria- (present on admission)  Assessment & Plan  Resolved        Stomatitis- (present on admission)  Assessment & Plan  thrush vs mucositis   Finished course of Nystatin    Type 2 diabetes mellitus without complication, without long-term current use of insulin ([facility])- (present on admission)  Assessment & Plan  diet controlled    MANDIE (acute kidney injury) ([facility])- (present on admission)  Assessment & Plan  Decrease IVF NS, follow bmp    COPD (chronic obstructive pulmonary disease) ([facility])- (present on admission)  Assessment & Plan  Spiriva, RT protocol    Neutropenic fever ([facility])  Assessment & Plan  Blood culture x 2  Cxr: Interstitial opacities in the mid/lower lungs, which may represent early interstitial edema. No focal consolidation or pleural effusions  Urine - patient incontinent - refuses straight cath or sinclair  Empiric cefepime and vanco - at risk for MRSA with large amount skin breakdown.    Continue granix - ANC 0  Poor prognosis discussed with patient - code status changed to DNR  Patient refuses transfer to ICU and catheterization    Glaucoma of right eye- (present on admission)  Assessment & Plan  timolol eyedrops    History of bladder cancer- (present on admission)  Assessment & Plan  Neobladder.    Current smoker- (present on admission)  Assessment & Plan  counseling       VTE prophylaxis: lovenox

## 2020-01-02 NOTE — CARE PLAN
Problem: Infection  Goal: Will remain free from infection  Outcome: PROGRESSING SLOWER THAN EXPECTED  Note:   Febrile this morning. Cultures ordered, abx infusing.     Problem: Skin Integrity  Goal: Risk for impaired skin integrity will decrease  Outcome: PROGRESSING SLOWER THAN EXPECTED  Note:   Extensive radiation burns to sacrum and perineal area, pt extremely hesitant to allow staff to turn, clean, dress wounds even after pain medication. Education provided and reinforced throughout shift w/out change in pts requests.      Problem: Urinary Elimination:  Goal: Ability to reestablish a normal urinary elimination pattern will improve  Outcome: PROGRESSING SLOWER THAN EXPECTED  Note:   Continued urinary leaking, pt has no control over bladder. Pt dislikes being cleaned d/t perineal irritation. Discussed benefits of indwelling catheter w/ pt but he refuses.      Alicja Crowell

## 2020-01-02 NOTE — PROGRESS NOTES
9105 - 0208    Pt febrile at start of shift. Tylenol administered. Temperature reported to Dr. Mckenzie -new orders for IV abx, blood cultures, urine culture.    *Pt refusing urine sample. RN provided education about need for sample in determining source of infection and guiding proper treatment. We discussed straight catheterization and option for indwelling catheter as well. Pt refusing indwelling catheter d/t history of UTIs and fear of recurrence. Pt refusing straight catheterization d/t irritation and pain. Pt verbalizing understanding of indication but continues to refuse sample. Dr. Mckenzie aware of inability to collect.     Pt experiencing chills and rigors again this evening, temperature jumped from 98.3 to 99.4, tachy at this time and BP unable to be completed d/t tense body language and rigoring. Dr. Mckenzie notified of pt symptoms and new orders placed for demerol 1x.      Fatigue and irritability this shift increased from this RNs previous interactions w/ patient. Unable to complete swallowing pills this morning and drifting to sleep during conversation. Pt becoming increasingly irritated w/ care (i.e. med administration, skin care, linen changes d/t incontinence) and attempting to refuse majority of care from RN and CNA.     Alicja Crowell

## 2020-01-02 NOTE — PROGRESS NOTES
Dana from Lab called with critical result of CA 6.9 at 0130. Critical lab result read back to Dana.   This critical lab result is within parameters for this hospital when corrected with the patient's albumin level of 2.1

## 2020-01-02 NOTE — PROGRESS NOTES
Oncology/Hematology Progress Note               Author: Daniel sam Date & Time created: 1/2/2020  11:51 AM   Diagnosis-anal cancer.  For chemo XRT /mitomycin-C 5-FU treatment  Interval History:  1/1/20 Started chemo day 29 on 12/24/19.  Last day of chemotherapy was 12/28/19.  XRT supposed to be finished 1/4.  Has chronic fatigue.  No significant diarrhea, feels like his buttock area is getting raw  1/2 - Spiked fever , neutropenic, DNR  review of Systems:  Review of Systems   Constitutional: Positive for malaise/fatigue. Negative for fever.   HENT: Positive for sore throat. Negative for hearing loss.    Respiratory: Negative for cough, sputum production and shortness of breath.    Cardiovascular: Negative for chest pain, palpitations and claudication.   Gastrointestinal: Negative for abdominal pain, constipation, diarrhea, heartburn and nausea.        Perirectal pain   Genitourinary: Negative for dysuria and hematuria.   Musculoskeletal: Positive for back pain. Negative for myalgias.   Skin: Negative for itching and rash.   Neurological: Negative for dizziness, sensory change and headaches.   Psychiatric/Behavioral: Positive for depression. The patient is not nervous/anxious.        Physical Exam:  Physical Exam  Constitutional:       General: He is not in acute distress.     Appearance: Normal appearance. He is not ill-appearing, toxic-appearing or diaphoretic.   HENT:      Head: Normocephalic and atraumatic.      Mouth/Throat:      Mouth: Mucous membranes are moist.      Pharynx: Oropharynx is clear.   Eyes:      General: No scleral icterus.     Pupils: Pupils are equal, round, and reactive to light.   Cardiovascular:      Rate and Rhythm: Normal rate and regular rhythm.   Abdominal:      General: There is no distension.      Palpations: There is no mass.      Tenderness: There is no tenderness. There is no guarding or rebound.   Neurological:      General: No focal deficit present.      Mental Status: He is  alert and oriented to person, place, and time.      Cranial Nerves: No cranial nerve deficit.      Sensory: No sensory deficit.   Psychiatric:         Mood and Affect: Mood normal.         Thought Content: Thought content normal.      Comments: Slightly anxious and depressed         Labs:          Recent Labs     19  0020 20  0046 20  0014   SODIUM 135 137 134*   POTASSIUM 4.0 4.1 3.9   CHLORIDE 107 107 108   CO2 21 23 20   BUN 28* 27* 27*   CREATININE 1.28 1.34 1.25   CALCIUM 7.0* 7.3* 6.9*     Recent Labs     19  0020 20  0046 20  0014   ALTSGPT 16 16 15   ASTSGOT 12 11* 10*   ALKPHOSPHAT 37 45 35   TBILIRUBIN 0.4 0.3 0.4   GLUCOSE 131* 139* 172*     Recent Labs     19  0020 20  0046 20  0014   RBC 2.66* 2.84* 2.30*   HEMOGLOBIN 8.6* 9.2* 7.5*   HEMATOCRIT 25.6* 28.5* 21.8*   PLATELETCT 77* 67* 52*     Recent Labs     19  0020 20  0046 20  0014   WBC 3.4* 1.3* 0.2*   NEUTSPOLYS 93.00* 88.40* cancel   LYMPHOCYTES 2.60* 1.80* cancel   MONOCYTES 0.90 1.80 cancel   EOSINOPHILS 0.00 0.90 cancel   BASOPHILS 0.00 0.00 cancel   ASTSGOT 12 11* 10*   ALTSGPT 16 16 15   ALKPHOSPHAT 37 45 35   TBILIRUBIN 0.4 0.3 0.4     Recent Labs     19  0020 20  0046 20  0014   SODIUM 135 137 134*   POTASSIUM 4.0 4.1 3.9   CHLORIDE 107 107 108   CO2  23 20   GLUCOSE 131* 139* 172*   BUN 28* 27* 27*   CREATININE 1.28 1.34 1.25   CALCIUM 7.0* 7.3* 6.9*     Hemodynamics:  Temp (24hrs), Av.9 °C (100.2 °F), Min:37.2 °C (98.9 °F), Max:38.8 °C (101.9 °F)  Temperature: (!) 38.3 °C (101 °F)  Pulse  Av.4  Min: 52  Max: 116   Blood Pressure : 102/52     Respiratory:    Respiration: 17, Pulse Oximetry: 94 %     Work Of Breathing / Effort: Mild  RUL Breath Sounds: Fine Crackles;Diminished, RML Breath Sounds: Diminished, RLL Breath Sounds: Diminished, VILLA Breath Sounds: Fine Crackles;Diminished, LLL Breath Sounds: Diminished  Fluids:    Intake/Output  Summary (Last 24 hours) at 12/24/2019 1514  Last data filed at 12/24/2019 0529  Gross per 24 hour   Intake 1570 ml   Output 3000 ml   Net -1430 ml        GI/Nutrition:  Orders Placed This Encounter   Procedures   • Diet Order Low Fiber(GI Soft)     Standing Status:   Standing     Number of Occurrences:   1     Order Specific Question:   Diet:     Answer:   Low Fiber(GI Soft) [2]     Medical Decision Making, by Problem:  Active Hospital Problems    Diagnosis   • *DVT (deep venous thrombosis) ([facility]) [I82.409]   • Hypotension [I95.9]   • Anal carcinoma (HCC) [C21.0]   • Hydronephrosis, bilateral [N13.30]   • Acute respiratory failure with hypoxia ([facility]) [J96.01]   • Pancytopenia (HCC) [D61.818]   • Hematuria [R31.9]   • MANDIE (acute kidney injury) ([facility]) [N17.9]   • Type 2 diabetes mellitus without complication, without long-term current use of insulin ([facility]) [E11.9]   • Stomatitis [K12.1]   • COPD (chronic obstructive pulmonary disease) ([facility]) [J44.9]   • Current smoker [F17.200]   • Glaucoma of right eye [H40.9]   • History of bladder cancer [Z85.51]       Plan:  Anal cancer -   chemotherapy with 5-FU and mitomycin-C. Day 37 of chemo. 20% DR with d29 chemo,   He is done with the chemotherapy    NF - On Cefepime, Cx pending   D/w Dr Torrez - Hold further XRT   Continue Granix  Guarded prognosis    DVT-on Lovenox continue Lovenox as before.  No evidence of bleeding or bruising.  Hold if platelets start dropping below 50,000         Quality-Core Measures

## 2020-01-02 NOTE — CARE PLAN
Problem: Safety  Goal: Will remain free from falls  Outcome: PROGRESSING AS EXPECTED     Problem: Infection  Goal: Will remain free from infection  Outcome: PROGRESSING AS EXPECTED     Problem: Bowel/Gastric:  Goal: Normal bowel function is maintained or improved  Outcome: PROGRESSING SLOWER THAN EXPECTED     Problem: Pain Management  Goal: Pain level will decrease to patient's comfort goal  Outcome: PROGRESSING SLOWER THAN EXPECTED     Problem: Skin Integrity  Goal: Risk for impaired skin integrity will decrease  Outcome: NOT MET

## 2020-01-02 NOTE — PROGRESS NOTES
"/51   Pulse 89   Temp 37.2 °C (99 °F) (Oral)   Resp 20   Ht 1.727 m (5' 8\")   Wt 59.1 kg (130 lb 4.7 oz)   SpO2 94%   BMI 19.81 kg/m²   Received report and assumed care of pt at 1900. Pt is A&O x4. Pt denies nausea or SOB. Dressing changes to groin and buttocks done PRN. POC discussed and updated. PIV is patent and infusing. Bed is in lowest position and call light is within reach. Hourly rounding is in place.  "

## 2020-01-02 NOTE — ASSESSMENT & PLAN NOTE
Blood culture x 2  Cxr: Interstitial opacities in the mid/lower lungs, which may represent early interstitial edema. No focal consolidation or pleural effusions  Urine - patient incontinent - refuses straight cath or sinclair  Empiric cefepime  DC zyvox as suspicion of MRSA low  Continue granix - ANC 0  Poor prognosis discussed with patient - code status changed to DNR  Patient refuses transfer to ICU and catheterization  Transition to comfort care measures only since 1/7

## 2020-01-03 NOTE — PROGRESS NOTES
Basil from Lab called with critical result of WBC 0.1 and plt 26 at 0045. Critical lab result read back to Basil.   This critical lab result is within parameters established by  for this patient

## 2020-01-03 NOTE — CARE PLAN
Problem: Knowledge Deficit  Goal: Knowledge of the prescribed therapeutic regimen will improve  Outcome: PROGRESSING AS EXPECTED  Note:   IV abx initiated yesterday. Pt febrile 1x overnight but afberile this morning.      Problem: Urinary Elimination:  Goal: Ability to reestablish a normal urinary elimination pattern will improve  Outcome: PROGRESSING SLOWER THAN EXPECTED  Note:   Urinary elimination options again addressed with patient this morning but he continues to deny d/t previous poor experiences with catheters.      Alicja Crowell
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
## 2020-01-03 NOTE — PROGRESS NOTES
"Pharmacy Kinetics 74 y.o. male on vancomycin day # 1 2020    Received Vancomycin loading dose 1500 mg iv x1 on 2020 at 1300    Indication for Treatment: Febrile neutropenia     Pertinent history per medical record: Admitted on 2019 with history of anal cancer undergoing chemoradiation. Last chemo was  and patient is neutropenic with ANC of 0 today. He developed fever and was initiated on broad spectrum antibiotics. Vancomcyin was added due to concern for sever skin breakdown in the perineum/rectal area.     Other antibiotics: cefepime 2 g IV q8h    Allergies: Patient has no known allergies.     List concerns for renal function: age; hx chronic kidney dx   Pertinent cultures to date:   pending    Recent Labs     19  0020 20  0046 20  0014   WBC 3.4* 1.3* 0.2*   NEUTSPOLYS 93.00* 88.40* cancel   BANDSSTABS 3.50 7.10  --      Recent Labs     19  0020 20  0046 20  0014   BUN 28* 27* 27*   CREATININE 1.28 1.34 1.25   ALBUMIN 2.1* 2.3* 2.1*       BP (!) 92/48   Pulse (!) 105   Temp 36.6 °C (97.8 °F) (Oral)   Resp 18   Ht 1.727 m (5' 8\")   Wt 59.1 kg (130 lb 4.7 oz)   SpO2 96%  Temp (24hrs), Av.4 °C (99.4 °F), Min:36.6 °C (97.8 °F), Max:38.3 °C (101 °F)      A/P   1. Vancomycin dose change: 15 mg/kg q24h  2. Next vancomycin level: Prior to 3rd dose or earlier if indicated (not yet ordered)  3. Goal trough: 16-20 mcg/mL  4. Comments: Patient is neutropenic (Granix started) and febrile with concern for infectious component of severe skin breakdown at the radiation site. Patient is refusing some evaluations but blood cultures have been drawn. Cautiously begin scheduled dosing (15 mg/kg q24h) with plan for level prior to 3rd dose or earlier if indicated.    Gema Carrera, PharmD, BCOP, BCPS    "

## 2020-01-04 NOTE — PROGRESS NOTES
Huma from Lab called with critical result of WBC at 0.2, platelets at 21, and hemoglobin at 8.6. Critical lab result read back to Huma.   This critical lab result is within parameters established by  for this patient

## 2020-01-04 NOTE — PROGRESS NOTES
5609 - 5391    Pt feeling better today. Less fatigued but still weak and struggling w/ bed mobility.     Blood transfusion at start of shift, completed late this morning, pt tolerated w/out issue.     Abx continued today, afebrile this shift.     Pain remains a significant issue today worsened by cleansing mansi area after incontinence and dressing changes. Incontinence of urine and stool continuously throughout shift. No control of bowel or bladder demonstrated. Viscopaste to sacral and perineal regions replaced w/ each episode of incontinence. Pre-medication provided prior to care but still distressing to patient.     Alicja Crowell

## 2020-01-04 NOTE — PROGRESS NOTES
Assumed patient care at 1900. Pt is A&Ox4 and a x1 assist. Bed alarm is on and in the low and locked position. Call light within reach, wearing non-slip socks, and rounded on hourly. Patient medicated for pain per MAR prior to dressing changes and reapplying viscopaste to sacral and perineal regions after episodes of incontinence. Patient still appears to be in a significant amount of pain during dressing changes.

## 2020-01-04 NOTE — PROGRESS NOTES
[facility] Medicine Daily Progress Note    Date of Service  1/4/2020    Chief Complaint  74 y.o. male admitted 12/7/2019 with oral pain and weight loss.    Hospital Course    Patient with known anal cancer undergoing radiation and chemotherapy  He has had significant oral pain and unable to swallow well the past few weeks.  He has a sore on his bottom that he has been applying hemorrhoidal cream with lidocaine for pain.  He had hematuria develop since admission when platelet count was significantly low.   He developed a DVT in RUE and is on anticoagulation with Lovenox.  He has completed chemotherapy and will complete radiation on Monday,  1/6/20.        Interval Problem Update  12/8 Patient feeling tired and blood pressure has been markedly low, he has received 2 Liters of NS bolus and LR has been running at 100/hour since admit.  He still appears dehydrated but is starting to require oxygen to maintain saturation, lungs still sound clear on examination.  He received 1 unit of platelets and urine is clearing in collection tubing from neobladder.  Cr increased overnight with hydration from 1.52 to 1.82 and this could be due to early hydration only and expect it to trend back down.  If pressure does not respond to midodrine and IVF then patient will need to transfer to ICU for pressor support.  Midodrine and solucortef started because of hypotension.    12/9 Patient less fatigued and feeling slightly better when compared to yesterday.  He has been able to eat with pain control received by MBX.  Cr trending back down following IV resuscitation and blood pressure has improved with midodrine and cortef - patient refused transfer to ICU yesterday.  Radiation continued today on schedule.    12/17 Patient with adequate blood pressure with IV hydration but tends to drop when stopped, he states his oral intake is poor as his taste is very bland and nothing is good anymore.  He is agreeable to take an appetite stimulant with  marinol and protein supplements in form of shakes.  Calcium is 6.8 but when corrected for hypoalbuminemia, is 8.4.  PICC line to be removed today.  12/18 Patient feeling okay today, appetite has improved with lunch and dinner yesterday after starting marinol.  Will trial decrease of IVF and monitor blood pressure, he is not showing signs of volume overload.  He didn't eat much breakfast so I will give him tid marinol to see if that helps.    12/19 Patient feeling better today, he got out of bed and took a shower.  Blood pressure is doing about the same as yesterday despite dropping IVF rate from 100/h to 75/h slightly.     12/20 Patient without complaint, blood pressures have been stable so will try to decrease IVF rate further.  Will continue to encourage patient to get out of bed as much as tolerant.  12/21 Patient having nausea and diarrhea today, suspect as s/e of his radiation therapy, he is responding to anti-emetic and I will also start PRN imodium.  No acute events.  BP stable with rate drop of IVF to 50cc/h.  12/22 Patient doing about same with blood pressure, states nausea from yesterday has subsided.  He is eating.  Discussed with Dr Deras and oncology will see him by tomorrow to determine if chemotherapy is appropriate to be given on schedule tomorrow.  12/23 Patient with stable, low blood pressure.  Initially PICC was to be placed today but after speaking with vascular access team, they feel he is too high of a risk for another PICC induced clot and recommend either tunneled central line or PORT.  Patient only has 1 drug that needs to be received via central line (mitomycin for about 45 minute infusion).  He should have an IJ placed for chemotherapy and then this to be discontinued.    12/31 Patient appears weaker than when I saw him last week, he is post chemo and on the final few days of radiation treatment and suffering the side effects of this with skin breakdown in the perineum/rectal area.  His  blood pressures remain low though stable.  Renal function stable.  1/1 Patient without much change today, RN encouraged him to get up to shower today and he refused secondary to pain but agreed to bed bath with pain medication given.  Improvement in hygiene encouraged to the patient.  ANC dropped with dc of Granix - 1.24 today, resume granix.  1/2 Patient febrile yesterday afternoon, not reported to me.  Febrile again this am and neutropenic fever w/u underway with CXR, blood cultures and UA but patient is not able to provide sample and refuses catheterization of any sort (straight or indwelling).  CXR shows no evidence of infection or consolidation.  Empiric antibiotics started.  Poor prognosis discussed with patient and he changed his code status from full to DNR.    1/3 Patient afebrile since initiation of antibiotics.  He appears slightly better today.  He received blood this am d/t hemoglobin of 6.3.  Blood pressure has remained low but stable for him.  Lovenox discontinued due to platelet drop to 26.  1/4 Patient without complaint today, states he continues to feel weak.  Renal function slightly better with discontinuation of vanco and change to zyvox.  Natali care remains very painful despite IV dilaudid dose given, will increase.     Consultants/Specialty  none    Code Status  DNR/DNI  No ICU transfer    Disposition  tbd    Review of Systems  Review of Systems   Constitutional: Positive for malaise/fatigue. Negative for chills and fever.   HENT: Negative for congestion and sore throat.    Eyes: Negative for blurred vision and photophobia.   Respiratory: Negative for cough and shortness of breath.    Cardiovascular: Negative for chest pain, claudication and leg swelling.   Gastrointestinal: Positive for diarrhea. Negative for abdominal pain, constipation, heartburn, nausea and vomiting.   Genitourinary: Negative for dysuria, flank pain and hematuria.        Rectal pain     Musculoskeletal: Negative for joint  pain and myalgias.   Skin: Negative for itching and rash.   Neurological: Positive for weakness. Negative for dizziness, sensory change, speech change and headaches.   Psychiatric/Behavioral: Negative for depression. The patient is not nervous/anxious and does not have insomnia.         Physical Exam  Temp:  [36.3 °C (97.3 °F)-37.1 °C (98.8 °F)] 36.8 °C (98.2 °F)  Pulse:  [72-90] 72  Resp:  [16-18] 16  BP: (83-99)/(39-54) 86/47  SpO2:  [96 %-100 %] 100 %    Physical Exam  Vitals signs and nursing note reviewed.   Constitutional:       General: He is not in acute distress.     Appearance: Normal appearance.   HENT:      Head: Normocephalic and atraumatic.   Eyes:      General: No scleral icterus.     Extraocular Movements: Extraocular movements intact.   Neck:      Musculoskeletal: Normal range of motion and neck supple.   Cardiovascular:      Rate and Rhythm: Normal rate and regular rhythm.      Pulses: Normal pulses.      Heart sounds: Normal heart sounds. No murmur.   Pulmonary:      Effort: Pulmonary effort is normal. No respiratory distress.      Breath sounds: Normal breath sounds. No wheezing, rhonchi or rales.   Abdominal:      General: Abdomen is flat. Bowel sounds are normal. There is no distension.      Palpations: Abdomen is soft.      Tenderness: There is no rebound.   Genitourinary:     Comments: Radiation breakdown perineum/rectum.    Musculoskeletal:         General: No swelling or tenderness.   Lymphadenopathy:      Cervical: No cervical adenopathy.   Skin:     Coloration: Skin is not jaundiced.      Findings: No erythema.      Comments: Rectal sore without evidence of infection, consistent with radiation changes.     Neurological:      General: No focal deficit present.      Mental Status: He is alert and oriented to person, place, and time. Mental status is at baseline.      Cranial Nerves: No cranial nerve deficit.   Psychiatric:         Mood and Affect: Mood normal.         Behavior: Behavior  normal.         Fluids  No intake or output data in the 24 hours ending 01/04/20 1340    Laboratory  Recent Labs     01/02/20  0014 01/03/20  0016 01/04/20  0022   WBC 0.2* 0.1* 0.2*   RBC 2.30* 1.92* 2.74*   HEMOGLOBIN 7.5* 6.3* 8.6*   HEMATOCRIT 21.8* 18.4* 25.2*   MCV 97.0 95.8 92.0   MCH 31.7 32.8 31.4   MCHC 32.7* 34.2 34.1   RDW 49.5 47.5 53.1*   PLATELETCT 52* 26* 21*   MPV 10.8 11.4 12.4     Recent Labs     01/02/20  0014 01/03/20  0016 01/04/20  0022   SODIUM 134* 134* 136   POTASSIUM 3.9 4.2 3.9   CHLORIDE 108 109 110   CO2 20 18* 18*   GLUCOSE 172* 156* 147*   BUN 27* 35* 45*   CREATININE 1.25 1.87* 1.65*   CALCIUM 6.9* 7.1* 7.5*                   Imaging  DX-CHEST-PORTABLE (1 VIEW)   Final Result      Interstitial opacities in the mid/lower lungs, which may represent early interstitial edema. No focal consolidation or pleural effusions.      US-EXTREMITY VENOUS UPPER UNILAT RIGHT   Final Result      DX-CHEST-FOR LINE PLACEMENT Perform procedure in: Patient's Room   Final Result      1.  Right IJ central catheter tip is in the mid/distal SVC. No pneumothorax.   2.  Mild interstitial prominence, likely mild edema. No focal consolidation or pleural effusions.      EC-ECHOCARDIOGRAM COMPLETE W/O CONT   Final Result      US-EXTREMITY VENOUS UPPER UNILAT RIGHT   Final Result      US-RENAL   Final Result      1.  There is moderate bilateral hydronephrosis, more than is typically expected for history of cystectomy with neobladder formation.      DX-CHEST-PORTABLE (1 VIEW)   Final Result      No radiographic evidence of acute cardiopulmonary process.           Assessment/Plan  * DVT (deep venous thrombosis) (HCC)  Assessment & Plan  PICC line associated  Lovenox discontinued until platelet count >50.      Pancytopenia (HCC)- (present on admission)  Assessment & Plan  Transfused 1 unit platelets  Follow CBC  Start Granix    Hypotension- (present on admission)  Assessment & Plan  Midodrine  IVF NS      Anal  carcinoma (HCC)- (present on admission)  Assessment & Plan  Chemotherapy given - 5-FU and Mitomycin C - completed  Remove central line  Maceration of perineum/rectum - secondary to radiation - painful requiring IV dilaudid for mansi-care.        CKD (chronic kidney disease) stage 3, GFR 30-59 ml/min ([facility])- (present on admission)  Assessment & Plan  PTH elevated  Follow bmp    Hypomagnesemia- (present on admission)  Assessment & Plan  oral Mg    Acute respiratory failure with hypoxia ([facility])- (present on admission)  Assessment & Plan  RT protocol    Hydronephrosis, bilateral- (present on admission)  Assessment & Plan  Follow bmp closely    Hematuria- (present on admission)  Assessment & Plan  Resolved        Stomatitis- (present on admission)  Assessment & Plan  thrush vs mucositis   Finished course of Nystatin    Type 2 diabetes mellitus without complication, without long-term current use of insulin ([facility])- (present on admission)  Assessment & Plan  diet controlled    MANDIE (acute kidney injury) ([facility])- (present on admission)  Assessment & Plan  Decrease IVF NS, follow bmp    COPD (chronic obstructive pulmonary disease) ([facility])- (present on admission)  Assessment & Plan  Spiriva, RT protocol    Neutropenic fever ([facility])  Assessment & Plan  Blood culture x 2  Cxr: Interstitial opacities in the mid/lower lungs, which may represent early interstitial edema. No focal consolidation or pleural effusions  Urine - patient incontinent - refuses straight cath or sinclair  Empiric cefepime and zyvox- at risk for MRSA with large amount skin breakdown.    Continue granix - ANC 0  Poor prognosis discussed with patient - code status changed to DNR  Patient refuses transfer to ICU and catheterization    Glaucoma of right eye- (present on admission)  Assessment & Plan  timolol eyedrops    History of bladder cancer- (present on admission)  Assessment & Plan  Neobladder.    Current smoker- (present on admission)  Assessment & Plan  counseling        VTE prophylaxis: SCDs, AC c/i due to bleeding and thrombocytopenia.

## 2020-01-04 NOTE — PROGRESS NOTES
[facility] Medicine Daily Progress Note    Date of Service  1/3/2020    Chief Complaint  74 y.o. male admitted 12/7/2019 with oral pain and weight loss.    Hospital Course    Patient with known anal cancer undergoing radiation and chemotherapy  He has had significant oral pain and unable to swallow well the past few weeks.  He has a sore on his bottom that he has been applying hemorrhoidal cream with lidocaine for pain.  He had hematuria develop since admission when platelet count was significantly low.   He developed a DVT in RUE and is on anticoagulation with Lovenox.  He has completed chemotherapy and will complete radiation on Monday,  1/6/20.        Interval Problem Update  12/8 Patient feeling tired and blood pressure has been markedly low, he has received 2 Liters of NS bolus and LR has been running at 100/hour since admit.  He still appears dehydrated but is starting to require oxygen to maintain saturation, lungs still sound clear on examination.  He received 1 unit of platelets and urine is clearing in collection tubing from neobladder.  Cr increased overnight with hydration from 1.52 to 1.82 and this could be due to early hydration only and expect it to trend back down.  If pressure does not respond to midodrine and IVF then patient will need to transfer to ICU for pressor support.  Midodrine and solucortef started because of hypotension.    12/9 Patient less fatigued and feeling slightly better when compared to yesterday.  He has been able to eat with pain control received by MBX.  Cr trending back down following IV resuscitation and blood pressure has improved with midodrine and cortef - patient refused transfer to ICU yesterday.  Radiation continued today on schedule.    12/17 Patient with adequate blood pressure with IV hydration but tends to drop when stopped, he states his oral intake is poor as his taste is very bland and nothing is good anymore.  He is agreeable to take an appetite stimulant with  marinol and protein supplements in form of shakes.  Calcium is 6.8 but when corrected for hypoalbuminemia, is 8.4.  PICC line to be removed today.  12/18 Patient feeling okay today, appetite has improved with lunch and dinner yesterday after starting marinol.  Will trial decrease of IVF and monitor blood pressure, he is not showing signs of volume overload.  He didn't eat much breakfast so I will give him tid marinol to see if that helps.    12/19 Patient feeling better today, he got out of bed and took a shower.  Blood pressure is doing about the same as yesterday despite dropping IVF rate from 100/h to 75/h slightly.     12/20 Patient without complaint, blood pressures have been stable so will try to decrease IVF rate further.  Will continue to encourage patient to get out of bed as much as tolerant.  12/21 Patient having nausea and diarrhea today, suspect as s/e of his radiation therapy, he is responding to anti-emetic and I will also start PRN imodium.  No acute events.  BP stable with rate drop of IVF to 50cc/h.  12/22 Patient doing about same with blood pressure, states nausea from yesterday has subsided.  He is eating.  Discussed with Dr Deras and oncology will see him by tomorrow to determine if chemotherapy is appropriate to be given on schedule tomorrow.  12/23 Patient with stable, low blood pressure.  Initially PICC was to be placed today but after speaking with vascular access team, they feel he is too high of a risk for another PICC induced clot and recommend either tunneled central line or PORT.  Patient only has 1 drug that needs to be received via central line (mitomycin for about 45 minute infusion).  He should have an IJ placed for chemotherapy and then this to be discontinued.    12/31 Patient appears weaker than when I saw him last week, he is post chemo and on the final few days of radiation treatment and suffering the side effects of this with skin breakdown in the perineum/rectal area.  His  blood pressures remain low though stable.  Renal function stable.  1/1 Patient without much change today, RN encouraged him to get up to shower today and he refused secondary to pain but agreed to bed bath with pain medication given.  Improvement in hygiene encouraged to the patient.  ANC dropped with dc of Granix - 1.24 today, resume granix.  1/2 Patient febrile yesterday afternoon, not reported to me.  Febrile again this am and neutropenic fever w/u underway with CXR, blood cultures and UA but patient is not able to provide sample and refuses catheterization of any sort (straight or indwelling).  CXR shows no evidence of infection or consolidation.  Empiric antibiotics started.  Poor prognosis discussed with patient and he changed his code status from full to DNR.    1/3 Patient afebrile since initiation of antibiotics.  He appears slightly better today.  He received blood this am d/t hemoglobin of 6.3.  Blood pressure has remained low but stable for him.  Lovenox discontinued due to platelet drop to 26.    Consultants/Specialty  none    Code Status  DNR/DNI  No ICU transfer    Disposition  tbd    Review of Systems  Review of Systems   Constitutional: Positive for malaise/fatigue. Negative for chills and fever.   HENT: Negative for congestion and sore throat.    Eyes: Negative for blurred vision and photophobia.   Respiratory: Negative for cough and shortness of breath.    Cardiovascular: Negative for chest pain, claudication and leg swelling.   Gastrointestinal: Positive for diarrhea. Negative for abdominal pain, constipation, heartburn, nausea and vomiting.   Genitourinary: Negative for dysuria, flank pain and hematuria.        Rectal pain     Musculoskeletal: Negative for joint pain and myalgias.   Skin: Negative for itching and rash.   Neurological: Positive for weakness. Negative for dizziness, sensory change, speech change and headaches.   Psychiatric/Behavioral: Negative for depression. The patient is not  nervous/anxious and does not have insomnia.         Physical Exam  Temp:  [36.4 °C (97.5 °F)-39.2 °C (102.5 °F)] 37.3 °C (99.2 °F)  Pulse:  [] 81  Resp:  [18-24] 18  BP: ()/(43-58) 100/47  SpO2:  [92 %-100 %] 98 %    Physical Exam  Vitals signs and nursing note reviewed.   Constitutional:       General: He is not in acute distress.     Appearance: Normal appearance.   HENT:      Head: Normocephalic and atraumatic.   Eyes:      General: No scleral icterus.     Extraocular Movements: Extraocular movements intact.   Neck:      Musculoskeletal: Normal range of motion and neck supple.   Cardiovascular:      Rate and Rhythm: Normal rate and regular rhythm.      Pulses: Normal pulses.      Heart sounds: Normal heart sounds. No murmur.   Pulmonary:      Effort: Pulmonary effort is normal. No respiratory distress.      Breath sounds: Normal breath sounds. No wheezing, rhonchi or rales.   Abdominal:      General: Abdomen is flat. Bowel sounds are normal. There is no distension.      Palpations: Abdomen is soft.      Tenderness: There is no rebound.   Genitourinary:     Comments: Radiation breakdown perineum/rectum.    Musculoskeletal:         General: No swelling or tenderness.   Lymphadenopathy:      Cervical: No cervical adenopathy.   Skin:     Coloration: Skin is not jaundiced.      Findings: No erythema.      Comments: Rectal sore without evidence of infection, consistent with radiation changes.     Neurological:      General: No focal deficit present.      Mental Status: He is alert and oriented to person, place, and time. Mental status is at baseline.      Cranial Nerves: No cranial nerve deficit.   Psychiatric:         Mood and Affect: Mood normal.         Behavior: Behavior normal.         Fluids    Intake/Output Summary (Last 24 hours) at 1/3/2020 1701  Last data filed at 1/2/2020 1900  Gross per 24 hour   Intake 7664.9 ml   Output --   Net 7664.9 ml       Laboratory  Recent Labs     01/01/20  0046  01/02/20  0014 01/03/20  0016   WBC 1.3* 0.2* 0.1*   RBC 2.84* 2.30* 1.92*   HEMOGLOBIN 9.2* 7.5* 6.3*   HEMATOCRIT 28.5* 21.8* 18.4*   .4* 97.0 95.8   MCH 32.4 31.7 32.8   MCHC 32.3* 32.7* 34.2   RDW 51.4* 49.5 47.5   PLATELETCT 67* 52* 26*   MPV 10.4 10.8 11.4     Recent Labs     01/01/20  0046 01/02/20  0014 01/03/20  0016   SODIUM 137 134* 134*   POTASSIUM 4.1 3.9 4.2   CHLORIDE 107 108 109   CO2 23 20 18*   GLUCOSE 139* 172* 156*   BUN 27* 27* 35*   CREATININE 1.34 1.25 1.87*   CALCIUM 7.3* 6.9* 7.1*                   Imaging  DX-CHEST-PORTABLE (1 VIEW)   Final Result      Interstitial opacities in the mid/lower lungs, which may represent early interstitial edema. No focal consolidation or pleural effusions.      US-EXTREMITY VENOUS UPPER UNILAT RIGHT   Final Result      DX-CHEST-FOR LINE PLACEMENT Perform procedure in: Patient's Room   Final Result      1.  Right IJ central catheter tip is in the mid/distal SVC. No pneumothorax.   2.  Mild interstitial prominence, likely mild edema. No focal consolidation or pleural effusions.      EC-ECHOCARDIOGRAM COMPLETE W/O CONT   Final Result      US-EXTREMITY VENOUS UPPER UNILAT RIGHT   Final Result      US-RENAL   Final Result      1.  There is moderate bilateral hydronephrosis, more than is typically expected for history of cystectomy with neobladder formation.      DX-CHEST-PORTABLE (1 VIEW)   Final Result      No radiographic evidence of acute cardiopulmonary process.           Assessment/Plan  * DVT (deep venous thrombosis) (HCC)  Assessment & Plan  PICC line associated  Lovenox discontinued until platelet count >50.      Pancytopenia (HCC)- (present on admission)  Assessment & Plan  Transfused 1 unit platelets  Follow CBC  Start Granix    Hypotension- (present on admission)  Assessment & Plan  Midodrine  IVF NS      Anal carcinoma (HCC)- (present on admission)  Assessment & Plan  Chemotherapy given - 5-FU and Mitomycin C - completed  Remove central  line  Maceration of perineum/rectum - secondary to radiation - painful requiring IV dilaudid for mansi-care.        CKD (chronic kidney disease) stage 3, GFR 30-59 ml/min ([facility])- (present on admission)  Assessment & Plan  PTH elevated  Follow bmp    Hypomagnesemia- (present on admission)  Assessment & Plan  oral Mg    Acute respiratory failure with hypoxia ([facility])- (present on admission)  Assessment & Plan  RT protocol    Hydronephrosis, bilateral- (present on admission)  Assessment & Plan  Follow bmp closely    Hematuria- (present on admission)  Assessment & Plan  Resolved        Stomatitis- (present on admission)  Assessment & Plan  thrush vs mucositis   Finished course of Nystatin    Type 2 diabetes mellitus without complication, without long-term current use of insulin ([facility])- (present on admission)  Assessment & Plan  diet controlled    MANDIE (acute kidney injury) ([facility])- (present on admission)  Assessment & Plan  Decrease IVF NS, follow bmp    COPD (chronic obstructive pulmonary disease) ([facility])- (present on admission)  Assessment & Plan  Spiriva, RT protocol    Neutropenic fever ([facility])  Assessment & Plan  Blood culture x 2  Cxr: Interstitial opacities in the mid/lower lungs, which may represent early interstitial edema. No focal consolidation or pleural effusions  Urine - patient incontinent - refuses straight cath or sinclair  Empiric cefepime and vanco - at risk for MRSA with large amount skin breakdown.    Continue granix - ANC 0  Poor prognosis discussed with patient - code status changed to DNR  Patient refuses transfer to ICU and catheterization    Glaucoma of right eye- (present on admission)  Assessment & Plan  timolol eyedrops    History of bladder cancer- (present on admission)  Assessment & Plan  Neobladder.    Current smoker- (present on admission)  Assessment & Plan  counseling       VTE prophylaxis: SCDs, AC c/i due to bleeding and thrombocytopenia.

## 2020-01-04 NOTE — CARE PLAN
Problem: Safety  Goal: Will remain free from falls  Outcome: PROGRESSING AS EXPECTED  Note:   Call light within reach, bed in the low and locked position, patient wearing non-slip socks, and rounded on hourly.       Problem: Knowledge Deficit  Goal: Knowledge of the prescribed therapeutic regimen will improve  Outcome: PROGRESSING AS EXPECTED  Note:   Patient educated on medications, nursing interventions, and their diagnosis.

## 2020-01-04 NOTE — PROGRESS NOTES
Pt is A&O.  Was incont of mucousy stool this am.  Pt's drsgs changed with clean up.  Pt's skin appears to be improving.  Pain medication increased as pt nearly came off the bed during clean up and drsg change.  BP remains low (baseline).  Appetite is fair for breakfast.  Afebrile.  Neutropenic precautions in place.

## 2020-01-05 NOTE — PROGRESS NOTES
"A&Ox4. Feeling \"drained\" and \"embarassed.\" Emotional support provided. Neutropenic precautions in place. Afebrile. Pain 6/10, medicating per MAR. PIC patent, infusing IVF's. POC discussed. All needs met at this time. Bed locked & in low position. Pt calls appropriately.  "

## 2020-01-05 NOTE — PROGRESS NOTES
Oncology/Hematology Progress Note               Author: Daniel sam Date & Time created: 1/5/2020  11:14 AM   Diagnosis-anal cancer.  For chemo XRT /mitomycin-C 5-FU treatment  Interval History:  1/1/20 Started chemo day 29 on 12/24/19.  Last day of chemotherapy was 12/28/19.  XRT supposed to be finished 1/4.  Has chronic fatigue.  No significant diarrhea, feels like his buttock area is getting raw  1/2 - Spiked fever , neutropenic, DNR  1/5 afebrile, very weak, anxious abt dying, sever XRT dermatitis  review of Systems:  Review of Systems   Constitutional: Positive for malaise/fatigue. Negative for fever.   HENT: Positive for sore throat. Negative for hearing loss.    Respiratory: Negative for cough, sputum production and shortness of breath.    Cardiovascular: Negative for chest pain, palpitations and claudication.   Gastrointestinal: Negative for abdominal pain, constipation, diarrhea, heartburn and nausea.        Perirectal pain   Genitourinary: Negative for dysuria and hematuria.   Musculoskeletal: Positive for back pain. Negative for myalgias.   Skin: Negative for itching and rash.   Neurological: Negative for dizziness, sensory change and headaches.   Psychiatric/Behavioral: Positive for depression. The patient is not nervous/anxious.        Physical Exam:  Physical Exam  Constitutional:       General: He is not in acute distress.     Appearance: Normal appearance. He is not ill-appearing, toxic-appearing or diaphoretic.   HENT:      Head: Normocephalic and atraumatic.      Mouth/Throat:      Mouth: Mucous membranes are moist.      Pharynx: Oropharynx is clear.   Eyes:      General: No scleral icterus.     Pupils: Pupils are equal, round, and reactive to light.   Cardiovascular:      Rate and Rhythm: Normal rate and regular rhythm.   Abdominal:      General: There is no distension.      Palpations: There is no mass.      Tenderness: There is no tenderness. There is no guarding or rebound.   Neurological:       General: No focal deficit present.      Mental Status: He is alert and oriented to person, place, and time.      Cranial Nerves: No cranial nerve deficit.      Sensory: No sensory deficit.   Psychiatric:         Mood and Affect: Mood normal.         Thought Content: Thought content normal.      Comments: Slightly anxious and depressed         Labs:          Recent Labs     20  0016 20  0022 20  0015   SODIUM 134* 136 135   POTASSIUM 4.2 3.9 3.9   CHLORIDE 109 110 112   CO2 18* 18* 17*   BUN 35* 45* 46*   CREATININE 1.87* 1.65* 1.46*   CALCIUM 7.1* 7.5* 7.4*     Recent Labs     20  0016 20  0022 20  0015   ALTSGPT 13 13 14   ASTSGOT 7* 8* 10*   ALKPHOSPHAT 34 33 41   TBILIRUBIN 0.7 0.8 0.5   GLUCOSE 156* 147* 162*     Recent Labs     20  0016 20  0022 20  0015   RBC 1.92* 2.74* 2.64*   HEMOGLOBIN 6.3* 8.6* 8.2*   HEMATOCRIT 18.4* 25.2* 24.8*   PLATELETCT 26* 21* 13*     Recent Labs     20  0016 20  0022 20  0015   WBC 0.1* 0.2* 0.2*   NEUTSPOLYS CANCEL cancel cancel   LYMPHOCYTES CANCEL cancel cancel   MONOCYTES CANCEL cancel cancel   EOSINOPHILS CANCEL cancel cancel   BASOPHILS CANCEL cancel cancel   ASTSGOT 7* 8* 10*   ALTSGPT 13 13 14   ALKPHOSPHAT 34 33 41   TBILIRUBIN 0.7 0.8 0.5     Recent Labs     20  0016 20  0022 20  0015   SODIUM 134* 136 135   POTASSIUM 4.2 3.9 3.9   CHLORIDE 109 110 112   CO2 18* 18* 17*   GLUCOSE 156* 147* 162*   BUN 35* 45* 46*   CREATININE 1.87* 1.65* 1.46*   CALCIUM 7.1* 7.5* 7.4*     Hemodynamics:  Temp (24hrs), Av.6 °C (97.9 °F), Min:36.4 °C (97.6 °F), Max:36.8 °C (98.2 °F)  Temperature: 36.6 °C (97.8 °F)  Pulse  Av.1  Min: 52  Max: 116   Blood Pressure : (!) 83/41     Respiratory:    Respiration: 18, Pulse Oximetry: 95 %     Work Of Breathing / Effort: Mild  RUL Breath Sounds: Diminished, RML Breath Sounds: Diminished, RLL Breath Sounds: Diminished, VILLA Breath Sounds: Diminished,  LLL Breath Sounds: Diminished  Fluids:    Intake/Output Summary (Last 24 hours) at 12/24/2019 1514  Last data filed at 12/24/2019 0529  Gross per 24 hour   Intake 1570 ml   Output 3000 ml   Net -1430 ml        GI/Nutrition:  Orders Placed This Encounter   Procedures   • Diet Order Low Fiber(GI Soft)     Standing Status:   Standing     Number of Occurrences:   1     Order Specific Question:   Diet:     Answer:   Low Fiber(GI Soft) [2]     Medical Decision Making, by Problem:  Active Hospital Problems    Diagnosis   • *DVT (deep venous thrombosis) (HCC) [I82.409]   • Hypotension [I95.9]   • Anal carcinoma (HCC) [C21.0]   • Hydronephrosis, bilateral [N13.30]   • Acute respiratory failure with hypoxia (HCC) [J96.01]   • Pancytopenia (HCC) [D61.818]   • Hematuria [R31.9]   • MANDIE (acute kidney injury) (HCC) [N17.9]   • Type 2 diabetes mellitus without complication, without long-term current use of insulin (HCC) [E11.9]   • Stomatitis [K12.1]   • COPD (chronic obstructive pulmonary disease) (HCC) [J44.9]   • Current smoker [F17.200]   • Glaucoma of right eye [H40.9]   • History of bladder cancer [Z85.51]       Plan:  Anal cancer -   chemotherapy with 5-FU and mitomycin-C. Finished Chemo. 20% DR with d29 chemo,   XRT stopped ( had 3 remaining doses due to neutropenic sepsis, decline in clinical condition)   NF - On Cefepime, ZyvoxCx negative D/w  Green - Hold further XRT   Continue Granix trnasfuse to keephgB>7, Plts>10 or any bleeding  Zyvox may be adding to the thrombocytopenia. rec ID consult  Guarded prognosis    DVT-Lovenox on hold      Quality-Core Measures

## 2020-01-05 NOTE — CARE PLAN
Problem: Infection  Goal: Will remain free from infection  Outcome: PROGRESSING AS EXPECTED  Intervention: Implement standard precautions and perform hand washing before and after patient contact  Note:   Neutropenic precautions in place. Afebrile. IV abx in use.      Problem: Pain Management  Goal: Pain level will decrease to patient's comfort goal  Outcome: PROGRESSING AS EXPECTED  Intervention: Follow pain managment plan developed in collaboration with patient and Interdisciplinary Team  Note:   Numeric pain scale in use. IV dilaudid 2mg IV used PRN. Adequately managing pain.

## 2020-01-05 NOTE — CARE PLAN
Problem: Communication  Goal: The ability to communicate needs accurately and effectively will improve  Outcome: PROGRESSING AS EXPECTED     Problem: Infection  Goal: Will remain free from infection  Outcome: PROGRESSING AS EXPECTED  Note:   Afebrile this shift. IV and PO abx continued.      Problem: Pain Management  Goal: Pain level will decrease to patient's comfort goal  Outcome: PROGRESSING AS EXPECTED  Note:   Pain more adequately controlled today after increase in pain medication.      Alicja Crowell

## 2020-01-05 NOTE — PROGRESS NOTES
1261 - [id number]    Pt expressing more appropriate pain control at rest today, skin care still very painful.     Pt not attempted out of bed today d/t weakness, pain, confusion, and pt refusal. Pt is able to turn self in bed via small shifts.     Confusion noted this morning. Disoriented to time and place, after reorientation provided pt states he feels a little bit better but still demonstrating confusion throughout the day.    IV cefepime continued, pt afebrile this shift.     Alicja Crowell

## 2020-01-05 NOTE — PROGRESS NOTES
Basil from Lab called with critical result of WBC of 0.2 & Plts at 13 at 0045. Critical lab result read back to Basil.   This critical lab result is within parameters established by [facility] policy for this patient

## 2020-01-06 NOTE — CARE PLAN
Problem: Communication  Goal: The ability to communicate needs accurately and effectively will improve  Outcome: PROGRESSING AS EXPECTED  Note:   Pt confused but demonstrating ability to communicate needs and utilize call bell.      Problem: Skin Integrity  Goal: Risk for impaired skin integrity will decrease  Outcome: PROGRESSING SLOWER THAN EXPECTED     Problem: Urinary Elimination:  Goal: Ability to reestablish a normal urinary elimination pattern will improve  Outcome: PROGRESSING SLOWER THAN EXPECTED     Alicja Crowell

## 2020-01-06 NOTE — PROGRESS NOTES
Received report & assumed care of patient at 1900. Assessment completed. Patient is A&Ox3, disoriented to time. Neutropenic precautions in place. L PIV patent, running NS at 75 mL/hr. Patient reports pain of 8/10, medication provided per MAR. POC discussed & questions answered. Bed locked and in lowest position, bed alarm is on, call light within reach, non-skid socks in place, hourly rounding. Patient reports no further needs at this time.

## 2020-01-06 NOTE — DIETARY
Brief Nutrition Update:  Patient with poor PO intake noted on weekly nutrition screen.  Patient stated he just doesn't have an appetite.  Discussed patient with MD and she stated nutrition support is not appropriate at this time as patient is making possible end of life decisions.  Nutrition Representative will continue to see him for ongoing meal and snack preferences as appropriate.  RD following plan of care and available PRN.

## 2020-01-06 NOTE — PROGRESS NOTES
[facility] Medicine Daily Progress Note    Date of Service  1/5/2020    Chief Complaint  74 y.o. male admitted 12/7/2019 with oral pain and weight loss.    Hospital Course    Patient with known anal cancer undergoing radiation and chemotherapy  He has had significant oral pain and unable to swallow well the past few weeks.  He has a sore on his bottom that he has been applying hemorrhoidal cream with lidocaine for pain.  He had hematuria develop since admission when platelet count was significantly low.   He developed a DVT in RUE and is on anticoagulation with Lovenox.  He has completed chemotherapy and will complete radiation on Monday,  1/6/20.        Interval Problem Update  12/8 Patient feeling tired and blood pressure has been markedly low, he has received 2 Liters of NS bolus and LR has been running at 100/hour since admit.  He still appears dehydrated but is starting to require oxygen to maintain saturation, lungs still sound clear on examination.  He received 1 unit of platelets and urine is clearing in collection tubing from neobladder.  Cr increased overnight with hydration from 1.52 to 1.82 and this could be due to early hydration only and expect it to trend back down.  If pressure does not respond to midodrine and IVF then patient will need to transfer to ICU for pressor support.  Midodrine and solucortef started because of hypotension.    12/9 Patient less fatigued and feeling slightly better when compared to yesterday.  He has been able to eat with pain control received by MBX.  Cr trending back down following IV resuscitation and blood pressure has improved with midodrine and cortef - patient refused transfer to ICU yesterday.  Radiation continued today on schedule.    12/17 Patient with adequate blood pressure with IV hydration but tends to drop when stopped, he states his oral intake is poor as his taste is very bland and nothing is good anymore.  He is agreeable to take an appetite stimulant with  marinol and protein supplements in form of shakes.  Calcium is 6.8 but when corrected for hypoalbuminemia, is 8.4.  PICC line to be removed today.  12/18 Patient feeling okay today, appetite has improved with lunch and dinner yesterday after starting marinol.  Will trial decrease of IVF and monitor blood pressure, he is not showing signs of volume overload.  He didn't eat much breakfast so I will give him tid marinol to see if that helps.    12/19 Patient feeling better today, he got out of bed and took a shower.  Blood pressure is doing about the same as yesterday despite dropping IVF rate from 100/h to 75/h slightly.     12/20 Patient without complaint, blood pressures have been stable so will try to decrease IVF rate further.  Will continue to encourage patient to get out of bed as much as tolerant.  12/21 Patient having nausea and diarrhea today, suspect as s/e of his radiation therapy, he is responding to anti-emetic and I will also start PRN imodium.  No acute events.  BP stable with rate drop of IVF to 50cc/h.  12/22 Patient doing about same with blood pressure, states nausea from yesterday has subsided.  He is eating.  Discussed with Dr Deras and oncology will see him by tomorrow to determine if chemotherapy is appropriate to be given on schedule tomorrow.  12/23 Patient with stable, low blood pressure.  Initially PICC was to be placed today but after speaking with vascular access team, they feel he is too high of a risk for another PICC induced clot and recommend either tunneled central line or PORT.  Patient only has 1 drug that needs to be received via central line (mitomycin for about 45 minute infusion).  He should have an IJ placed for chemotherapy and then this to be discontinued.    12/31 Patient appears weaker than when I saw him last week, he is post chemo and on the final few days of radiation treatment and suffering the side effects of this with skin breakdown in the perineum/rectal area.  His  blood pressures remain low though stable.  Renal function stable.  1/1 Patient without much change today, RN encouraged him to get up to shower today and he refused secondary to pain but agreed to bed bath with pain medication given.  Improvement in hygiene encouraged to the patient.  ANC dropped with dc of Granix - 1.24 today, resume granix.  1/2 Patient febrile yesterday afternoon, not reported to me.  Febrile again this am and neutropenic fever w/u underway with CXR, blood cultures and UA but patient is not able to provide sample and refuses catheterization of any sort (straight or indwelling).  CXR shows no evidence of infection or consolidation.  Empiric antibiotics started.  Poor prognosis discussed with patient and he changed his code status from full to DNR.    1/3 Patient afebrile since initiation of antibiotics.  He appears slightly better today.  He received blood this am d/t hemoglobin of 6.3.  Blood pressure has remained low but stable for him.  Lovenox discontinued due to platelet drop to 26.  1/4 Patient without complaint today, states he continues to feel weak.  Renal function slightly better with discontinuation of vanco and change to zyvox.  Natali care remains very painful despite IV dilaudid dose given, will increase.   1/5 Patient feeling about the same, feels anxious about possibility of not making it through this.  Will stop Zyvox as suspicion of MRSA is now low.  Cultures showed no growth.  Cr continues to improve.    Consultants/Specialty  none    Code Status  DNR/DNI  No ICU transfer    Disposition  tbd    Review of Systems  Review of Systems   Constitutional: Positive for malaise/fatigue. Negative for chills and fever.   HENT: Negative for congestion and sore throat.    Eyes: Negative for blurred vision and photophobia.   Respiratory: Negative for cough and shortness of breath.    Cardiovascular: Negative for chest pain, claudication and leg swelling.   Gastrointestinal: Positive for  diarrhea. Negative for abdominal pain, constipation, heartburn, nausea and vomiting.   Genitourinary: Negative for dysuria, flank pain and hematuria.        Rectal pain     Musculoskeletal: Negative for joint pain and myalgias.   Skin: Negative for itching and rash.   Neurological: Positive for weakness. Negative for dizziness, sensory change, speech change and headaches.   Psychiatric/Behavioral: Negative for depression. The patient is not nervous/anxious and does not have insomnia.         Physical Exam  Temp:  [36.4 °C (97.6 °F)-36.8 °C (98.3 °F)] 36.8 °C (98.3 °F)  Pulse:  [] 72  Resp:  [14-20] 14  BP: (83-88)/(41-51) 87/48  SpO2:  [94 %-98 %] 96 %    Physical Exam  Vitals signs and nursing note reviewed.   Constitutional:       General: He is not in acute distress.     Appearance: Normal appearance.   HENT:      Head: Normocephalic and atraumatic.   Eyes:      General: No scleral icterus.     Extraocular Movements: Extraocular movements intact.   Neck:      Musculoskeletal: Normal range of motion and neck supple.   Cardiovascular:      Rate and Rhythm: Normal rate and regular rhythm.      Pulses: Normal pulses.      Heart sounds: Normal heart sounds. No murmur.   Pulmonary:      Effort: Pulmonary effort is normal. No respiratory distress.      Breath sounds: Normal breath sounds. No wheezing, rhonchi or rales.   Abdominal:      General: Abdomen is flat. Bowel sounds are normal. There is no distension.      Palpations: Abdomen is soft.      Tenderness: There is no rebound.   Genitourinary:     Comments: Radiation breakdown perineum/rectum.    Musculoskeletal:         General: No swelling or tenderness.   Lymphadenopathy:      Cervical: No cervical adenopathy.   Skin:     Coloration: Skin is not jaundiced.      Findings: No erythema.      Comments: Rectal sore without evidence of infection, consistent with radiation changes.     Neurological:      General: No focal deficit present.      Mental Status: He is  alert and oriented to person, place, and time. Mental status is at baseline.      Cranial Nerves: No cranial nerve deficit.   Psychiatric:         Mood and Affect: Mood normal.         Behavior: Behavior normal.         Fluids  No intake or output data in the 24 hours ending 01/05/20 1601    Laboratory  Recent Labs     01/03/20  0016 01/04/20  0022 01/05/20  0015   WBC 0.1* 0.2* 0.2*   RBC 1.92* 2.74* 2.64*   HEMOGLOBIN 6.3* 8.6* 8.2*   HEMATOCRIT 18.4* 25.2* 24.8*   MCV 95.8 92.0 93.9   MCH 32.8 31.4 31.1   MCHC 34.2 34.1 33.1*   RDW 47.5 53.1* 54.7*   PLATELETCT 26* 21* 13*   MPV 11.4 12.4 14.1*     Recent Labs     01/03/20  0016 01/04/20  0022 01/05/20  0015   SODIUM 134* 136 135   POTASSIUM 4.2 3.9 3.9   CHLORIDE 109 110 112   CO2 18* 18* 17*   GLUCOSE 156* 147* 162*   BUN 35* 45* 46*   CREATININE 1.87* 1.65* 1.46*   CALCIUM 7.1* 7.5* 7.4*                   Imaging  DX-CHEST-PORTABLE (1 VIEW)   Final Result      Interstitial opacities in the mid/lower lungs, which may represent early interstitial edema. No focal consolidation or pleural effusions.      US-EXTREMITY VENOUS UPPER UNILAT RIGHT   Final Result      DX-CHEST-FOR LINE PLACEMENT Perform procedure in: Patient's Room   Final Result      1.  Right IJ central catheter tip is in the mid/distal SVC. No pneumothorax.   2.  Mild interstitial prominence, likely mild edema. No focal consolidation or pleural effusions.      EC-ECHOCARDIOGRAM COMPLETE W/O CONT   Final Result      US-EXTREMITY VENOUS UPPER UNILAT RIGHT   Final Result      US-RENAL   Final Result      1.  There is moderate bilateral hydronephrosis, more than is typically expected for history of cystectomy with neobladder formation.      DX-CHEST-PORTABLE (1 VIEW)   Final Result      No radiographic evidence of acute cardiopulmonary process.           Assessment/Plan  * DVT (deep venous thrombosis) (HCC)  Assessment & Plan  PICC line associated  Lovenox discontinued until platelet count  >50.      Pancytopenia (HCC)- (present on admission)  Assessment & Plan  Transfused 1 unit platelets  Follow CBC  Start Granix    Hypotension- (present on admission)  Assessment & Plan  Midodrine  IVF NS      Anal carcinoma ([facility])- (present on admission)  Assessment & Plan  Chemotherapy given - 5-FU and Mitomycin C - completed  Remove central line  Maceration of perineum/rectum - secondary to radiation - painful requiring IV dilaudid for mansi-care.        CKD (chronic kidney disease) stage 3, GFR 30-59 ml/min ([facility])- (present on admission)  Assessment & Plan  PTH elevated  Follow bmp    Hypomagnesemia- (present on admission)  Assessment & Plan  oral Mg    Acute respiratory failure with hypoxia ([facility])- (present on admission)  Assessment & Plan  RT protocol    Hydronephrosis, bilateral- (present on admission)  Assessment & Plan  Follow bmp closely    Hematuria- (present on admission)  Assessment & Plan  Resolved        Stomatitis- (present on admission)  Assessment & Plan  thrush vs mucositis   Finished course of Nystatin    Type 2 diabetes mellitus without complication, without long-term current use of insulin ([facility])- (present on admission)  Assessment & Plan  diet controlled    MANDIE (acute kidney injury) ([facility])- (present on admission)  Assessment & Plan  Decrease IVF NS, follow bmp    COPD (chronic obstructive pulmonary disease) ([facility])- (present on admission)  Assessment & Plan  Spiriva, RT protocol    Neutropenic fever ([facility])  Assessment & Plan  Blood culture x 2  Cxr: Interstitial opacities in the mid/lower lungs, which may represent early interstitial edema. No focal consolidation or pleural effusions  Urine - patient incontinent - refuses straight cath or sinclair  Empiric cefepime  DC zyvox as suspicion of MRSA low  Continue granix - ANC 0  Poor prognosis discussed with patient - code status changed to DNR  Patient refuses transfer to ICU and catheterization    Glaucoma of right eye- (present on admission)  Assessment &  Plan  timolol eyedrops    History of bladder cancer- (present on admission)  Assessment & Plan  Neobladder.    Current smoker- (present on admission)  Assessment & Plan  counseling       VTE prophylaxis: SCDs, AC c/i due to bleeding and thrombocytopenia.

## 2020-01-06 NOTE — PROGRESS NOTES
[facility] Medicine Daily Progress Note    Date of Service  1/6/2020    Chief Complaint  74 y.o. male admitted 12/7/2019 with oral pain and weight loss.    Hospital Course    Patient with known anal cancer undergoing radiation and chemotherapy  He has had significant oral pain and unable to swallow well the past few weeks.  He has a sore on his bottom that he has been applying hemorrhoidal cream with lidocaine for pain.  He had hematuria develop since admission when platelet count was significantly low.   He developed a DVT in RUE and is on anticoagulation with Lovenox.  He has completed chemotherapy and will complete radiation on Monday,  1/6/20.        Interval Problem Update  12/8 Patient feeling tired and blood pressure has been markedly low, he has received 2 Liters of NS bolus and LR has been running at 100/hour since admit.  He still appears dehydrated but is starting to require oxygen to maintain saturation, lungs still sound clear on examination.  He received 1 unit of platelets and urine is clearing in collection tubing from neobladder.  Cr increased overnight with hydration from 1.52 to 1.82 and this could be due to early hydration only and expect it to trend back down.  If pressure does not respond to midodrine and IVF then patient will need to transfer to ICU for pressor support.  Midodrine and solucortef started because of hypotension.    12/9 Patient less fatigued and feeling slightly better when compared to yesterday.  He has been able to eat with pain control received by MBX.  Cr trending back down following IV resuscitation and blood pressure has improved with midodrine and cortef - patient refused transfer to ICU yesterday.  Radiation continued today on schedule.    12/17 Patient with adequate blood pressure with IV hydration but tends to drop when stopped, he states his oral intake is poor as his taste is very bland and nothing is good anymore.  He is agreeable to take an appetite stimulant with  marinol and protein supplements in form of shakes.  Calcium is 6.8 but when corrected for hypoalbuminemia, is 8.4.  PICC line to be removed today.  12/18 Patient feeling okay today, appetite has improved with lunch and dinner yesterday after starting marinol.  Will trial decrease of IVF and monitor blood pressure, he is not showing signs of volume overload.  He didn't eat much breakfast so I will give him tid marinol to see if that helps.    12/19 Patient feeling better today, he got out of bed and took a shower.  Blood pressure is doing about the same as yesterday despite dropping IVF rate from 100/h to 75/h slightly.     12/20 Patient without complaint, blood pressures have been stable so will try to decrease IVF rate further.  Will continue to encourage patient to get out of bed as much as tolerant.  12/21 Patient having nausea and diarrhea today, suspect as s/e of his radiation therapy, he is responding to anti-emetic and I will also start PRN imodium.  No acute events.  BP stable with rate drop of IVF to 50cc/h.  12/22 Patient doing about same with blood pressure, states nausea from yesterday has subsided.  He is eating.  Discussed with Dr Deras and oncology will see him by tomorrow to determine if chemotherapy is appropriate to be given on schedule tomorrow.  12/23 Patient with stable, low blood pressure.  Initially PICC was to be placed today but after speaking with vascular access team, they feel he is too high of a risk for another PICC induced clot and recommend either tunneled central line or PORT.  Patient only has 1 drug that needs to be received via central line (mitomycin for about 45 minute infusion).  He should have an IJ placed for chemotherapy and then this to be discontinued.    12/31 Patient appears weaker than when I saw him last week, he is post chemo and on the final few days of radiation treatment and suffering the side effects of this with skin breakdown in the perineum/rectal area.  His  "blood pressures remain low though stable.  Renal function stable.  1/1 Patient without much change today, RN encouraged him to get up to shower today and he refused secondary to pain but agreed to bed bath with pain medication given.  Improvement in hygiene encouraged to the patient.  ANC dropped with dc of Granix - 1.24 today, resume granix.  1/2 Patient febrile yesterday afternoon, not reported to me.  Febrile again this am and neutropenic fever w/u underway with CXR, blood cultures and UA but patient is not able to provide sample and refuses catheterization of any sort (straight or indwelling).  CXR shows no evidence of infection or consolidation.  Empiric antibiotics started.  Poor prognosis discussed with patient and he changed his code status from full to DNR.    1/3 Patient afebrile since initiation of antibiotics.  He appears slightly better today.  He received blood this am d/t hemoglobin of 6.3.  Blood pressure has remained low but stable for him.  Lovenox discontinued due to platelet drop to 26.  1/4 Patient without complaint today, states he continues to feel weak.  Renal function slightly better with discontinuation of vanco and change to zyvox.  Natali care remains very painful despite IV dilaudid dose given, will increase.   1/5 Patient feeling about the same, feels anxious about possibility of not making it through this.  Will stop Zyvox as suspicion of MRSA is now low.  Cultures showed no growth.  Cr continues to improve.  1/6 Patient feeling poorly today and tells me he thinks he would like to die now and doesn't want to \"do this anymore\".  I asked multiple clarification questions and explained to him that earlier in the week when febrile, he was at risk for getting sicker and passing away but he isn't actively dying.  He is still eating and drinking, but has excruciating pain when going through perineal care from incontinence and he is suffering from this, but not dying.  He states okay and he will " keep fighting.      Consultants/Specialty  none    Code Status  DNR/DNI  No ICU transfer    Disposition  tbd    Review of Systems  Review of Systems   Constitutional: Positive for malaise/fatigue. Negative for chills and fever.   HENT: Negative for congestion and sore throat.    Eyes: Negative for blurred vision and photophobia.   Respiratory: Negative for cough and shortness of breath.    Cardiovascular: Negative for chest pain, claudication and leg swelling.   Gastrointestinal: Positive for diarrhea. Negative for abdominal pain, constipation, heartburn, nausea and vomiting.   Genitourinary: Negative for dysuria, flank pain and hematuria.        Rectal pain     Musculoskeletal: Negative for joint pain and myalgias.   Skin: Negative for itching and rash.   Neurological: Positive for weakness. Negative for dizziness, sensory change, speech change and headaches.   Psychiatric/Behavioral: Negative for depression. The patient is not nervous/anxious and does not have insomnia.         Physical Exam  Temp:  [36.5 °C (97.7 °F)-37.7 °C (99.8 °F)] 36.9 °C (98.4 °F)  Pulse:  [64-87] 87  Resp:  [16-18] 16  BP: ()/(43-60) 96/49  SpO2:  [94 %-100 %] 96 %    Physical Exam  Vitals signs and nursing note reviewed.   Constitutional:       General: He is not in acute distress.     Appearance: Normal appearance.   HENT:      Head: Normocephalic and atraumatic.   Eyes:      General: No scleral icterus.     Extraocular Movements: Extraocular movements intact.   Neck:      Musculoskeletal: Normal range of motion and neck supple.   Cardiovascular:      Rate and Rhythm: Normal rate and regular rhythm.      Pulses: Normal pulses.      Heart sounds: Normal heart sounds. No murmur.   Pulmonary:      Effort: Pulmonary effort is normal. No respiratory distress.      Breath sounds: Normal breath sounds. No wheezing, rhonchi or rales.   Abdominal:      General: Abdomen is flat. Bowel sounds are normal. There is no distension.       Palpations: Abdomen is soft.      Tenderness: There is no rebound.   Genitourinary:     Comments: Radiation breakdown perineum/rectum.    Musculoskeletal:         General: No swelling or tenderness.   Lymphadenopathy:      Cervical: No cervical adenopathy.   Skin:     Coloration: Skin is not jaundiced.      Findings: No erythema.      Comments: Rectal sore without evidence of infection, consistent with radiation changes.     Neurological:      General: No focal deficit present.      Mental Status: He is alert and oriented to person, place, and time. Mental status is at baseline.      Cranial Nerves: No cranial nerve deficit.   Psychiatric:         Mood and Affect: Mood normal.         Behavior: Behavior normal.         Fluids  No intake or output data in the 24 hours ending 01/06/20 1310    Laboratory  Recent Labs     01/04/20  0022 01/05/20  0015 01/06/20  0027   WBC 0.2* 0.2* 0.5*   RBC 2.74* 2.64* 2.60*   HEMOGLOBIN 8.6* 8.2* 8.2*   HEMATOCRIT 25.2* 24.8* 24.7*   MCV 92.0 93.9 95.0   MCH 31.4 31.1 31.5   MCHC 34.1 33.1* 33.2*   RDW 53.1* 54.7* 54.7*   PLATELETCT 21* 13* 7*   MPV 12.4 14.1*  --      Recent Labs     01/04/20  0022 01/05/20  0015 01/06/20  0027   SODIUM 136 135 139   POTASSIUM 3.9 3.9 4.2   CHLORIDE 110 112 115*   CO2 18* 17* 18*   GLUCOSE 147* 162* 125*   BUN 45* 46* 43*   CREATININE 1.65* 1.46* 1.51*   CALCIUM 7.5* 7.4* 7.4*                   Imaging  DX-CHEST-PORTABLE (1 VIEW)   Final Result      Interstitial opacities in the mid/lower lungs, which may represent early interstitial edema. No focal consolidation or pleural effusions.      US-EXTREMITY VENOUS UPPER UNILAT RIGHT   Final Result      DX-CHEST-FOR LINE PLACEMENT Perform procedure in: Patient's Room   Final Result      1.  Right IJ central catheter tip is in the mid/distal SVC. No pneumothorax.   2.  Mild interstitial prominence, likely mild edema. No focal consolidation or pleural effusions.      EC-ECHOCARDIOGRAM COMPLETE W/O CONT    Final Result      US-EXTREMITY VENOUS UPPER UNILAT RIGHT   Final Result      US-RENAL   Final Result      1.  There is moderate bilateral hydronephrosis, more than is typically expected for history of cystectomy with neobladder formation.      DX-CHEST-PORTABLE (1 VIEW)   Final Result      No radiographic evidence of acute cardiopulmonary process.           Assessment/Plan  * DVT (deep venous thrombosis) ([facility])  Assessment & Plan  PICC line associated  Lovenox discontinued until platelet count >50.      Pancytopenia ([facility])- (present on admission)  Assessment & Plan  Transfused 2 unit platelets  Follow CBC  Continue Granix    Hypotension- (present on admission)  Assessment & Plan  Midodrine  IVF NS      Anal carcinoma ([facility])- (present on admission)  Assessment & Plan  Chemotherapy given - 5-FU and Mitomycin C - completed  Remove central line  Maceration of perineum/rectum - secondary to radiation - painful requiring IV dilaudid for mansi-care.        CKD (chronic kidney disease) stage 3, GFR 30-59 ml/min ([facility])- (present on admission)  Assessment & Plan  PTH elevated  Follow bmp    Hypomagnesemia- (present on admission)  Assessment & Plan  oral Mg    Acute respiratory failure with hypoxia ([facility])- (present on admission)  Assessment & Plan  RT protocol    Hydronephrosis, bilateral- (present on admission)  Assessment & Plan  Follow bmp closely    Hematuria- (present on admission)  Assessment & Plan  Resolved        Stomatitis- (present on admission)  Assessment & Plan  thrush vs mucositis   Finished course of Nystatin    Type 2 diabetes mellitus without complication, without long-term current use of insulin ([facility])- (present on admission)  Assessment & Plan  diet controlled    MANDIE (acute kidney injury) ([facility])- (present on admission)  Assessment & Plan  Decrease IVF NS, follow bmp    COPD (chronic obstructive pulmonary disease) ([facility])- (present on admission)  Assessment & Plan  Spiriva, RT protocol    Neutropenic fever  (HCC)  Assessment & Plan  Blood culture x 2  Cxr: Interstitial opacities in the mid/lower lungs, which may represent early interstitial edema. No focal consolidation or pleural effusions  Urine - patient incontinent - refuses straight cath or sinclair  Empiric cefepime  DC zyvox as suspicion of MRSA low  Continue granix - ANC 0  Poor prognosis discussed with patient - code status changed to DNR  Patient refuses transfer to ICU and catheterization    Glaucoma of right eye- (present on admission)  Assessment & Plan  timolol eyedrops    History of bladder cancer- (present on admission)  Assessment & Plan  Neobladder.    Current smoker- (present on admission)  Assessment & Plan  counseling       VTE prophylaxis: SCDs, AC c/i due to bleeding and thrombocytopenia.

## 2020-01-06 NOTE — DISCHARGE PLANNING
Anticipated Discharge Disposition: Home vs SNF    Action: Discussed in IDT rounds that the patient will possibly need SNF, however he has good friends who are willing to take him in and take care of him.    Barriers to Discharge: medical clearance    Plan: will continue to follow for any discharge needs/barriers.

## 2020-01-06 NOTE — PROGRESS NOTES
Salvador from Lab called with critical result of WBC of 0.5 and Platelets of 7 at 0240. Critical lab result read back to Salvador.   Dr. Turner notified of critical lab result at 0250.  Critical lab result read back by Dr. Turner.

## 2020-01-06 NOTE — PROGRESS NOTES
[id number] - [id number]    Pt feeling confused this morning, recognizes that he is confused but cannot recall time or which city we're in.   Affect is fairly flat this morning as well, pt reported he is feeling scared and unsure of what he wants to continue doing. This was reported to Dr. Mckenzie, plan to address goals of care again today w/ patient.    Radiation not completed today per pt decision. Continue remainder of care as pt agrees.     Platelets at 7 this morning w/ minimal hematuria and no other obvious signs of bleeding. X1 unit of platelets ordered and administered.     Not attempted out of bed today d/t pt request (pain) and weakness. 2L O2 via nasal canula intact and sufficient this shift.     Alicja Crowell

## 2020-01-07 PROBLEM — R31.9 HEMATURIA: Status: RESOLVED | Noted: 2019-01-01 | Resolved: 2020-01-01

## 2020-01-07 PROBLEM — Z71.89 ACP (ADVANCE CARE PLANNING): Status: ACTIVE | Noted: 2020-01-01

## 2020-01-07 NOTE — CARE PLAN
Problem: Safety  Goal: Will remain free from falls  Outcome: PROGRESSING AS EXPECTED  Note:   Call light within reach, bed in the low and locked position, patient wearing non-slip socks, and rounded on hourly.       Problem: Pain Management  Goal: Pain level will decrease to patient's comfort goal  Outcome: PROGRESSING SLOWER THAN EXPECTED  Note:   Patient educated on and offered pain management interventions. Patient reports pain level of 7/10 and appears to be in 10 out of 10 pain during dressing changes.

## 2020-01-07 NOTE — PROGRESS NOTES
Assumed patient care at 1900. Pt is oriented to self and place but a little confused regarding time and a x1 assist. Bed alarm is on and in the low and locked position. Call light within reach, wearing non-slip socks, and rounded on hourly. Patient medicated for pain per MAR prior to dressing change and still appeared to be in 10 out of 10 pain during cleansing/changing of dressing. Sacrum and perineal area red and painful. Neutropenic precautions in place.

## 2020-01-07 NOTE — PROGRESS NOTES
[facility] Medicine Daily Progress Note    Date of Service  1/7/2020    Chief Complaint  74 y.o. male admitted 12/7/2019 with oral pain and weight loss.    Hospital Course    Patient with known anal cancer undergoing radiation and chemotherapy  He has had significant oral pain and unable to swallow well the past few weeks.  He has a sore on his bottom that he has been applying hemorrhoidal cream with lidocaine for pain.  He had hematuria develop since admission when platelet count was significantly low.   He developed a DVT in RUE and is on anticoagulation with Lovenox.  He has completed chemotherapy and will complete radiation on Monday,  1/6/20.        Interval Problem Update  12/8 Patient feeling tired and blood pressure has been markedly low, he has received 2 Liters of NS bolus and LR has been running at 100/hour since admit.  He still appears dehydrated but is starting to require oxygen to maintain saturation, lungs still sound clear on examination.  He received 1 unit of platelets and urine is clearing in collection tubing from neobladder.  Cr increased overnight with hydration from 1.52 to 1.82 and this could be due to early hydration only and expect it to trend back down.  If pressure does not respond to midodrine and IVF then patient will need to transfer to ICU for pressor support.  Midodrine and solucortef started because of hypotension.    12/9 Patient less fatigued and feeling slightly better when compared to yesterday.  He has been able to eat with pain control received by MBX.  Cr trending back down following IV resuscitation and blood pressure has improved with midodrine and cortef - patient refused transfer to ICU yesterday.  Radiation continued today on schedule.    12/17 Patient with adequate blood pressure with IV hydration but tends to drop when stopped, he states his oral intake is poor as his taste is very bland and nothing is good anymore.  He is agreeable to take an appetite stimulant with  marinol and protein supplements in form of shakes.  Calcium is 6.8 but when corrected for hypoalbuminemia, is 8.4.  PICC line to be removed today.  12/18 Patient feeling okay today, appetite has improved with lunch and dinner yesterday after starting marinol.  Will trial decrease of IVF and monitor blood pressure, he is not showing signs of volume overload.  He didn't eat much breakfast so I will give him tid marinol to see if that helps.    12/19 Patient feeling better today, he got out of bed and took a shower.  Blood pressure is doing about the same as yesterday despite dropping IVF rate from 100/h to 75/h slightly.     12/20 Patient without complaint, blood pressures have been stable so will try to decrease IVF rate further.  Will continue to encourage patient to get out of bed as much as tolerant.  12/21 Patient having nausea and diarrhea today, suspect as s/e of his radiation therapy, he is responding to anti-emetic and I will also start PRN imodium.  No acute events.  BP stable with rate drop of IVF to 50cc/h.  12/22 Patient doing about same with blood pressure, states nausea from yesterday has subsided.  He is eating.  Discussed with Dr Deras and oncology will see him by tomorrow to determine if chemotherapy is appropriate to be given on schedule tomorrow.  12/23 Patient with stable, low blood pressure.  Initially PICC was to be placed today but after speaking with vascular access team, they feel he is too high of a risk for another PICC induced clot and recommend either tunneled central line or PORT.  Patient only has 1 drug that needs to be received via central line (mitomycin for about 45 minute infusion).  He should have an IJ placed for chemotherapy and then this to be discontinued.  12/31 Patient appears weaker than when I saw him last week, he is post chemo and on the final few days of radiation treatment and suffering the side effects of this with skin breakdown in the perineum/rectal area.  His  "blood pressures remain low though stable.  Renal function stable.  1/1 Patient without much change today, RN encouraged him to get up to shower today and he refused secondary to pain but agreed to bed bath with pain medication given.  Improvement in hygiene encouraged to the patient.  ANC dropped with dc of Granix - 1.24 today, resume granix.  1/2 Patient febrile yesterday afternoon, not reported to me.  Febrile again this am and neutropenic fever w/u underway with CXR, blood cultures and UA but patient is not able to provide sample and refuses catheterization of any sort (straight or indwelling).  CXR shows no evidence of infection or consolidation.  Empiric antibiotics started.  Poor prognosis discussed with patient and he changed his code status from full to DNR.    1/3 Patient afebrile since initiation of antibiotics.  He appears slightly better today.  He received blood this am d/t hemoglobin of 6.3.  Blood pressure has remained low but stable for him.  Lovenox discontinued due to platelet drop to 26.  1/4 Patient without complaint today, states he continues to feel weak.  Renal function slightly better with discontinuation of vanco and change to zyvox.  Natali care remains very painful despite IV dilaudid dose given, will increase.   1/5 Patient feeling about the same, feels anxious about possibility of not making it through this.  Will stop Zyvox as suspicion of MRSA is now low.  Cultures showed no growth.  Cr continues to improve.  1/6 Patient feeling poorly today and tells me he thinks he would like to die now and doesn't want to \"do this anymore\".  I asked multiple clarification questions and explained to him that earlier in the week when febrile, he was at risk for getting sicker and passing away but he isn't actively dying.  He is still eating and drinking, but has excruciating pain when going through perineal care from incontinence and he is suffering from this, but not dying.  He states okay and he will " keep fighting.  1/7, Hb low 6.8,, I am transfusing.  Leukopenia WBC 0.6, neutropenia 350, patient is feeling weaker. I had a prolonged discussion with the patient regarding goals of care, diagnoses, prognosis, and prognosis.  Patient has poor functional performance, advanced age with multiple complications related to treatment of his anal cancer.  Including pancytopenia, acute kidney injury and deep vein thrombosis.  In addition to his comorbid conditions including COPD and chronic kidney disease.  He wants to continue with comfort care measures only.  Stopping antibiotics no further lab tests.  I offered to update his friend, Nighat.  However, he did not want me to update her.  Comfort care measures only initiated. I spent 25 minutes on advanced care planning in addition to the time spent managing the other medical problems.       Consultants/Specialty  None    Code Status  DNR/DNI, transition to comfort measures only 1/7    Disposition  Hospice     Review of Systems  Review of Systems   Constitutional: Positive for malaise/fatigue. Negative for chills and fever.   HENT: Negative for congestion and sore throat.    Eyes: Negative for blurred vision and photophobia.   Respiratory: Negative for cough and shortness of breath.    Cardiovascular: Negative for chest pain, claudication and leg swelling.   Gastrointestinal: Positive for diarrhea. Negative for abdominal pain, constipation, heartburn, nausea and vomiting.   Genitourinary: Negative for dysuria, flank pain and hematuria.        Rectal pain     Musculoskeletal: Positive for myalgias. Negative for joint pain.   Skin: Negative for itching and rash.   Neurological: Positive for weakness. Negative for dizziness, sensory change, speech change and headaches.   Psychiatric/Behavioral: Negative for depression. The patient is not nervous/anxious and does not have insomnia.       Physical Exam  Temp:  [36.4 °C (97.5 °F)-37.8 °C (100.1 °F)] 36.5 °C (97.7 °F)  Pulse:  [76-88]  76  Resp:  [18-20] 18  BP: ()/(47-67) 103/58  SpO2:  [95 %-100 %] 100 %    Physical Exam  Vitals signs and nursing note reviewed.   Constitutional:       General: He is not in acute distress.     Appearance: Normal appearance.      Comments: Chronically ill-appearing   HENT:      Head: Normocephalic and atraumatic.      Mouth/Throat:      Mouth: Mucous membranes are dry.   Eyes:      General: No scleral icterus.     Extraocular Movements: Extraocular movements intact.   Neck:      Musculoskeletal: Normal range of motion and neck supple.   Cardiovascular:      Rate and Rhythm: Normal rate and regular rhythm.      Pulses: Normal pulses.      Heart sounds: Normal heart sounds. No murmur.   Pulmonary:      Effort: Pulmonary effort is normal. No respiratory distress.      Breath sounds: Normal breath sounds. No wheezing, rhonchi or rales.   Abdominal:      General: Abdomen is flat. Bowel sounds are normal. There is no distension.      Palpations: Abdomen is soft.      Tenderness: There is no rebound.   Genitourinary:     Comments: Radiation breakdown perineum/rectum.  Musculoskeletal:         General: No swelling or tenderness.   Lymphadenopathy:      Cervical: No cervical adenopathy.   Skin:     Coloration: Skin is not jaundiced.      Findings: No erythema.      Comments: Rectal sore   Neurological:      General: No focal deficit present.      Mental Status: He is alert and oriented to person, place, and time. Mental status is at baseline.      Cranial Nerves: No cranial nerve deficit.   Psychiatric:         Mood and Affect: Mood normal.         Behavior: Behavior normal.         Fluids  No intake or output data in the 24 hours ending 01/07/20 1812    Laboratory  Recent Labs     01/05/20  0015 01/06/20  0027 01/07/20  0021   WBC 0.2* 0.5* 0.6*   RBC 2.64* 2.60* 2.24*   HEMOGLOBIN 8.2* 8.2* 6.8*   HEMATOCRIT 24.8* 24.7* 21.2*   MCV 93.9 95.0 94.6   MCH 31.1 31.5 30.4   MCHC 33.1* 33.2* 32.1*   RDW 54.7* 54.7* 55.3*    PLATELETCT 13* 7* 17*   MPV 14.1*  --  11.5     Recent Labs     01/05/20  0015 01/06/20  0027 01/07/20  0021   SODIUM 135 139 139   POTASSIUM 3.9 4.2 4.3   CHLORIDE 112 115* 116*   CO2 17* 18* 17*   GLUCOSE 162* 125* 138*   BUN 46* 43* 45*   CREATININE 1.46* 1.51* 1.38   CALCIUM 7.4* 7.4* 7.3*                   Imaging  DX-CHEST-PORTABLE (1 VIEW)   Final Result      Interstitial opacities in the mid/lower lungs, which may represent early interstitial edema. No focal consolidation or pleural effusions.      US-EXTREMITY VENOUS UPPER UNILAT RIGHT   Final Result      DX-CHEST-FOR LINE PLACEMENT Perform procedure in: Patient's Room   Final Result      1.  Right IJ central catheter tip is in the mid/distal SVC. No pneumothorax.   2.  Mild interstitial prominence, likely mild edema. No focal consolidation or pleural effusions.      EC-ECHOCARDIOGRAM COMPLETE W/O CONT   Final Result      US-EXTREMITY VENOUS UPPER UNILAT RIGHT   Final Result      US-RENAL   Final Result      1.  There is moderate bilateral hydronephrosis, more than is typically expected for history of cystectomy with neobladder formation.      DX-CHEST-PORTABLE (1 VIEW)   Final Result      No radiographic evidence of acute cardiopulmonary process.           Assessment/Plan  * DVT (deep venous thrombosis) (HCC)  Assessment & Plan  PICC line associated  Lovenox discontinued until platelet count >50.  Transition to comfort care measures only since 1/7     Pancytopenia (HCC)- (present on admission)  Assessment & Plan  Status post blood transfusions and Granix.  Transition to comfort care measures only since 1/7    Hypotension- (present on admission)  Assessment & Plan  Midodrine  IVF NS  Transition to comfort care measures only since 1/7     Anal carcinoma (HCC)- (present on admission)  Assessment & Plan  Chemotherapy given - 5-FU and Mitomycin C - completed  Remove central line  Maceration of perineum/rectum - secondary to radiation - painful requiring IV  dilaudid for mansi-care.  Transition to comfort care measures only since 1/7     CKD (chronic kidney disease) stage 3, GFR 30-59 ml/min ([facility])- (present on admission)  Assessment & Plan  Transition to comfort care measures only since 1/7     Hypomagnesemia- (present on admission)  Assessment & Plan  Transition to comfort care measures only since 1/7     Acute respiratory failure with hypoxia ([facility])- (present on admission)  Assessment & Plan  RT protocol    Hydronephrosis, bilateral- (present on admission)  Assessment & Plan  Transition to comfort care measures only since 1/7     Stomatitis- (present on admission)  Assessment & Plan  thrush vs mucositis   Finished course of Nystatin     Type 2 diabetes mellitus without complication, without long-term current use of insulin ([facility])- (present on admission)  Assessment & Plan  Diet controlled  Transition to comfort care measures only since 1/7     MANDIE (acute kidney injury) ([facility])- (present on admission)  Assessment & Plan  Decrease IVF NS, follow bmp   Transition to comfort care measures only since 1/7     COPD (chronic obstructive pulmonary disease) ([facility])- (present on admission)  Assessment & Plan  Spiriva, RT protocol  Transition to comfort care measures only since 1/7     ACP (advance care planning)  Assessment & Plan  On 1/7/2020, I had a prolonged discussion with the patient regarding goals of care, diagnoses, prognosis, and prognosis.  Patient has poor functional performance, advanced age with multiple complications related to treatment of his anal cancer.  Including pancytopenia, acute kidney injury and deep vein thrombosis.  In addition to his comorbid conditions including COPD and chronic kidney disease.    He wants to continue with comfort care measures only.  Stopping antibiotics no further lab tests.  Comfort care measures initiated 1/7/2020     Neutropenic fever ([facility])  Assessment & Plan  Blood culture x 2  Cxr: Interstitial opacities in the mid/lower lungs, which  may represent early interstitial edema. 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    Current smoker- (present on admission)  Assessment & Plan  Counseled      VTE prophylaxis: On comfort care

## 2020-01-07 NOTE — PROGRESS NOTES
Matias from Lab called with critical result of WBC of 0.6, Platelets of 17 and Preliminary ANC of 0.30 at 0105. Critical lab result read back to Matias.   This critical lab result is within parameters established by  for this patient

## 2020-01-07 NOTE — PROGRESS NOTES
[id number] - [id number]    Pt awake, alert, oriented x2 (disorientation to time and place but oriented to situation and person). C/o pain and requesting staff to 'just let me go', declining medications overnight and this morning. Pts requests discussed w/ Dr. Goel. Pt now comfort care.     Pain well controlled this afternoon w/ Oral morphine, additional required in the evening w/ dressing change. Mentation cleared throughout afternoon w/ presence of friends at bedside. Friends (Kirti, Marla) and family (brother, Gino) updated on pt status and change in goals of care.     Alicja Crowell

## 2020-01-07 NOTE — CARE PLAN
Problem: Communication  Goal: The ability to communicate needs accurately and effectively will improve  Outcome: PROGRESSING AS EXPECTED  Note:   Pt still confused this morning regarding time but is very clearly communicating his care wishes to myself and to Dr. Goel.      Problem: Pain Management  Goal: Pain level will decrease to patient's comfort goal  Outcome: PROGRESSING SLOWER THAN EXPECTED  Note:   Goals of care transitioning today, new pain medications ordered.     Alicja Crowell

## 2020-01-07 NOTE — PROGRESS NOTES
Oncology/Hematology Progress Note               Author: Rasheed CASIMIRODebbie Sean Date & Time created: 1/7/2020  11:17 AM     CC: Anal cancer status post chemo RT    Interval History:  The patient tells me that he is decided that he wants to go down a palliative route.  He is not sure that he wants to go through more treatment in the sense of even transfusions.    Review of Systems:  Review of Systems   Constitutional: Positive for malaise/fatigue and weight loss. Negative for chills and fever.   HENT: Negative for hearing loss and tinnitus.    Eyes: Negative for blurred vision and double vision.   Respiratory: Negative for cough.    Gastrointestinal: Negative for heartburn and nausea.   Musculoskeletal: Positive for myalgias.   Neurological: Negative for dizziness and headaches.   Endo/Heme/Allergies: Does not bruise/bleed easily.   Psychiatric/Behavioral: Positive for depression.       Physical Exam:  Physical Exam  Constitutional:       Appearance: Normal appearance.   Eyes:      Pupils: Pupils are equal, round, and reactive to light.   Cardiovascular:      Rate and Rhythm: Normal rate and regular rhythm.   Neurological:      General: No focal deficit present.      Mental Status: He is alert and oriented to person, place, and time.   Psychiatric:         Mood and Affect: Mood normal.         Thought Content: Thought content normal.         Judgment: Judgment normal.         Labs:          Recent Labs     01/05/20  0015 01/06/20  0027 01/07/20  0021   SODIUM 135 139 139   POTASSIUM 3.9 4.2 4.3   CHLORIDE 112 115* 116*   CO2 17* 18* 17*   BUN 46* 43* 45*   CREATININE 1.46* 1.51* 1.38   CALCIUM 7.4* 7.4* 7.3*     Recent Labs     01/05/20  0015 01/06/20  0027 01/07/20  0021   ALTSGPT 14 19 17   ASTSGOT 10* 12 11*   ALKPHOSPHAT 41 52 43   TBILIRUBIN 0.5 0.5 0.7   GLUCOSE 162* 125* 138*     Recent Labs     01/05/20  0015 01/06/20  0027 01/07/20  0021   RBC 2.64* 2.60* 2.24*   HEMOGLOBIN 8.2* 8.2* 6.8*   HEMATOCRIT 24.8* 24.7*  21.2*   PLATELETCT 13* 7* 17*     Recent Labs     20  0015 20  0027 20  0021   WBC 0.2* 0.5* 0.6*   NEUTSPOLYS cancel 67.30 54.80   LYMPHOCYTES cancel 9.70* 28.70   MONOCYTES cancel 17.70* 12.10   EOSINOPHILS cancel 0.00 0.00   BASOPHILS cancel 0.00 0.00   ASTSGOT 10* 12 11*   ALTSGPT 14 19 17   ALKPHOSPHAT 41 52 43   TBILIRUBIN 0.5 0.5 0.7     Recent Labs     20  0015 20  0027 20  0021   SODIUM 135 139 139   POTASSIUM 3.9 4.2 4.3   CHLORIDE 112 115* 116*   CO2 17* 18* 17*   GLUCOSE 162* 125* 138*   BUN 46* 43* 45*   CREATININE 1.46* 1.51* 1.38   CALCIUM 7.4* 7.4* 7.3*     Hemodynamics:  Temp (24hrs), Av.9 °C (98.4 °F), Min:36.4 °C (97.5 °F), Max:37.8 °C (100.1 °F)  Temperature: 36.5 °C (97.7 °F)  Pulse  Av.6  Min: 52  Max: 116   Blood Pressure : 103/58     Respiratory:    Respiration: 18, Pulse Oximetry: 100 %     Work Of Breathing / Effort: Mild  RUL Breath Sounds: Diminished, RML Breath Sounds: Diminished, RLL Breath Sounds: Diminished, VILLA Breath Sounds: Diminished, LLL Breath Sounds: Diminished  Fluids:    Intake/Output Summary (Last 24 hours) at 2020 1117  Last data filed at 2020 1516  Gross per 24 hour   Intake 100 ml   Output --   Net 100 ml        GI/Nutrition:  Orders Placed This Encounter   Procedures   • Diet Order Low Fiber(GI Soft)     Standing Status:   Standing     Number of Occurrences:   1     Order Specific Question:   Diet:     Answer:   Low Fiber(GI Soft) [2]     Medical Decision Making, by Problem:  Active Hospital Problems    Diagnosis   • *DVT (deep venous thrombosis) (HCC) [I82.409]   • Pancytopenia (HCC) [D61.818]   • Hypotension [I95.9]   • Anal carcinoma (HCC) [C21.0]   • CKD (chronic kidney disease) stage 3, GFR 30-59 ml/min ([facility]) [N18.3]   • Hydronephrosis, bilateral [N13.30]   • Acute respiratory failure with hypoxia ([facility]) [J96.01]   • Hypomagnesemia [E83.42]   • Hematuria [R31.9]   • MANDIE (acute kidney injury) ([facility]) [N17.9]   • Type  2 diabetes mellitus without complication, without long-term current use of insulin (HCC) [E11.9]   • Stomatitis [K12.1]   • COPD (chronic obstructive pulmonary disease) (HCC) [J44.9]   • Current smoker [F17.200]   • Glaucoma of right eye [H40.9]   • History of bladder cancer [Z85.51]   • Neutropenic fever (HCC) [D70.9, R50.81]       Plan:  Anal cancer status post 5-FU and mitomycin with radiation.  He stopped radiation short by 3 days.  He ended up having neutropenic sepsis with clinical decline and skin breakdown.  The patient has required growth factor support as well as transfusions.  He is very debilitated.    He tells me today that he is interested in stopping transfusions and other therapies.  He wants to meet with palliative care and hospice.  He seems in very clear state of mind.  He understands that his time could be limited without the support that he is getting currently.  He tells me he has no other family.    I relayed all this to the primary team.  They will discuss everything with the patient as well and confirm his wishes.  They will consult with hospice if that is what is decision is even when they speak to him.      Moderate complexity/moderate decision making  Quality-Core Measures   Reviewed items::  Labs reviewed

## 2020-01-08 NOTE — DISCHARGE PLANNING
Anticipated Discharge Disposition: TBD with Hospice sercices    Action: Spoke with the patient and his friend Marla at the bedside about hospice choices. The patient picked [facility] and signed the choice form.  Choice form faxed to [facility] at [phone].    Barriers to Discharge: Hospice acceptance and placement.    Plan: will continue to follow for any discharge needs/barriers.

## 2020-01-08 NOTE — WOUND TEAM
[facility]  Inpatient Services   Wound and Skin Care Progress    Admission Date: 12/7/2019     Consult Date:1/8  [facility], [facility], SH: Reviewed    Unit where seen by Wound Team: [id number]     WOUND CONSULT RELATED TO:  Radiation burn to sacrum, groin and penis follow up       Self Report / Pain Level:  Max pain with site care, Premedicated by RN.    OBJECTIVE:  Pt on pressure redistribution mattress with waffle overlay. Viscopaste Dressing to sacrum/perineum.    WOUND TYPE, LOCATION, CHARACTERISTICS (Pressure Injuries: location, stage, POA or date identified)        Wound 12/07/19 Radiation Perineum;Buttocks;Sacrum;Penis (Active)   Wound Image     1/8/2020  8:50 AM   Site Assessment Red;Pink;Dusky;Brown    Natali-wound Assessment Pale;Red;Pink;Brown    Margins Undefined edges    Wound Width (cm) 1 cm    Wound Depth (cm) 0.2 cm    Wound Surface Area (cm^2) 4 cm^2    Tunneling 0 cm    Undermining 0 cm    Closure None    Drainage Amount Small    Drainage Description Sanguineous    Non-staged Wound Description Full thickness    Treatments Cleansed;Site care    Periwound Protectant Barrier Paste    Dressing Options Petrolatum Gauze (yellow)    Dressing Cleansing/Solutions Normal Saline    Dressing Changed New    Dressing Status Clean;Dry;Intact    Dressing Change Frequency As Needed    NEXT Weekly Photo (Inpatient Only) 01/15/20    WOUND NURSE ONLY - Odor None    WOUND NURSE ONLY - Exposed Structures None    WOUND NURSE ONLY - Tissue Type and Percentage 50% red %50 % brown pink        Vascular:    Dorsal Pedal pulses:  NA    Posterior tib pulses:   NA    HALIMA:     NA    Lab Values:    Lab Results   Component Value Date/Time    WBC 0.6 (LL) 01/07/2020 12:21 AM    RBC 2.24 (L) 01/07/2020 12:21 AM    HEMOGLOBIN 6.8 (L) 01/07/2020 12:21 AM    HEMATOCRIT 21.2 (L) 01/07/2020 12:21 AM        Lab Results   Component Value Date/Time    HBA1C 6.4 (H) 10/03/2019 01:34 PM        Culture:   NA      INTERVENTIONS BY WOUND TEAM:   Pt turned to right side, dressing removed and wound and mansi wound cleansed with water. Dried with gauze. Measurement and pictures obtained. Sacrum, bilateral ischia, perineum, penis crusted with stoma powder and no sting. 2 xeroform and ABD pads to sacrum,  secured with 2 sacral mepilex. Barrier paste to Perineum and inner thighs       Dressing Selection:  2 xeroform, 2 mepilex, stoma powder, no sting barrier,  ABD pads.    Interdisciplinary consultation: Patient, SAMIA Helm    EVALUATION: Pt presenting with radiation burn, partial thickness wounds extending from sacrum to penis. Therapy completed this week and pt has now transitioned to comfort care. Wounds continue to be extremely painful to touch. Pt is also incontinent of bowel and bladder which requires frequent sheet changes and is not able to tolerate condom cath application due to pain at this time. Sacral skin is now presenting with small, sanguinous drainage, xeroform applied to privide bacteriostatic protection, abd pads applied for absorption and mepilex used to offload pressure form site. Nursing to perform dressing changes as needed. Penis may be left EWA as pt continues to be incontinent, barrier ointment applied to help control moisture and decrease irritation. Nursing to place inter dry under scrotum to wick out moisture.    Factors affecting wound healing: radiation therapy, impaired mobility, pain, anal carcinoma, DMII, CKD, tobacco.  Goals: Steady decrease in wound area and depth weekly.    NURSING PLAN OF CARE ORDERS (X):    Dressing changes: See Dressing Care orders: x  Skin care: See Skin Care orders:   Rectal tube care: See Rectal Tube Care orders:   Other orders:    WOUND TEAM PLAN OF CARE (X):   NPWT change 3 x week:        Dressing changes by wound team:       Follow up as needed:     Nursing to perform dressing change, wound team to follow weekly  Other (explain):     Anticipated discharge plans (X):       Home Care:           Outpatient  Wound Center:            Self Care:            Other:     Hospice- will benefit from comfort oriented wound care.

## 2020-01-08 NOTE — DISCHARGE PLANNING
Received Choice form at [phone]  Agency/Facility Name: [facility]  Referral sent per Choice form @ [phone]

## 2020-01-08 NOTE — ASSESSMENT & PLAN NOTE
On 1/7/2020, I had a prolonged discussion with the patient regarding goals of care, diagnoses, prognosis, and prognosis.  Patient has poor functional performance, advanced age with multiple complications related to treatment of his anal cancer.  Including pancytopenia, acute kidney injury and deep vein thrombosis.  In addition to his comorbid conditions including COPD and chronic kidney disease.    He wants to continue with comfort care measures only.  Stopping antibiotics no further lab tests.  Comfort care measures initiated 1/7/2020

## 2020-01-08 NOTE — PROGRESS NOTES
Received report & assumed care of patient at 1900. Assessment completed. Patient is comfort care. ANGELO DURAN patent, SL. Patient reports pain of 6/10, medication provided per MAR. Patient is on 2L oxygen via nasal cannula. POC discussed & questions answered. Bed locked and in lowest position, bed alarm is on, call light within reach, non-skid socks in place, hourly rounding. Patient reports no further needs at this time.

## 2020-01-08 NOTE — PROGRESS NOTES
[facility] Medicine Daily Progress Note    Date of Service  1/8/2020    Chief Complaint  74 y.o. male admitted 12/7/2019 with oral pain and weight loss.    Hospital Course    Patient with known anal cancer undergoing radiation and chemotherapy  He has had significant oral pain and unable to swallow well the past few weeks.  He has a sore on his bottom that he has been applying hemorrhoidal cream with lidocaine for pain.  He had hematuria develop since admission when platelet count was significantly low.   He developed a DVT in RUE and is on anticoagulation with Lovenox.  He has completed chemotherapy and will complete radiation on Monday,  1/6/20.        Interval Problem Update  12/8 Patient feeling tired and blood pressure has been markedly low, he has received 2 Liters of NS bolus and LR has been running at 100/hour since admit.  He still appears dehydrated but is starting to require oxygen to maintain saturation, lungs still sound clear on examination.  He received 1 unit of platelets and urine is clearing in collection tubing from neobladder.  Cr increased overnight with hydration from 1.52 to 1.82 and this could be due to early hydration only and expect it to trend back down.  If pressure does not respond to midodrine and IVF then patient will need to transfer to ICU for pressor support.  Midodrine and solucortef started because of hypotension.    12/9 Patient less fatigued and feeling slightly better when compared to yesterday.  He has been able to eat with pain control received by MBX.  Cr trending back down following IV resuscitation and blood pressure has improved with midodrine and cortef - patient refused transfer to ICU yesterday.  Radiation continued today on schedule.    12/17 Patient with adequate blood pressure with IV hydration but tends to drop when stopped, he states his oral intake is poor as his taste is very bland and nothing is good anymore.  He is agreeable to take an appetite stimulant with  marinol and protein supplements in form of shakes.  Calcium is 6.8 but when corrected for hypoalbuminemia, is 8.4.  PICC line to be removed today.  12/18 Patient feeling okay today, appetite has improved with lunch and dinner yesterday after starting marinol.  Will trial decrease of IVF and monitor blood pressure, he is not showing signs of volume overload.  He didn't eat much breakfast so I will give him tid marinol to see if that helps.    12/19 Patient feeling better today, he got out of bed and took a shower.  Blood pressure is doing about the same as yesterday despite dropping IVF rate from 100/h to 75/h slightly.     12/20 Patient without complaint, blood pressures have been stable so will try to decrease IVF rate further.  Will continue to encourage patient to get out of bed as much as tolerant.  12/21 Patient having nausea and diarrhea today, suspect as s/e of his radiation therapy, he is responding to anti-emetic and I will also start PRN imodium.  No acute events.  BP stable with rate drop of IVF to 50cc/h.  12/22 Patient doing about same with blood pressure, states nausea from yesterday has subsided.  He is eating.  Discussed with Dr Deras and oncology will see him by tomorrow to determine if chemotherapy is appropriate to be given on schedule tomorrow.  12/23 Patient with stable, low blood pressure.  Initially PICC was to be placed today but after speaking with vascular access team, they feel he is too high of a risk for another PICC induced clot and recommend either tunneled central line or PORT.  Patient only has 1 drug that needs to be received via central line (mitomycin for about 45 minute infusion).  He should have an IJ placed for chemotherapy and then this to be discontinued.  12/31 Patient appears weaker than when I saw him last week, he is post chemo and on the final few days of radiation treatment and suffering the side effects of this with skin breakdown in the perineum/rectal area.  His  "blood pressures remain low though stable.  Renal function stable.  1/1 Patient without much change today, RN encouraged him to get up to shower today and he refused secondary to pain but agreed to bed bath with pain medication given.  Improvement in hygiene encouraged to the patient.  ANC dropped with dc of Granix - 1.24 today, resume granix.  1/2 Patient febrile yesterday afternoon, not reported to me.  Febrile again this am and neutropenic fever w/u underway with CXR, blood cultures and UA but patient is not able to provide sample and refuses catheterization of any sort (straight or indwelling).  CXR shows no evidence of infection or consolidation.  Empiric antibiotics started.  Poor prognosis discussed with patient and he changed his code status from full to DNR.    1/3 Patient afebrile since initiation of antibiotics.  He appears slightly better today.  He received blood this am d/t hemoglobin of 6.3.  Blood pressure has remained low but stable for him.  Lovenox discontinued due to platelet drop to 26.  1/4 Patient without complaint today, states he continues to feel weak.  Renal function slightly better with discontinuation of vanco and change to zyvox.  Natali care remains very painful despite IV dilaudid dose given, will increase.   1/5 Patient feeling about the same, feels anxious about possibility of not making it through this.  Will stop Zyvox as suspicion of MRSA is now low.  Cultures showed no growth.  Cr continues to improve.  1/6 Patient feeling poorly today and tells me he thinks he would like to die now and doesn't want to \"do this anymore\".  I asked multiple clarification questions and explained to him that earlier in the week when febrile, he was at risk for getting sicker and passing away but he isn't actively dying.  He is still eating and drinking, but has excruciating pain when going through perineal care from incontinence and he is suffering from this, but not dying.  He states okay and he will " keep fighting.  1/7, Hb low 6.8,, I am transfusing.  Leukopenia WBC 0.6, neutropenia 350, patient is feeling weaker. I had a prolonged discussion with the patient regarding goals of care, diagnoses, prognosis, and prognosis.  Patient has poor functional performance, advanced age with multiple complications related to treatment of his anal cancer.  Including pancytopenia, acute kidney injury and deep vein thrombosis.  In addition to his comorbid conditions including COPD and chronic kidney disease.  He wants to continue with comfort care measures only.  Stopping antibiotics no further lab tests.  I offered to update his friend, Nighat.  However, he did not want me to update her.  Comfort care measures only initiated. I spent 25 minutes on advanced care planning in addition to the time spent managing the other medical problems.   1/8, appears comfortable, denies having uncontrollable pain.  Accepted by [facility].  Pending placement. Discussed with patient, patient's nurse and with multidisciplinary team during rounds including , pharmacist and charge nurse.      Consultants/Specialty  None    Code Status  DNR/DNI, comfort care since 1/7    Disposition  Hospice, [facility]    Review of Systems  Review of Systems   Constitutional: Positive for malaise/fatigue. Negative for chills and fever.   HENT: Negative for congestion and sore throat.    Eyes: Negative for blurred vision and photophobia.   Respiratory: Negative for cough and shortness of breath.    Cardiovascular: Negative for chest pain, claudication and leg swelling.   Gastrointestinal: Positive for diarrhea. Negative for abdominal pain, constipation, heartburn, nausea and vomiting.   Genitourinary: Negative for dysuria, flank pain and hematuria.        Rectal pain     Musculoskeletal: Positive for myalgias. Negative for joint pain.   Skin: Negative for itching and rash.   Neurological: Positive for weakness. Negative for dizziness, sensory  change, speech change and headaches.   Psychiatric/Behavioral: Negative for depression. The patient is not nervous/anxious and does not have insomnia.       Physical Exam  Temp:  [36.5 °C (97.7 °F)-36.8 °C (98.3 °F)] 36.8 °C (98.3 °F)  Pulse:  [76-92] 92  Resp:  [18-20] 18  BP: ()/(57-59) 91/59  SpO2:  [94 %-100 %] 94 %    Physical Exam  Vitals signs and nursing note reviewed.   Constitutional:       General: He is not in acute distress.     Appearance: Normal appearance.      Comments: Chronically ill-appearing   HENT:      Head: Normocephalic and atraumatic.      Mouth/Throat:      Mouth: Mucous membranes are dry.   Eyes:      General: No scleral icterus.     Extraocular Movements: Extraocular movements intact.   Neck:      Musculoskeletal: Normal range of motion and neck supple.   Cardiovascular:      Rate and Rhythm: Normal rate and regular rhythm.      Pulses: Normal pulses.      Heart sounds: Normal heart sounds. No murmur.   Pulmonary:      Effort: Pulmonary effort is normal. No respiratory distress.      Breath sounds: Normal breath sounds. No wheezing, rhonchi or rales.   Abdominal:      General: Abdomen is flat. Bowel sounds are normal. There is no distension.      Palpations: Abdomen is soft.      Tenderness: There is no rebound.   Genitourinary:     Comments: Radiation breakdown perineum/rectum.  Musculoskeletal:         General: No swelling or tenderness.   Lymphadenopathy:      Cervical: No cervical adenopathy.   Skin:     Coloration: Skin is not jaundiced.      Findings: No erythema.      Comments: Rectal sore   Neurological:      General: No focal deficit present.      Mental Status: He is alert and oriented to person, place, and time. Mental status is at baseline.      Cranial Nerves: No cranial nerve deficit.   Psychiatric:         Mood and Affect: Mood normal.         Behavior: Behavior normal.         Fluids  No intake or output data in the 24 hours ending 01/08/20  0740    Laboratory  Recent Labs     01/06/20  0027 01/07/20  0021   WBC 0.5* 0.6*   RBC 2.60* 2.24*   HEMOGLOBIN 8.2* 6.8*   HEMATOCRIT 24.7* 21.2*   MCV 95.0 94.6   MCH 31.5 30.4   MCHC 33.2* 32.1*   RDW 54.7* 55.3*   PLATELETCT 7* 17*   MPV  --  11.5     Recent Labs     01/06/20  0027 01/07/20  0021   SODIUM 139 139   POTASSIUM 4.2 4.3   CHLORIDE 115* 116*   CO2 18* 17*   GLUCOSE 125* 138*   BUN 43* 45*   CREATININE 1.51* 1.38   CALCIUM 7.4* 7.3*                   Imaging  DX-CHEST-PORTABLE (1 VIEW)   Final Result      Interstitial opacities in the mid/lower lungs, which may represent early interstitial edema. No focal consolidation or pleural effusions.      US-EXTREMITY VENOUS UPPER UNILAT RIGHT   Final Result      DX-CHEST-FOR LINE PLACEMENT Perform procedure in: Patient's Room   Final Result      1.  Right IJ central catheter tip is in the mid/distal SVC. No pneumothorax.   2.  Mild interstitial prominence, likely mild edema. No focal consolidation or pleural effusions.      EC-ECHOCARDIOGRAM COMPLETE W/O CONT   Final Result      US-EXTREMITY VENOUS UPPER UNILAT RIGHT   Final Result      US-RENAL   Final Result      1.  There is moderate bilateral hydronephrosis, more than is typically expected for history of cystectomy with neobladder formation.      DX-CHEST-PORTABLE (1 VIEW)   Final Result      No radiographic evidence of acute cardiopulmonary process.           Assessment/Plan  * DVT (deep venous thrombosis) ([facility])  Assessment & Plan  PICC line associated  Lovenox discontinued until platelet count >50.  Transition to comfort care measures only since 1/7     Pancytopenia ([facility])- (present on admission)  Assessment & Plan  Status post blood transfusions and Granix.  Transition to comfort care measures only since 1/7    Hypotension- (present on admission)  Assessment & Plan  Midodrine  IVF NS  Transition to comfort care measures only since 1/7     Anal carcinoma ([facility])- (present on admission)  Assessment &  Plan  Chemotherapy given - 5-FU and Mitomycin C - completed  Remove central line  Maceration of perineum/rectum - secondary to radiation - painful requiring IV dilaudid for mansi-care.  Transition to comfort care measures only since 1/7     CKD (chronic kidney disease) stage 3, GFR 30-59 ml/min ([facility])- (present on admission)  Assessment & Plan  Transition to comfort care measures only since 1/7     Hypomagnesemia- (present on admission)  Assessment & Plan  Transition to comfort care measures only since 1/7     Acute respiratory failure with hypoxia ([facility])- (present on admission)  Assessment & Plan  RT protocol    Hydronephrosis, bilateral- (present on admission)  Assessment & Plan  Transition to comfort care measures only since 1/7     Stomatitis- (present on admission)  Assessment & Plan  thrush vs mucositis   Finished course of Nystatin     Type 2 diabetes mellitus without complication, without long-term current use of insulin ([facility])- (present on admission)  Assessment & Plan  Diet controlled  Transition to comfort care measures only since 1/7     MANDIE (acute kidney injury) ([facility])- (present on admission)  Assessment & Plan  Decrease IVF NS, follow bmp   Transition to comfort care measures only since 1/7     COPD (chronic obstructive pulmonary disease) ([facility])- (present on admission)  Assessment & Plan  Spiriva, RT protocol  Transition to comfort care measures only since 1/7     ACP (advance care planning)  Assessment & Plan  On 1/7/2020, I had a prolonged discussion with the patient regarding goals of care, diagnoses, prognosis, and prognosis.  Patient has poor functional performance, advanced age with multiple complications related to treatment of his anal cancer.  Including pancytopenia, acute kidney injury and deep vein thrombosis.  In addition to his comorbid conditions including COPD and chronic kidney disease.    He wants to continue with comfort care measures only.  Stopping antibiotics no further lab tests.   Comfort care measures initiated 1/7/2020     Neutropenic fever ([facility])  Assessment & Plan  Blood culture x 2  Cxr: Interstitial opacities in the mid/lower lungs, which may represent early interstitial edema. 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    Current smoker- (present on admission)  Assessment & Plan  Counseled      VTE prophylaxis: On comfort care

## 2020-01-09 NOTE — PROGRESS NOTES
RE: BEDSIDE REPORT  REPORT ACCEPTED AND CARE ASSUMED     PLAN OF CARE: COMFORT CARE/PAIN MANAGEMENT  D/C PLAN: [facility] AWAITING BED     FALL PRECAUTIONS WITH BED ALARM ON AND HOURLY ROUNDING ONGOING, BED IN LOCKED LOW POSITION, CALL LIGHT WITHIN REACH

## 2020-01-09 NOTE — CARE PLAN
A/O X 3-4; COMFORT CARE AS OF 1/7/2020 ORAL MORPHINE/ATIVAN; RECTAL/SACRAL WOUND; URINARY INCONT.UNABLE TO TOLERATE ZAFAR/CONDOM CATH   D/C PLAN:[facility] AWAITING BED  Problem: Discharge Barriers/Planning  Goal: Patient's continuum of care needs will be met  Outcome: PROGRESSING AS EXPECTED     Problem: Pain Management  Goal: Pain level will decrease to patient's comfort goal  Outcome: PROGRESSING AS EXPECTED

## 2020-01-09 NOTE — PROGRESS NOTES
[facility] Medicine Daily Progress Note    Date of Service  1/9/2020    Chief Complaint  74 y.o. male admitted 12/7/2019 with oral pain and weight loss.    Hospital Course    Patient with known anal cancer undergoing radiation and chemotherapy  He has had significant oral pain and unable to swallow well the past few weeks.  He has a sore on his bottom that he has been applying hemorrhoidal cream with lidocaine for pain.  He had hematuria develop since admission when platelet count was significantly low.   He developed a DVT in RUE and is on anticoagulation with Lovenox.  He has completed chemotherapy and will complete radiation on Monday,  1/6/20.        Interval Problem Update  12/8 Patient feeling tired and blood pressure has been markedly low, he has received 2 Liters of NS bolus and LR has been running at 100/hour since admit.  He still appears dehydrated but is starting to require oxygen to maintain saturation, lungs still sound clear on examination.  He received 1 unit of platelets and urine is clearing in collection tubing from neobladder.  Cr increased overnight with hydration from 1.52 to 1.82 and this could be due to early hydration only and expect it to trend back down.  If pressure does not respond to midodrine and IVF then patient will need to transfer to ICU for pressor support.  Midodrine and solucortef started because of hypotension.    12/9 Patient less fatigued and feeling slightly better when compared to yesterday.  He has been able to eat with pain control received by MBX.  Cr trending back down following IV resuscitation and blood pressure has improved with midodrine and cortef - patient refused transfer to ICU yesterday.  Radiation continued today on schedule.    12/17 Patient with adequate blood pressure with IV hydration but tends to drop when stopped, he states his oral intake is poor as his taste is very bland and nothing is good anymore.  He is agreeable to take an appetite stimulant with  marinol and protein supplements in form of shakes.  Calcium is 6.8 but when corrected for hypoalbuminemia, is 8.4.  PICC line to be removed today.  12/18 Patient feeling okay today, appetite has improved with lunch and dinner yesterday after starting marinol.  Will trial decrease of IVF and monitor blood pressure, he is not showing signs of volume overload.  He didn't eat much breakfast so I will give him tid marinol to see if that helps.    12/19 Patient feeling better today, he got out of bed and took a shower.  Blood pressure is doing about the same as yesterday despite dropping IVF rate from 100/h to 75/h slightly.     12/20 Patient without complaint, blood pressures have been stable so will try to decrease IVF rate further.  Will continue to encourage patient to get out of bed as much as tolerant.  12/21 Patient having nausea and diarrhea today, suspect as s/e of his radiation therapy, he is responding to anti-emetic and I will also start PRN imodium.  No acute events.  BP stable with rate drop of IVF to 50cc/h.  12/22 Patient doing about same with blood pressure, states nausea from yesterday has subsided.  He is eating.  Discussed with Dr Deras and oncology will see him by tomorrow to determine if chemotherapy is appropriate to be given on schedule tomorrow.  12/23 Patient with stable, low blood pressure.  Initially PICC was to be placed today but after speaking with vascular access team, they feel he is too high of a risk for another PICC induced clot and recommend either tunneled central line or PORT.  Patient only has 1 drug that needs to be received via central line (mitomycin for about 45 minute infusion).  He should have an IJ placed for chemotherapy and then this to be discontinued.  12/31 Patient appears weaker than when I saw him last week, he is post chemo and on the final few days of radiation treatment and suffering the side effects of this with skin breakdown in the perineum/rectal area.  His  "blood pressures remain low though stable.  Renal function stable.  1/1 Patient without much change today, RN encouraged him to get up to shower today and he refused secondary to pain but agreed to bed bath with pain medication given.  Improvement in hygiene encouraged to the patient.  ANC dropped with dc of Granix - 1.24 today, resume granix.  1/2 Patient febrile yesterday afternoon, not reported to me.  Febrile again this am and neutropenic fever w/u underway with CXR, blood cultures and UA but patient is not able to provide sample and refuses catheterization of any sort (straight or indwelling).  CXR shows no evidence of infection or consolidation.  Empiric antibiotics started.  Poor prognosis discussed with patient and he changed his code status from full to DNR.    1/3 Patient afebrile since initiation of antibiotics.  He appears slightly better today.  He received blood this am d/t hemoglobin of 6.3.  Blood pressure has remained low but stable for him.  Lovenox discontinued due to platelet drop to 26.  1/4 Patient without complaint today, states he continues to feel weak.  Renal function slightly better with discontinuation of vanco and change to zyvox.  Natali care remains very painful despite IV dilaudid dose given, will increase.   1/5 Patient feeling about the same, feels anxious about possibility of not making it through this.  Will stop Zyvox as suspicion of MRSA is now low.  Cultures showed no growth.  Cr continues to improve.  1/6 Patient feeling poorly today and tells me he thinks he would like to die now and doesn't want to \"do this anymore\".  I asked multiple clarification questions and explained to him that earlier in the week when febrile, he was at risk for getting sicker and passing away but he isn't actively dying.  He is still eating and drinking, but has excruciating pain when going through perineal care from incontinence and he is suffering from this, but not dying.  He states okay and he will " keep fighting.  1/7, Hb low 6.8,, I am transfusing.  Leukopenia WBC 0.6, neutropenia 350, patient is feeling weaker. I had a prolonged discussion with the patient regarding goals of care, diagnoses, prognosis, and prognosis.  Patient has poor functional performance, advanced age with multiple complications related to treatment of his anal cancer.  Including pancytopenia, acute kidney injury and deep vein thrombosis.  In addition to his comorbid conditions including COPD and chronic kidney disease.  He wants to continue with comfort care measures only.  Stopping antibiotics no further lab tests.  I offered to update his friend, Nighat.  However, he did not want me to update her.  Comfort care measures only initiated. I spent 25 minutes on advanced care planning in addition to the time spent managing the other medical problems.   1/8, appears comfortable, denies having uncontrollable pain.  Accepted by [facility] ([place]  Pending placement.   1/9, Accepted by [facility] care coordination working on finding placement, group home. Appears comfortable, I am ordering QuantiFERON gold.  I Discussed with patient, patient's nurse and with multidisciplinary team during rounds including , pharmacist and charge nurse.      Consultants/Specialty  None    Code Status  DNR/DNI, comfort care since 1/7    Disposition  Hospice, [facility]    Review of Systems  Review of Systems   Constitutional: Positive for malaise/fatigue. Negative for chills and fever.   HENT: Negative for congestion and sore throat.    Eyes: Negative for blurred vision and photophobia.   Respiratory: Negative for cough and shortness of breath.    Cardiovascular: Negative for chest pain, claudication and leg swelling.   Gastrointestinal: Positive for diarrhea. Negative for abdominal pain, constipation, heartburn, nausea and vomiting.   Genitourinary: Negative for dysuria, flank pain and hematuria.        Rectal pain     Musculoskeletal: Positive  for myalgias. Negative for joint pain.   Skin: Negative for itching and rash.   Neurological: Positive for weakness. Negative for dizziness, sensory change, speech change and headaches.   Psychiatric/Behavioral: Negative for depression. The patient is not nervous/anxious and does not have insomnia.       Physical Exam  Temp:  [37 °C (98.6 °F)] 37 °C (98.6 °F)  Pulse:  [108] 108  Resp:  [16-17] 16  BP: (84)/(58) 84/58  SpO2:  [95 %] 95 %    Physical Exam  Vitals signs and nursing note reviewed.   Constitutional:       General: He is not in acute distress.     Appearance: Normal appearance.      Comments: Chronically ill-appearing   HENT:      Head: Normocephalic and atraumatic.      Mouth/Throat:      Mouth: Mucous membranes are dry.   Eyes:      General: No scleral icterus.     Extraocular Movements: Extraocular movements intact.   Neck:      Musculoskeletal: Normal range of motion and neck supple.   Cardiovascular:      Rate and Rhythm: Normal rate and regular rhythm.      Pulses: Normal pulses.      Heart sounds: Normal heart sounds. No murmur.   Pulmonary:      Effort: Pulmonary effort is normal. No respiratory distress.      Breath sounds: Normal breath sounds. No wheezing, rhonchi or rales.   Abdominal:      General: Abdomen is flat. Bowel sounds are normal. There is no distension.      Palpations: Abdomen is soft.      Tenderness: There is no rebound.   Genitourinary:     Comments: Radiation breakdown perineum/rectum.  Musculoskeletal:         General: No swelling or tenderness.   Lymphadenopathy:      Cervical: No cervical adenopathy.   Skin:     Coloration: Skin is not jaundiced.      Findings: No erythema.      Comments: Rectal sore   Neurological:      General: No focal deficit present.      Mental Status: He is alert and oriented to person, place, and time. Mental status is at baseline.      Cranial Nerves: No cranial nerve deficit.   Psychiatric:         Mood and Affect: Mood normal.         Behavior:  Behavior normal.         Fluids  No intake or output data in the 24 hours ending 01/09/20 1305    Laboratory  Recent Labs     01/07/20  0021   WBC 0.6*   RBC 2.24*   HEMOGLOBIN 6.8*   HEMATOCRIT 21.2*   MCV 94.6   MCH 30.4   MCHC 32.1*   RDW 55.3*   PLATELETCT 17*   MPV 11.5     Recent Labs     01/07/20  0021   SODIUM 139   POTASSIUM 4.3   CHLORIDE 116*   CO2 17*   GLUCOSE 138*   BUN 45*   CREATININE 1.38   CALCIUM 7.3*                   Imaging  DX-CHEST-PORTABLE (1 VIEW)   Final Result      Interstitial opacities in the mid/lower lungs, which may represent early interstitial edema. No focal consolidation or pleural effusions.      US-EXTREMITY VENOUS UPPER UNILAT RIGHT   Final Result      DX-CHEST-FOR LINE PLACEMENT Perform procedure in: Patient's Room   Final Result      1.  Right IJ central catheter tip is in the mid/distal SVC. No pneumothorax.   2.  Mild interstitial prominence, likely mild edema. No focal consolidation or pleural effusions.      EC-ECHOCARDIOGRAM COMPLETE W/O CONT   Final Result      US-EXTREMITY VENOUS UPPER UNILAT RIGHT   Final Result      US-RENAL   Final Result      1.  There is moderate bilateral hydronephrosis, more than is typically expected for history of cystectomy with neobladder formation.      DX-CHEST-PORTABLE (1 VIEW)   Final Result      No radiographic evidence of acute cardiopulmonary process.           Assessment/Plan  * DVT (deep venous thrombosis) (HCC)  Assessment & Plan  PICC line associated  Lovenox discontinued until platelet count >50.  Transition to comfort care measures only since 1/7     Pancytopenia (HCC)- (present on admission)  Assessment & Plan  Status post blood transfusions and Granix.  Transition to comfort care measures only since 1/7    Hypotension- (present on admission)  Assessment & Plan  Midodrine  IVF NS  Transition to comfort care measures only since 1/7     Anal carcinoma (HCC)- (present on admission)  Assessment & Plan  Chemotherapy given - 5-FU and  Mitomycin C - completed  Remove central line  Maceration of perineum/rectum - secondary to radiation - painful requiring IV dilaudid for mansi-care.  Transition to comfort care measures only since 1/7   Plan to go on hospice to a group home  [facility]     CKD (chronic kidney disease) stage 3, GFR 30-59 ml/min ([facility])- (present on admission)  Assessment & Plan  Transition to comfort care measures only since 1/7     Hypomagnesemia- (present on admission)  Assessment & Plan  Transition to comfort care measures only since 1/7     Acute respiratory failure with hypoxia ([facility])- (present on admission)  Assessment & Plan  RT protocol    Hydronephrosis, bilateral- (present on admission)  Assessment & Plan  Transition to comfort care measures only since 1/7     Stomatitis- (present on admission)  Assessment & Plan  thrush vs mucositis   Finished course of Nystatin     Type 2 diabetes mellitus without complication, without long-term current use of insulin ([facility])- (present on admission)  Assessment & Plan  Diet controlled  Transition to comfort care measures only since 1/7     MANDIE (acute kidney injury) ([facility])- (present on admission)  Assessment & Plan  Decrease IVF NS, follow bmp   Transition to comfort care measures only since 1/7     COPD (chronic obstructive pulmonary disease) ([facility])- (present on admission)  Assessment & Plan  Spiriva, RT protocol  Transition to comfort care measures only since 1/7     ACP (advance care planning)  Assessment & Plan  On 1/7/2020, I had a prolonged discussion with the patient regarding goals of care, diagnoses, prognosis, and prognosis.  Patient has poor functional performance, advanced age with multiple complications related to treatment of his anal cancer.  Including pancytopenia, acute kidney injury and deep vein thrombosis.  In addition to his comorbid conditions including COPD and chronic kidney disease.    He wants to continue with comfort care measures only.  Stopping antibiotics no  further lab tests.  Comfort care measures initiated 1/7/2020     Neutropenic fever (HCC)  Assessment & Plan  Blood culture x 2  Cxr: Interstitial opacities in the mid/lower lungs, which may represent early interstitial edema. 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    Current smoker- (present on admission)  Assessment & Plan  Counseled      VTE prophylaxis: On comfort care

## 2020-01-09 NOTE — HOSPICE
ATTN: Provider/Care management team/Nurses    RE: Referral to [facility]    Thank you for the referral to [facility] for the patient listed above. We have made contact with the patient. To access [facility] documentation, click on Chart Review and then click onto Encounters (left top corner of screen).      If you have any questions or concerns regarding the patient’s transition to [facility], please do not hesitate to contact us at [phone].     We look forward to collaborating with you,  [facility] Care Team

## 2020-01-10 NOTE — PROGRESS NOTES
[facility] Medicine Daily Progress Note    Date of Service  1/10/2020    Chief Complaint  74 y.o. male admitted 12/7/2019 with oral pain and weight loss.    Hospital Course      Patient with known anal cancer undergoing radiation and chemotherapy  He has had significant oral pain and unable to swallow well the past few weeks.  He has a sore on his bottom that he has been applying hemorrhoidal cream with lidocaine for pain.  He had hematuria develop since admission when platelet count was significantly low.   He developed a DVT in RUE and is on anticoagulation with Lovenox.  He has completed chemotherapy and will complete radiation on Monday,  1/6/20.        Interval Problem Update  12/8 Patient feeling tired and blood pressure has been markedly low, he has received 2 Liters of NS bolus and LR has been running at 100/hour since admit.  He still appears dehydrated but is starting to require oxygen to maintain saturation, lungs still sound clear on examination.  He received 1 unit of platelets and urine is clearing in collection tubing from neobladder.  Cr increased overnight with hydration from 1.52 to 1.82 and this could be due to early hydration only and expect it to trend back down.  If pressure does not respond to midodrine and IVF then patient will need to transfer to ICU for pressor support.  Midodrine and solucortef started because of hypotension.    12/9 Patient less fatigued and feeling slightly better when compared to yesterday.  He has been able to eat with pain control received by MBX.  Cr trending back down following IV resuscitation and blood pressure has improved with midodrine and cortef - patient refused transfer to ICU yesterday.  Radiation continued today on schedule.    12/17 Patient with adequate blood pressure with IV hydration but tends to drop when stopped, he states his oral intake is poor as his taste is very bland and nothing is good anymore.  He is agreeable to take an appetite stimulant with  marinol and protein supplements in form of shakes.  Calcium is 6.8 but when corrected for hypoalbuminemia, is 8.4.  PICC line to be removed today.  12/18 Patient feeling okay today, appetite has improved with lunch and dinner yesterday after starting marinol.  Will trial decrease of IVF and monitor blood pressure, he is not showing signs of volume overload.  He didn't eat much breakfast so I will give him tid marinol to see if that helps.    12/19 Patient feeling better today, he got out of bed and took a shower.  Blood pressure is doing about the same as yesterday despite dropping IVF rate from 100/h to 75/h slightly.     12/20 Patient without complaint, blood pressures have been stable so will try to decrease IVF rate further.  Will continue to encourage patient to get out of bed as much as tolerant.  12/21 Patient having nausea and diarrhea today, suspect as s/e of his radiation therapy, he is responding to anti-emetic and I will also start PRN imodium.  No acute events.  BP stable with rate drop of IVF to 50cc/h.  12/22 Patient doing about same with blood pressure, states nausea from yesterday has subsided.  He is eating.  Discussed with Dr Deras and oncology will see him by tomorrow to determine if chemotherapy is appropriate to be given on schedule tomorrow.  12/23 Patient with stable, low blood pressure.  Initially PICC was to be placed today but after speaking with vascular access team, they feel he is too high of a risk for another PICC induced clot and recommend either tunneled central line or PORT.  Patient only has 1 drug that needs to be received via central line (mitomycin for about 45 minute infusion).  He should have an IJ placed for chemotherapy and then this to be discontinued.  12/31 Patient appears weaker than when I saw him last week, he is post chemo and on the final few days of radiation treatment and suffering the side effects of this with skin breakdown in the perineum/rectal area.  His  "blood pressures remain low though stable.  Renal function stable.  1/1 Patient without much change today, RN encouraged him to get up to shower today and he refused secondary to pain but agreed to bed bath with pain medication given.  Improvement in hygiene encouraged to the patient.  ANC dropped with dc of Granix - 1.24 today, resume granix.  1/2 Patient febrile yesterday afternoon, not reported to me.  Febrile again this am and neutropenic fever w/u underway with CXR, blood cultures and UA but patient is not able to provide sample and refuses catheterization of any sort (straight or indwelling).  CXR shows no evidence of infection or consolidation.  Empiric antibiotics started.  Poor prognosis discussed with patient and he changed his code status from full to DNR.    1/3 Patient afebrile since initiation of antibiotics.  He appears slightly better today.  He received blood this am d/t hemoglobin of 6.3.  Blood pressure has remained low but stable for him.  Lovenox discontinued due to platelet drop to 26.  1/4 Patient without complaint today, states he continues to feel weak.  Renal function slightly better with discontinuation of vanco and change to zyvox.  Natali care remains very painful despite IV dilaudid dose given, will increase.   1/5 Patient feeling about the same, feels anxious about possibility of not making it through this.  Will stop Zyvox as suspicion of MRSA is now low.  Cultures showed no growth.  Cr continues to improve.  1/6 Patient feeling poorly today and tells me he thinks he would like to die now and doesn't want to \"do this anymore\".  I asked multiple clarification questions and explained to him that earlier in the week when febrile, he was at risk for getting sicker and passing away but he isn't actively dying.  He is still eating and drinking, but has excruciating pain when going through perineal care from incontinence and he is suffering from this, but not dying.  He states okay and he will " keep fighting.  1/7, Hb low 6.8,, I am transfusing.  Leukopenia WBC 0.6, neutropenia 350, patient is feeling weaker. I had a prolonged discussion with the patient regarding goals of care, diagnoses, prognosis, and prognosis.  Patient has poor functional performance, advanced age with multiple complications related to treatment of his anal cancer.  Including pancytopenia, acute kidney injury and deep vein thrombosis.  In addition to his comorbid conditions including COPD and chronic kidney disease.  He wants to continue with comfort care measures only.  Stopping antibiotics no further lab tests.  I offered to update his friend, Nighat.  However, he did not want me to update her.  Comfort care measures only initiated. I spent 25 minutes on advanced care planning in addition to the time spent managing the other medical problems.   1/8, appears comfortable, denies having uncontrollable pain.  Accepted by [facility].  Pending placement.   1/9, Accepted by [facility], care coordination working on finding placement, group home. Appears comfortable, I am ordering QuantiFERON gold.    1/10, Appears comfortable, Accepted by [facility], care coordination working on finding placement, group home with financial help. I Discussed with patient, patient's nurse and with multidisciplinary team during rounds including , pharmacist and charge nurse.      Consultants/Specialty  None    Code Status  DNR/DNI, comfort care since 1/7    Disposition  Hospice, [facility]    Review of Systems  Review of Systems   Constitutional: Positive for malaise/fatigue. Negative for chills and fever.   HENT: Negative for congestion and sore throat.    Eyes: Negative for blurred vision and photophobia.   Respiratory: Negative for cough and shortness of breath.    Cardiovascular: Negative for chest pain, claudication and leg swelling.   Gastrointestinal: Negative for abdominal pain, constipation, heartburn, nausea and vomiting.    Genitourinary: Negative for dysuria, flank pain and hematuria.        Rectal pain     Musculoskeletal: Negative for joint pain and myalgias.   Skin: Negative for itching and rash.   Neurological: Negative for dizziness, sensory change, speech change and headaches.   Endo/Heme/Allergies: Bruises/bleeds easily.   Psychiatric/Behavioral: Negative for depression. The patient is not nervous/anxious and does not have insomnia.       Physical Exam  Temp:  [37.7 °C (99.8 °F)] 37.7 °C (99.8 °F)  Pulse:  [116] 116  Resp:  [18-20] 20  BP: (84)/(51) 84/51  SpO2:  [96 %] 96 %    Physical Exam  Vitals signs and nursing note reviewed.   Constitutional:       General: He is not in acute distress.     Appearance: Normal appearance.      Comments: Chronically ill-appearing   HENT:      Head: Normocephalic and atraumatic.      Mouth/Throat:      Mouth: Mucous membranes are dry.   Eyes:      General: No scleral icterus.     Extraocular Movements: Extraocular movements intact.   Neck:      Musculoskeletal: Normal range of motion and neck supple.   Cardiovascular:      Rate and Rhythm: Normal rate and regular rhythm.      Pulses: Normal pulses.      Heart sounds: Normal heart sounds. No murmur.   Pulmonary:      Effort: Pulmonary effort is normal. No respiratory distress.      Breath sounds: Normal breath sounds. No wheezing, rhonchi or rales.   Abdominal:      General: Abdomen is flat. Bowel sounds are normal. There is no distension.      Palpations: Abdomen is soft.      Tenderness: There is no rebound.   Genitourinary:     Comments: Radiation breakdown perineum/rectum.  Musculoskeletal:         General: No swelling or tenderness.   Lymphadenopathy:      Cervical: No cervical adenopathy.   Skin:     Coloration: Skin is not jaundiced.      Findings: No erythema.      Comments: Rectal sore   Neurological:      General: No focal deficit present.      Mental Status: He is alert and oriented to person, place, and time. Mental status is at  baseline.      Cranial Nerves: No cranial nerve deficit.   Psychiatric:         Mood and Affect: Mood normal.         Behavior: Behavior normal.         Fluids  No intake or output data in the 24 hours ending 01/10/20 1705    Laboratory                        Imaging  DX-CHEST-PORTABLE (1 VIEW)   Final Result      Interstitial opacities in the mid/lower lungs, which may represent early interstitial edema. No focal consolidation or pleural effusions.      US-EXTREMITY VENOUS UPPER UNILAT RIGHT   Final Result      DX-CHEST-FOR LINE PLACEMENT Perform procedure in: Patient's Room   Final Result      1.  Right IJ central catheter tip is in the mid/distal SVC. No pneumothorax.   2.  Mild interstitial prominence, likely mild edema. No focal consolidation or pleural effusions.      EC-ECHOCARDIOGRAM COMPLETE W/O CONT   Final Result      US-EXTREMITY VENOUS UPPER UNILAT RIGHT   Final Result      US-RENAL   Final Result      1.  There is moderate bilateral hydronephrosis, more than is typically expected for history of cystectomy with neobladder formation.      DX-CHEST-PORTABLE (1 VIEW)   Final Result      No radiographic evidence of acute cardiopulmonary process.           Assessment/Plan  * DVT (deep venous thrombosis) (HCC)  Assessment & Plan  PICC line associated  Lovenox discontinued until platelet count >50.  Transition to comfort care measures only since 1/7     Pancytopenia (HCC)- (present on admission)  Assessment & Plan  Status post blood transfusions and Granix.  Transition to comfort care measures only since 1/7    Hypotension- (present on admission)  Assessment & Plan  Midodrine  IVF NS  Transition to comfort care measures only since 1/7     Anal carcinoma (HCC)- (present on admission)  Assessment & Plan  Chemotherapy given - 5-FU and Mitomycin C - completed  Remove central line  Maceration of perineum/rectum - secondary to radiation - painful requiring IV dilaudid for mansi-care.  Transition to comfort care  measures only since 1/7   Plan to go on hospice to a group home w [facility]     CKD (chronic kidney disease) stage 3, GFR 30-59 ml/min ([facility])- (present on admission)  Assessment & Plan  Transition to comfort care measures only since 1/7     Hypomagnesemia- (present on admission)  Assessment & Plan  Transition to comfort care measures only since 1/7     Acute respiratory failure with hypoxia ([facility])- (present on admission)  Assessment & Plan  RT protocol    Hydronephrosis, bilateral- (present on admission)  Assessment & Plan  Transition to comfort care measures only since 1/7     Stomatitis- (present on admission)  Assessment & Plan  thrush vs mucositis   Finished course of Nystatin     Type 2 diabetes mellitus without complication, without long-term current use of insulin ([facility])- (present on admission)  Assessment & Plan  Diet controlled  Transition to comfort care measures only since 1/7     MANDIE (acute kidney injury) ([facility])- (present on admission)  Assessment & Plan  Decrease IVF NS, follow bmp   Transition to comfort care measures only since 1/7     COPD (chronic obstructive pulmonary disease) ([facility])- (present on admission)  Assessment & Plan  Spiriva, RT protocol  Transition to comfort care measures only since 1/7     ACP (advance care planning)  Assessment & Plan  On 1/7/2020, I had a prolonged discussion with the patient regarding goals of care, diagnoses, prognosis, and prognosis.  Patient has poor functional performance, advanced age with multiple complications related to treatment of his anal cancer.  Including pancytopenia, acute kidney injury and deep vein thrombosis.  In addition to his comorbid conditions including COPD and chronic kidney disease.    He wants to continue with comfort care measures only.  Stopping antibiotics no further lab tests.  Comfort care measures initiated 1/7/2020     Neutropenic fever ([facility])  Assessment & Plan  Blood culture x 2  Cxr: Interstitial opacities in the mid/lower lungs,  which may represent early interstitial edema. 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    Current smoker- (present on admission)  Assessment & Plan  Counseled      VTE prophylaxis: On comfort care

## 2020-01-10 NOTE — PROGRESS NOTES
Patient is alert and oriented. Wound dressing changed early this evening. It was painful for patient dilaudid given. Ordered new supplies. Friends at bedside today. Call light within reach hourly rounding in practice.

## 2020-01-10 NOTE — PROGRESS NOTES
Received shift report from day shift RN. Pt is AO3 disoriented to time. Reports pain in his sacrum and groin area with movement. Rest, repositioned, and medicated per MAR. Denies any N/V. Assessed and discussed POC. Bed alarm on, bed in lowest position, call light and personal belongings within reach, educated to call if in need of assistance, non skid socks, all needs met at this time. Comfort care measures in place

## 2020-01-10 NOTE — CARE PLAN
Problem: Communication  Goal: The ability to communicate needs accurately and effectively will improve  Outcome: PROGRESSING AS EXPECTED  Note:   Pt involved in POC. Addressed questions/concerns     Problem: Safety  Goal: Will remain free from injury  Outcome: PROGRESSING AS EXPECTED  Note:   Bed alarm on, bed in lowest position, call light and personal belongings within reach, educated to call if in need of assistance

## 2020-01-10 NOTE — DISCHARGE PLANNING
Anticipated Discharge Disposition: Group home with [facility] services.    Action: Spoke with Ishaan at [facility]. Ishaan has been in contact with the patient and his friends and are trying find out how to access patient's funds for him.    Barriers to Discharge: finances    Plan: will continue to follow for any discharge needs/barriers

## 2020-01-11 PROBLEM — F17.200 CURRENT SMOKER: Status: RESOLVED | Noted: 2018-05-18 | Resolved: 2020-01-01

## 2020-01-11 NOTE — PROGRESS NOTES
Received report from day RN and assumed care of comfort patient at 1900.  Assessed patient and reviewed notes.  Patient appears to be AOx4 - nods appropriately but does not speak.  Patient lethargic.  Patient medicated overnight for pain per MAR but denied pain at intervals.  In early AM, patient developed copious brown secretions and required frequent suctioning, which he sometimes refused.  Patient breath sounds audibly wet and breathing labored in AM.  Patient positioned upright in bed, sublingual atropine drops administered per MAR, and ativan administered for patient comfort.  Patient appears uncomfortable but refused several comfort measures throughout night.  Staff made aware and frequent rounding and suctioning initiated.  Plan of care reviewed, patient board updated, and safety precautions in place.

## 2020-01-11 NOTE — DISCHARGE SUMMARY
Death Summary    Cause of Death  Cardiopulmonary collapse due to anal cancer and neutropenia and other complications of chemotherapy.     Comorbid Conditions at the Time of Death  Principal Problem:    DVT (deep venous thrombosis) (HCC) POA: No  Active Problems:    Anal carcinoma (HCC) POA: Yes    Hypotension POA: Yes    Pancytopenia (HCC) (Chronic) POA: Yes    COPD (chronic obstructive pulmonary disease) (HCC) POA: Yes    MANDIE (acute kidney injury) (HCC) POA: Yes    Type 2 diabetes mellitus without complication, without long-term current use of insulin (HCC) POA: Yes    Stomatitis POA: Yes    Hydronephrosis, bilateral POA: Yes    Acute respiratory failure with hypoxia (HCC) POA: Yes    Hypomagnesemia POA: Yes    CKD (chronic kidney disease) stage 3, GFR 30-59 ml/min (HCC) POA: Yes    Neutropenic fever (HCC) POA: Unknown    ACP (advance care planning) POA: Unknown    History of bladder cancer POA: Yes    Glaucoma of right eye POA: Yes  Resolved Problems:    Hematuria POA: Yes    Current smoker POA: Yes    History of Presenting Illness and Hospital Course  This is a 74 y.o. male with a past medical history of diabetes milltus, chronic obstructive pulmonary disease, Bladder cancer with a neobladder, tobacco use, and anal cancer admitted 12/7/2019 with progressive pain and weakness.  Got chemotherapy and radiation therapy. Patient got neutropenia and started on antibiotic.  Unfortunately the patient did not improve much and decided to focus on comfort care measures only.  It was transitioned to comfort care on January 7 and past on January 11           Death Date: 01/11/20   Death Time: 0525         Pronounced By (RN1): Lupillo Gutierrez  Pronounced By (RN2): Tala Blair

## 2020-01-11 NOTE — PROGRESS NOTES
Patient found  by CNA and death pronounced by Lupillo Gutierrez, RN and Tala Blair, RN at 0525.  Friend, Marla Ibarra, notified.  She notified second friend,  Kirti, and they are en route to the hospital to say their goodbyes and collect patient belongings.   notified; this is not a 's case, per se; however, the patient will go to the 's office for identification due to no next of kin listed.  [facility].  Hospice RN under LOVE Gray MD, notified of death.  [employer] notified of patient's death; at this time, patient is eligible donor.  Saline placed in eyes and head of bed elevated.  Death data charted as able; after friends' visit, data can be completed and printed.  2 RN pronouncement printed and passed on to day RN to place with patient body and in chart after friends have departed.  [facility] notified of death and mortuary plan.      [employer] no longer following per [employer] employee, Mark    Per patient's friends Marla and Kirti, patient has brother in [place] and  notified.   states she will speak with brother and is no longer on the case and patient can be released to [facility] mortuary - [facility] in [place].    Friends at bedside now.

## 2021-06-30 NOTE — PROGRESS NOTES
Pt A&O x4. Patient resting in bed. Call light and belongings within reach. Pt's wife visited in room.    Received change of shift report from day RN. Assumed pt. Care at 1900. Pt. Aox4, SBA, no s/s of distress. IJ dressing changed, + blood return x3. Chemo infusing. Plan of care discussed, questions answered. Bed in lowest position with wheels locked, bed alarm in place. Call light within reach.

## 2021-07-15 NOTE — FACE TO FACE
"Face to Face Note  -  Durable Medical Equipment    Lionel Lorenzo M.D. - NPI: [id number]  I certify that this patient is under my care and that they had a durable medical equipment(DME)face to face encounter by myself that meets the physician DME face-to-face encounter requirements with this patient on:    Date of encounter:   Patient:                    MRN:                       YOB: 2018  Corona Bean  [id number]  1945     The encounter with the patient was in whole, or in part, for the following medical condition, which is the primary reason for durable medical equipment:  COPD    I certify that, based on my findings, the following durable medical equipment is medically necessary:  Oxygen.    HOME O2 Saturation Measurements:(Values must be present for Home Oxygen orders)  Room air sat at rest: 92  Room air sat with amb: 87  With liters of O2: 1, O2 sat at rest with O2: 95  With Liters of O2: 1, O2 sat with amb with O2 : 94  Is the patient mobile?: Yes    My Clinical findings support the need for the above equipment due to:  Hypoxia    Supporting Symptoms: The patient requires supplemental oxygen, as the following interventions have been tried with limited or no improvement: \"Bronchodilators and/or steroid inhalers    "
Patient

## 2022-07-15 NOTE — PROGRESS NOTES
[facility] Medicine Daily Progress Note    Date of Service  7/15/2022    Chief Complaint  Andrei Garay is a 82 y.o. male admitted 6/29/2022 with left leg swelling and altered mental status    Hospital Course  Andrei Garay is a 82 y.o. male who presented 6/29/2022 with a history of type 2 diabetes, hyperlipidemia, hypertension, osteomyelitis who presents with right leg swelling and altered mental status.     According to the patient's daughter who is visiting him she states that he began becoming more confused and slurring his words yesterday.  He also reports that he had episode of incontinence which is not his normal baseline.  Has been receiving IV daptomycin for left lower leg osteomyelitis which was diagnosed 2 weeks ago, MRSA culture positive.    Emergency department ultrasound of the right lower extremity negative for DVT. Limb preservation services consulted and following.  Completed a bedside debridement and applied new dressings.  Sure of wound management patient was receiving it [facility] facility.  Wound does not appear to be improved from prior hospitalization.  We will consult infectious disease for recommendations and continue to work with LPS for management.      Interval Problem Update  SHREYA overnight, restraints removed, wound VAC remains in place.  Continue to monitor.  Still not having urge to urinate, barbosa in place, bladder training ongoing.        Consultants/Specialty  infectious disease  Limb preservation services    Code Status  DNAR/DNI    Disposition  Patient is not medically cleared for discharge.   Anticipate discharge to to skilled nursing facility.  I have placed the appropriate orders for post-discharge needs.    Review of Systems  Review of Systems   Unable to perform ROS: Mental acuity        Physical Exam  Temp:  [36.6 °C (97.8 °F)-36.7 °C (98.1 °F)] 36.6 °C (97.8 °F)  Pulse:  [74-88] 79  Resp:  [16-18] 16  BP: (129-144)/(54-79) 140/76  SpO2:  [91 %-92 %] 91  [facility] Medicine Daily Progress Note    Date of Service  12/24/2019    Chief Complaint  74 y.o. male admitted 12/7/2019 with acute kidney injury.    Hospital Course  Admitted with acute kidney injury, noted to have PICC line associated DVT, known history of anal cancer.    Interval Problem Update  MANDIE - crea 1.2  DVT - picc line removed  Anal CA - needs central line for chemotherapy  Diabetes - BS mid 100s  Hypotension - sbp 80-90  Resp failure - O2 2 lpm NC    Consultants/Specialty  Oncology  Critical care     Code Status  Full    Disposition  TBD    Review of Systems  Review of Systems   Constitutional: Positive for malaise/fatigue. Negative for chills, diaphoresis and fever.   HENT: Negative for hearing loss and sore throat.    Eyes: Negative for blurred vision.   Respiratory: Negative for cough, shortness of breath and wheezing.    Cardiovascular: Negative for chest pain, palpitations and leg swelling.   Gastrointestinal: Negative for abdominal pain, diarrhea, heartburn, nausea and vomiting.   Genitourinary: Negative for dysuria, flank pain and hematuria.   Musculoskeletal: Negative for back pain and neck pain.   Skin: Negative for rash.   Neurological: Positive for weakness. Negative for dizziness, sensory change, speech change, focal weakness and headaches.   Psychiatric/Behavioral: The patient is not nervous/anxious.         Physical Exam  Temp:  [36.4 °C (97.5 °F)-36.9 °C (98.4 °F)] 36.5 °C (97.7 °F)  Pulse:  [62-81] 81  Resp:  [18-20] 18  BP: (88-96)/(45-60) 88/57  SpO2:  [93 %-98 %] 98 %    Physical Exam  HENT:      Head: Normocephalic and atraumatic.      Nose: No congestion.      Mouth/Throat:      Mouth: Mucous membranes are moist.   Eyes:      Conjunctiva/sclera: Conjunctivae normal.      Pupils: Pupils are equal, round, and reactive to light.   Cardiovascular:      Rate and Rhythm: Normal rate and regular rhythm.   Pulmonary:      Effort: Pulmonary effort is normal.      Breath sounds: Normal breath  %    Physical Exam  Vitals and nursing note reviewed.   Constitutional:       General: He is not in acute distress.     Appearance: He is obese. He is not ill-appearing.   Cardiovascular:      Rate and Rhythm: Normal rate and regular rhythm.   Pulmonary:      Effort: Pulmonary effort is normal. No respiratory distress.   Abdominal:      General: Abdomen is protuberant.   Skin:     Findings: Erythema and signs of injury present.      Comments: Left leg wound vac in place   Neurological:      General: No focal deficit present.      Mental Status: He is disoriented.      Motor: Motor function is intact.   Psychiatric:         Mood and Affect: Affect is flat.         Behavior: Behavior is hyperactive.         Cognition and Memory: Cognition is impaired. Memory is impaired.         Judgment: Judgment is impulsive.         Fluids    Intake/Output Summary (Last 24 hours) at 7/15/2022 1409  Last data filed at 7/14/2022 1700  Gross per 24 hour   Intake --   Output 375 ml   Net -375 ml       Laboratory                        Imaging  US-EXTREMITY VENOUS LOWER UNILAT RIGHT   Final Result      CT-HEAD W/O   Final Result         No acute intracranial abnormality.            DX-ANKLE 3+ VIEWS LEFT   Final Result         Bony destruction in the lateral aspect of the distal fibula is consistent with osteomyelitis described on recent MRI. Overlying soft tissue edema and a small amount of soft tissue gas      DX-CHEST-PORTABLE (1 VIEW)   Final Result      1.  Increased perihilar interstitial markings are suggestive of pulmonary edema.      2.  Stable mild cardiomegaly           Assessment/Plan  * Encephalopathy  Assessment & Plan  Likely secondary to worsening cellulitis of the right leg.    Patient has been receiving IV daptomycin for osteomyelitis of the left leg, will continue    Avoid sedating medications  Improving    Urinary retention  Assessment & Plan  Cont flomax  Failed barbosa trial out  Will replace barbosa and start bladder  sounds.   Abdominal:      General: Bowel sounds are normal. There is no distension.      Palpations: Abdomen is soft.      Tenderness: There is no tenderness. There is no guarding or rebound.   Musculoskeletal:      Right lower leg: No edema.      Left lower leg: No edema.   Skin:     General: Skin is warm and dry.   Neurological:      General: No focal deficit present.      Mental Status: He is alert and oriented to person, place, and time.      Cranial Nerves: No cranial nerve deficit.         Fluids    Intake/Output Summary (Last 24 hours) at 12/24/2019 1327  Last data filed at 12/24/2019 0529  Gross per 24 hour   Intake 1570 ml   Output 3000 ml   Net -1430 ml       Laboratory  Recent Labs     12/22/19  0022 12/23/19  0018 12/24/19  0020   WBC 2.5* 4.5* 2.2*   RBC 3.14* 3.50* 2.79*   HEMOGLOBIN 10.1* 11.4* 9.0*   HEMATOCRIT 30.6* 34.0* 27.3*   MCV 97.5 97.1 97.8   MCH 32.2 32.6 32.3   MCHC 33.0* 33.5* 33.0*   RDW 47.0 47.8 48.6   PLATELETCT 162* 163* 134*   MPV 10.0 9.7 10.0     Recent Labs     12/22/19  0022 12/23/19  0018 12/24/19  0020   SODIUM 137 136 134*   POTASSIUM 3.7 3.8 4.0   CHLORIDE 110 109 108   CO2 22 22 21   GLUCOSE 148* 162* 136*   BUN 24* 21 24*   CREATININE 1.31 1.03 1.25   CALCIUM 7.0* 7.0* 6.8*                   Imaging  DX-CHEST-FOR LINE PLACEMENT Perform procedure in: Patient's Room   Final Result      1.  Right IJ central catheter tip is in the mid/distal SVC. No pneumothorax.   2.  Mild interstitial prominence, likely mild edema. No focal consolidation or pleural effusions.      EC-ECHOCARDIOGRAM COMPLETE W/O CONT   Final Result      US-EXTREMITY VENOUS UPPER UNILAT RIGHT   Final Result      US-RENAL   Final Result      1.  There is moderate bilateral hydronephrosis, more than is typically expected for history of cystectomy with neobladder formation.      DX-CHEST-PORTABLE (1 VIEW)   Final Result      No radiographic evidence of acute cardiopulmonary process.           Assessment/Plan  *  DVT (deep venous thrombosis) (HCC)  Assessment & Plan  PICC line associated  Lovenox      Hypotension- (present on admission)  Assessment & Plan  Midodrine  Increase IVF NS      Anal carcinoma (HCC)- (present on admission)  Assessment & Plan  Central line placement -critical care consulted  For chemotherapy        Acute respiratory failure with hypoxia (HCC)- (present on admission)  Assessment & Plan  RT protocol    Hydronephrosis, bilateral- (present on admission)  Assessment & Plan  Follow bmp closely    Pancytopenia (HCC)- (present on admission)  Assessment & Plan  Transfuse 1 unit platelets  Follow CBC    Hematuria- (present on admission)  Assessment & Plan  Resolved        Stomatitis- (present on admission)  Assessment & Plan  thrush vs mucositis   Nystatin    Type 2 diabetes mellitus without complication, without long-term current use of insulin (HCC)- (present on admission)  Assessment & Plan  SSI    MANDIE (acute kidney injury) (HCC)- (present on admission)  Assessment & Plan  Increase IVF NS  follow bmp, check PTH    COPD (chronic obstructive pulmonary disease) ([facility])- (present on admission)  Assessment & Plan  Spiriva, RT protocol    Glaucoma of right eye- (present on admission)  Assessment & Plan  timolol eyedrops    History of bladder cancer- (present on admission)  Assessment & Plan  Neobladder.    Current smoker- (present on admission)  Assessment & Plan  counseling       VTE prophylaxis: Lovenox       training      Osteomyelitis (HCC)- (present on admission)  Assessment & Plan  Patient with osteomyelitis currently on daptomycin.   CRP mildly elevated  LPS    Type 2 diabetes mellitus (HCC)- (present on admission)  Assessment & Plan  Last A1c was 6.33 weeks ago.  Hold metformin  Insulin sliding scale  Diabetic diet    Cellulitis- (present on admission)  Assessment & Plan  Daughter reports that the patient's right leg is more red and swollen than normal.  Doppler ultrasound does not show DVT  ID consulted, patient reportedly had removed his wound VAC while at SNF  Continue the daptomycin (until 7/23) and Unasyn (until 7/6)    Dementia with behavioral disturbance (HCC)- (present on admission)  Assessment & Plan  History of dementia.  Oriented x 2, unsure of baseline  seroqeul 12.5mg to facilitate off restraints    Hypertension- (present on admission)  Assessment & Plan  Continue home medication lisinopril and metoprolol      Hyperlipidemia- (present on admission)  Assessment & Plan  Continue home med atorvastatin       VTE prophylaxis: enoxaparin ppx    I have performed a physical exam and reviewed and updated ROS and Plan today (7/15/2022). In review of yesterday's note (7/14/2022), there are no changes except as documented above.

## (undated) DEVICE — MANIFOLD NEPTUNE 1 PORT (20/PK)

## (undated) DEVICE — FILM CASSETTE ENDO

## (undated) DEVICE — CONTAINER, SPECIMEN, STERILE

## (undated) DEVICE — BASIN EMESIS DISP. - (250/CA)

## (undated) DEVICE — GLOVE BIOGEL ECLIPSE PF LATEX SIZE 8.0  (50PR/BX)

## (undated) DEVICE — GLOVE BIOGEL ECLIPSE  PF LATEX SIZE 6.5 (50PR/BX)

## (undated) DEVICE — CATHERTER CLEAR SINGLE USE INJECTION THERAPY NEEDLE 25GA X 4MM  2.3MM X 240CM (5EA/BX)

## (undated) DEVICE — TUBE NG SALEM SUMP 14FR (50EA/BX)

## (undated) DEVICE — GOWN SURGEONS X-LARGE - DISP. (30/CA)

## (undated) DEVICE — GLOVE, BIOGEL ECLIPSE, SZ 7.0, PF LTX (50/BX)

## (undated) DEVICE — SENSOR SPO2 NEO LNCS ADHESIVE (20/BX) SEE USER NOTES

## (undated) DEVICE — CATHETER IV 20 GA X 1-1/4 ---SURG.& SDS ONLY--- (50EA/BX)

## (undated) DEVICE — SPONGE GAUZE NON-STERILE 4X4 - (2000/CA 10PK/CA)

## (undated) DEVICE — Device

## (undated) DEVICE — NEPTUNE 4 PORT MANIFOLD - (20/PK)

## (undated) DEVICE — GLOVE BIOGEL ECLIPSE PF LATEX SIZE 7.5

## (undated) DEVICE — TUBE CONNECTING SUCTION - CLEAR PLASTIC STERILE 72 IN (50EA/CA)

## (undated) DEVICE — KIT CUSTOM PROCEDURE SINGLE FOR ENDO  (15/CA)

## (undated) DEVICE — CANISTER SUCTION RIGID RED 1500CC (40EA/CA)